# Patient Record
Sex: MALE | Race: OTHER | NOT HISPANIC OR LATINO | URBAN - METROPOLITAN AREA
[De-identification: names, ages, dates, MRNs, and addresses within clinical notes are randomized per-mention and may not be internally consistent; named-entity substitution may affect disease eponyms.]

---

## 2017-03-02 ENCOUNTER — INPATIENT (INPATIENT)
Facility: HOSPITAL | Age: 72
LOS: 3 days | Discharge: HOME | DRG: 191 | End: 2017-03-06
Attending: FAMILY MEDICINE | Admitting: FAMILY MEDICINE
Payer: MEDICARE

## 2017-03-02 VITALS
RESPIRATION RATE: 30 BRPM | SYSTOLIC BLOOD PRESSURE: 177 MMHG | WEIGHT: 160.06 LBS | HEART RATE: 130 BPM | HEIGHT: 67 IN | OXYGEN SATURATION: 89 % | DIASTOLIC BLOOD PRESSURE: 77 MMHG

## 2017-03-02 DIAGNOSIS — Z98.89 OTHER SPECIFIED POSTPROCEDURAL STATES: Chronic | ICD-10-CM

## 2017-03-02 DIAGNOSIS — Z12.11 ENCOUNTER FOR SCREENING FOR MALIGNANT NEOPLASM OF COLON: Chronic | ICD-10-CM

## 2017-03-02 LAB
ALBUMIN SERPL ELPH-MCNC: 3.1 G/DL — LOW (ref 3.5–5)
ALP SERPL-CCNC: 91 U/L — SIGNIFICANT CHANGE UP (ref 40–120)
ALT FLD-CCNC: 27 U/L DA — SIGNIFICANT CHANGE UP (ref 10–60)
ANION GAP SERPL CALC-SCNC: 5 MMOL/L — SIGNIFICANT CHANGE UP (ref 5–17)
APTT BLD: 29.5 SEC — SIGNIFICANT CHANGE UP (ref 27.5–37.4)
AST SERPL-CCNC: 65 U/L — HIGH (ref 10–40)
BASOPHILS # BLD AUTO: 0.2 K/UL — SIGNIFICANT CHANGE UP (ref 0–0.2)
BASOPHILS NFR BLD AUTO: 1.2 % — SIGNIFICANT CHANGE UP (ref 0–2)
BILIRUB SERPL-MCNC: 0.5 MG/DL — SIGNIFICANT CHANGE UP (ref 0.2–1.2)
BUN SERPL-MCNC: 20 MG/DL — HIGH (ref 7–18)
CALCIUM SERPL-MCNC: 8.6 MG/DL — SIGNIFICANT CHANGE UP (ref 8.4–10.5)
CHLORIDE SERPL-SCNC: 99 MMOL/L — SIGNIFICANT CHANGE UP (ref 96–108)
CO2 SERPL-SCNC: 32 MMOL/L — HIGH (ref 22–31)
CREAT SERPL-MCNC: 1.06 MG/DL — SIGNIFICANT CHANGE UP (ref 0.5–1.3)
EOSINOPHIL # BLD AUTO: 0.1 K/UL — SIGNIFICANT CHANGE UP (ref 0–0.5)
EOSINOPHIL NFR BLD AUTO: 0.7 % — SIGNIFICANT CHANGE UP (ref 0–6)
GLUCOSE SERPL-MCNC: 165 MG/DL — HIGH (ref 70–99)
HCT VFR BLD CALC: 46.6 % — SIGNIFICANT CHANGE UP (ref 39–50)
HGB BLD-MCNC: 14.7 G/DL — SIGNIFICANT CHANGE UP (ref 13–17)
INR BLD: 1.08 RATIO — SIGNIFICANT CHANGE UP (ref 0.88–1.16)
LYMPHOCYTES # BLD AUTO: 1.9 K/UL — SIGNIFICANT CHANGE UP (ref 1–3.3)
LYMPHOCYTES # BLD AUTO: 12.7 % — LOW (ref 13–44)
MCHC RBC-ENTMCNC: 29.2 PG — SIGNIFICANT CHANGE UP (ref 27–34)
MCHC RBC-ENTMCNC: 31.5 GM/DL — LOW (ref 32–36)
MCV RBC AUTO: 92.6 FL — SIGNIFICANT CHANGE UP (ref 80–100)
MONOCYTES # BLD AUTO: 1 K/UL — HIGH (ref 0–0.9)
MONOCYTES NFR BLD AUTO: 7.2 % — SIGNIFICANT CHANGE UP (ref 2–14)
NEUTROPHILS # BLD AUTO: 11.4 K/UL — HIGH (ref 1.8–7.4)
NEUTROPHILS NFR BLD AUTO: 78.3 % — HIGH (ref 43–77)
PLATELET # BLD AUTO: 279 K/UL — SIGNIFICANT CHANGE UP (ref 150–400)
POTASSIUM SERPL-MCNC: 5.8 MMOL/L — HIGH (ref 3.5–5.3)
POTASSIUM SERPL-SCNC: 5.8 MMOL/L — HIGH (ref 3.5–5.3)
PROT SERPL-MCNC: 9.1 G/DL — HIGH (ref 6–8.3)
PROTHROM AB SERPL-ACNC: 12.1 SEC — SIGNIFICANT CHANGE UP (ref 10–13.1)
RBC # BLD: 5.03 M/UL — SIGNIFICANT CHANGE UP (ref 4.2–5.8)
RBC # FLD: 14.4 % — SIGNIFICANT CHANGE UP (ref 10.3–14.5)
SODIUM SERPL-SCNC: 136 MMOL/L — SIGNIFICANT CHANGE UP (ref 135–145)
WBC # BLD: 14.6 K/UL — HIGH (ref 3.8–10.5)
WBC # FLD AUTO: 14.6 K/UL — HIGH (ref 3.8–10.5)

## 2017-03-02 PROCEDURE — 71020: CPT | Mod: 26

## 2017-03-02 RX ORDER — IPRATROPIUM/ALBUTEROL SULFATE 18-103MCG
3 AEROSOL WITH ADAPTER (GRAM) INHALATION ONCE
Qty: 0 | Refills: 0 | Status: COMPLETED | OUTPATIENT
Start: 2017-03-02 | End: 2017-03-02

## 2017-03-02 RX ADMIN — Medication 3 MILLILITER(S): at 23:29

## 2017-03-02 RX ADMIN — Medication 3 MILLILITER(S): at 23:20

## 2017-03-02 RX ADMIN — Medication 125 MILLIGRAM(S): at 23:20

## 2017-03-02 NOTE — ED ADULT NURSE NOTE - PMH
Asthma    COPD (chronic obstructive pulmonary disease)    DM (diabetes mellitus)    HTN (hypertension)    Hyperlipemia

## 2017-03-03 DIAGNOSIS — I10 ESSENTIAL (PRIMARY) HYPERTENSION: ICD-10-CM

## 2017-03-03 DIAGNOSIS — I21.4 NON-ST ELEVATION (NSTEMI) MYOCARDIAL INFARCTION: ICD-10-CM

## 2017-03-03 DIAGNOSIS — E11.9 TYPE 2 DIABETES MELLITUS WITHOUT COMPLICATIONS: ICD-10-CM

## 2017-03-03 DIAGNOSIS — G47.33 OBSTRUCTIVE SLEEP APNEA (ADULT) (PEDIATRIC): ICD-10-CM

## 2017-03-03 DIAGNOSIS — E78.5 HYPERLIPIDEMIA, UNSPECIFIED: ICD-10-CM

## 2017-03-03 DIAGNOSIS — R79.89 OTHER SPECIFIED ABNORMAL FINDINGS OF BLOOD CHEMISTRY: ICD-10-CM

## 2017-03-03 DIAGNOSIS — Z41.8 ENCOUNTER FOR OTHER PROCEDURES FOR PURPOSES OTHER THAN REMEDYING HEALTH STATE: ICD-10-CM

## 2017-03-03 DIAGNOSIS — J44.1 CHRONIC OBSTRUCTIVE PULMONARY DISEASE WITH (ACUTE) EXACERBATION: ICD-10-CM

## 2017-03-03 LAB
24R-OH-CALCIDIOL SERPL-MCNC: 16.5 NG/ML — LOW (ref 30–100)
ALBUMIN SERPL ELPH-MCNC: 3.1 G/DL — LOW (ref 3.5–5)
ALP SERPL-CCNC: 77 U/L — SIGNIFICANT CHANGE UP (ref 40–120)
ALT FLD-CCNC: 22 U/L DA — SIGNIFICANT CHANGE UP (ref 10–60)
ANION GAP SERPL CALC-SCNC: 8 MMOL/L — SIGNIFICANT CHANGE UP (ref 5–17)
APTT BLD: 21.8 SEC — LOW (ref 27.5–37.4)
AST SERPL-CCNC: 24 U/L — SIGNIFICANT CHANGE UP (ref 10–40)
BASE EXCESS BLDA CALC-SCNC: 7.3 MMOL/L — HIGH (ref -2–2)
BASOPHILS # BLD AUTO: 0.1 K/UL — SIGNIFICANT CHANGE UP (ref 0–0.2)
BASOPHILS NFR BLD AUTO: 0.5 % — SIGNIFICANT CHANGE UP (ref 0–2)
BILIRUB SERPL-MCNC: 0.3 MG/DL — SIGNIFICANT CHANGE UP (ref 0.2–1.2)
BLOOD GAS COMMENTS ARTERIAL: SIGNIFICANT CHANGE UP
BUN SERPL-MCNC: 19 MG/DL — HIGH (ref 7–18)
CALCIUM SERPL-MCNC: 8.5 MG/DL — SIGNIFICANT CHANGE UP (ref 8.4–10.5)
CHLORIDE SERPL-SCNC: 97 MMOL/L — SIGNIFICANT CHANGE UP (ref 96–108)
CHOLEST SERPL-MCNC: 147 MG/DL — SIGNIFICANT CHANGE UP (ref 10–199)
CK MB BLD-MCNC: 1.1 % — SIGNIFICANT CHANGE UP (ref 0–3.5)
CK MB BLD-MCNC: 1.1 % — SIGNIFICANT CHANGE UP (ref 0–3.5)
CK MB CFR SERPL CALC: 2.7 NG/ML — SIGNIFICANT CHANGE UP (ref 0–3.6)
CK MB CFR SERPL CALC: 2.7 NG/ML — SIGNIFICANT CHANGE UP (ref 0–3.6)
CK SERPL-CCNC: 236 U/L — HIGH (ref 35–232)
CK SERPL-CCNC: 250 U/L — HIGH (ref 35–232)
CK SERPL-CCNC: 382 U/L — HIGH (ref 35–232)
CO2 SERPL-SCNC: 35 MMOL/L — HIGH (ref 22–31)
CREAT SERPL-MCNC: 1.12 MG/DL — SIGNIFICANT CHANGE UP (ref 0.5–1.3)
EOSINOPHIL # BLD AUTO: 0 K/UL — SIGNIFICANT CHANGE UP (ref 0–0.5)
EOSINOPHIL NFR BLD AUTO: 0 % — SIGNIFICANT CHANGE UP (ref 0–6)
FOLATE SERPL-MCNC: 14.6 NG/ML — SIGNIFICANT CHANGE UP (ref 4.8–24.2)
GLUCOSE SERPL-MCNC: 210 MG/DL — HIGH (ref 70–99)
HBA1C BLD-MCNC: 7.4 % — HIGH (ref 4–5.6)
HCO3 BLDA-SCNC: 32 MMOL/L — HIGH (ref 23–27)
HCT VFR BLD CALC: 40.4 % — SIGNIFICANT CHANGE UP (ref 39–50)
HDLC SERPL-MCNC: 48 MG/DL — SIGNIFICANT CHANGE UP (ref 40–125)
HGB BLD-MCNC: 13.1 G/DL — SIGNIFICANT CHANGE UP (ref 13–17)
HOROWITZ INDEX BLDA+IHG-RTO: 28 — SIGNIFICANT CHANGE UP
INR BLD: 1.11 RATIO — SIGNIFICANT CHANGE UP (ref 0.88–1.16)
LACTATE SERPL-SCNC: 2.1 MMOL/L — HIGH (ref 0.7–2)
LIPID PNL WITH DIRECT LDL SERPL: 82 MG/DL — SIGNIFICANT CHANGE UP
LYMPHOCYTES # BLD AUTO: 0.5 K/UL — LOW (ref 1–3.3)
LYMPHOCYTES # BLD AUTO: 3.9 % — LOW (ref 13–44)
MAGNESIUM SERPL-MCNC: 1.7 MG/DL — LOW (ref 1.8–2.4)
MCHC RBC-ENTMCNC: 29.6 PG — SIGNIFICANT CHANGE UP (ref 27–34)
MCHC RBC-ENTMCNC: 32.4 GM/DL — SIGNIFICANT CHANGE UP (ref 32–36)
MCV RBC AUTO: 91.2 FL — SIGNIFICANT CHANGE UP (ref 80–100)
MONOCYTES # BLD AUTO: 0.3 K/UL — SIGNIFICANT CHANGE UP (ref 0–0.9)
MONOCYTES NFR BLD AUTO: 2.1 % — SIGNIFICANT CHANGE UP (ref 2–14)
NEUTROPHILS # BLD AUTO: 11.8 K/UL — HIGH (ref 1.8–7.4)
NEUTROPHILS NFR BLD AUTO: 93.6 % — HIGH (ref 43–77)
NT-PROBNP SERPL-SCNC: 1464 PG/ML — HIGH (ref 0–125)
NT-PROBNP SERPL-SCNC: 1735 PG/ML — HIGH (ref 0–125)
PCO2 BLDA: 49 MMHG — HIGH (ref 32–46)
PH BLDA: 7.44 — SIGNIFICANT CHANGE UP (ref 7.35–7.45)
PLATELET # BLD AUTO: 237 K/UL — SIGNIFICANT CHANGE UP (ref 150–400)
PO2 BLDA: 68 MMHG — LOW (ref 74–108)
POTASSIUM SERPL-MCNC: 3.4 MMOL/L — LOW (ref 3.5–5.3)
POTASSIUM SERPL-SCNC: 3.4 MMOL/L — LOW (ref 3.5–5.3)
PROT SERPL-MCNC: 8.2 G/DL — SIGNIFICANT CHANGE UP (ref 6–8.3)
PROTHROM AB SERPL-ACNC: 12.5 SEC — SIGNIFICANT CHANGE UP (ref 10–13.1)
RAPID RVP RESULT: SIGNIFICANT CHANGE UP
RBC # BLD: 4.43 M/UL — SIGNIFICANT CHANGE UP (ref 4.2–5.8)
RBC # FLD: 14 % — SIGNIFICANT CHANGE UP (ref 10.3–14.5)
SAO2 % BLDA: 94 % — SIGNIFICANT CHANGE UP (ref 92–96)
SODIUM SERPL-SCNC: 140 MMOL/L — SIGNIFICANT CHANGE UP (ref 135–145)
TOTAL CHOLESTEROL/HDL RATIO MEASUREMENT: 3.1 RATIO — LOW (ref 3.4–9.6)
TRIGL SERPL-MCNC: 86 MG/DL — SIGNIFICANT CHANGE UP (ref 10–149)
TROPONIN I SERPL-MCNC: 0.36 NG/ML — HIGH (ref 0–0.04)
TROPONIN I SERPL-MCNC: 0.51 NG/ML — HIGH (ref 0–0.04)
TROPONIN I SERPL-MCNC: 0.74 NG/ML — HIGH (ref 0–0.04)
TSH SERPL-MCNC: 0.42 UU/ML — SIGNIFICANT CHANGE UP (ref 0.34–4.82)
VIT B12 SERPL-MCNC: 649 PG/ML — SIGNIFICANT CHANGE UP (ref 243–894)
WBC # BLD: 12.6 K/UL — HIGH (ref 3.8–10.5)
WBC # FLD AUTO: 12.6 K/UL — HIGH (ref 3.8–10.5)

## 2017-03-03 PROCEDURE — 99285 EMERGENCY DEPT VISIT HI MDM: CPT | Mod: 25

## 2017-03-03 RX ORDER — INSULIN HUMAN 100 [IU]/ML
10 INJECTION, SOLUTION SUBCUTANEOUS ONCE
Qty: 0 | Refills: 0 | Status: COMPLETED | OUTPATIENT
Start: 2017-03-03 | End: 2017-03-03

## 2017-03-03 RX ORDER — CALCIUM GLUCONATE 100 MG/ML
1 VIAL (ML) INTRAVENOUS ONCE
Qty: 0 | Refills: 0 | Status: COMPLETED | OUTPATIENT
Start: 2017-03-03 | End: 2017-03-03

## 2017-03-03 RX ORDER — SODIUM BICARBONATE 1 MEQ/ML
50 SYRINGE (ML) INTRAVENOUS ONCE
Qty: 0 | Refills: 0 | Status: COMPLETED | OUTPATIENT
Start: 2017-03-03 | End: 2017-03-03

## 2017-03-03 RX ORDER — ACETAMINOPHEN 500 MG
650 TABLET ORAL EVERY 6 HOURS
Qty: 0 | Refills: 0 | Status: DISCONTINUED | OUTPATIENT
Start: 2017-03-03 | End: 2017-03-06

## 2017-03-03 RX ORDER — SIMVASTATIN 20 MG/1
20 TABLET, FILM COATED ORAL AT BEDTIME
Qty: 0 | Refills: 0 | Status: DISCONTINUED | OUTPATIENT
Start: 2017-03-03 | End: 2017-03-06

## 2017-03-03 RX ORDER — INSULIN LISPRO 100/ML
VIAL (ML) SUBCUTANEOUS
Qty: 0 | Refills: 0 | Status: DISCONTINUED | OUTPATIENT
Start: 2017-03-03 | End: 2017-03-04

## 2017-03-03 RX ORDER — NITROGLYCERIN 6.5 MG
0.4 CAPSULE, EXTENDED RELEASE ORAL ONCE
Qty: 0 | Refills: 0 | Status: COMPLETED | OUTPATIENT
Start: 2017-03-03 | End: 2017-03-03

## 2017-03-03 RX ORDER — GABAPENTIN 400 MG/1
400 CAPSULE ORAL
Qty: 0 | Refills: 0 | Status: DISCONTINUED | OUTPATIENT
Start: 2017-03-03 | End: 2017-03-06

## 2017-03-03 RX ORDER — IPRATROPIUM/ALBUTEROL SULFATE 18-103MCG
3 AEROSOL WITH ADAPTER (GRAM) INHALATION EVERY 6 HOURS
Qty: 0 | Refills: 0 | Status: DISCONTINUED | OUTPATIENT
Start: 2017-03-03 | End: 2017-03-04

## 2017-03-03 RX ORDER — ENOXAPARIN SODIUM 100 MG/ML
40 INJECTION SUBCUTANEOUS DAILY
Qty: 0 | Refills: 0 | Status: DISCONTINUED | OUTPATIENT
Start: 2017-03-03 | End: 2017-03-06

## 2017-03-03 RX ORDER — MAGNESIUM SULFATE 500 MG/ML
1 VIAL (ML) INJECTION ONCE
Qty: 0 | Refills: 0 | Status: COMPLETED | OUTPATIENT
Start: 2017-03-03 | End: 2017-03-03

## 2017-03-03 RX ORDER — DEXTROSE 50 % IN WATER 50 %
50 SYRINGE (ML) INTRAVENOUS ONCE
Qty: 0 | Refills: 0 | Status: COMPLETED | OUTPATIENT
Start: 2017-03-03 | End: 2017-03-03

## 2017-03-03 RX ORDER — FUROSEMIDE 40 MG
40 TABLET ORAL ONCE
Qty: 0 | Refills: 0 | Status: COMPLETED | OUTPATIENT
Start: 2017-03-03 | End: 2017-03-03

## 2017-03-03 RX ORDER — ASPIRIN/CALCIUM CARB/MAGNESIUM 324 MG
162 TABLET ORAL DAILY
Qty: 0 | Refills: 0 | Status: DISCONTINUED | OUTPATIENT
Start: 2017-03-03 | End: 2017-03-06

## 2017-03-03 RX ORDER — POTASSIUM CHLORIDE 20 MEQ
40 PACKET (EA) ORAL ONCE
Qty: 0 | Refills: 0 | Status: COMPLETED | OUTPATIENT
Start: 2017-03-03 | End: 2017-03-03

## 2017-03-03 RX ORDER — BUDESONIDE AND FORMOTEROL FUMARATE DIHYDRATE 160; 4.5 UG/1; UG/1
2 AEROSOL RESPIRATORY (INHALATION)
Qty: 0 | Refills: 0 | Status: DISCONTINUED | OUTPATIENT
Start: 2017-03-03 | End: 2017-03-06

## 2017-03-03 RX ORDER — NICOTINE POLACRILEX 2 MG
1 GUM BUCCAL DAILY
Qty: 0 | Refills: 0 | Status: DISCONTINUED | OUTPATIENT
Start: 2017-03-03 | End: 2017-03-06

## 2017-03-03 RX ORDER — LOSARTAN POTASSIUM 100 MG/1
50 TABLET, FILM COATED ORAL DAILY
Qty: 0 | Refills: 0 | Status: DISCONTINUED | OUTPATIENT
Start: 2017-03-03 | End: 2017-03-06

## 2017-03-03 RX ADMIN — GABAPENTIN 400 MILLIGRAM(S): 400 CAPSULE ORAL at 11:44

## 2017-03-03 RX ADMIN — GABAPENTIN 400 MILLIGRAM(S): 400 CAPSULE ORAL at 05:25

## 2017-03-03 RX ADMIN — Medication 3 MILLILITER(S): at 15:40

## 2017-03-03 RX ADMIN — Medication 5: at 21:43

## 2017-03-03 RX ADMIN — Medication 2: at 16:44

## 2017-03-03 RX ADMIN — Medication 40 MILLIGRAM(S): at 05:25

## 2017-03-03 RX ADMIN — Medication 1 PATCH: at 05:05

## 2017-03-03 RX ADMIN — INSULIN HUMAN 10 UNIT(S): 100 INJECTION, SOLUTION SUBCUTANEOUS at 00:33

## 2017-03-03 RX ADMIN — Medication 50 MILLILITER(S): at 00:32

## 2017-03-03 RX ADMIN — GABAPENTIN 400 MILLIGRAM(S): 400 CAPSULE ORAL at 23:31

## 2017-03-03 RX ADMIN — Medication 3 MILLILITER(S): at 20:55

## 2017-03-03 RX ADMIN — LOSARTAN POTASSIUM 50 MILLIGRAM(S): 100 TABLET, FILM COATED ORAL at 05:36

## 2017-03-03 RX ADMIN — Medication 3 MILLILITER(S): at 10:01

## 2017-03-03 RX ADMIN — Medication 4: at 11:44

## 2017-03-03 RX ADMIN — Medication 200 GRAM(S): at 00:33

## 2017-03-03 RX ADMIN — Medication 1: at 08:32

## 2017-03-03 RX ADMIN — Medication 100 GRAM(S): at 11:27

## 2017-03-03 RX ADMIN — Medication 40 MILLIGRAM(S): at 17:34

## 2017-03-03 RX ADMIN — Medication 50 MILLIEQUIVALENT(S): at 00:32

## 2017-03-03 RX ADMIN — Medication 0.4 MILLIGRAM(S): at 00:33

## 2017-03-03 RX ADMIN — ENOXAPARIN SODIUM 40 MILLIGRAM(S): 100 INJECTION SUBCUTANEOUS at 11:36

## 2017-03-03 RX ADMIN — Medication 40 MILLIGRAM(S): at 00:33

## 2017-03-03 RX ADMIN — SIMVASTATIN 20 MILLIGRAM(S): 20 TABLET, FILM COATED ORAL at 21:44

## 2017-03-03 RX ADMIN — GABAPENTIN 400 MILLIGRAM(S): 400 CAPSULE ORAL at 17:34

## 2017-03-03 RX ADMIN — Medication 40 MILLIGRAM(S): at 11:27

## 2017-03-03 RX ADMIN — Medication 40 MILLIEQUIVALENT(S): at 08:32

## 2017-03-03 RX ADMIN — Medication 162 MILLIGRAM(S): at 02:37

## 2017-03-03 RX ADMIN — Medication 3 MILLILITER(S): at 02:37

## 2017-03-03 NOTE — ED ADULT NURSE REASSESSMENT NOTE - NS ED NURSE REASSESS COMMENT FT1
Patient was endorsed to RN Daniella hemodynamically stable and with no complaints. Patient refused bipap. ON 2L O2.

## 2017-03-03 NOTE — ED PROVIDER NOTE - CARE PLAN
Principal Discharge DX:	NSTEMI (non-ST elevated myocardial infarction)  Secondary Diagnosis:	COPD (chronic obstructive pulmonary disease)  Secondary Diagnosis:	CHF (congestive heart failure)

## 2017-03-03 NOTE — ED PROVIDER NOTE - OBJECTIVE STATEMENT
72 y/o M with PMHx of COPD, DM, asthma, HTN, HLD, presents to ED c/o shortness of breath x yesterday with associated coughing productive of yellow sputum and wheezing. Pt uses albuterol pump at home, with no relief in this episode. Pt is not on home O2. Denies any sick contacts or recent travel. Pt endorses some chest tightness, and notes similarity of episodes. Denies leg swelling or any other complaints. NKDA.

## 2017-03-03 NOTE — ED PROVIDER NOTE - NS ED MD SCRIBE ATTENDING SCRIBE SECTIONS
VITAL SIGNS( Pullset)/PAST MEDICAL/SURGICAL/SOCIAL HISTORY/PHYSICAL EXAM/DISPOSITION/HISTORY OF PRESENT ILLNESS/REVIEW OF SYSTEMS

## 2017-03-03 NOTE — H&P ADULT. - ASSESSMENT
70 y/o Male from home, lives with family , active smoker w/ PMHx COPD (never intubated, not on home oxygen), YUE on cpap at [facility], DM, asthma, HTN, HLD, presented to ED c/o worsening productive cough, wheezing and  shortness of breath x 2 months admitted for COPD exacerbation 2/2 to acute bronchitis

## 2017-03-03 NOTE — H&P ADULT. - PROBLEM SELECTOR PLAN 2
elevated troponin, Patient denies any chest pain, but is diabetic  underlying ?CHF  f/up echo  admit to tele, r/o ACs  trend cardiac enzymes  f/up Cardio: Dr. beaver

## 2017-03-03 NOTE — DIETITIAN INITIAL EVALUATION ADULT. - OTHER INFO
Patient visited for blood glucose >200. Patient from home lives with family. Pt. visited, reports appetite fair [id number] eating 2meals with a snack in between meals due to his medical condition PTA, stated  Lbs & stable for 2yrs, dx. as diabetic 5yrs ago & control blood glucose with med/metformin at home, accepted diet/diabetic education & copy of My Plate Planner & receptive, in house intake 50% & tolerating, skin intact.

## 2017-03-03 NOTE — ED PROVIDER NOTE - CARDIAC, MLM
Normal rate, regular rhythm.  Heart sounds S1, S2.  No murmurs, rubs or gallops. No peripheral edema

## 2017-03-03 NOTE — ED PROVIDER NOTE - MEDICAL DECISION MAKING DETAILS
70 y/o M with likely COPD exacerbation. Will give duonebs, steroids and check chest x-ray, labs and EKG. Consider admission if little to no improvement after treatment.

## 2017-03-03 NOTE — H&P ADULT. - NEUROLOGICAL DETAILS
alert and oriented x 3/no spontaneous movement/responds to pain/deep reflexes intact/sensation intact/normal strength/cranial nerves intact/superficial reflexes intact/responds to verbal commands

## 2017-03-03 NOTE — H&P ADULT. - HISTORY OF PRESENT ILLNESS
70 y/o M with PMHx of COPD, DM, asthma, HTN, HLD, presents to ED c/o shortness of breath x yesterday with associated coughing productive of yellow sputum and wheezing. Pt uses albuterol pump at home, with no relief in this episode. Pt is not on home O2. Denies any sick contacts or recent travel. Pt endorses some chest tightness, and notes similarity of episodes. Denies leg swelling or any other complaints. NKDA. 70 y/o Male from home, lives with family , active smoker w/ PMHx COPD (never intubated, not on home oxygen), YUE on cpap at [facility], DM, asthma, HTN, HLD, presented to ED c/o worsening productive cough, wheezing and  shortness of breath x 2 months. Patient states that he was asymptomatic 2 months ago and started having cough productive of yellowish sputum, and wheezing on and off. Patient states that he has been using albuterol inhaler at home which helps to some extent, but recently has not been helping much. Patient complains of SOB, orthopnea, no PND. Denies any fever/chills, abdominal pain, diaphoresis, Chest pain or any other complaints. Denies any sick contacts or recent travel. Pt endorses some chest tightness, and notes similarity of episodes. Denies leg swelling or any other complaints.     Ed course:  Patient was found to be sinus tachy to about 123bpm, was wheezing one examination, labs concerning for elevated troponin, elevated WBC count

## 2017-03-03 NOTE — H&P ADULT. - PROBLEM SELECTOR PLAN 1
COPD exacerbation 2/2 to Acute bronchitis, RVP panel: Neg  c/w duoneb   c/w steroids and taper as needed  c/w levaquin   Monitor for any fever  f/up blood cultures  Smoking cessation, Patient is active smoker with hx of 1pack per week x 60 years  nicotine patch: willing to quit

## 2017-03-03 NOTE — H&P ADULT. - NEUROLOGICAL
negative Alert & oriented; no sensory, motor or coordination deficits, normal reflexes detailed exam

## 2017-03-04 LAB
ALBUMIN SERPL ELPH-MCNC: 2.6 G/DL — LOW (ref 3.5–5)
ALP SERPL-CCNC: 79 U/L — SIGNIFICANT CHANGE UP (ref 40–120)
ALT FLD-CCNC: 32 U/L DA — SIGNIFICANT CHANGE UP (ref 10–60)
ANION GAP SERPL CALC-SCNC: 6 MMOL/L — SIGNIFICANT CHANGE UP (ref 5–17)
AST SERPL-CCNC: 25 U/L — SIGNIFICANT CHANGE UP (ref 10–40)
BILIRUB SERPL-MCNC: 0.2 MG/DL — SIGNIFICANT CHANGE UP (ref 0.2–1.2)
BUN SERPL-MCNC: 31 MG/DL — HIGH (ref 7–18)
CALCIUM SERPL-MCNC: 8.4 MG/DL — SIGNIFICANT CHANGE UP (ref 8.4–10.5)
CHLORIDE SERPL-SCNC: 98 MMOL/L — SIGNIFICANT CHANGE UP (ref 96–108)
CO2 SERPL-SCNC: 33 MMOL/L — HIGH (ref 22–31)
CREAT SERPL-MCNC: 0.98 MG/DL — SIGNIFICANT CHANGE UP (ref 0.5–1.3)
GLUCOSE SERPL-MCNC: 305 MG/DL — HIGH (ref 70–99)
HCT VFR BLD CALC: 41.4 % — SIGNIFICANT CHANGE UP (ref 39–50)
HGB BLD-MCNC: 13 G/DL — SIGNIFICANT CHANGE UP (ref 13–17)
LACTATE SERPL-SCNC: 1.1 MMOL/L — SIGNIFICANT CHANGE UP (ref 0.7–2)
MAGNESIUM SERPL-MCNC: 2.2 MG/DL — SIGNIFICANT CHANGE UP (ref 1.8–2.4)
MCHC RBC-ENTMCNC: 29.3 PG — SIGNIFICANT CHANGE UP (ref 27–34)
MCHC RBC-ENTMCNC: 31.5 GM/DL — LOW (ref 32–36)
MCV RBC AUTO: 93.2 FL — SIGNIFICANT CHANGE UP (ref 80–100)
PHOSPHATE SERPL-MCNC: 2.6 MG/DL — SIGNIFICANT CHANGE UP (ref 2.5–4.5)
PLATELET # BLD AUTO: 258 K/UL — SIGNIFICANT CHANGE UP (ref 150–400)
POTASSIUM SERPL-MCNC: 4.2 MMOL/L — SIGNIFICANT CHANGE UP (ref 3.5–5.3)
POTASSIUM SERPL-SCNC: 4.2 MMOL/L — SIGNIFICANT CHANGE UP (ref 3.5–5.3)
PROT SERPL-MCNC: 7.7 G/DL — SIGNIFICANT CHANGE UP (ref 6–8.3)
RBC # BLD: 4.44 M/UL — SIGNIFICANT CHANGE UP (ref 4.2–5.8)
RBC # FLD: 14.5 % — SIGNIFICANT CHANGE UP (ref 10.3–14.5)
SODIUM SERPL-SCNC: 137 MMOL/L — SIGNIFICANT CHANGE UP (ref 135–145)
TROPONIN I SERPL-MCNC: 0.25 NG/ML — HIGH (ref 0–0.04)
WBC # BLD: 12.2 K/UL — HIGH (ref 3.8–10.5)
WBC # FLD AUTO: 12.2 K/UL — HIGH (ref 3.8–10.5)

## 2017-03-04 RX ORDER — SODIUM CHLORIDE 9 MG/ML
1000 INJECTION, SOLUTION INTRAVENOUS
Qty: 0 | Refills: 0 | Status: DISCONTINUED | OUTPATIENT
Start: 2017-03-04 | End: 2017-03-06

## 2017-03-04 RX ORDER — DEXTROSE 50 % IN WATER 50 %
1 SYRINGE (ML) INTRAVENOUS ONCE
Qty: 0 | Refills: 0 | Status: DISCONTINUED | OUTPATIENT
Start: 2017-03-04 | End: 2017-03-06

## 2017-03-04 RX ORDER — INSULIN LISPRO 100/ML
VIAL (ML) SUBCUTANEOUS
Qty: 0 | Refills: 0 | Status: DISCONTINUED | OUTPATIENT
Start: 2017-03-04 | End: 2017-03-06

## 2017-03-04 RX ORDER — ALBUTEROL 90 UG/1
2 AEROSOL, METERED ORAL EVERY 6 HOURS
Qty: 0 | Refills: 0 | Status: DISCONTINUED | OUTPATIENT
Start: 2017-03-04 | End: 2017-03-06

## 2017-03-04 RX ORDER — DEXTROSE 50 % IN WATER 50 %
12.5 SYRINGE (ML) INTRAVENOUS ONCE
Qty: 0 | Refills: 0 | Status: DISCONTINUED | OUTPATIENT
Start: 2017-03-04 | End: 2017-03-06

## 2017-03-04 RX ORDER — DEXTROSE 50 % IN WATER 50 %
25 SYRINGE (ML) INTRAVENOUS ONCE
Qty: 0 | Refills: 0 | Status: DISCONTINUED | OUTPATIENT
Start: 2017-03-04 | End: 2017-03-06

## 2017-03-04 RX ORDER — GLUCAGON INJECTION, SOLUTION 0.5 MG/.1ML
1 INJECTION, SOLUTION SUBCUTANEOUS ONCE
Qty: 0 | Refills: 0 | Status: DISCONTINUED | OUTPATIENT
Start: 2017-03-04 | End: 2017-03-06

## 2017-03-04 RX ADMIN — LOSARTAN POTASSIUM 50 MILLIGRAM(S): 100 TABLET, FILM COATED ORAL at 05:38

## 2017-03-04 RX ADMIN — Medication 8: at 17:00

## 2017-03-04 RX ADMIN — Medication 1 PATCH: at 11:37

## 2017-03-04 RX ADMIN — Medication 3 MILLILITER(S): at 08:47

## 2017-03-04 RX ADMIN — Medication 162 MILLIGRAM(S): at 11:36

## 2017-03-04 RX ADMIN — BUDESONIDE AND FORMOTEROL FUMARATE DIHYDRATE 2 PUFF(S): 160; 4.5 AEROSOL RESPIRATORY (INHALATION) at 22:13

## 2017-03-04 RX ADMIN — BUDESONIDE AND FORMOTEROL FUMARATE DIHYDRATE 2 PUFF(S): 160; 4.5 AEROSOL RESPIRATORY (INHALATION) at 00:12

## 2017-03-04 RX ADMIN — Medication 40 MILLIGRAM(S): at 05:37

## 2017-03-04 RX ADMIN — SIMVASTATIN 20 MILLIGRAM(S): 20 TABLET, FILM COATED ORAL at 22:13

## 2017-03-04 RX ADMIN — Medication 1 PATCH: at 05:36

## 2017-03-04 RX ADMIN — Medication 3 MILLILITER(S): at 14:44

## 2017-03-04 RX ADMIN — Medication 6: at 11:36

## 2017-03-04 RX ADMIN — BUDESONIDE AND FORMOTEROL FUMARATE DIHYDRATE 2 PUFF(S): 160; 4.5 AEROSOL RESPIRATORY (INHALATION) at 08:37

## 2017-03-04 RX ADMIN — Medication 3: at 08:35

## 2017-03-04 RX ADMIN — GABAPENTIN 400 MILLIGRAM(S): 400 CAPSULE ORAL at 17:54

## 2017-03-04 RX ADMIN — ENOXAPARIN SODIUM 40 MILLIGRAM(S): 100 INJECTION SUBCUTANEOUS at 11:36

## 2017-03-04 RX ADMIN — Medication 40 MILLIGRAM(S): at 15:47

## 2017-03-04 RX ADMIN — GABAPENTIN 400 MILLIGRAM(S): 400 CAPSULE ORAL at 23:26

## 2017-03-04 RX ADMIN — GABAPENTIN 400 MILLIGRAM(S): 400 CAPSULE ORAL at 11:37

## 2017-03-04 RX ADMIN — GABAPENTIN 400 MILLIGRAM(S): 400 CAPSULE ORAL at 05:38

## 2017-03-05 LAB
ALBUMIN SERPL ELPH-MCNC: 2.9 G/DL — LOW (ref 3.5–5)
ALP SERPL-CCNC: 79 U/L — SIGNIFICANT CHANGE UP (ref 40–120)
ALT FLD-CCNC: 45 U/L DA — SIGNIFICANT CHANGE UP (ref 10–60)
ANION GAP SERPL CALC-SCNC: 5 MMOL/L — SIGNIFICANT CHANGE UP (ref 5–17)
AST SERPL-CCNC: 25 U/L — SIGNIFICANT CHANGE UP (ref 10–40)
BILIRUB SERPL-MCNC: 0.2 MG/DL — SIGNIFICANT CHANGE UP (ref 0.2–1.2)
BUN SERPL-MCNC: 30 MG/DL — HIGH (ref 7–18)
CALCIUM SERPL-MCNC: 8.7 MG/DL — SIGNIFICANT CHANGE UP (ref 8.4–10.5)
CHLORIDE SERPL-SCNC: 99 MMOL/L — SIGNIFICANT CHANGE UP (ref 96–108)
CO2 SERPL-SCNC: 35 MMOL/L — HIGH (ref 22–31)
CREAT SERPL-MCNC: 1.03 MG/DL — SIGNIFICANT CHANGE UP (ref 0.5–1.3)
GLUCOSE SERPL-MCNC: 249 MG/DL — HIGH (ref 70–99)
HCT VFR BLD CALC: 43.8 % — SIGNIFICANT CHANGE UP (ref 39–50)
HGB BLD-MCNC: 13.4 G/DL — SIGNIFICANT CHANGE UP (ref 13–17)
MAGNESIUM SERPL-MCNC: 2.3 MG/DL — SIGNIFICANT CHANGE UP (ref 1.8–2.4)
MCHC RBC-ENTMCNC: 28.7 PG — SIGNIFICANT CHANGE UP (ref 27–34)
MCHC RBC-ENTMCNC: 30.5 GM/DL — LOW (ref 32–36)
MCV RBC AUTO: 93.9 FL — SIGNIFICANT CHANGE UP (ref 80–100)
PHOSPHATE SERPL-MCNC: 2.6 MG/DL — SIGNIFICANT CHANGE UP (ref 2.5–4.5)
PLATELET # BLD AUTO: 286 K/UL — SIGNIFICANT CHANGE UP (ref 150–400)
POTASSIUM SERPL-MCNC: 4.5 MMOL/L — SIGNIFICANT CHANGE UP (ref 3.5–5.3)
POTASSIUM SERPL-SCNC: 4.5 MMOL/L — SIGNIFICANT CHANGE UP (ref 3.5–5.3)
PROT SERPL-MCNC: 7.9 G/DL — SIGNIFICANT CHANGE UP (ref 6–8.3)
RBC # BLD: 4.67 M/UL — SIGNIFICANT CHANGE UP (ref 4.2–5.8)
RBC # FLD: 14.3 % — SIGNIFICANT CHANGE UP (ref 10.3–14.5)
SODIUM SERPL-SCNC: 139 MMOL/L — SIGNIFICANT CHANGE UP (ref 135–145)
TROPONIN I SERPL-MCNC: 0.21 NG/ML — HIGH (ref 0–0.04)
WBC # BLD: 12 K/UL — HIGH (ref 3.8–10.5)
WBC # FLD AUTO: 12 K/UL — HIGH (ref 3.8–10.5)

## 2017-03-05 RX ADMIN — GABAPENTIN 400 MILLIGRAM(S): 400 CAPSULE ORAL at 11:16

## 2017-03-05 RX ADMIN — LOSARTAN POTASSIUM 50 MILLIGRAM(S): 100 TABLET, FILM COATED ORAL at 05:38

## 2017-03-05 RX ADMIN — GABAPENTIN 400 MILLIGRAM(S): 400 CAPSULE ORAL at 23:38

## 2017-03-05 RX ADMIN — Medication 6: at 08:30

## 2017-03-05 RX ADMIN — SIMVASTATIN 20 MILLIGRAM(S): 20 TABLET, FILM COATED ORAL at 21:31

## 2017-03-05 RX ADMIN — Medication 162 MILLIGRAM(S): at 11:16

## 2017-03-05 RX ADMIN — ALBUTEROL 2 PUFF(S): 90 AEROSOL, METERED ORAL at 14:28

## 2017-03-05 RX ADMIN — GABAPENTIN 400 MILLIGRAM(S): 400 CAPSULE ORAL at 17:34

## 2017-03-05 RX ADMIN — Medication 100 MILLIGRAM(S): at 23:38

## 2017-03-05 RX ADMIN — Medication 40 MILLIGRAM(S): at 05:38

## 2017-03-05 RX ADMIN — ENOXAPARIN SODIUM 40 MILLIGRAM(S): 100 INJECTION SUBCUTANEOUS at 11:17

## 2017-03-05 RX ADMIN — Medication 1 PATCH: at 11:16

## 2017-03-05 RX ADMIN — Medication 650 MILLIGRAM(S): at 03:06

## 2017-03-05 RX ADMIN — ALBUTEROL 2 PUFF(S): 90 AEROSOL, METERED ORAL at 04:03

## 2017-03-05 RX ADMIN — Medication 4: at 11:16

## 2017-03-05 RX ADMIN — BUDESONIDE AND FORMOTEROL FUMARATE DIHYDRATE 2 PUFF(S): 160; 4.5 AEROSOL RESPIRATORY (INHALATION) at 21:31

## 2017-03-05 RX ADMIN — GABAPENTIN 400 MILLIGRAM(S): 400 CAPSULE ORAL at 05:38

## 2017-03-05 RX ADMIN — BUDESONIDE AND FORMOTEROL FUMARATE DIHYDRATE 2 PUFF(S): 160; 4.5 AEROSOL RESPIRATORY (INHALATION) at 11:17

## 2017-03-05 RX ADMIN — ALBUTEROL 2 PUFF(S): 90 AEROSOL, METERED ORAL at 09:00

## 2017-03-05 RX ADMIN — Medication 1 PATCH: at 11:17

## 2017-03-05 RX ADMIN — Medication 650 MILLIGRAM(S): at 02:06

## 2017-03-06 ENCOUNTER — TRANSCRIPTION ENCOUNTER (OUTPATIENT)
Age: 72
End: 2017-03-06

## 2017-03-06 VITALS
DIASTOLIC BLOOD PRESSURE: 69 MMHG | OXYGEN SATURATION: 96 % | RESPIRATION RATE: 18 BRPM | TEMPERATURE: 97 F | SYSTOLIC BLOOD PRESSURE: 130 MMHG | HEART RATE: 104 BPM

## 2017-03-06 LAB
ALBUMIN SERPL ELPH-MCNC: 2.8 G/DL — LOW (ref 3.5–5)
ALP SERPL-CCNC: 78 U/L — SIGNIFICANT CHANGE UP (ref 40–120)
ALT FLD-CCNC: 51 U/L DA — SIGNIFICANT CHANGE UP (ref 10–60)
ANION GAP SERPL CALC-SCNC: 6 MMOL/L — SIGNIFICANT CHANGE UP (ref 5–17)
AST SERPL-CCNC: 27 U/L — SIGNIFICANT CHANGE UP (ref 10–40)
BILIRUB SERPL-MCNC: 0.3 MG/DL — SIGNIFICANT CHANGE UP (ref 0.2–1.2)
BUN SERPL-MCNC: 22 MG/DL — HIGH (ref 7–18)
CALCIUM SERPL-MCNC: 8.8 MG/DL — SIGNIFICANT CHANGE UP (ref 8.4–10.5)
CHLORIDE SERPL-SCNC: 102 MMOL/L — SIGNIFICANT CHANGE UP (ref 96–108)
CO2 SERPL-SCNC: 32 MMOL/L — HIGH (ref 22–31)
CREAT SERPL-MCNC: 0.78 MG/DL — SIGNIFICANT CHANGE UP (ref 0.5–1.3)
GLUCOSE SERPL-MCNC: 124 MG/DL — HIGH (ref 70–99)
HCT VFR BLD CALC: 44.1 % — SIGNIFICANT CHANGE UP (ref 39–50)
HGB BLD-MCNC: 14 G/DL — SIGNIFICANT CHANGE UP (ref 13–17)
MAGNESIUM SERPL-MCNC: 1.9 MG/DL — SIGNIFICANT CHANGE UP (ref 1.8–2.4)
MCHC RBC-ENTMCNC: 29.5 PG — SIGNIFICANT CHANGE UP (ref 27–34)
MCHC RBC-ENTMCNC: 31.9 GM/DL — LOW (ref 32–36)
MCV RBC AUTO: 92.6 FL — SIGNIFICANT CHANGE UP (ref 80–100)
PHOSPHATE SERPL-MCNC: 2.2 MG/DL — LOW (ref 2.5–4.5)
PLATELET # BLD AUTO: 298 K/UL — SIGNIFICANT CHANGE UP (ref 150–400)
POTASSIUM SERPL-MCNC: 4 MMOL/L — SIGNIFICANT CHANGE UP (ref 3.5–5.3)
POTASSIUM SERPL-SCNC: 4 MMOL/L — SIGNIFICANT CHANGE UP (ref 3.5–5.3)
PROT SERPL-MCNC: 7.5 G/DL — SIGNIFICANT CHANGE UP (ref 6–8.3)
RBC # BLD: 4.76 M/UL — SIGNIFICANT CHANGE UP (ref 4.2–5.8)
RBC # FLD: 14.2 % — SIGNIFICANT CHANGE UP (ref 10.3–14.5)
SODIUM SERPL-SCNC: 140 MMOL/L — SIGNIFICANT CHANGE UP (ref 135–145)
TROPONIN I SERPL-MCNC: 0.18 NG/ML — HIGH (ref 0–0.04)
WBC # BLD: 13 K/UL — HIGH (ref 3.8–10.5)
WBC # FLD AUTO: 13 K/UL — HIGH (ref 3.8–10.5)

## 2017-03-06 PROCEDURE — 83735 ASSAY OF MAGNESIUM: CPT

## 2017-03-06 PROCEDURE — 82553 CREATINE MB FRACTION: CPT

## 2017-03-06 PROCEDURE — 85027 COMPLETE CBC AUTOMATED: CPT

## 2017-03-06 PROCEDURE — 84443 ASSAY THYROID STIM HORMONE: CPT

## 2017-03-06 PROCEDURE — 78452 HT MUSCLE IMAGE SPECT MULT: CPT

## 2017-03-06 PROCEDURE — 85610 PROTHROMBIN TIME: CPT

## 2017-03-06 PROCEDURE — 87581 M.PNEUMON DNA AMP PROBE: CPT

## 2017-03-06 PROCEDURE — 99285 EMERGENCY DEPT VISIT HI MDM: CPT | Mod: 25

## 2017-03-06 PROCEDURE — 84100 ASSAY OF PHOSPHORUS: CPT

## 2017-03-06 PROCEDURE — 94660 CPAP INITIATION&MGMT: CPT

## 2017-03-06 PROCEDURE — 93306 TTE W/DOPPLER COMPLETE: CPT

## 2017-03-06 PROCEDURE — 83036 HEMOGLOBIN GLYCOSYLATED A1C: CPT

## 2017-03-06 PROCEDURE — 87633 RESP VIRUS 12-25 TARGETS: CPT

## 2017-03-06 PROCEDURE — 80061 LIPID PANEL: CPT

## 2017-03-06 PROCEDURE — 80053 COMPREHEN METABOLIC PANEL: CPT

## 2017-03-06 PROCEDURE — 87798 DETECT AGENT NOS DNA AMP: CPT

## 2017-03-06 PROCEDURE — 82607 VITAMIN B-12: CPT

## 2017-03-06 PROCEDURE — 82306 VITAMIN D 25 HYDROXY: CPT

## 2017-03-06 PROCEDURE — 82803 BLOOD GASES ANY COMBINATION: CPT

## 2017-03-06 PROCEDURE — 83880 ASSAY OF NATRIURETIC PEPTIDE: CPT

## 2017-03-06 PROCEDURE — A9502: CPT

## 2017-03-06 PROCEDURE — 94640 AIRWAY INHALATION TREATMENT: CPT

## 2017-03-06 PROCEDURE — 83605 ASSAY OF LACTIC ACID: CPT

## 2017-03-06 PROCEDURE — 87486 CHLMYD PNEUM DNA AMP PROBE: CPT

## 2017-03-06 PROCEDURE — 71046 X-RAY EXAM CHEST 2 VIEWS: CPT

## 2017-03-06 PROCEDURE — 84484 ASSAY OF TROPONIN QUANT: CPT

## 2017-03-06 PROCEDURE — 85730 THROMBOPLASTIN TIME PARTIAL: CPT

## 2017-03-06 PROCEDURE — 82746 ASSAY OF FOLIC ACID SERUM: CPT

## 2017-03-06 PROCEDURE — 87040 BLOOD CULTURE FOR BACTERIA: CPT

## 2017-03-06 PROCEDURE — 82550 ASSAY OF CK (CPK): CPT

## 2017-03-06 PROCEDURE — 93017 CV STRESS TEST TRACING ONLY: CPT

## 2017-03-06 PROCEDURE — 93005 ELECTROCARDIOGRAM TRACING: CPT

## 2017-03-06 RX ORDER — SIMVASTATIN 20 MG/1
1 TABLET, FILM COATED ORAL
Qty: 30 | Refills: 0
Start: 2017-03-06 | End: 2017-04-05

## 2017-03-06 RX ORDER — ASPIRIN/CALCIUM CARB/MAGNESIUM 324 MG
2 TABLET ORAL
Qty: 60 | Refills: 0
Start: 2017-03-06 | End: 2017-04-05

## 2017-03-06 RX ORDER — BUDESONIDE AND FORMOTEROL FUMARATE DIHYDRATE 160; 4.5 UG/1; UG/1
1 AEROSOL RESPIRATORY (INHALATION)
Qty: 1 | Refills: 0 | OUTPATIENT
Start: 2017-03-06 | End: 2017-04-05

## 2017-03-06 RX ORDER — LOSARTAN POTASSIUM 100 MG/1
1 TABLET, FILM COATED ORAL
Qty: 30 | Refills: 0
Start: 2017-03-06 | End: 2017-04-05

## 2017-03-06 RX ORDER — CIPROFLOXACIN LACTATE 400MG/40ML
1 VIAL (ML) INTRAVENOUS
Qty: 1 | Refills: 0
Start: 2017-03-06 | End: 2017-03-07

## 2017-03-06 RX ORDER — DILTIAZEM HCL 120 MG
1 CAPSULE, EXT RELEASE 24 HR ORAL
Qty: 90 | Refills: 0
Start: 2017-03-06 | End: 2017-04-05

## 2017-03-06 RX ORDER — ACETAMINOPHEN 500 MG
2 TABLET ORAL
Qty: 240 | Refills: 0
Start: 2017-03-06 | End: 2017-04-05

## 2017-03-06 RX ORDER — SIMVASTATIN 20 MG/1
1 TABLET, FILM COATED ORAL
Qty: 0 | Refills: 0 | COMMUNITY

## 2017-03-06 RX ORDER — POTASSIUM PHOSPHATE, MONOBASIC POTASSIUM PHOSPHATE, DIBASIC 236; 224 MG/ML; MG/ML
15 INJECTION, SOLUTION INTRAVENOUS ONCE
Qty: 0 | Refills: 0 | Status: COMPLETED | OUTPATIENT
Start: 2017-03-06 | End: 2017-03-06

## 2017-03-06 RX ORDER — NICOTINE POLACRILEX 2 MG
1 GUM BUCCAL
Qty: 30 | Refills: 0
Start: 2017-03-06 | End: 2017-04-05

## 2017-03-06 RX ADMIN — ENOXAPARIN SODIUM 40 MILLIGRAM(S): 100 INJECTION SUBCUTANEOUS at 11:30

## 2017-03-06 RX ADMIN — Medication 1 PATCH: at 11:33

## 2017-03-06 RX ADMIN — Medication 650 MILLIGRAM(S): at 01:56

## 2017-03-06 RX ADMIN — POTASSIUM PHOSPHATE, MONOBASIC POTASSIUM PHOSPHATE, DIBASIC 62.5 MILLIMOLE(S): 236; 224 INJECTION, SOLUTION INTRAVENOUS at 10:44

## 2017-03-06 RX ADMIN — GABAPENTIN 400 MILLIGRAM(S): 400 CAPSULE ORAL at 05:35

## 2017-03-06 RX ADMIN — GABAPENTIN 400 MILLIGRAM(S): 400 CAPSULE ORAL at 11:30

## 2017-03-06 RX ADMIN — Medication 6: at 12:31

## 2017-03-06 RX ADMIN — ALBUTEROL 2 PUFF(S): 90 AEROSOL, METERED ORAL at 10:44

## 2017-03-06 RX ADMIN — Medication 1 PATCH: at 11:30

## 2017-03-06 RX ADMIN — Medication 162 MILLIGRAM(S): at 11:30

## 2017-03-06 RX ADMIN — Medication 40 MILLIGRAM(S): at 05:35

## 2017-03-06 RX ADMIN — Medication 2: at 07:44

## 2017-03-06 RX ADMIN — BUDESONIDE AND FORMOTEROL FUMARATE DIHYDRATE 2 PUFF(S): 160; 4.5 AEROSOL RESPIRATORY (INHALATION) at 10:44

## 2017-03-06 RX ADMIN — Medication 650 MILLIGRAM(S): at 02:56

## 2017-03-06 NOTE — DISCHARGE NOTE ADULT - PATIENT PORTAL LINK FT
“You can access the [facility] Patient Portal, offered by [facility], by registering with the following website: [email or web address]”

## 2017-03-06 NOTE — DISCHARGE NOTE ADULT - MEDICATION SUMMARY - MEDICATIONS TO STOP TAKING
I will STOP taking the medications listed below when I get home from the hospital:    propranolol 20 mg oral tablet  --  by mouth once a day    Augmentin 875 mg-125 mg oral tablet  -- 875 milligram(s) by mouth every 12 hours  -- Finish all this medication unless otherwise directed by prescriber.  Take with food or milk.

## 2017-03-06 NOTE — DISCHARGE NOTE ADULT - MEDICATION SUMMARY - MEDICATIONS TO TAKE
I will START or STAY ON the medications listed below when I get home from the hospital:    prednisone 40mg x 1 day, then 30mg x 3 days, then 20mg x 3 days, then 10mg x 3 days, then stop  -- Indication: For COPD (chronic obstructive pulmonary disease)    acetaminophen 325 mg oral tablet  -- 2 tab(s) by mouth every 6 hours, As needed, Mild Pain (1 - 3)/temp >100.4  -- Indication: For pain    aspirin 81 mg oral tablet, chewable  -- 2 tab(s) by mouth once a day  -- Indication: For Need for prophylactic measure    losartan 50 mg oral tablet  -- 1 tab(s) by mouth once a day  -- Indication: For HTN (hypertension)    diltiaZEM 30 mg oral tablet  -- 1 tab(s) by mouth every 8 hours  -- Indication: For HTN (hypertension)    gabapentin 400 mg oral tablet  --  by mouth 4 times a day  -- Indication: For pain    metFORMIN 1000 mg oral tablet  -- 1 tab(s) by mouth 2 times a day  -- Indication: For DM (diabetes mellitus)    glimepiride 4 mg oral tablet  -- 1 tab(s) by mouth once a day  -- Indication: For DM (diabetes mellitus)    simvastatin 20 mg oral tablet  -- 1 tab(s) by mouth once a day (at bedtime)  -- Indication: For HLDq    budesonide-formoterol 80 mcg-4.5 mcg/inh inhalation aerosol  -- 1 application inhaled 2 times a day, As Needed  -- Indication: For COPD (chronic obstructive pulmonary disease)    albuterol 90 mcg/inh inhalation aerosol  --  inhaled once a day  -- Indication: For COPD (chronic obstructive pulmonary disease)    albuterol 90 mcg/inh inhalation aerosol  -- 2 puff(s) inhaled every 4 hours, As Needed  -- Indication: For COPD (chronic obstructive pulmonary disease)    guaiFENesin 100 mg/5 mL oral liquid  -- 5 milliliter(s) by mouth every 6 hours, As needed, Cough  -- Indication: For COugh    ocular lubricant ophthalmic solution  -- 1 drop(s) to each affected eye 4 times a day, As needed, Dry Eyes  -- Indication: For Eye drops    Levaquin 500 mg oral tablet  -- 1 tab(s) by mouth once a day  -- Avoid prolonged or excessive exposure to direct and/or artificial sunlight while taking this medication.  Do not take dairy products, antacids, or iron preparations within one hour of this medication.  Finish all this medication unless otherwise directed by prescriber.  May cause drowsiness or dizziness.  Medication should be taken with plenty of water.    -- Indication: For bronchitis    nicotine 14 mg/24 hr transdermal film, extended release  -- 1 application by transdermal patch once a day  -- Indication: For smoking cessation

## 2017-03-06 NOTE — DISCHARGE NOTE ADULT - CARE PLAN
Principal Discharge DX:	COPD (chronic obstructive pulmonary disease)  Goal:	to be free of bronchitis causing COPD exacerbation  Instructions for follow-up, activity and diet:	c/w antibiotics x 1 more day, c/w bronchodilators  Secondary Diagnosis:	CHF (congestive heart failure)  Instructions for follow-up, activity and diet:	grade 2 diastolic dysfunction on echo, restrict sodium intake  Secondary Diagnosis:	HTN (hypertension)  Instructions for follow-up, activity and diet:	take BP meds as directed  Secondary Diagnosis:	Hyperlipemia  Instructions for follow-up, activity and diet:	c/w statin  Secondary Diagnosis:	NSTEMI (non-ST elevated myocardial infarction)  Instructions for follow-up, activity and diet:	c/w meds as directed, likely 2/2 demand ischemia;  Secondary Diagnosis:	YUE on CPAP  Instructions for follow-up, activity and diet:	c/w CPAP at night  Secondary Diagnosis:	Asthma  Instructions for follow-up, activity and diet:	c/w bronchodilators

## 2017-03-06 NOTE — DISCHARGE NOTE ADULT - HOSPITAL COURSE
72 y/o Male from home, lives with family , active smoker w/ PMHx COPD (never intubated, not on home oxygen), YUE on cpap at [facility], DM, asthma, HTN, HLD, presented to ED c/o worsening productive cough, wheezing and  shortness of breath x 2 months. Patient states that he was asymptomatic 2 months ago and started having cough productive of yellowish sputum, and wheezing on and off. Patient states that he has been using albuterol inhaler at home which helps to some extent, but recently has not been helping much. Patient complains of SOB, orthopnea, no PND. Denies any fever/chills, abdominal pain, diaphoresis, Chest pain or any other complaints. Denies any sick contacts or recent travel. Pt endorses some chest tightness, and notes similarity of episodes. Denies leg swelling or any other complaints.     Ed course:  Patient was found to be sinus tachy to about 123bpm, was wheezing one examination, labs concerning for elevated troponin, elevated WBC count    Admitted for:    1) COPD exacerbation 2/2 Acute bronchitis  RVP panel: Neg  c/w levaquin x 1 more day  c/w steroid taper  c/w symbicort  f/up blood cultures  c/w nicotine for smoking cessation    2) Troponemia, r/o ACS  echo: grade 2 DD, normal Ef  stress test was done  Troponins trending down  Cardio: Dr. beaver.     3) DM (diabetes mellitus): c/w home DM meds    4) HTN: take meds as directed, losartan and cardizem    5) HLD- c/w statin    6) YUE on CPAP at night    7)·  DVT ppx 72 y/o Male from home, lives with family , active smoker w/ PMHx COPD (never intubated, not on home oxygen), YUE on cpap at [facility], DM, asthma, HTN, HLD, presented to ED c/o worsening productive cough, wheezing and  shortness of breath x 2 months. Patient states that he was asymptomatic 2 months ago and started having cough productive of yellowish sputum, and wheezing on and off. Patient states that he has been using albuterol inhaler at home which helps to some extent, but recently has not been helping much. Patient complains of SOB, orthopnea, no PND. Denies any fever/chills, abdominal pain, diaphoresis, Chest pain or any other complaints. Denies any sick contacts or recent travel. Pt endorses some chest tightness, and notes similarity of episodes. Denies leg swelling or any other complaints.     Ed course:  Patient was found to be sinus tachy to about 123bpm, was wheezing one examination, labs concerning for elevated troponin, elevated WBC count    Admitted for:    1) COPD exacerbation 2/2 Acute bronchitis  RVP panel: Neg  c/w levaquin x 1 more day  c/w steroid taper  c/w symbicort  f/up blood cultures  c/w nicotine for smoking cessation    2) Troponemia, r/o ACS  echo: grade 2 DD, normal Ef, Troponins trending down  stress test was done with Negative ECG evidence of ischemia after IV of Lexiscan.  Cardio: Dr. beaver.     3) DM (diabetes mellitus): c/w home DM meds    4) HTN: take meds as directed, losartan and cardizem    5) HLD- c/w statin    6) YUE on CPAP at night    7)·  DVT ppx

## 2017-03-06 NOTE — DISCHARGE NOTE ADULT - PLAN OF CARE
to be free of bronchitis causing COPD exacerbation c/w antibiotics x 1 more day, c/w bronchodilators grade 2 diastolic dysfunction on echo, restrict sodium intake take BP meds as directed c/w statin c/w meds as directed, likely 2/2 demand ischemia; c/w CPAP at night c/w bronchodilators

## 2017-03-06 NOTE — DISCHARGE NOTE ADULT - SECONDARY DIAGNOSIS.
CHF (congestive heart failure) HTN (hypertension) Hyperlipemia NSTEMI (non-ST elevated myocardial infarction) YUE on CPAP Asthma

## 2017-03-08 LAB
CULTURE RESULTS: SIGNIFICANT CHANGE UP
CULTURE RESULTS: SIGNIFICANT CHANGE UP
SPECIMEN SOURCE: SIGNIFICANT CHANGE UP
SPECIMEN SOURCE: SIGNIFICANT CHANGE UP

## 2017-03-10 DIAGNOSIS — I50.9 HEART FAILURE, UNSPECIFIED: ICD-10-CM

## 2017-03-10 DIAGNOSIS — E11.9 TYPE 2 DIABETES MELLITUS WITHOUT COMPLICATIONS: ICD-10-CM

## 2017-03-10 DIAGNOSIS — R07.89 OTHER CHEST PAIN: ICD-10-CM

## 2017-03-10 DIAGNOSIS — Z79.82 LONG TERM (CURRENT) USE OF ASPIRIN: ICD-10-CM

## 2017-03-10 DIAGNOSIS — Z79.84 LONG TERM (CURRENT) USE OF ORAL HYPOGLYCEMIC DRUGS: ICD-10-CM

## 2017-03-10 DIAGNOSIS — J20.9 ACUTE BRONCHITIS, UNSPECIFIED: ICD-10-CM

## 2017-03-10 DIAGNOSIS — E78.5 HYPERLIPIDEMIA, UNSPECIFIED: ICD-10-CM

## 2017-03-10 DIAGNOSIS — F17.210 NICOTINE DEPENDENCE, CIGARETTES, UNCOMPLICATED: ICD-10-CM

## 2017-03-10 DIAGNOSIS — J45.909 UNSPECIFIED ASTHMA, UNCOMPLICATED: ICD-10-CM

## 2017-03-10 DIAGNOSIS — J44.1 CHRONIC OBSTRUCTIVE PULMONARY DISEASE WITH (ACUTE) EXACERBATION: ICD-10-CM

## 2017-03-10 DIAGNOSIS — D72.829 ELEVATED WHITE BLOOD CELL COUNT, UNSPECIFIED: ICD-10-CM

## 2017-03-10 DIAGNOSIS — G47.33 OBSTRUCTIVE SLEEP APNEA (ADULT) (PEDIATRIC): ICD-10-CM

## 2017-03-10 DIAGNOSIS — Z83.3 FAMILY HISTORY OF DIABETES MELLITUS: ICD-10-CM

## 2017-03-10 DIAGNOSIS — Z79.51 LONG TERM (CURRENT) USE OF INHALED STEROIDS: ICD-10-CM

## 2017-03-10 DIAGNOSIS — I21.4 NON-ST ELEVATION (NSTEMI) MYOCARDIAL INFARCTION: ICD-10-CM

## 2017-03-10 DIAGNOSIS — I11.0 HYPERTENSIVE HEART DISEASE WITH HEART FAILURE: ICD-10-CM

## 2017-03-14 DIAGNOSIS — I24.8 OTHER FORMS OF ACUTE ISCHEMIC HEART DISEASE: ICD-10-CM

## 2017-03-14 DIAGNOSIS — J44.0 CHRONIC OBSTRUCTIVE PULMONARY DISEASE WITH ACUTE LOWER RESPIRATORY INFECTION: ICD-10-CM

## 2017-05-14 ENCOUNTER — EMERGENCY (EMERGENCY)
Facility: HOSPITAL | Age: 72
LOS: 1 days | Discharge: HOME | End: 2017-05-14
Attending: EMERGENCY MEDICINE
Payer: MEDICARE

## 2017-05-14 VITALS
WEIGHT: 179.9 LBS | OXYGEN SATURATION: 99 % | HEART RATE: 83 BPM | DIASTOLIC BLOOD PRESSURE: 67 MMHG | SYSTOLIC BLOOD PRESSURE: 130 MMHG | RESPIRATION RATE: 18 BRPM | TEMPERATURE: 97 F

## 2017-05-14 VITALS
SYSTOLIC BLOOD PRESSURE: 151 MMHG | OXYGEN SATURATION: 98 % | TEMPERATURE: 98 F | DIASTOLIC BLOOD PRESSURE: 63 MMHG | RESPIRATION RATE: 17 BRPM | HEART RATE: 117 BPM

## 2017-05-14 DIAGNOSIS — Z98.89 OTHER SPECIFIED POSTPROCEDURAL STATES: Chronic | ICD-10-CM

## 2017-05-14 DIAGNOSIS — Z12.11 ENCOUNTER FOR SCREENING FOR MALIGNANT NEOPLASM OF COLON: Chronic | ICD-10-CM

## 2017-05-14 LAB
ALBUMIN SERPL ELPH-MCNC: 3.8 G/DL — SIGNIFICANT CHANGE UP (ref 3.5–5)
ALP SERPL-CCNC: 73 U/L — SIGNIFICANT CHANGE UP (ref 40–120)
ALT FLD-CCNC: 23 U/L DA — SIGNIFICANT CHANGE UP (ref 10–60)
ANION GAP SERPL CALC-SCNC: 7 MMOL/L — SIGNIFICANT CHANGE UP (ref 5–17)
AST SERPL-CCNC: 22 U/L — SIGNIFICANT CHANGE UP (ref 10–40)
BASOPHILS # BLD AUTO: 0.2 K/UL — SIGNIFICANT CHANGE UP (ref 0–0.2)
BASOPHILS NFR BLD AUTO: 2.5 % — HIGH (ref 0–2)
BILIRUB SERPL-MCNC: 0.3 MG/DL — SIGNIFICANT CHANGE UP (ref 0.2–1.2)
BUN SERPL-MCNC: 14 MG/DL — SIGNIFICANT CHANGE UP (ref 7–18)
CALCIUM SERPL-MCNC: 9.5 MG/DL — SIGNIFICANT CHANGE UP (ref 8.4–10.5)
CHLORIDE SERPL-SCNC: 98 MMOL/L — SIGNIFICANT CHANGE UP (ref 96–108)
CO2 SERPL-SCNC: 32 MMOL/L — HIGH (ref 22–31)
CREAT SERPL-MCNC: 0.96 MG/DL — SIGNIFICANT CHANGE UP (ref 0.5–1.3)
EOSINOPHIL # BLD AUTO: 0.4 K/UL — SIGNIFICANT CHANGE UP (ref 0–0.5)
EOSINOPHIL NFR BLD AUTO: 3.7 % — SIGNIFICANT CHANGE UP (ref 0–6)
GLUCOSE SERPL-MCNC: 105 MG/DL — HIGH (ref 70–99)
HCT VFR BLD CALC: 45.5 % — SIGNIFICANT CHANGE UP (ref 39–50)
HGB BLD-MCNC: 14.7 G/DL — SIGNIFICANT CHANGE UP (ref 13–17)
LYMPHOCYTES # BLD AUTO: 3.2 K/UL — SIGNIFICANT CHANGE UP (ref 1–3.3)
LYMPHOCYTES # BLD AUTO: 33.7 % — SIGNIFICANT CHANGE UP (ref 13–44)
MCHC RBC-ENTMCNC: 30 PG — SIGNIFICANT CHANGE UP (ref 27–34)
MCHC RBC-ENTMCNC: 32.3 GM/DL — SIGNIFICANT CHANGE UP (ref 32–36)
MCV RBC AUTO: 92.9 FL — SIGNIFICANT CHANGE UP (ref 80–100)
MONOCYTES # BLD AUTO: 0.7 K/UL — SIGNIFICANT CHANGE UP (ref 0–0.9)
MONOCYTES NFR BLD AUTO: 7.2 % — SIGNIFICANT CHANGE UP (ref 2–14)
NEUTROPHILS # BLD AUTO: 5 K/UL — SIGNIFICANT CHANGE UP (ref 1.8–7.4)
NEUTROPHILS NFR BLD AUTO: 52.9 % — SIGNIFICANT CHANGE UP (ref 43–77)
NT-PROBNP SERPL-SCNC: 221 PG/ML — HIGH (ref 0–125)
PLATELET # BLD AUTO: 293 K/UL — SIGNIFICANT CHANGE UP (ref 150–400)
POTASSIUM SERPL-MCNC: 4.3 MMOL/L — SIGNIFICANT CHANGE UP (ref 3.5–5.3)
POTASSIUM SERPL-SCNC: 4.3 MMOL/L — SIGNIFICANT CHANGE UP (ref 3.5–5.3)
PROT SERPL-MCNC: 8.8 G/DL — HIGH (ref 6–8.3)
RBC # BLD: 4.89 M/UL — SIGNIFICANT CHANGE UP (ref 4.2–5.8)
RBC # FLD: 14.2 % — SIGNIFICANT CHANGE UP (ref 10.3–14.5)
SODIUM SERPL-SCNC: 137 MMOL/L — SIGNIFICANT CHANGE UP (ref 135–145)
TROPONIN I SERPL-MCNC: <0.015 NG/ML — SIGNIFICANT CHANGE UP (ref 0–0.04)
WBC # BLD: 9.4 K/UL — SIGNIFICANT CHANGE UP (ref 3.8–10.5)
WBC # FLD AUTO: 9.4 K/UL — SIGNIFICANT CHANGE UP (ref 3.8–10.5)

## 2017-05-14 PROCEDURE — 94640 AIRWAY INHALATION TREATMENT: CPT

## 2017-05-14 PROCEDURE — 93005 ELECTROCARDIOGRAM TRACING: CPT

## 2017-05-14 PROCEDURE — 99285 EMERGENCY DEPT VISIT HI MDM: CPT | Mod: 25

## 2017-05-14 PROCEDURE — 71020: CPT | Mod: 26

## 2017-05-14 PROCEDURE — 71046 X-RAY EXAM CHEST 2 VIEWS: CPT

## 2017-05-14 PROCEDURE — 96374 THER/PROPH/DIAG INJ IV PUSH: CPT

## 2017-05-14 PROCEDURE — 80053 COMPREHEN METABOLIC PANEL: CPT

## 2017-05-14 PROCEDURE — 84484 ASSAY OF TROPONIN QUANT: CPT

## 2017-05-14 PROCEDURE — 85027 COMPLETE CBC AUTOMATED: CPT

## 2017-05-14 PROCEDURE — 83880 ASSAY OF NATRIURETIC PEPTIDE: CPT

## 2017-05-14 PROCEDURE — 96375 TX/PRO/DX INJ NEW DRUG ADDON: CPT

## 2017-05-14 RX ORDER — ACETAMINOPHEN 500 MG
975 TABLET ORAL ONCE
Qty: 0 | Refills: 0 | Status: COMPLETED | OUTPATIENT
Start: 2017-05-14 | End: 2017-05-14

## 2017-05-14 RX ORDER — IPRATROPIUM/ALBUTEROL SULFATE 18-103MCG
3 AEROSOL WITH ADAPTER (GRAM) INHALATION ONCE
Qty: 0 | Refills: 0 | Status: COMPLETED | OUTPATIENT
Start: 2017-05-14 | End: 2017-05-14

## 2017-05-14 RX ORDER — ALBUTEROL 90 UG/1
2.5 AEROSOL, METERED ORAL ONCE
Qty: 0 | Refills: 0 | Status: COMPLETED | OUTPATIENT
Start: 2017-05-14 | End: 2017-05-14

## 2017-05-14 RX ORDER — ALBUTEROL 90 UG/1
3 AEROSOL, METERED ORAL
Qty: 60 | Refills: 0
Start: 2017-05-14 | End: 2017-05-19

## 2017-05-14 RX ADMIN — Medication 975 MILLIGRAM(S): at 06:29

## 2017-05-14 RX ADMIN — Medication 3 MILLILITER(S): at 06:29

## 2017-05-14 RX ADMIN — Medication 125 MILLIGRAM(S): at 04:38

## 2017-05-14 RX ADMIN — Medication 3 MILLILITER(S): at 04:40

## 2017-05-14 RX ADMIN — ALBUTEROL 2.5 MILLIGRAM(S): 90 AEROSOL, METERED ORAL at 05:00

## 2017-05-14 NOTE — ED ADULT NURSE NOTE - OBJECTIVE STATEMENT
71 y/o M pt with PMHx of Asthma (uses Albuterol pump), COPD, DM, HTN, and HLD and no significant PSHx presents to ED c/o difficulty breathing, SOB, and difficulty completing sentences for several months that worsened 3 days ago, as well as loss of appetite (although pt has been gaining weight) x3.5 months. Pt also states SOB is worse at night. Pt denies CP, diarrhea, nausea, vomiting, abd pain, or any other complaints. Pt reports being admitted to the hospital x1 year ago for difficulty breathing; pt recovered but subsequently quit smoking (prior to this, pt smoked extensively).

## 2017-05-14 NOTE — ED PROVIDER NOTE - OBJECTIVE STATEMENT
71 y/o M pt with PMHx of Asthma (uses Albuterol pump), COPD, DM, HTN, and HLD and no significant PSHx presents to ED c/o difficulty breathing, SOB, and difficulty completing sentences for several months that worsened 3 days ago, as well as loss of appetite (although pt has been gaining weight) x3.5 months. Pt also states SOB is worse at night. Pt denies CP, diarrhea, nausea, vomiting, abd pain, or any other complaints. Pt reports being admitted to the hospital x1 year ago for difficulty breathing; pt recovered but subsequently quit smoking (prior to this, pt smoked extensively). Pt also denies Hx of EtOH abuse. As per pt, he does not recall if he was ever given a steroid to treat his difficulty breathing. NKDA.

## 2017-05-14 NOTE — ED PROVIDER NOTE - MEDICAL DECISION MAKING DETAILS
73 y/o M pt presents with difficulty breathing and shortness of breath for several months that worsened 3 days ago, as well as loss of appetite x3.5 months. Plan for CXR, labs, nebulizer treatment, and reassess.

## 2017-05-14 NOTE — ED PROVIDER NOTE - PROGRESS NOTE DETAILS
Patient is feeling much better and is asking to leave. Will dc with albuterol nebs and prednisone to follow up with pmd and return if worse. He ias ambulatory without getting sob. Precautions reviewed.

## 2017-05-14 NOTE — ED ADULT NURSE REASSESSMENT NOTE - NS ED NURSE REASSESS COMMENT FT1
[facility]- [phone] (CELL) HOME PHONE [phone]
patient states feeling much better, denies nay difficulty breathing or pain.

## 2017-05-14 NOTE — ED PROVIDER NOTE - NS ED MD SCRIBE ATTENDING SCRIBE SECTIONS
HISTORY OF PRESENT ILLNESS/PHYSICAL EXAM/PAST MEDICAL/SURGICAL/SOCIAL HISTORY/RESULTS/REVIEW OF SYSTEMS/VITAL SIGNS( Pullset)/DISPOSITION

## 2017-05-17 DIAGNOSIS — J44.1 CHRONIC OBSTRUCTIVE PULMONARY DISEASE WITH (ACUTE) EXACERBATION: ICD-10-CM

## 2017-05-17 DIAGNOSIS — I10 ESSENTIAL (PRIMARY) HYPERTENSION: ICD-10-CM

## 2017-05-17 DIAGNOSIS — J45.909 UNSPECIFIED ASTHMA, UNCOMPLICATED: ICD-10-CM

## 2017-05-17 DIAGNOSIS — Z79.51 LONG TERM (CURRENT) USE OF INHALED STEROIDS: ICD-10-CM

## 2017-05-17 DIAGNOSIS — E11.9 TYPE 2 DIABETES MELLITUS WITHOUT COMPLICATIONS: ICD-10-CM

## 2017-05-17 DIAGNOSIS — E78.5 HYPERLIPIDEMIA, UNSPECIFIED: ICD-10-CM

## 2017-05-17 DIAGNOSIS — Z79.84 LONG TERM (CURRENT) USE OF ORAL HYPOGLYCEMIC DRUGS: ICD-10-CM

## 2017-05-29 ENCOUNTER — EMERGENCY (EMERGENCY)
Facility: HOSPITAL | Age: 72
LOS: 1 days | Discharge: HOME | End: 2017-05-29
Attending: EMERGENCY MEDICINE
Payer: MEDICARE

## 2017-05-29 VITALS
RESPIRATION RATE: 16 BRPM | HEIGHT: 66 IN | SYSTOLIC BLOOD PRESSURE: 153 MMHG | TEMPERATURE: 97 F | OXYGEN SATURATION: 96 % | WEIGHT: 169.98 LBS | DIASTOLIC BLOOD PRESSURE: 72 MMHG | HEART RATE: 85 BPM

## 2017-05-29 DIAGNOSIS — Z98.89 OTHER SPECIFIED POSTPROCEDURAL STATES: Chronic | ICD-10-CM

## 2017-05-29 DIAGNOSIS — B34.9 VIRAL INFECTION, UNSPECIFIED: ICD-10-CM

## 2017-05-29 DIAGNOSIS — R06.02 SHORTNESS OF BREATH: ICD-10-CM

## 2017-05-29 DIAGNOSIS — Z12.11 ENCOUNTER FOR SCREENING FOR MALIGNANT NEOPLASM OF COLON: Chronic | ICD-10-CM

## 2017-05-29 DIAGNOSIS — J44.9 CHRONIC OBSTRUCTIVE PULMONARY DISEASE, UNSPECIFIED: ICD-10-CM

## 2017-05-29 DIAGNOSIS — E11.9 TYPE 2 DIABETES MELLITUS WITHOUT COMPLICATIONS: ICD-10-CM

## 2017-05-29 DIAGNOSIS — E78.5 HYPERLIPIDEMIA, UNSPECIFIED: ICD-10-CM

## 2017-05-29 DIAGNOSIS — I10 ESSENTIAL (PRIMARY) HYPERTENSION: ICD-10-CM

## 2017-05-29 LAB
ALBUMIN SERPL ELPH-MCNC: 3.6 G/DL — SIGNIFICANT CHANGE UP (ref 3.5–5)
ALP SERPL-CCNC: 107 U/L — SIGNIFICANT CHANGE UP (ref 40–120)
ALT FLD-CCNC: 70 U/L DA — HIGH (ref 10–60)
ANION GAP SERPL CALC-SCNC: 5 MMOL/L — SIGNIFICANT CHANGE UP (ref 5–17)
APPEARANCE UR: CLEAR — SIGNIFICANT CHANGE UP
AST SERPL-CCNC: 12 U/L — SIGNIFICANT CHANGE UP (ref 10–40)
BASE EXCESS BLDV CALC-SCNC: 7.4 MMOL/L — HIGH (ref -2–2)
BILIRUB SERPL-MCNC: 0.2 MG/DL — SIGNIFICANT CHANGE UP (ref 0.2–1.2)
BILIRUB UR-MCNC: NEGATIVE — SIGNIFICANT CHANGE UP
BUN SERPL-MCNC: 14 MG/DL — SIGNIFICANT CHANGE UP (ref 7–18)
CALCIUM SERPL-MCNC: 9.6 MG/DL — SIGNIFICANT CHANGE UP (ref 8.4–10.5)
CHLORIDE SERPL-SCNC: 102 MMOL/L — SIGNIFICANT CHANGE UP (ref 96–108)
CO2 SERPL-SCNC: 35 MMOL/L — HIGH (ref 22–31)
COLOR SPEC: YELLOW — SIGNIFICANT CHANGE UP
CREAT SERPL-MCNC: 0.92 MG/DL — SIGNIFICANT CHANGE UP (ref 0.5–1.3)
DIFF PNL FLD: ABNORMAL
GLUCOSE SERPL-MCNC: 107 MG/DL — HIGH (ref 70–99)
GLUCOSE UR QL: NEGATIVE — SIGNIFICANT CHANGE UP
HCO3 BLDV-SCNC: 35 MMOL/L — HIGH (ref 21–29)
HCT VFR BLD CALC: 47.1 % — SIGNIFICANT CHANGE UP (ref 39–50)
HGB BLD-MCNC: 14.4 G/DL — SIGNIFICANT CHANGE UP (ref 13–17)
HOROWITZ INDEX BLDV+IHG-RTO: 21 — SIGNIFICANT CHANGE UP
KETONES UR-MCNC: NEGATIVE — SIGNIFICANT CHANGE UP
LEUKOCYTE ESTERASE UR-ACNC: NEGATIVE — SIGNIFICANT CHANGE UP
MCHC RBC-ENTMCNC: 28.8 PG — SIGNIFICANT CHANGE UP (ref 27–34)
MCHC RBC-ENTMCNC: 30.6 GM/DL — LOW (ref 32–36)
MCV RBC AUTO: 94.2 FL — SIGNIFICANT CHANGE UP (ref 80–100)
NITRITE UR-MCNC: NEGATIVE — SIGNIFICANT CHANGE UP
PCO2 BLDV: 64 MMHG — HIGH (ref 35–50)
PH BLDV: 7.36 — SIGNIFICANT CHANGE UP (ref 7.35–7.45)
PH UR: 8 — SIGNIFICANT CHANGE UP (ref 5–8)
PLATELET # BLD AUTO: 338 K/UL — SIGNIFICANT CHANGE UP (ref 150–400)
PO2 BLDV: 19 MMHG — LOW (ref 25–45)
POTASSIUM SERPL-MCNC: 4.8 MMOL/L — SIGNIFICANT CHANGE UP (ref 3.5–5.3)
POTASSIUM SERPL-SCNC: 4.8 MMOL/L — SIGNIFICANT CHANGE UP (ref 3.5–5.3)
PROT SERPL-MCNC: 8.5 G/DL — HIGH (ref 6–8.3)
PROT UR-MCNC: NEGATIVE — SIGNIFICANT CHANGE UP
RBC # BLD: 5 M/UL — SIGNIFICANT CHANGE UP (ref 4.2–5.8)
RBC # FLD: 14.2 % — SIGNIFICANT CHANGE UP (ref 10.3–14.5)
SAO2 % BLDV: 22 % — LOW (ref 67–88)
SODIUM SERPL-SCNC: 142 MMOL/L — SIGNIFICANT CHANGE UP (ref 135–145)
SP GR SPEC: 1.01 — SIGNIFICANT CHANGE UP (ref 1.01–1.02)
UROBILINOGEN FLD QL: NEGATIVE — SIGNIFICANT CHANGE UP
WBC # BLD: 10.1 K/UL — SIGNIFICANT CHANGE UP (ref 3.8–10.5)
WBC # FLD AUTO: 10.1 K/UL — SIGNIFICANT CHANGE UP (ref 3.8–10.5)

## 2017-05-29 PROCEDURE — 85027 COMPLETE CBC AUTOMATED: CPT

## 2017-05-29 PROCEDURE — 82803 BLOOD GASES ANY COMBINATION: CPT

## 2017-05-29 PROCEDURE — 99284 EMERGENCY DEPT VISIT MOD MDM: CPT | Mod: 25

## 2017-05-29 PROCEDURE — 80053 COMPREHEN METABOLIC PANEL: CPT

## 2017-05-29 PROCEDURE — 99285 EMERGENCY DEPT VISIT HI MDM: CPT | Mod: 25

## 2017-05-29 PROCEDURE — 71020: CPT | Mod: 26

## 2017-05-29 PROCEDURE — 81001 URINALYSIS AUTO W/SCOPE: CPT

## 2017-05-29 PROCEDURE — 96374 THER/PROPH/DIAG INJ IV PUSH: CPT

## 2017-05-29 PROCEDURE — 71046 X-RAY EXAM CHEST 2 VIEWS: CPT

## 2017-05-29 RX ORDER — BUDESONIDE AND FORMOTEROL FUMARATE DIHYDRATE 160; 4.5 UG/1; UG/1
1 AEROSOL RESPIRATORY (INHALATION)
Qty: 1 | Refills: 0
Start: 2017-05-29 | End: 2017-06-28

## 2017-05-29 RX ORDER — SODIUM CHLORIDE 9 MG/ML
1000 INJECTION INTRAMUSCULAR; INTRAVENOUS; SUBCUTANEOUS ONCE
Qty: 0 | Refills: 0 | Status: COMPLETED | OUTPATIENT
Start: 2017-05-29 | End: 2017-05-29

## 2017-05-29 RX ORDER — ALBUTEROL 90 UG/1
2 AEROSOL, METERED ORAL
Qty: 1 | Refills: 0
Start: 2017-05-29

## 2017-05-29 RX ORDER — ALBUTEROL 90 UG/1
3 AEROSOL, METERED ORAL
Qty: 60 | Refills: 0
Start: 2017-05-29 | End: 2017-06-28

## 2017-05-29 RX ORDER — ALBUTEROL 90 UG/1
2 AEROSOL, METERED ORAL
Qty: 0 | Refills: 0 | COMMUNITY

## 2017-05-29 RX ORDER — KETOROLAC TROMETHAMINE 30 MG/ML
30 SYRINGE (ML) INJECTION ONCE
Qty: 0 | Refills: 0 | Status: DISCONTINUED | OUTPATIENT
Start: 2017-05-29 | End: 2017-05-29

## 2017-05-29 RX ADMIN — Medication 30 MILLIGRAM(S): at 05:08

## 2017-05-29 RX ADMIN — SODIUM CHLORIDE 4000 MILLILITER(S): 9 INJECTION INTRAMUSCULAR; INTRAVENOUS; SUBCUTANEOUS at 02:13

## 2017-05-29 RX ADMIN — Medication 30 MILLIGRAM(S): at 02:33

## 2017-05-29 NOTE — ED PROVIDER NOTE - OBJECTIVE STATEMENT
71 y/o M pt w/ PMHx of COPD, asthma, DM and HTN presents to the ED c/o shortness of breath. Pt states that lately he has been feeling confused and weak. Denies fever, chills, cough or any other complaints. NKDA. 71 y/o M pt w/ PMHx of COPD, asthma, DM and HTN presents to the ED c/o shortness of breath. Pt states that lately he has been feeling low energy and sob bc he ran out of his nebs. Denies fever, chills, cough or any other complaints. NKDA.

## 2017-05-29 NOTE — ED PROVIDER NOTE - MEDICAL DECISION MAKING DETAILS
copd flare in setting of viral illness  feels much better with fluids and meds. not having productive cough so no azithro but refilled nebs  discussed anticipatory guidance. pmd follow up this week. well appearing. feels better.

## 2017-05-29 NOTE — ED ADULT NURSE NOTE - OBJECTIVE STATEMENT
Pt states that he is having sob especially on exertion, co having headache. Pt also feeling confused. pt is aware of the time, place. denies fever, chills.

## 2017-06-06 ENCOUNTER — EMERGENCY (EMERGENCY)
Facility: HOSPITAL | Age: 72
LOS: 1 days | Discharge: HOME | End: 2017-06-06
Attending: EMERGENCY MEDICINE
Payer: MEDICARE

## 2017-06-06 VITALS
TEMPERATURE: 98 F | HEART RATE: 95 BPM | RESPIRATION RATE: 18 BRPM | SYSTOLIC BLOOD PRESSURE: 148 MMHG | OXYGEN SATURATION: 97 % | DIASTOLIC BLOOD PRESSURE: 75 MMHG

## 2017-06-06 VITALS
HEIGHT: 67 IN | HEART RATE: 101 BPM | WEIGHT: 164.91 LBS | RESPIRATION RATE: 18 BRPM | OXYGEN SATURATION: 95 % | SYSTOLIC BLOOD PRESSURE: 156 MMHG | DIASTOLIC BLOOD PRESSURE: 82 MMHG | TEMPERATURE: 98 F

## 2017-06-06 DIAGNOSIS — Z12.11 ENCOUNTER FOR SCREENING FOR MALIGNANT NEOPLASM OF COLON: Chronic | ICD-10-CM

## 2017-06-06 DIAGNOSIS — E78.5 HYPERLIPIDEMIA, UNSPECIFIED: ICD-10-CM

## 2017-06-06 DIAGNOSIS — Z98.890 OTHER SPECIFIED POSTPROCEDURAL STATES: ICD-10-CM

## 2017-06-06 DIAGNOSIS — R10.13 EPIGASTRIC PAIN: ICD-10-CM

## 2017-06-06 DIAGNOSIS — J45.909 UNSPECIFIED ASTHMA, UNCOMPLICATED: ICD-10-CM

## 2017-06-06 DIAGNOSIS — E11.9 TYPE 2 DIABETES MELLITUS WITHOUT COMPLICATIONS: ICD-10-CM

## 2017-06-06 DIAGNOSIS — Z98.89 OTHER SPECIFIED POSTPROCEDURAL STATES: Chronic | ICD-10-CM

## 2017-06-06 DIAGNOSIS — I10 ESSENTIAL (PRIMARY) HYPERTENSION: ICD-10-CM

## 2017-06-06 DIAGNOSIS — R51 HEADACHE: ICD-10-CM

## 2017-06-06 DIAGNOSIS — R11.0 NAUSEA: ICD-10-CM

## 2017-06-06 LAB
ALBUMIN SERPL ELPH-MCNC: 3.4 G/DL — LOW (ref 3.5–5)
ALP SERPL-CCNC: 89 U/L — SIGNIFICANT CHANGE UP (ref 40–120)
ALT FLD-CCNC: 33 U/L DA — SIGNIFICANT CHANGE UP (ref 10–60)
ANION GAP SERPL CALC-SCNC: 5 MMOL/L — SIGNIFICANT CHANGE UP (ref 5–17)
APTT BLD: 34.4 SEC — SIGNIFICANT CHANGE UP (ref 27.5–37.4)
AST SERPL-CCNC: 46 U/L — HIGH (ref 10–40)
BASOPHILS # BLD AUTO: 0.2 K/UL — SIGNIFICANT CHANGE UP (ref 0–0.2)
BASOPHILS NFR BLD AUTO: 2.6 % — HIGH (ref 0–2)
BILIRUB SERPL-MCNC: 0.3 MG/DL — SIGNIFICANT CHANGE UP (ref 0.2–1.2)
BUN SERPL-MCNC: 13 MG/DL — SIGNIFICANT CHANGE UP (ref 7–18)
CALCIUM SERPL-MCNC: 9 MG/DL — SIGNIFICANT CHANGE UP (ref 8.4–10.5)
CHLORIDE SERPL-SCNC: 97 MMOL/L — SIGNIFICANT CHANGE UP (ref 96–108)
CK MB BLD-MCNC: 1.1 % — SIGNIFICANT CHANGE UP (ref 0–3.5)
CK MB CFR SERPL CALC: 2.4 NG/ML — SIGNIFICANT CHANGE UP (ref 0–3.6)
CK SERPL-CCNC: 212 U/L — SIGNIFICANT CHANGE UP (ref 35–232)
CO2 SERPL-SCNC: 34 MMOL/L — HIGH (ref 22–31)
CREAT SERPL-MCNC: 0.9 MG/DL — SIGNIFICANT CHANGE UP (ref 0.5–1.3)
D DIMER BLD IA.RAPID-MCNC: <150 NG/ML DDU — SIGNIFICANT CHANGE UP
EOSINOPHIL # BLD AUTO: 0.6 K/UL — HIGH (ref 0–0.5)
EOSINOPHIL NFR BLD AUTO: 7.2 % — HIGH (ref 0–6)
GLUCOSE SERPL-MCNC: 148 MG/DL — HIGH (ref 70–99)
HCT VFR BLD CALC: 42.8 % — SIGNIFICANT CHANGE UP (ref 39–50)
HGB BLD-MCNC: 14.4 G/DL — SIGNIFICANT CHANGE UP (ref 13–17)
INR BLD: 0.97 RATIO — SIGNIFICANT CHANGE UP (ref 0.88–1.16)
LIDOCAIN IGE QN: 116 U/L — SIGNIFICANT CHANGE UP (ref 73–393)
LYMPHOCYTES # BLD AUTO: 2.3 K/UL — SIGNIFICANT CHANGE UP (ref 1–3.3)
LYMPHOCYTES # BLD AUTO: 29.6 % — SIGNIFICANT CHANGE UP (ref 13–44)
MCHC RBC-ENTMCNC: 31 PG — SIGNIFICANT CHANGE UP (ref 27–34)
MCHC RBC-ENTMCNC: 33.5 GM/DL — SIGNIFICANT CHANGE UP (ref 32–36)
MCV RBC AUTO: 92.4 FL — SIGNIFICANT CHANGE UP (ref 80–100)
MONOCYTES # BLD AUTO: 0.5 K/UL — SIGNIFICANT CHANGE UP (ref 0–0.9)
MONOCYTES NFR BLD AUTO: 6.1 % — SIGNIFICANT CHANGE UP (ref 2–14)
NEUTROPHILS # BLD AUTO: 4.3 K/UL — SIGNIFICANT CHANGE UP (ref 1.8–7.4)
NEUTROPHILS NFR BLD AUTO: 54.4 % — SIGNIFICANT CHANGE UP (ref 43–77)
PLATELET # BLD AUTO: 301 K/UL — SIGNIFICANT CHANGE UP (ref 150–400)
POTASSIUM SERPL-MCNC: 5.5 MMOL/L — HIGH (ref 3.5–5.3)
POTASSIUM SERPL-SCNC: 5.5 MMOL/L — HIGH (ref 3.5–5.3)
PROT SERPL-MCNC: 8.8 G/DL — HIGH (ref 6–8.3)
PROTHROM AB SERPL-ACNC: 10.6 SEC — SIGNIFICANT CHANGE UP (ref 9.8–12.7)
RBC # BLD: 4.64 M/UL — SIGNIFICANT CHANGE UP (ref 4.2–5.8)
RBC # FLD: 14.4 % — SIGNIFICANT CHANGE UP (ref 10.3–14.5)
SODIUM SERPL-SCNC: 136 MMOL/L — SIGNIFICANT CHANGE UP (ref 135–145)
TROPONIN I SERPL-MCNC: <0.015 NG/ML — SIGNIFICANT CHANGE UP (ref 0–0.04)
WBC # BLD: 7.9 K/UL — SIGNIFICANT CHANGE UP (ref 3.8–10.5)
WBC # FLD AUTO: 7.9 K/UL — SIGNIFICANT CHANGE UP (ref 3.8–10.5)

## 2017-06-06 PROCEDURE — 71020: CPT | Mod: 26

## 2017-06-06 PROCEDURE — 82553 CREATINE MB FRACTION: CPT

## 2017-06-06 PROCEDURE — 85027 COMPLETE CBC AUTOMATED: CPT

## 2017-06-06 PROCEDURE — 36000 PLACE NEEDLE IN VEIN: CPT

## 2017-06-06 PROCEDURE — 70450 CT HEAD/BRAIN W/O DYE: CPT

## 2017-06-06 PROCEDURE — 70450 CT HEAD/BRAIN W/O DYE: CPT | Mod: 26

## 2017-06-06 PROCEDURE — 85730 THROMBOPLASTIN TIME PARTIAL: CPT

## 2017-06-06 PROCEDURE — 83690 ASSAY OF LIPASE: CPT

## 2017-06-06 PROCEDURE — 99284 EMERGENCY DEPT VISIT MOD MDM: CPT

## 2017-06-06 PROCEDURE — 71046 X-RAY EXAM CHEST 2 VIEWS: CPT

## 2017-06-06 PROCEDURE — 80053 COMPREHEN METABOLIC PANEL: CPT

## 2017-06-06 PROCEDURE — 93005 ELECTROCARDIOGRAM TRACING: CPT

## 2017-06-06 PROCEDURE — 99285 EMERGENCY DEPT VISIT HI MDM: CPT | Mod: 25

## 2017-06-06 PROCEDURE — 85610 PROTHROMBIN TIME: CPT

## 2017-06-06 PROCEDURE — 82550 ASSAY OF CK (CPK): CPT

## 2017-06-06 PROCEDURE — 84484 ASSAY OF TROPONIN QUANT: CPT

## 2017-06-06 PROCEDURE — 85379 FIBRIN DEGRADATION QUANT: CPT

## 2017-06-06 PROCEDURE — 36416 COLLJ CAPILLARY BLOOD SPEC: CPT

## 2017-06-06 RX ORDER — FAMOTIDINE 10 MG/ML
20 INJECTION INTRAVENOUS ONCE
Qty: 0 | Refills: 0 | Status: COMPLETED | OUTPATIENT
Start: 2017-06-06 | End: 2017-06-06

## 2017-06-06 RX ORDER — SODIUM CHLORIDE 9 MG/ML
3 INJECTION INTRAMUSCULAR; INTRAVENOUS; SUBCUTANEOUS ONCE
Qty: 0 | Refills: 0 | Status: COMPLETED | OUTPATIENT
Start: 2017-06-06 | End: 2017-06-06

## 2017-06-06 RX ORDER — ACETAMINOPHEN 500 MG
650 TABLET ORAL ONCE
Qty: 0 | Refills: 0 | Status: COMPLETED | OUTPATIENT
Start: 2017-06-06 | End: 2017-06-06

## 2017-06-06 RX ORDER — FAMOTIDINE 10 MG/ML
1 INJECTION INTRAVENOUS
Qty: 60 | Refills: 0
Start: 2017-06-06 | End: 2017-07-06

## 2017-06-06 RX ADMIN — Medication 650 MILLIGRAM(S): at 04:21

## 2017-06-06 RX ADMIN — FAMOTIDINE 20 MILLIGRAM(S): 10 INJECTION INTRAVENOUS at 05:26

## 2017-06-06 RX ADMIN — SODIUM CHLORIDE 3 MILLILITER(S): 9 INJECTION INTRAMUSCULAR; INTRAVENOUS; SUBCUTANEOUS at 03:29

## 2017-06-06 NOTE — ED PROVIDER NOTE - NS ED MD SCRIBE ATTENDING SCRIBE SECTIONS
PHYSICAL EXAM/REVIEW OF SYSTEMS/PAST MEDICAL/SURGICAL/SOCIAL HISTORY/DISPOSITION/HISTORY OF PRESENT ILLNESS/VITAL SIGNS( Pullset)

## 2017-06-06 NOTE — ED PROVIDER NOTE - MEDICAL DECISION MAKING DETAILS
73 y/o M pt with DM, HTN, HLD, headaches presents to ED c/o nausea and HA. Will order Pepsin, Zofran, CT head for headaches and reassess;  f/u with GI doctor. 71 y/o M pt with DM, HTN, HLD, headaches presents to ED c/o nausea and HA. Will order Pepcid, Zofran, CT head for headaches and reassess;  f/u with GI doctor.

## 2017-06-06 NOTE — ED PROVIDER NOTE - OBJECTIVE STATEMENT
71 y/o M pt with a significant PMHx of DM, HTN, HLD, headaches (takes tylenols to relief) and a significant PSHx of spine surgery presents to ED c/o nausea x 2 days. Pt reports that he could not sleep at night, so he came to the ED. Pt reports to have HA secondary to nausea x today. Pt states that he has visual changes occasionally; things go dark when he watches TV sometimes. Pt states that when this happens, he realizes his blood sugar is low. Pt denies sick contacts, or recent travel. Pt denies abd pain, chest pain, fevers, chills, diarrhea, cough, or any other complaints.  (Pt quit smoking 2 years ago). NKDA. 73 y/o M pt with a significant PMHx of DM, HTN, HLD, headaches (takes tylenols to relief) and a significant PSHx of spine surgery presents to ED c/o nausea x 2 days. Pt reports that he could not sleep at night, so he came to the ED. Pt reports to have HA secondary to nausea x today. Pt states that he has visual changes occasionally; things go dark when he watches TV sometimes. Pt states that when this happens, he realizes his blood sugar is low. Pt denies sick contacts, or recent travel. Pt denies abd pain, no back pain, chest pain, no diaphoresis, no fevers, chills, diarrhea, cough, or any other complaints.  (Pt quit smoking 2 years ago). NKDA. Questioned CP/SOB several times as triage report notes this as his chief complaint. He repeatedly states he did not have pain/sob.  He does feel bloating- relieved by gas-x

## 2017-06-22 ENCOUNTER — EMERGENCY (EMERGENCY)
Facility: HOSPITAL | Age: 72
LOS: 1 days | Discharge: HOME | End: 2017-06-22
Attending: EMERGENCY MEDICINE
Payer: MEDICARE

## 2017-06-22 VITALS
HEIGHT: 67 IN | TEMPERATURE: 98 F | DIASTOLIC BLOOD PRESSURE: 66 MMHG | RESPIRATION RATE: 18 BRPM | SYSTOLIC BLOOD PRESSURE: 128 MMHG | HEART RATE: 72 BPM | OXYGEN SATURATION: 97 % | WEIGHT: 175.05 LBS

## 2017-06-22 DIAGNOSIS — E11.9 TYPE 2 DIABETES MELLITUS WITHOUT COMPLICATIONS: ICD-10-CM

## 2017-06-22 DIAGNOSIS — Z98.89 OTHER SPECIFIED POSTPROCEDURAL STATES: Chronic | ICD-10-CM

## 2017-06-22 DIAGNOSIS — E78.5 HYPERLIPIDEMIA, UNSPECIFIED: ICD-10-CM

## 2017-06-22 DIAGNOSIS — Z12.11 ENCOUNTER FOR SCREENING FOR MALIGNANT NEOPLASM OF COLON: Chronic | ICD-10-CM

## 2017-06-22 DIAGNOSIS — Z87.891 PERSONAL HISTORY OF NICOTINE DEPENDENCE: ICD-10-CM

## 2017-06-22 DIAGNOSIS — I10 ESSENTIAL (PRIMARY) HYPERTENSION: ICD-10-CM

## 2017-06-22 DIAGNOSIS — J44.1 CHRONIC OBSTRUCTIVE PULMONARY DISEASE WITH (ACUTE) EXACERBATION: ICD-10-CM

## 2017-06-22 PROCEDURE — 84484 ASSAY OF TROPONIN QUANT: CPT

## 2017-06-22 PROCEDURE — 85027 COMPLETE CBC AUTOMATED: CPT

## 2017-06-22 PROCEDURE — 36415 COLL VENOUS BLD VENIPUNCTURE: CPT

## 2017-06-22 PROCEDURE — 99284 EMERGENCY DEPT VISIT MOD MDM: CPT | Mod: 25

## 2017-06-22 PROCEDURE — 94640 AIRWAY INHALATION TREATMENT: CPT

## 2017-06-22 PROCEDURE — 99283 EMERGENCY DEPT VISIT LOW MDM: CPT | Mod: 25

## 2017-06-22 PROCEDURE — 83880 ASSAY OF NATRIURETIC PEPTIDE: CPT

## 2017-06-22 PROCEDURE — 80053 COMPREHEN METABOLIC PANEL: CPT

## 2017-06-22 RX ORDER — IPRATROPIUM/ALBUTEROL SULFATE 18-103MCG
3 AEROSOL WITH ADAPTER (GRAM) INHALATION ONCE
Qty: 0 | Refills: 0 | Status: COMPLETED | OUTPATIENT
Start: 2017-06-22 | End: 2017-06-22

## 2017-06-22 RX ORDER — IPRATROPIUM BROMIDE 0.2 MG/ML
500 SOLUTION, NON-ORAL INHALATION ONCE
Qty: 0 | Refills: 0 | Status: DISCONTINUED | OUTPATIENT
Start: 2017-06-22 | End: 2017-06-22

## 2017-06-22 RX ADMIN — Medication 50 MILLIGRAM(S): at 23:59

## 2017-06-22 RX ADMIN — Medication 3 MILLILITER(S): at 23:59

## 2017-06-22 NOTE — ED ADULT TRIAGE NOTE - CHIEF COMPLAINT QUOTE
pt c/o feeling short of breath when laying down,  presently states feeling fine since he is sitting up

## 2017-06-22 NOTE — ED PROVIDER NOTE - OBJECTIVE STATEMENT
71 y/o male with PMHx of [place], DM, COPD, HTN, and HLD and PSHx of Endoscopy and Spinal Surgery presents to the ED c/o SOB when lying down x2 days. Pt states he has nebs which help relieve symptoms, but he is out of the solution. Pt denies fever, chills, CP, cough, leg swelling, or any other complaints. NKDA.

## 2017-06-22 NOTE — ED PROVIDER NOTE - MEDICAL DECISION MAKING DETAILS
copd exacerbation  feels much better after nebs and prednisone. discussed anticipatory guidance. refilled meds.

## 2017-06-23 LAB
ALBUMIN SERPL ELPH-MCNC: 3.3 G/DL — LOW (ref 3.5–5)
ALP SERPL-CCNC: 71 U/L — SIGNIFICANT CHANGE UP (ref 40–120)
ALT FLD-CCNC: 25 U/L DA — SIGNIFICANT CHANGE UP (ref 10–60)
ANION GAP SERPL CALC-SCNC: 7 MMOL/L — SIGNIFICANT CHANGE UP (ref 5–17)
AST SERPL-CCNC: 25 U/L — SIGNIFICANT CHANGE UP (ref 10–40)
BILIRUB SERPL-MCNC: 0.2 MG/DL — SIGNIFICANT CHANGE UP (ref 0.2–1.2)
BUN SERPL-MCNC: 13 MG/DL — SIGNIFICANT CHANGE UP (ref 7–18)
CALCIUM SERPL-MCNC: 8.4 MG/DL — SIGNIFICANT CHANGE UP (ref 8.4–10.5)
CHLORIDE SERPL-SCNC: 101 MMOL/L — SIGNIFICANT CHANGE UP (ref 96–108)
CO2 SERPL-SCNC: 30 MMOL/L — SIGNIFICANT CHANGE UP (ref 22–31)
CREAT SERPL-MCNC: 0.91 MG/DL — SIGNIFICANT CHANGE UP (ref 0.5–1.3)
GLUCOSE SERPL-MCNC: 118 MG/DL — HIGH (ref 70–99)
HCT VFR BLD CALC: 39.9 % — SIGNIFICANT CHANGE UP (ref 39–50)
HGB BLD-MCNC: 13 G/DL — SIGNIFICANT CHANGE UP (ref 13–17)
MCHC RBC-ENTMCNC: 30.9 PG — SIGNIFICANT CHANGE UP (ref 27–34)
MCHC RBC-ENTMCNC: 32.5 GM/DL — SIGNIFICANT CHANGE UP (ref 32–36)
MCV RBC AUTO: 95.2 FL — SIGNIFICANT CHANGE UP (ref 80–100)
NT-PROBNP SERPL-SCNC: 565 PG/ML — HIGH (ref 0–125)
PLATELET # BLD AUTO: 244 K/UL — SIGNIFICANT CHANGE UP (ref 150–400)
POTASSIUM SERPL-MCNC: 4.2 MMOL/L — SIGNIFICANT CHANGE UP (ref 3.5–5.3)
POTASSIUM SERPL-SCNC: 4.2 MMOL/L — SIGNIFICANT CHANGE UP (ref 3.5–5.3)
PROT SERPL-MCNC: 7.7 G/DL — SIGNIFICANT CHANGE UP (ref 6–8.3)
RBC # BLD: 4.19 M/UL — LOW (ref 4.2–5.8)
RBC # FLD: 13.2 % — SIGNIFICANT CHANGE UP (ref 10.3–14.5)
SODIUM SERPL-SCNC: 138 MMOL/L — SIGNIFICANT CHANGE UP (ref 135–145)
TROPONIN I SERPL-MCNC: <0.015 NG/ML — SIGNIFICANT CHANGE UP (ref 0–0.04)
WBC # BLD: 8.7 K/UL — SIGNIFICANT CHANGE UP (ref 3.8–10.5)
WBC # FLD AUTO: 8.7 K/UL — SIGNIFICANT CHANGE UP (ref 3.8–10.5)

## 2017-06-23 RX ORDER — ALBUTEROL 90 UG/1
3 AEROSOL, METERED ORAL
Qty: 30 | Refills: 0
Start: 2017-06-23 | End: 2017-07-23

## 2017-06-23 NOTE — ED ADULT NURSE NOTE - PAIN RATING/NUMBER SCALE (0-10): REST
Subjective:    HPI                    Objective:    System    Physical Exam    General     ROS    Assessment/Plan:      SUBJECTIVE  Subjective: Stood for almost 4 hours yesterday so he is stiff and sore.  But still doing well otherwise.  Using his cane without issues right now   Current Pain level: 1/10   Changes in function:  Yes (See Goal flowsheet attached for changes in current functional level)     Adverse reaction to treatment or activity:  None    OBJECTIVE  Objective: Gait: cues to bend knee more on swing and avoid hip hiking.       ASSESSMENT  Chidi continues to require intervention to meet STG and LTG's: PT  Patient is progressing as expected.  Response to therapy has shown an improvement in  pain level, strength and gait  Progress made towards STG/LTG?  Yes (See Goal flowsheet attached for updates on achievement of STG and LTG)    PLAN  Current treatment program is being advanced to more complex exercises.    PTA/ATC plan:  N/A    Please refer to the daily flowsheet for treatment today, total treatment time and time spent performing 1:1 timed codes.               0

## 2019-06-05 NOTE — ED PROVIDER NOTE - CPE EDP MUSC NORM
Normal rate, regular rhythm.  Heart sounds S1, S2.  No murmurs, rubs or gallops.
normal...
denies pain/discomfort

## 2019-11-04 ENCOUNTER — INPATIENT (INPATIENT)
Facility: HOSPITAL | Age: 74
LOS: 1 days | Discharge: HOME | DRG: 300 | End: 2019-11-06
Attending: INTERNAL MEDICINE | Admitting: INTERNAL MEDICINE
Payer: MEDICARE

## 2019-11-04 VITALS — TEMPERATURE: 100 F

## 2019-11-04 DIAGNOSIS — J44.9 CHRONIC OBSTRUCTIVE PULMONARY DISEASE, UNSPECIFIED: ICD-10-CM

## 2019-11-04 DIAGNOSIS — Z98.89 OTHER SPECIFIED POSTPROCEDURAL STATES: Chronic | ICD-10-CM

## 2019-11-04 DIAGNOSIS — R06.02 SHORTNESS OF BREATH: ICD-10-CM

## 2019-11-04 DIAGNOSIS — I10 ESSENTIAL (PRIMARY) HYPERTENSION: ICD-10-CM

## 2019-11-04 DIAGNOSIS — E11.9 TYPE 2 DIABETES MELLITUS WITHOUT COMPLICATIONS: ICD-10-CM

## 2019-11-04 DIAGNOSIS — N17.9 ACUTE KIDNEY FAILURE, UNSPECIFIED: ICD-10-CM

## 2019-11-04 DIAGNOSIS — E78.5 HYPERLIPIDEMIA, UNSPECIFIED: ICD-10-CM

## 2019-11-04 DIAGNOSIS — F17.200 NICOTINE DEPENDENCE, UNSPECIFIED, UNCOMPLICATED: ICD-10-CM

## 2019-11-04 DIAGNOSIS — Z29.9 ENCOUNTER FOR PROPHYLACTIC MEASURES, UNSPECIFIED: ICD-10-CM

## 2019-11-04 DIAGNOSIS — I73.9 PERIPHERAL VASCULAR DISEASE, UNSPECIFIED: ICD-10-CM

## 2019-11-04 DIAGNOSIS — Z12.11 ENCOUNTER FOR SCREENING FOR MALIGNANT NEOPLASM OF COLON: Chronic | ICD-10-CM

## 2019-11-04 LAB
ALBUMIN SERPL ELPH-MCNC: 3.6 G/DL — SIGNIFICANT CHANGE UP (ref 3.5–5)
ALP SERPL-CCNC: 75 U/L — SIGNIFICANT CHANGE UP (ref 40–120)
ALT FLD-CCNC: 27 U/L DA — SIGNIFICANT CHANGE UP (ref 10–60)
ANION GAP SERPL CALC-SCNC: 4 MMOL/L — LOW (ref 5–17)
APPEARANCE UR: CLEAR — SIGNIFICANT CHANGE UP
APTT BLD: 30.2 SEC — SIGNIFICANT CHANGE UP (ref 27.5–36.3)
AST SERPL-CCNC: 14 U/L — SIGNIFICANT CHANGE UP (ref 10–40)
BASOPHILS # BLD AUTO: 0.09 K/UL — SIGNIFICANT CHANGE UP (ref 0–0.2)
BASOPHILS NFR BLD AUTO: 0.7 % — SIGNIFICANT CHANGE UP (ref 0–2)
BILIRUB SERPL-MCNC: 0.6 MG/DL — SIGNIFICANT CHANGE UP (ref 0.2–1.2)
BILIRUB UR-MCNC: NEGATIVE — SIGNIFICANT CHANGE UP
BUN SERPL-MCNC: 29 MG/DL — HIGH (ref 7–18)
CALCIUM SERPL-MCNC: 9.5 MG/DL — SIGNIFICANT CHANGE UP (ref 8.4–10.5)
CHLORIDE SERPL-SCNC: 100 MMOL/L — SIGNIFICANT CHANGE UP (ref 96–108)
CO2 SERPL-SCNC: 31 MMOL/L — SIGNIFICANT CHANGE UP (ref 22–31)
COLOR SPEC: YELLOW — SIGNIFICANT CHANGE UP
CREAT SERPL-MCNC: 1.47 MG/DL — HIGH (ref 0.5–1.3)
DIFF PNL FLD: ABNORMAL
EOSINOPHIL # BLD AUTO: 0.04 K/UL — SIGNIFICANT CHANGE UP (ref 0–0.5)
EOSINOPHIL NFR BLD AUTO: 0.3 % — SIGNIFICANT CHANGE UP (ref 0–6)
GLUCOSE BLDC GLUCOMTR-MCNC: 286 MG/DL — HIGH (ref 70–99)
GLUCOSE SERPL-MCNC: 210 MG/DL — HIGH (ref 70–99)
GLUCOSE UR QL: 100 MG/DL
HCT VFR BLD CALC: 48.5 % — SIGNIFICANT CHANGE UP (ref 39–50)
HGB BLD-MCNC: 15.6 G/DL — SIGNIFICANT CHANGE UP (ref 13–17)
IMM GRANULOCYTES NFR BLD AUTO: 0.3 % — SIGNIFICANT CHANGE UP (ref 0–1.5)
INR BLD: 1.04 RATIO — SIGNIFICANT CHANGE UP (ref 0.88–1.16)
KETONES UR-MCNC: ABNORMAL
LACTATE SERPL-SCNC: 1.7 MMOL/L — SIGNIFICANT CHANGE UP (ref 0.7–2)
LEUKOCYTE ESTERASE UR-ACNC: NEGATIVE — SIGNIFICANT CHANGE UP
LYMPHOCYTES # BLD AUTO: 18.6 % — SIGNIFICANT CHANGE UP (ref 13–44)
LYMPHOCYTES # BLD AUTO: 2.34 K/UL — SIGNIFICANT CHANGE UP (ref 1–3.3)
MCHC RBC-ENTMCNC: 30.5 PG — SIGNIFICANT CHANGE UP (ref 27–34)
MCHC RBC-ENTMCNC: 32.2 GM/DL — SIGNIFICANT CHANGE UP (ref 32–36)
MCV RBC AUTO: 94.7 FL — SIGNIFICANT CHANGE UP (ref 80–100)
MONOCYTES # BLD AUTO: 1.09 K/UL — HIGH (ref 0–0.9)
MONOCYTES NFR BLD AUTO: 8.7 % — SIGNIFICANT CHANGE UP (ref 2–14)
NEUTROPHILS # BLD AUTO: 8.97 K/UL — HIGH (ref 1.8–7.4)
NEUTROPHILS NFR BLD AUTO: 71.4 % — SIGNIFICANT CHANGE UP (ref 43–77)
NITRITE UR-MCNC: NEGATIVE — SIGNIFICANT CHANGE UP
NRBC # BLD: 0 /100 WBCS — SIGNIFICANT CHANGE UP (ref 0–0)
PH UR: 6 — SIGNIFICANT CHANGE UP (ref 5–8)
PLATELET # BLD AUTO: 254 K/UL — SIGNIFICANT CHANGE UP (ref 150–400)
POTASSIUM SERPL-MCNC: 4.3 MMOL/L — SIGNIFICANT CHANGE UP (ref 3.5–5.3)
POTASSIUM SERPL-SCNC: 4.3 MMOL/L — SIGNIFICANT CHANGE UP (ref 3.5–5.3)
PROT SERPL-MCNC: 9.1 G/DL — HIGH (ref 6–8.3)
PROT UR-MCNC: 30 MG/DL
PROTHROM AB SERPL-ACNC: 11.6 SEC — SIGNIFICANT CHANGE UP (ref 10–12.9)
RBC # BLD: 5.12 M/UL — SIGNIFICANT CHANGE UP (ref 4.2–5.8)
RBC # FLD: 13.6 % — SIGNIFICANT CHANGE UP (ref 10.3–14.5)
SODIUM SERPL-SCNC: 135 MMOL/L — SIGNIFICANT CHANGE UP (ref 135–145)
SP GR SPEC: 1.01 — SIGNIFICANT CHANGE UP (ref 1.01–1.02)
TROPONIN I SERPL-MCNC: <0.015 NG/ML — SIGNIFICANT CHANGE UP (ref 0–0.04)
UROBILINOGEN FLD QL: NEGATIVE — SIGNIFICANT CHANGE UP
WBC # BLD: 12.57 K/UL — HIGH (ref 3.8–10.5)
WBC # FLD AUTO: 12.57 K/UL — HIGH (ref 3.8–10.5)

## 2019-11-04 PROCEDURE — 99231 SBSQ HOSP IP/OBS SF/LOW 25: CPT

## 2019-11-04 PROCEDURE — 99223 1ST HOSP IP/OBS HIGH 75: CPT | Mod: AI,GC

## 2019-11-04 PROCEDURE — 93971 EXTREMITY STUDY: CPT | Mod: 26,LT

## 2019-11-04 PROCEDURE — 99222 1ST HOSP IP/OBS MODERATE 55: CPT

## 2019-11-04 PROCEDURE — 71275 CT ANGIOGRAPHY CHEST: CPT | Mod: 26

## 2019-11-04 PROCEDURE — 99285 EMERGENCY DEPT VISIT HI MDM: CPT

## 2019-11-04 PROCEDURE — 71045 X-RAY EXAM CHEST 1 VIEW: CPT | Mod: 26

## 2019-11-04 RX ORDER — INSULIN LISPRO 100/ML
VIAL (ML) SUBCUTANEOUS
Refills: 0 | Status: DISCONTINUED | OUTPATIENT
Start: 2019-11-04 | End: 2019-11-06

## 2019-11-04 RX ORDER — SODIUM CHLORIDE 9 MG/ML
1000 INJECTION, SOLUTION INTRAVENOUS
Refills: 0 | Status: DISCONTINUED | OUTPATIENT
Start: 2019-11-04 | End: 2019-11-06

## 2019-11-04 RX ORDER — GLIMEPIRIDE 1 MG
1 TABLET ORAL
Qty: 0 | Refills: 0 | DISCHARGE

## 2019-11-04 RX ORDER — CEFTRIAXONE 500 MG/1
1000 INJECTION, POWDER, FOR SOLUTION INTRAMUSCULAR; INTRAVENOUS ONCE
Refills: 0 | Status: COMPLETED | OUTPATIENT
Start: 2019-11-04 | End: 2019-11-04

## 2019-11-04 RX ORDER — AZITHROMYCIN 500 MG/1
500 TABLET, FILM COATED ORAL ONCE
Refills: 0 | Status: COMPLETED | OUTPATIENT
Start: 2019-11-04 | End: 2019-11-04

## 2019-11-04 RX ORDER — MORPHINE SULFATE 50 MG/1
2 CAPSULE, EXTENDED RELEASE ORAL ONCE
Refills: 0 | Status: DISCONTINUED | OUTPATIENT
Start: 2019-11-04 | End: 2019-11-04

## 2019-11-04 RX ORDER — GLUCAGON INJECTION, SOLUTION 0.5 MG/.1ML
1 INJECTION, SOLUTION SUBCUTANEOUS ONCE
Refills: 0 | Status: DISCONTINUED | OUTPATIENT
Start: 2019-11-04 | End: 2019-11-06

## 2019-11-04 RX ORDER — ASPIRIN/CALCIUM CARB/MAGNESIUM 324 MG
81 TABLET ORAL DAILY
Refills: 0 | Status: DISCONTINUED | OUTPATIENT
Start: 2019-11-04 | End: 2019-11-06

## 2019-11-04 RX ORDER — DEXTROSE 50 % IN WATER 50 %
15 SYRINGE (ML) INTRAVENOUS ONCE
Refills: 0 | Status: DISCONTINUED | OUTPATIENT
Start: 2019-11-04 | End: 2019-11-06

## 2019-11-04 RX ORDER — IPRATROPIUM/ALBUTEROL SULFATE 18-103MCG
3 AEROSOL WITH ADAPTER (GRAM) INHALATION EVERY 6 HOURS
Refills: 0 | Status: DISCONTINUED | OUTPATIENT
Start: 2019-11-04 | End: 2019-11-06

## 2019-11-04 RX ORDER — ACETAMINOPHEN 500 MG
1000 TABLET ORAL ONCE
Refills: 0 | Status: COMPLETED | OUTPATIENT
Start: 2019-11-04 | End: 2019-11-04

## 2019-11-04 RX ORDER — IPRATROPIUM/ALBUTEROL SULFATE 18-103MCG
3 AEROSOL WITH ADAPTER (GRAM) INHALATION ONCE
Refills: 0 | Status: COMPLETED | OUTPATIENT
Start: 2019-11-04 | End: 2019-11-04

## 2019-11-04 RX ORDER — INSULIN LISPRO 100/ML
VIAL (ML) SUBCUTANEOUS AT BEDTIME
Refills: 0 | Status: DISCONTINUED | OUTPATIENT
Start: 2019-11-04 | End: 2019-11-06

## 2019-11-04 RX ORDER — LOSARTAN POTASSIUM 100 MG/1
50 TABLET, FILM COATED ORAL DAILY
Refills: 0 | Status: DISCONTINUED | OUTPATIENT
Start: 2019-11-04 | End: 2019-11-04

## 2019-11-04 RX ORDER — GABAPENTIN 400 MG/1
600 CAPSULE ORAL THREE TIMES A DAY
Refills: 0 | Status: DISCONTINUED | OUTPATIENT
Start: 2019-11-04 | End: 2019-11-06

## 2019-11-04 RX ORDER — DEXTROSE 10 % IN WATER 10 %
250 INTRAVENOUS SOLUTION INTRAVENOUS ONCE
Refills: 0 | Status: DISCONTINUED | OUTPATIENT
Start: 2019-11-04 | End: 2019-11-06

## 2019-11-04 RX ORDER — GABAPENTIN 400 MG/1
400 CAPSULE ORAL
Refills: 0 | Status: DISCONTINUED | OUTPATIENT
Start: 2019-11-04 | End: 2019-11-04

## 2019-11-04 RX ORDER — HEPARIN SODIUM 5000 [USP'U]/ML
5000 INJECTION INTRAVENOUS; SUBCUTANEOUS EVERY 12 HOURS
Refills: 0 | Status: DISCONTINUED | OUTPATIENT
Start: 2019-11-04 | End: 2019-11-06

## 2019-11-04 RX ORDER — DEXTROSE 10 % IN WATER 10 %
125 INTRAVENOUS SOLUTION INTRAVENOUS ONCE
Refills: 0 | Status: DISCONTINUED | OUTPATIENT
Start: 2019-11-04 | End: 2019-11-06

## 2019-11-04 RX ORDER — ACETAMINOPHEN 500 MG
650 TABLET ORAL EVERY 6 HOURS
Refills: 0 | Status: DISCONTINUED | OUTPATIENT
Start: 2019-11-04 | End: 2019-11-06

## 2019-11-04 RX ORDER — NICOTINE POLACRILEX 2 MG
1 GUM BUCCAL DAILY
Refills: 0 | Status: DISCONTINUED | OUTPATIENT
Start: 2019-11-04 | End: 2019-11-06

## 2019-11-04 RX ORDER — SIMVASTATIN 20 MG/1
20 TABLET, FILM COATED ORAL AT BEDTIME
Refills: 0 | Status: DISCONTINUED | OUTPATIENT
Start: 2019-11-04 | End: 2019-11-06

## 2019-11-04 RX ORDER — SODIUM CHLORIDE 9 MG/ML
2300 INJECTION INTRAMUSCULAR; INTRAVENOUS; SUBCUTANEOUS ONCE
Refills: 0 | Status: COMPLETED | OUTPATIENT
Start: 2019-11-04 | End: 2019-11-04

## 2019-11-04 RX ADMIN — SODIUM CHLORIDE 2300 MILLILITER(S): 9 INJECTION INTRAMUSCULAR; INTRAVENOUS; SUBCUTANEOUS at 09:54

## 2019-11-04 RX ADMIN — Medication 1000 MILLIGRAM(S): at 10:10

## 2019-11-04 RX ADMIN — SODIUM CHLORIDE 2300 MILLILITER(S): 9 INJECTION INTRAMUSCULAR; INTRAVENOUS; SUBCUTANEOUS at 10:54

## 2019-11-04 RX ADMIN — AZITHROMYCIN 255 MILLIGRAM(S): 500 TABLET, FILM COATED ORAL at 11:20

## 2019-11-04 RX ADMIN — Medication 3 MILLILITER(S): at 21:22

## 2019-11-04 RX ADMIN — Medication 125 MILLIGRAM(S): at 10:00

## 2019-11-04 RX ADMIN — Medication 1: at 21:41

## 2019-11-04 RX ADMIN — CEFTRIAXONE 1000 MILLIGRAM(S): 500 INJECTION, POWDER, FOR SOLUTION INTRAMUSCULAR; INTRAVENOUS at 09:54

## 2019-11-04 RX ADMIN — Medication 400 MILLIGRAM(S): at 09:54

## 2019-11-04 RX ADMIN — AZITHROMYCIN 500 MILLIGRAM(S): 500 TABLET, FILM COATED ORAL at 12:20

## 2019-11-04 RX ADMIN — Medication 3 MILLILITER(S): at 09:59

## 2019-11-04 RX ADMIN — Medication 3 MILLILITER(S): at 11:21

## 2019-11-04 RX ADMIN — MORPHINE SULFATE 2 MILLIGRAM(S): 50 CAPSULE, EXTENDED RELEASE ORAL at 13:37

## 2019-11-04 RX ADMIN — GABAPENTIN 600 MILLIGRAM(S): 400 CAPSULE ORAL at 21:38

## 2019-11-04 RX ADMIN — MORPHINE SULFATE 2 MILLIGRAM(S): 50 CAPSULE, EXTENDED RELEASE ORAL at 14:07

## 2019-11-04 RX ADMIN — Medication 1000 MILLIGRAM(S): at 10:24

## 2019-11-04 RX ADMIN — CEFTRIAXONE 100 MILLIGRAM(S): 500 INJECTION, POWDER, FOR SOLUTION INTRAMUSCULAR; INTRAVENOUS at 09:54

## 2019-11-04 NOTE — CONSULT NOTE ADULT - SUBJECTIVE AND OBJECTIVE BOX
Pt c/o new acute onset L LE pain x 9 days.  No obvious inciting etiology, but had an episode of weakness in a bathroom and fell on his knees at that time. (no other trauma)  From knee to foot.  No swelling/discoloration.  Constant.  Worse w WB'ing. Unsure re elevation/lowering effect.  No CP/Palpitations  No previous vasc probs- no GI/LE claudication, rest pain, ulcers, swelling, discoloration etc    PMH: DM  PSH: CCY, spine surg    Smoker  Ex ETOH    No Gi c/o    FH: No reported aneurysms/clots    AAO, NAD, Jaime  Uncomfortable when asked to flex L LE  No JVD/icterus  Abd: S/NT, no pulsatilty  LEs: equally warm/norm color/girth  L Knee tenderness  No edema/ulcers/cellulitis  +rad, fem, pop, DP/PT b/l    vUS: No DVT    A/P:   L LE pain  No obvious vasc path    Rec:   Med mgmt/cv risk factors optimization  Further neuroskeletomuscular moyer and mgmt as indicated  Stop smoking  FU outpt for vasc surveillace

## 2019-11-04 NOTE — H&P ADULT - HISTORY OF PRESENT ILLNESS
___ PMHx presented to ED c/o 74M from home, active smoker, PMHx of HTN, DM, asthma, COPD, HLD presented to ED c/o severe pain and numbness of lt. leg. He complaints of worsening 10/10 left calf pain with numbness, not relieved with pain meds and massage. He was seen by his PCP and advised to see vascular specialist but pt. did not make appointment. He now has severe pain that he cannot even stand on it. He also endorses worsening shortness of breath at rest with productive cough X 7-8 mths. Denies any weakness, numbness of other body parts, headache dizziness, chest pain, urinary or bowel complaint.

## 2019-11-04 NOTE — ED ADULT NURSE NOTE - CHPI ED NUR SYMPTOMS NEG
no loss of consciousness/no numbness/no abrasion/no tingling/no bleeding/no vomiting/no confusion/no deformity

## 2019-11-04 NOTE — H&P ADULT - NSICDXPASTMEDICALHX_GEN_ALL_CORE_FT
PAST MEDICAL HISTORY:  Asthma     COPD (chronic obstructive pulmonary disease)     DM (diabetes mellitus)     HTN (hypertension)     Hyperlipemia

## 2019-11-04 NOTE — H&P ADULT - PROBLEM SELECTOR PLAN 8
IMPROVE VTE Individual Risk Assessment  RISK                                                                Points  [  ] Previous VTE                                                  3  [  ] Thrombophilia                                               2  [  ] Lower limb paralysis                                      2        (unable to hold up >15 seconds)    [  ] Current Cancer                                              2         (within 6 months)  [x  ] Immobilization > 24 hrs                                1  [  ] ICU/CCU stay > 24 hours                              1  [x  ] Age > 60                                                      1  IMPROVE VTE Score _________2,   heparin for DVT proph

## 2019-11-04 NOTE — H&P ADULT - NSCORESITESY/N_GEN_A_CORE_RD
IDR Summary      Patient: Juliana Richardson MRN: [id number]    Age: 65 y.o.  : 1941     Admit Diagnosis: I73.9  Atherosclerosis of arteries of extremities ([address]      POD #: 4    DIET status: Cardiac     Lines/Tubes:   IV: YES   Needed: YES  Murray: NO  Needed:NO  Central Line: NO Needed: NO      VTE Prophylaxis: Chemical    Mobility needs: Yes     PT ordered:  YES PT eval on chart: YES    OT ordered:  YES OT eval on chart: YES      ST ordered:  NO ST eval on chart:  NO     Disposition/Care Management:  Discharge plan: Home with [address] ordered? NO     Recommended DME from PT/OT:      DME ordered? NO     SNF- has patient been matched? NO    Accepting bed? NO   Does patient require insurance auth?   NO      Barriers to discharge:   Financial concerns:No   PCP: Shanice Tim NP    : NO  Interventions:       LOS: 4 days     Expected days until discharge: today           Signed:     KANNAN Fink-BC  [address]  Hospitalist Division  Pager:  [phone]  Office:  [phone]
Yes

## 2019-11-04 NOTE — H&P ADULT - ASSESSMENT
74M from home, active smoker, PMHx of HTN, DM, asthma, COPD, HLD presented to ED c/o severe pain and numbness of lt. leg.

## 2019-11-04 NOTE — H&P ADULT - ATTENDING COMMENTS
Patient seen and examined with PGY2 ; Agree with PGY2 MAR A/P above with editing as needed. My independent assessment, findings on exam, diagnosis and plan of care as listed below. Discussed with Dr. Davis    Vital Signs Last 24 Hrs  T(C): 36.7 (04 Nov 2019 16:05), Max: 37.8 (04 Nov 2019 09:00)  T(F): 98 (04 Nov 2019 16:05), Max: 100.1 (04 Nov 2019 09:00)  HR: 96 (04 Nov 2019 16:05) (83 - 250)  BP: 136/62 (04 Nov 2019 16:05) (108/63 - 136/62)  RR: 16 (04 Nov 2019 16:05) (16 - 26)  SpO2: 94% (04 Nov 2019 16:05) (82% - 100%)    P/E;  Neuro: AAO x3; No focal deficits  CVS: S1S2 present, regular  Resp: BLAE+, No wheeze or Rhonchi  GI: Soft, BS+, NT  Extr: No edema or calf tenderness  severe Claudication of right LE with elevation and flexion    Labs:                        15.6   12.57 )-----------( 254      ( 04 Nov 2019 09:43 )             48.5   11-04    135  |  100  |  29<H>  ----------------------------<  210<H>  4.3   |  31  |  1.47<H>    Ca    9.5      04 Nov 2019 09:43    TPro  9.1<H>  /  Alb  3.6  /  TBili  0.6  /  DBili  x   /  AST  14  /  ALT  27  /  AlkPhos  75  11-04    US Duplex Venous Lower Ext Ltd, Left (11.04.19 @ 10:26)    IMPRESSION: No evidence of left lower extremity deep venous thrombosis.    D/D:  Inability to ambulate due to severe claudication of the RLE possibly severe PVD due to prolonged smoking  Type 2 DM with Neuropathy also possible  Chronic tobacco use        Plan:  Medicine;   Increase Gabapentin to 600 mg thrice daily  Vascular consult Dr. De Leon; Arterial doppler for ABIs/ PVRs  PT eval  Smoking cessation counseled  Hold metformin as patient may need further contrast studies;   Humalog scale for now and add  Lantus if sugars are persistently elevated  A1c, TSH  DVT ppx    Discussed with patient findings and plan of care  Discussed with TJ Davis and RN    FULL NOTE TO FOLLOW  Discussed with patient findings and plan of care Patient seen and examined with PGY2 ; Agree with PGY2 MAR A/P above with editing as needed. My independent assessment, findings on exam, diagnosis and plan of care as listed below. Discussed with Dr. Davis    Patient with Hx DM and HTN, chronic smoker x 60 yrs presenting with inability to ambulate due to pain in left calf especially on flexion.     ROS/FH/SH: As above    Vital Signs Last 24 Hrs  T(C): 36.7 (04 Nov 2019 16:05), Max: 37.8 (04 Nov 2019 09:00)  T(F): 98 (04 Nov 2019 16:05), Max: 100.1 (04 Nov 2019 09:00)  HR: 96 (04 Nov 2019 16:05) (83 - 250)  BP: 136/62 (04 Nov 2019 16:05) (108/63 - 136/62)  RR: 16 (04 Nov 2019 16:05) (16 - 26)  SpO2: 94% (04 Nov 2019 16:05) (82% - 100%)    P/E;  Neuro: AAO x3; No focal deficits  CVS: S1S2 present, regular  Resp: BLAE+, No wheeze or Rhonchi  GI: Soft, BS+, NT  Extr: No edema or calf tenderness  severe Claudication of right LE with elevation and flexion    Labs:                        15.6   12.57 )-----------( 254      ( 04 Nov 2019 09:43 )             48.5   11-04    135  |  100  |  29<H>  ----------------------------<  210<H>  4.3   |  31  |  1.47<H>    Ca    9.5      04 Nov 2019 09:43    TPro  9.1<H>  /  Alb  3.6  /  TBili  0.6  /  DBili  x   /  AST  14  /  ALT  27  /  AlkPhos  75  11-04    US Duplex Venous Lower Ext Ltd, Left (11.04.19 @ 10:26)    IMPRESSION: No evidence of left lower extremity deep venous thrombosis.    D/D:  Inability to ambulate due to severe claudication of the RLE possibly severe PVD due to prolonged smoking  Type 2 DM with Neuropathy also possible  Chronic tobacco use    Plan:  Medicine;   Increase Gabapentin to 600 mg thrice daily  Vascular consult d/w Dr. De Leon; Arterial doppler for ABIs/ PVRs  If leg weakness persist, will consider Neurology evaluation  PT eval  Smoking cessation counseled  Hold metformin as patient may need further contrast studies;   Humalog scale for now and add  Lantus if sugars are persistently elevated  A1c, TSH  DVT ppx    Discussed with patient findings and plan of care  Discussed with TJ Davis and RN

## 2019-11-04 NOTE — ED PROVIDER NOTE - CLINICAL SUMMARY MEDICAL DECISION MAKING FREE TEXT BOX
73 yo male presents with shortness of breath and leg pain. Patient was found to be febrile on core temp and is hypoxic with SpO2 at 92% on room air. Will give patient oxygen, obtain sepsis labs, give treatment for Asthma/COPD exacerbation.  Will obtain US doppler to r/o DVT and CTA chest to r/o PE. Will reassess.

## 2019-11-04 NOTE — ED PROVIDER NOTE - MUSCULOSKELETAL, MLM
Left popliteal fossa with TTP. Mild tenderness of left calf. Compartments are soft with no tenderness or deformity. Good sensation and peripheral pulses.

## 2019-11-04 NOTE — H&P ADULT - PROBLEM SELECTOR PLAN 4
home meds: metformin and glimepiride, (non compliant)  Hold oral hypoglycemics  c/w HSS   FU A1C  Monitor fingerstick and adjust meds

## 2019-11-04 NOTE — ED ADULT TRIAGE NOTE - CHIEF COMPLAINT QUOTE
C/o left knee and back pain since yesterday.  As per son patients knees gave out on him and he fell last night.  Dyspnea on exertion, positive tobacco use, chronic non productive moist cough noted

## 2019-11-04 NOTE — H&P ADULT - NSICDXPASTSURGICALHX_GEN_ALL_CORE_FT
PAST SURGICAL HISTORY:  Encounter for screening colonoscopy     H/O endoscopy     Status post spinal surgery lumbar

## 2019-11-04 NOTE — H&P ADULT - PROBLEM SELECTOR PLAN 1
p/w severe pain and numbness of lt. leg  no neurological deficit  doppler negative for DVT  counselled to stop smoking  vascular consult

## 2019-11-04 NOTE — ED ADULT NURSE NOTE - OBJECTIVE STATEMENT
Pt c/o LLE pain , denies any trauma/falls but son stated pt has been weak since yesterday, his knees gave uo and fell, no LOC   Pt states he has some shortness of breath, smoker's cough, active cigarette smoker

## 2019-11-04 NOTE — ED PROVIDER NOTE - OBJECTIVE STATEMENT
73 yo male with Pmhx of HTN, DM, asthma, COPD, HLD, presents with left leg pain and weakness for 2 days, and severe chills, wheezing and shortness of breath for multiple days. Patient states that yesterday he was having spontaneous pain in LLE that he has never had before and due to his pain, his left leg was giving out on him. Patient reports he has been unable to ambulate secondary to his left leg discomfort. Patient is compliant with all of his medication. Patient denies taking any anticoagulants, trauma, falls, chest pain, nausea, vomiting, diaphoresis, and any other acute complaints.

## 2019-11-05 LAB
ALBUMIN SERPL ELPH-MCNC: 2.8 G/DL — LOW (ref 3.5–5)
ALP SERPL-CCNC: 58 U/L — SIGNIFICANT CHANGE UP (ref 40–120)
ALT FLD-CCNC: 21 U/L DA — SIGNIFICANT CHANGE UP (ref 10–60)
ANION GAP SERPL CALC-SCNC: 4 MMOL/L — LOW (ref 5–17)
AST SERPL-CCNC: 15 U/L — SIGNIFICANT CHANGE UP (ref 10–40)
BASOPHILS # BLD AUTO: 0.01 K/UL — SIGNIFICANT CHANGE UP (ref 0–0.2)
BASOPHILS NFR BLD AUTO: 0.1 % — SIGNIFICANT CHANGE UP (ref 0–2)
BILIRUB SERPL-MCNC: 0.3 MG/DL — SIGNIFICANT CHANGE UP (ref 0.2–1.2)
BUN SERPL-MCNC: 40 MG/DL — HIGH (ref 7–18)
CALCIUM SERPL-MCNC: 9 MG/DL — SIGNIFICANT CHANGE UP (ref 8.4–10.5)
CHLORIDE SERPL-SCNC: 106 MMOL/L — SIGNIFICANT CHANGE UP (ref 96–108)
CHOLEST SERPL-MCNC: 151 MG/DL — SIGNIFICANT CHANGE UP (ref 10–199)
CK SERPL-CCNC: 255 U/L — HIGH (ref 35–232)
CO2 SERPL-SCNC: 29 MMOL/L — SIGNIFICANT CHANGE UP (ref 22–31)
CREAT SERPL-MCNC: 1.19 MG/DL — SIGNIFICANT CHANGE UP (ref 0.5–1.3)
CULTURE RESULTS: SIGNIFICANT CHANGE UP
EOSINOPHIL # BLD AUTO: 0 K/UL — SIGNIFICANT CHANGE UP (ref 0–0.5)
EOSINOPHIL NFR BLD AUTO: 0 % — SIGNIFICANT CHANGE UP (ref 0–6)
GLUCOSE BLDC GLUCOMTR-MCNC: 144 MG/DL — HIGH (ref 70–99)
GLUCOSE BLDC GLUCOMTR-MCNC: 167 MG/DL — HIGH (ref 70–99)
GLUCOSE BLDC GLUCOMTR-MCNC: 198 MG/DL — HIGH (ref 70–99)
GLUCOSE BLDC GLUCOMTR-MCNC: 214 MG/DL — HIGH (ref 70–99)
GLUCOSE SERPL-MCNC: 270 MG/DL — HIGH (ref 70–99)
HBA1C BLD-MCNC: 8.1 % — HIGH (ref 4–5.6)
HBA1C BLD-MCNC: 8.1 % — HIGH (ref 4–5.6)
HCT VFR BLD CALC: 41.3 % — SIGNIFICANT CHANGE UP (ref 39–50)
HDLC SERPL-MCNC: 64 MG/DL — SIGNIFICANT CHANGE UP
HGB BLD-MCNC: 13.3 G/DL — SIGNIFICANT CHANGE UP (ref 13–17)
IMM GRANULOCYTES NFR BLD AUTO: 0.6 % — SIGNIFICANT CHANGE UP (ref 0–1.5)
LIPID PNL WITH DIRECT LDL SERPL: 75 MG/DL — SIGNIFICANT CHANGE UP
LYMPHOCYTES # BLD AUTO: 1.66 K/UL — SIGNIFICANT CHANGE UP (ref 1–3.3)
LYMPHOCYTES # BLD AUTO: 13 % — SIGNIFICANT CHANGE UP (ref 13–44)
MAGNESIUM SERPL-MCNC: 2.4 MG/DL — SIGNIFICANT CHANGE UP (ref 1.6–2.6)
MCHC RBC-ENTMCNC: 30.5 PG — SIGNIFICANT CHANGE UP (ref 27–34)
MCHC RBC-ENTMCNC: 32.2 GM/DL — SIGNIFICANT CHANGE UP (ref 32–36)
MCV RBC AUTO: 94.7 FL — SIGNIFICANT CHANGE UP (ref 80–100)
MONOCYTES # BLD AUTO: 1.15 K/UL — HIGH (ref 0–0.9)
MONOCYTES NFR BLD AUTO: 9 % — SIGNIFICANT CHANGE UP (ref 2–14)
NEUTROPHILS # BLD AUTO: 9.9 K/UL — HIGH (ref 1.8–7.4)
NEUTROPHILS NFR BLD AUTO: 77.3 % — HIGH (ref 43–77)
NRBC # BLD: 0 /100 WBCS — SIGNIFICANT CHANGE UP (ref 0–0)
PHOSPHATE SERPL-MCNC: 2.7 MG/DL — SIGNIFICANT CHANGE UP (ref 2.5–4.5)
PLATELET # BLD AUTO: 233 K/UL — SIGNIFICANT CHANGE UP (ref 150–400)
POTASSIUM SERPL-MCNC: 4.5 MMOL/L — SIGNIFICANT CHANGE UP (ref 3.5–5.3)
POTASSIUM SERPL-SCNC: 4.5 MMOL/L — SIGNIFICANT CHANGE UP (ref 3.5–5.3)
PROT SERPL-MCNC: 7.5 G/DL — SIGNIFICANT CHANGE UP (ref 6–8.3)
RBC # BLD: 4.36 M/UL — SIGNIFICANT CHANGE UP (ref 4.2–5.8)
RBC # FLD: 13.4 % — SIGNIFICANT CHANGE UP (ref 10.3–14.5)
SODIUM SERPL-SCNC: 139 MMOL/L — SIGNIFICANT CHANGE UP (ref 135–145)
SPECIMEN SOURCE: SIGNIFICANT CHANGE UP
TOTAL CHOLESTEROL/HDL RATIO MEASUREMENT: 2.4 RATIO — LOW (ref 3.4–9.6)
TRIGL SERPL-MCNC: 61 MG/DL — SIGNIFICANT CHANGE UP (ref 10–149)
TSH SERPL-MCNC: 0.64 UU/ML — SIGNIFICANT CHANGE UP (ref 0.34–4.82)
VIT B12 SERPL-MCNC: 856 PG/ML — SIGNIFICANT CHANGE UP (ref 232–1245)
WBC # BLD: 12.8 K/UL — HIGH (ref 3.8–10.5)
WBC # FLD AUTO: 12.8 K/UL — HIGH (ref 3.8–10.5)

## 2019-11-05 PROCEDURE — 93923 UPR/LXTR ART STDY 3+ LVLS: CPT | Mod: 26

## 2019-11-05 PROCEDURE — 99233 SBSQ HOSP IP/OBS HIGH 50: CPT | Mod: GC

## 2019-11-05 RX ORDER — METFORMIN HYDROCHLORIDE 850 MG/1
1000 TABLET ORAL
Refills: 0 | Status: DISCONTINUED | OUTPATIENT
Start: 2019-11-05 | End: 2019-11-06

## 2019-11-05 RX ORDER — MAGNESIUM HYDROXIDE 400 MG/1
30 TABLET, CHEWABLE ORAL DAILY
Refills: 0 | Status: DISCONTINUED | OUTPATIENT
Start: 2019-11-05 | End: 2019-11-06

## 2019-11-05 RX ADMIN — Medication 1 PATCH: at 12:06

## 2019-11-05 RX ADMIN — GABAPENTIN 600 MILLIGRAM(S): 400 CAPSULE ORAL at 22:10

## 2019-11-05 RX ADMIN — Medication 81 MILLIGRAM(S): at 12:07

## 2019-11-05 RX ADMIN — HEPARIN SODIUM 5000 UNIT(S): 5000 INJECTION INTRAVENOUS; SUBCUTANEOUS at 17:29

## 2019-11-05 RX ADMIN — Medication 1: at 17:29

## 2019-11-05 RX ADMIN — SIMVASTATIN 20 MILLIGRAM(S): 20 TABLET, FILM COATED ORAL at 22:10

## 2019-11-05 RX ADMIN — Medication 2: at 07:57

## 2019-11-05 RX ADMIN — Medication 3 MILLILITER(S): at 14:27

## 2019-11-05 RX ADMIN — Medication 3 MILLILITER(S): at 09:25

## 2019-11-05 RX ADMIN — METFORMIN HYDROCHLORIDE 1000 MILLIGRAM(S): 850 TABLET ORAL at 17:29

## 2019-11-05 RX ADMIN — Medication 650 MILLIGRAM(S): at 22:10

## 2019-11-05 RX ADMIN — Medication 1 PATCH: at 21:25

## 2019-11-05 RX ADMIN — Medication 3 MILLILITER(S): at 21:05

## 2019-11-05 RX ADMIN — GABAPENTIN 600 MILLIGRAM(S): 400 CAPSULE ORAL at 13:53

## 2019-11-05 RX ADMIN — Medication 3 MILLILITER(S): at 02:25

## 2019-11-05 RX ADMIN — Medication 650 MILLIGRAM(S): at 23:45

## 2019-11-05 RX ADMIN — MAGNESIUM HYDROXIDE 30 MILLILITER(S): 400 TABLET, CHEWABLE ORAL at 17:32

## 2019-11-05 RX ADMIN — HEPARIN SODIUM 5000 UNIT(S): 5000 INJECTION INTRAVENOUS; SUBCUTANEOUS at 05:53

## 2019-11-05 RX ADMIN — GABAPENTIN 600 MILLIGRAM(S): 400 CAPSULE ORAL at 05:53

## 2019-11-05 NOTE — PROGRESS NOTE ADULT - SUBJECTIVE AND OBJECTIVE BOX
PGY 1 Note discussed with supervising resident and primary attending    Patient is a 74y old  Male who presents with a chief complaint of Inability to ambulate due to severe pain in left lower extremity (2019 16:16)      INTERVAL HPI/OVERNIGHT EVENTS: Patient's leg pain and weakness resolved this AM. Patient continues to have bilateral leg numbness and tingling, as well as numbness in left hand due to carpal tunnel syndrome(patient wears a wrist splint at night). Denies all other review of systems.      MEDICATIONS  (STANDING):  albuterol/ipratropium for Nebulization 3 milliLiter(s) Nebulizer every 6 hours  aspirin  chewable 81 milliGRAM(s) Oral daily  dextrose 10% Bolus 125 milliLiter(s) IV Bolus once  dextrose 10% Bolus 250 milliLiter(s) IV Bolus once  dextrose 5%. 1000 milliLiter(s) (50 mL/Hr) IV Continuous <Continuous>  gabapentin 600 milliGRAM(s) Oral three times a day  heparin  Injectable 5000 Unit(s) SubCutaneous every 12 hours  insulin lispro (HumaLOG) corrective regimen sliding scale   SubCutaneous three times a day before meals  insulin lispro (HumaLOG) corrective regimen sliding scale   SubCutaneous at bedtime  nicotine -  14 mG/24Hr(s) Patch 1 patch Transdermal daily  simvastatin 20 milliGRAM(s) Oral at bedtime    MEDICATIONS  (PRN):  acetaminophen   Tablet .. 650 milliGRAM(s) Oral every 6 hours PRN Mild Pain (1 - 3), Moderate Pain (4 - 6)  dextrose 40% Gel 15 Gram(s) Oral once PRN Blood Glucose LESS THAN 70 milliGRAM(s)/deciLiter  glucagon  Injectable 1 milliGRAM(s) IntraMuscular once PRN Glucose <70 milliGRAM(s)/deciLiter      __________________________________________________  REVIEW OF SYSTEMS:    Negative except as per HPI      Vital Signs Last 24 Hrs  T(C): 36.6 (2019 07:35), Max: 36.7 (2019 16:05)  T(F): 97.8 (2019 07:35), Max: 98 (2019 16:05)  HR: 90 (2019 09:43) (75 - 102)  BP: 100/43 (2019 07:35) (93/33 - 136/62)  BP(mean): --  RR: 18 (2019 07:35) (16 - 20)  SpO2: 99% (2019 09:43) (94% - 100%)    ________________________________________________  PHYSICAL EXAM:  GENERAL: NAD, well nourished, pleasant.   HEENT: Normocephalic;  conjunctivae and sclerae clear; moist mucous membranes;   NECK : supple  CHEST/LUNG: Clear to auscultation bilaterally with good air entry   HEART: S1 S2  regular; no murmurs, gallops or rubs  ABDOMEN: Soft, Nontender, Nondistended; Bowel sounds present  EXTREMITIES: no cyanosis; no edema; no calf tenderness, no discoloration  SKIN: warm and dry; no rash  NERVOUS SYSTEM:  Awake and alert; Oriented  to place, person and time ; no new deficits    _________________________________________________  LABS:                        13.3   12.80 )-----------( 233      ( 2019 06:24 )             41.3     11-    139  |  106  |  40<H>  ----------------------------<  270<H>  4.5   |  29  |  1.19    Ca    9.0      2019 06:24  Phos  2.7     11-  Mg     2.4     11-05    TPro  7.5  /  Alb  2.8<L>  /  TBili  0.3  /  DBili  x   /  AST  15  /  ALT  21  /  AlkPhos  58  11-05    PT/INR - ( 2019 09:43 )   PT: 11.6 sec;   INR: 1.04 ratio         PTT - ( 2019 09:43 )  PTT:30.2 sec  Urinalysis Basic - ( 2019 17:00 )    Color: Yellow / Appearance: Clear / S.010 / pH: x  Gluc: x / Ketone: Small  / Bili: Negative / Urobili: Negative   Blood: x / Protein: 30 mg/dL / Nitrite: Negative   Leuk Esterase: Negative / RBC: 0-2 /HPF / WBC 0-2 /HPF   Sq Epi: x / Non Sq Epi: Few /HPF / Bacteria: Few /HPF      CAPILLARY BLOOD GLUCOSE      POCT Blood Glucose.: 214 mg/dL (2019 07:51)  POCT Blood Glucose.: 286 mg/dL (2019 21:07)        RADIOLOGY & ADDITIONAL TESTS:    Imaging Personally Reviewed:  YES/NO    Consultant(s) Notes Reviewed:   YES/ No    Care Discussed with Consultants :     Plan of care was discussed with patient and /or primary care giver; all questions and concerns were addressed and care was aligned with patient's wishes.

## 2019-11-05 NOTE — PROGRESS NOTE ADULT - PROBLEM SELECTOR PLAN 2
Present with elevated from baseline creatinine.  Losartan, home medication held  Creatinine trending down

## 2019-11-05 NOTE — PROGRESS NOTE ADULT - PROBLEM SELECTOR PLAN 4
home meds: metformin and glimepiride, (non compliant?)  Hold oral hypoglycemics  c/w HSS   FU A1C- 8.1  -b/l lower extremity neuropathy, increased home dose of gabapentin  -Patient counseled on the importance of medication compliance

## 2019-11-05 NOTE — PROGRESS NOTE ADULT - PROBLEM SELECTOR PLAN 1
p/w severe pain and numbness of lt. leg  no neurological deficit  doppler negative for DVT  counselled to stop smoking  vascular consult, Dr De Leon  -No obvious vascular pathology, recommends med management, cardiovascular optimization, FU Op for vasc surveillance   -ABIs pending

## 2019-11-05 NOTE — PROGRESS NOTE ADULT - ATTENDING COMMENTS
Patient seen and examined this morning around 11 AM with PGY1; Agree with PGY1 A/P above with editing as needed. My independent assessment, findings on exam, diagnosis and plan of care as listed below. Discussed with Dr. Fry.    Patient admitted with inability to ambulate due to sever pain behind left knee and calf; Pain is significantly improved able to stand and take few steps today. He feels a lot better. No new complaints.    Patient seen by Vascular and recommended outpatient follow up and surveillance.     Vital Signs Last 24 Hrs  T(C): 36.6 (05 Nov 2019 07:35), Max: 36.7 (04 Nov 2019 16:05)  T(F): 97.8 (05 Nov 2019 07:35), Max: 98 (04 Nov 2019 16:05)  HR: 90 (05 Nov 2019 09:43) (75 - 102)  BP: 100/43 (05 Nov 2019 07:35) (93/33 - 136/62)  RR: 18 (05 Nov 2019 07:35) (16 - 20)  SpO2: 99% (05 Nov 2019 09:43) (94% - 100%)    P/E:  Neuro: AAO x3; No gross focal deficits  CVS: S1S2 present, regular  Resp: BLAE+, No wheeze or Rhonchi  GI: Soft, BS+, NT, ND  Extr: No edema or calf tenderness B/L LE  No palpable masses or focal tenderness noted    Labs:                        13.3   12.80 )-----------( 233      ( 05 Nov 2019 06:24 )             41.3   11-05    139  |  106  |  40<H>  ----------------------------<  270<H>  4.5   |  29  |  1.19    Ca    9.0      05 Nov 2019 06:24  Phos  2.7     11-05  Mg     2.4     11-05    TPro  7.5  /  Alb  2.8<L>  /  TBili  0.3  /  DBili  x   /  AST  15  /  ALT  21  /  AlkPhos  58  11-05    Creatine Kinase, Serum (11.05.19 @ 09:57)    Creatine Kinase, Serum: 255 U/L      D/D: Patient seen and examined this morning around 11 AM with PGY1; Agree with PGY1 A/P above with editing as needed. My independent assessment, findings on exam, diagnosis and plan of care as listed below. Discussed with Dr. Fry.    Patient admitted with inability to ambulate due to sever pain behind left knee and calf; Pain is significantly improved able to stand and take few steps today. He feels a lot better. No new complaints.    Patient seen by Vascular and recommended outpatient follow up and surveillance.     Vital Signs Last 24 Hrs  T(C): 36.6 (05 Nov 2019 07:35), Max: 36.7 (04 Nov 2019 16:05)  T(F): 97.8 (05 Nov 2019 07:35), Max: 98 (04 Nov 2019 16:05)  HR: 90 (05 Nov 2019 09:43) (75 - 102)  BP: 100/43 (05 Nov 2019 07:35) (93/33 - 136/62)  RR: 18 (05 Nov 2019 07:35) (16 - 20)  SpO2: 99% (05 Nov 2019 09:43) (94% - 100%)    P/E:  Neuro: AAO x3; No gross focal deficits  CVS: S1S2 present, regular  Resp: BLAE+, No wheeze or Rhonchi  GI: Soft, BS+, NT, ND  Extr: No edema or calf tenderness B/L LE  No palpable masses or focal tenderness noted    Labs:                        13.3   12.80 )-----------( 233      ( 05 Nov 2019 06:24 )             41.3   11-05    139  |  106  |  40<H>  ----------------------------<  270<H>  4.5   |  29  |  1.19    Ca    9.0      05 Nov 2019 06:24  Phos  2.7     11-05  Mg     2.4     11-05    TPro  7.5  /  Alb  2.8<L>  /  TBili  0.3  /  DBili  x   /  AST  15  /  ALT  21  /  AlkPhos  58  11-05    Creatine Kinase, Serum (11.05.19 @ 09:57)    Creatine Kinase, Serum: 255 U/L  Thyroid Stimulating Hormone, Serum (11.05.19 @ 06:24)    Thyroid Stimulating Hormone, Serum: 0.64 uU/mL  Lipid Profile (11.05.19 @ 06:24)    Total Cholesterol/HDL Ratio Measurement: 2.4 RATIO    Cholesterol, Serum: 151 mg/dL    Triglycerides, Serum: 61 mg/dL    HDL Cholesterol, Serum: 64:     Direct LDL: 75:     D/D:  Inability to ambulate due to severe claudication of the RLE etiology unclear Neuropathic pain vs PVD  Type 2 DM with Neuropathy with Hyperglycemia  Chronic tobacco use    Plan:  Continue Gabapentin 600 mg thrice daily; Tolerated well with significant improvement in claudication   Vascular consult appreciated; Recommend outpatient surveillance; No further work up suggested by Vascular; Follow up ABIs.  If leg weakness recurs, will consider Neurology evaluation; much improved currently.   PT eval awaited; If continues to improve well will plan discharge in the next 24 hrs  Resume Metformin as no further work up planned; Humalog scale for now  Nutritional consult and Diabetic teaching;   DVT ppx  Continue Bronchodilators for COPD; Smoking cessation counseled    Discussed with patient findings and plan of care; D/C Home likely tomorrow if remain stable  Discussed with PGY1 Dr. Fry and RN Jesusita.

## 2019-11-06 ENCOUNTER — TRANSCRIPTION ENCOUNTER (OUTPATIENT)
Age: 74
End: 2019-11-06

## 2019-11-06 VITALS
RESPIRATION RATE: 20 BRPM | SYSTOLIC BLOOD PRESSURE: 106 MMHG | HEART RATE: 96 BPM | OXYGEN SATURATION: 99 % | DIASTOLIC BLOOD PRESSURE: 85 MMHG | TEMPERATURE: 99 F

## 2019-11-06 LAB
ANION GAP SERPL CALC-SCNC: 4 MMOL/L — LOW (ref 5–17)
BUN SERPL-MCNC: 42 MG/DL — HIGH (ref 7–18)
CALCIUM SERPL-MCNC: 9 MG/DL — SIGNIFICANT CHANGE UP (ref 8.4–10.5)
CHLORIDE SERPL-SCNC: 106 MMOL/L — SIGNIFICANT CHANGE UP (ref 96–108)
CO2 SERPL-SCNC: 31 MMOL/L — SIGNIFICANT CHANGE UP (ref 22–31)
CREAT SERPL-MCNC: 1.11 MG/DL — SIGNIFICANT CHANGE UP (ref 0.5–1.3)
GLUCOSE BLDC GLUCOMTR-MCNC: 145 MG/DL — HIGH (ref 70–99)
GLUCOSE BLDC GLUCOMTR-MCNC: 221 MG/DL — HIGH (ref 70–99)
GLUCOSE SERPL-MCNC: 172 MG/DL — HIGH (ref 70–99)
HCT VFR BLD CALC: 42.7 % — SIGNIFICANT CHANGE UP (ref 39–50)
HGB BLD-MCNC: 13.5 G/DL — SIGNIFICANT CHANGE UP (ref 13–17)
MCHC RBC-ENTMCNC: 30.1 PG — SIGNIFICANT CHANGE UP (ref 27–34)
MCHC RBC-ENTMCNC: 31.6 GM/DL — LOW (ref 32–36)
MCV RBC AUTO: 95.1 FL — SIGNIFICANT CHANGE UP (ref 80–100)
NRBC # BLD: 0 /100 WBCS — SIGNIFICANT CHANGE UP (ref 0–0)
PLATELET # BLD AUTO: 243 K/UL — SIGNIFICANT CHANGE UP (ref 150–400)
POTASSIUM SERPL-MCNC: 4.5 MMOL/L — SIGNIFICANT CHANGE UP (ref 3.5–5.3)
POTASSIUM SERPL-SCNC: 4.5 MMOL/L — SIGNIFICANT CHANGE UP (ref 3.5–5.3)
RBC # BLD: 4.49 M/UL — SIGNIFICANT CHANGE UP (ref 4.2–5.8)
RBC # FLD: 13.7 % — SIGNIFICANT CHANGE UP (ref 10.3–14.5)
SODIUM SERPL-SCNC: 141 MMOL/L — SIGNIFICANT CHANGE UP (ref 135–145)
WBC # BLD: 9.27 K/UL — SIGNIFICANT CHANGE UP (ref 3.8–10.5)
WBC # FLD AUTO: 9.27 K/UL — SIGNIFICANT CHANGE UP (ref 3.8–10.5)

## 2019-11-06 PROCEDURE — 99239 HOSP IP/OBS DSCHRG MGMT >30: CPT | Mod: GC

## 2019-11-06 RX ORDER — GABAPENTIN 400 MG/1
0 CAPSULE ORAL
Qty: 0 | Refills: 0 | DISCHARGE

## 2019-11-06 RX ORDER — BUDESONIDE AND FORMOTEROL FUMARATE DIHYDRATE 160; 4.5 UG/1; UG/1
1 AEROSOL RESPIRATORY (INHALATION)
Qty: 1 | Refills: 0
Start: 2019-11-06 | End: 2019-12-05

## 2019-11-06 RX ORDER — GABAPENTIN 400 MG/1
1 CAPSULE ORAL
Qty: 90 | Refills: 0
Start: 2019-11-06 | End: 2019-12-05

## 2019-11-06 RX ORDER — LOSARTAN POTASSIUM 100 MG/1
1 TABLET, FILM COATED ORAL
Qty: 30 | Refills: 0
Start: 2019-11-06 | End: 2019-12-05

## 2019-11-06 RX ADMIN — Medication 1 PATCH: at 06:34

## 2019-11-06 RX ADMIN — Medication 81 MILLIGRAM(S): at 12:09

## 2019-11-06 RX ADMIN — GABAPENTIN 600 MILLIGRAM(S): 400 CAPSULE ORAL at 13:44

## 2019-11-06 RX ADMIN — GABAPENTIN 600 MILLIGRAM(S): 400 CAPSULE ORAL at 06:32

## 2019-11-06 RX ADMIN — Medication 1 PATCH: at 12:07

## 2019-11-06 RX ADMIN — HEPARIN SODIUM 5000 UNIT(S): 5000 INJECTION INTRAVENOUS; SUBCUTANEOUS at 06:30

## 2019-11-06 RX ADMIN — Medication 3 MILLILITER(S): at 03:52

## 2019-11-06 RX ADMIN — Medication 650 MILLIGRAM(S): at 10:59

## 2019-11-06 RX ADMIN — Medication 1 PATCH: at 12:09

## 2019-11-06 RX ADMIN — Medication 3 MILLILITER(S): at 08:27

## 2019-11-06 RX ADMIN — Medication 2: at 12:02

## 2019-11-06 RX ADMIN — Medication 650 MILLIGRAM(S): at 11:35

## 2019-11-06 RX ADMIN — METFORMIN HYDROCHLORIDE 1000 MILLIGRAM(S): 850 TABLET ORAL at 06:31

## 2019-11-06 NOTE — DISCHARGE NOTE PROVIDER - NSDCMRMEDTOKEN_GEN_ALL_CORE_FT
albuterol 90 mcg/inh inhalation aerosol: 2 puff(s) inhaled every 4 hours, As Needed prn sob  aspirin 81 mg oral tablet, chewable: 2 tab(s) orally once a day  budesonide-formoterol 80 mcg-4.5 mcg/inh inhalation aerosol: 1 application inhaled 2 times a day, As Needed  gabapentin 600 mg oral tablet: 1 tab(s) orally 3 times a day MDD:3/day  glimepiride 4 mg oral tablet: 1 tab(s) orally 2 times a day  losartan 50 mg oral tablet: 1 tab(s) orally once a day  metFORMIN 1000 mg oral tablet: 1 tab(s) orally 2 times a day  nebulizer machine: 1 application 4 times a day 1 Nebulizer machine. Use as directed.  nicotine 14 mg/24 hr transdermal film, extended release: 1 application transdermal once a day  simvastatin 20 mg oral tablet: 1 tab(s) orally once a day (at bedtime) albuterol 90 mcg/inh inhalation aerosol: 2 puff(s) inhaled every 4 hours, As Needed prn sob  aspirin 81 mg oral tablet, chewable: 2 tab(s) orally once a day  budesonide-formoterol 80 mcg-4.5 mcg/inh inhalation aerosol: 1 application inhaled 2 times a day, As Needed  gabapentin 600 mg oral tablet: 1 tab(s) orally 3 times a day MDD:3/day  glimepiride 4 mg oral tablet: 1 tab(s) orally 2 times a day  metFORMIN 1000 mg oral tablet: 1 tab(s) orally 2 times a day  nebulizer machine: 1 application 4 times a day 1 Nebulizer machine. Use as directed.  nicotine 14 mg/24 hr transdermal film, extended release: 1 application transdermal once a day  simvastatin 20 mg oral tablet: 1 tab(s) orally once a day (at bedtime) albuterol 90 mcg/inh inhalation aerosol: 2 puff(s) inhaled every 4 hours, As Needed prn sob  aspirin 81 mg oral tablet, chewable: 2 tab(s) orally once a day  budesonide-formoterol 80 mcg-4.5 mcg/inh inhalation aerosol: 1 application inhaled 2 times a day,  gabapentin 600 mg oral tablet: 1 tab(s) orally 3 times a day MDD:3/day  glimepiride 4 mg oral tablet: 1 tab(s) orally 2 times a day  losartan 25 mg oral tablet: 1 tab(s) orally once a day   metFORMIN 1000 mg oral tablet: 1 tab(s) orally 2 times a day  nebulizer machine: 1 application 4 times a day 1 Nebulizer machine. Use as directed.  nicotine 14 mg/24 hr transdermal film, extended release: 1 application transdermal once a day  simvastatin 20 mg oral tablet: 1 tab(s) orally once a day (at bedtime)

## 2019-11-06 NOTE — DISCHARGE NOTE PROVIDER - CARE PROVIDER_API CALL
Vadim De Leon)  Vascular Surgery  [address] [facility], [address] [place] [address]  Phone: [phone]  Fax: [phone]  Follow Up Time:

## 2019-11-06 NOTE — DISCHARGE NOTE PROVIDER - NSDCCPCAREPLAN_GEN_ALL_CORE_FT
PRINCIPAL DISCHARGE DIAGNOSIS  Diagnosis: Left leg pain  Assessment and Plan of Treatment: You presented with leg pain in your left calf. Original concerns for arterial disease. We obtained SHREYA that was negative for arterial disease. You were seen by vascular and they recommend outpatient vascular monitoring. You may follow up with vascular if needed. Your leg pain resolved during hospitalization.   Additionally, you had numbness and burning sensation in your bilateral feet. This is most likely due to diabetic neuropathy. It is important for you to maintain strict glycemic control. Please be compliant with diabetes medication.  Please follow up with your primary care physician in one week to inform them of your recent hospitalization.      SECONDARY DISCHARGE DIAGNOSES  Diagnosis: DM (diabetes mellitus)  Assessment and Plan of Treatment: It is IMPORTANT that you continue with diabetic medications of metformin and glimepiride . HbAIC was found on admission to be 8.1.  You must maintain a healthy diet that consist of low sugar, low fat, and low sodium. Exercise frequently if possible. Consider repeating your Hemoglobin A1c within 3 months after discharge to monitor your average blood glucose control. Follow up with primary care physician in one week after discharge.    Diagnosis: Hyperlipemia  Assessment and Plan of Treatment: Continue with cholesterol medications. Maintain a healthy diet that consist of low sugar, low fat, low sodium diet. Exercise frequently if possible.  Follow up with primary care physician in one week after discharge.  Diet suggested: DASH Diet that Emphasizes vegetables, fruits, and fat-free or low-fat dairy products. Includes whole grains, fish, poultry, beans, seeds, nuts, and vegetable oils. Limits sodium, sweets, sugary beverages, and red meats.    Diagnosis: Smoker  Assessment and Plan of Treatment: You have a history of smoking. This may cause arterial damage, however the tests done while admitted did not show any arterial disease. You are highly recommended to stop smoking. You may use nicotine gum or patches over the counter if you wish.    Diagnosis: CODY (acute kidney injury)  Assessment and Plan of Treatment: You presented with elevated creatinine levels indicating kidney damage. After IV fluids, your creatinine levels returned to normal. Your medication of losartan was initially held due to CODY. You may continue losartan as this medication is protective of kidneys in diabetic patients. Please follow up with your primary care physician in one week to inform them of your recent hospitalization.    Diagnosis: HTN (hypertension)  Assessment and Plan of Treatment: Continue with blood pressure medication. Maintain a healthy diet that consist of low sugar, low fat, and low sodium. Exercise frequently if possible.  Follow up with primary care physician in one week after discharge. PRINCIPAL DISCHARGE DIAGNOSIS  Diagnosis: Left leg pain  Assessment and Plan of Treatment: You presented with leg pain in your left calf. Original concerns for arterial disease. We obtained SHREYA that was negative for arterial disease. You were seen by vascular and they recommend outpatient vascular monitoring. You may follow up with vascular if needed. Your leg pain resolved during hospitalization.   Additionally, you had numbness and burning sensation in your bilateral feet. This is most likely due to diabetic neuropathy. It is important for you to maintain strict glycemic control. Please be compliant with diabetes medication.  Please follow up with your primary care physician in one week to inform them of your recent hospitalization.      SECONDARY DISCHARGE DIAGNOSES  Diagnosis: Uncontrolled diabetes mellitus with diabetic neuropathy  Assessment and Plan of Treatment: You have burning and tingling in your feet. This is likely due to diabetic neuropathy from uncontrolled diabetes. We highly recommend that you maintain strict glycemic control. We also recommend that you follow up with a podiatrist and opthalmologist at least once a year. Please follow up with your primary care physician in one week to inform them of your recent hospitalization.    Diagnosis: DM (diabetes mellitus)  Assessment and Plan of Treatment: It is IMPORTANT that you continue with diabetic medications of metformin and glimepiride . HbAIC was found on admission to be 8.1.  You must maintain a healthy diet that consist of low sugar, low fat, and low sodium. Exercise frequently if possible. Consider repeating your Hemoglobin A1c within 3 months after discharge to monitor your average blood glucose control. Follow up with primary care physician in one week after discharge.    Diagnosis: Hyperlipemia  Assessment and Plan of Treatment: Continue with cholesterol medications. Maintain a healthy diet that consist of low sugar, low fat, low sodium diet. Exercise frequently if possible.  Follow up with primary care physician in one week after discharge.  Diet suggested: DASH Diet that Emphasizes vegetables, fruits, and fat-free or low-fat dairy products. Includes whole grains, fish, poultry, beans, seeds, nuts, and vegetable oils. Limits sodium, sweets, sugary beverages, and red meats.    Diagnosis: Smoker  Assessment and Plan of Treatment: You have a history of smoking. This may cause arterial damage, however the tests done while admitted did not show any arterial disease. You are highly recommended to stop smoking. You may use nicotine gum or patches over the counter if you wish.    Diagnosis: CODY (acute kidney injury)  Assessment and Plan of Treatment: You presented with elevated creatinine levels indicating kidney damage. After IV fluids, your creatinine levels returned to normal. Your medication of losartan was initially held due to CODY. You may continue losartan as this medication is protective of kidneys in diabetic patients. Please follow up with your primary care physician in one week to inform them of your recent hospitalization.    Diagnosis: HTN (hypertension)  Assessment and Plan of Treatment: Continue with blood pressure medication. Maintain a healthy diet that consist of low sugar, low fat, and low sodium. Exercise frequently if possible.  Follow up with primary care physician in one week after discharge. PRINCIPAL DISCHARGE DIAGNOSIS  Diagnosis: Left leg pain  Assessment and Plan of Treatment: You presented with severe leg pain in your left calf with inability to ambulate. Original concerns for arterial disease. We obtained SHREYA that was negative for arterial disease. You were seen by Vascular Surgeon and they recommend outpatient vascular monitoring. You may follow up with vascular surgery as outpatient if needed for recurrent pains. Your leg pain resolved during hospitalization. Your pain could be related   Additionally, you had numbness and burning sensation in your bilateral feet. This is most likely due to diabetic neuropathy. It is important for you to maintain strict glycemic control. Please be compliant with diabetes medication.  Please follow up with your primary care physician in one week to inform them of your recent hospitalization.      SECONDARY DISCHARGE DIAGNOSES  Diagnosis: Uncontrolled diabetes mellitus with diabetic neuropathy  Assessment and Plan of Treatment: You have burning and tingling in your feet. This is likely due to diabetic neuropathy from uncontrolled diabetes. We highly recommend that you maintain strict glycemic control. We also recommend that you follow up with a podiatrist and opthalmologist at least once a year. Please follow up with your primary care physician in one week to inform them of your recent hospitalization.    Diagnosis: DM (diabetes mellitus)  Assessment and Plan of Treatment: It is IMPORTANT that you continue with diabetic medications of metformin and glimepiride . HbAIC was found on admission to be 8.1.  You must maintain a healthy diet that consist of low sugar, low fat, and low sodium. Exercise frequently if possible. Consider repeating your Hemoglobin A1c within 3 months after discharge to monitor your average blood glucose control. Follow up with primary care physician in one week after discharge.    Diagnosis: CODY (acute kidney injury)  Assessment and Plan of Treatment: You presented with elevated creatinine levels indicating kidney damage. After IV fluids, your creatinine levels returned to normal. Your medication of losartan was initially held due to CODY. You may continue losartan as this medication is protective of kidneys in diabetic patients. Please follow up with your primary care physician in one week to inform them of your recent hospitalization.    Diagnosis: Smoker  Assessment and Plan of Treatment: You have a history of smoking. This may cause arterial damage, however the tests done while admitted did not show any arterial disease. You are highly recommended to stop smoking. You may use nicotine gum or patches over the counter if you wish.    Diagnosis: Hyperlipemia  Assessment and Plan of Treatment: Continue with cholesterol medications. Maintain a healthy diet that consist of low sugar, low fat, low sodium diet. Exercise frequently if possible.  Follow up with primary care physician in one week after discharge.  Diet suggested: DASH Diet that Emphasizes vegetables, fruits, and fat-free or low-fat dairy products. Includes whole grains, fish, poultry, beans, seeds, nuts, and vegetable oils. Limits sodium, sweets, sugary beverages, and red meats.    Diagnosis: HTN (hypertension)  Assessment and Plan of Treatment: Continue with blood pressure medication. Maintain a healthy diet that consist of low sugar, low fat, and low sodium. Exercise frequently if possible.  Follow up with primary care physician in one week after discharge. PRINCIPAL DISCHARGE DIAGNOSIS  Diagnosis: Left leg pain  Assessment and Plan of Treatment: You presented with severe leg pain in your left calf with inability to ambulate. Original concerns for arterial disease. We obtained SHREYA that was negative for arterial disease. You were seen by Vascular Surgeon and they recommend outpatient vascular monitoring. You may follow up with vascular surgery as outpatient if needed for recurrent pain. Your leg pain resolved during hospitalization.   Additionally, you had numbness and burning sensation in your bilateral feet. This is most likely due to diabetic neuropathy. It is important for you to maintain strict glycemic control. Please be compliant with diabetes medication.  Please follow up with your primary care physician in one week to inform them of your recent hospitalization.      SECONDARY DISCHARGE DIAGNOSES  Diagnosis: Uncontrolled diabetes mellitus with diabetic neuropathy  Assessment and Plan of Treatment: You have burning and tingling in your feet. This is likely due to diabetic neuropathy from uncontrolled diabetes. We highly recommend that you maintain strict glycemic control. We also recommend that you follow up with a podiatrist and opthalmologist at least once a year. Please follow up with your primary care physician in one week to inform them of your recent hospitalization.    Diagnosis: DM (diabetes mellitus)  Assessment and Plan of Treatment: It is IMPORTANT that you continue with diabetic medications of metformin and glimepiride . HbAIC was found on admission to be 8.1.  You must maintain a healthy diet that consist of low sugar, low fat, and low sodium. Exercise frequently if possible. Consider repeating your Hemoglobin A1c within 3 months after discharge to monitor your average blood glucose control. Follow up with primary care physician in one week after discharge.    Diagnosis: Hyperlipemia  Assessment and Plan of Treatment: Continue with cholesterol medications. Maintain a healthy diet that consist of low sugar, low fat, low sodium diet. Exercise frequently if possible.  Follow up with primary care physician in one week after discharge.  Diet suggested: DASH Diet that Emphasizes vegetables, fruits, and fat-free or low-fat dairy products. Includes whole grains, fish, poultry, beans, seeds, nuts, and vegetable oils. Limits sodium, sweets, sugary beverages, and red meats.    Diagnosis: Smoker  Assessment and Plan of Treatment: You have a history of smoking. This may cause arterial damage, however the tests done while admitted did not show any arterial disease. You are highly recommended to stop smoking. You may use nicotine gum or patches over the counter if you wish.    Diagnosis: CODY (acute kidney injury)  Assessment and Plan of Treatment: You presented with elevated creatinine levels indicating kidney damage. After IV fluids, your creatinine levels returned to normal. Your medication of losartan was initially held due to CODY. You may continue losartan as this medication is protective of kidneys in diabetic patients. We will start you on a lower dose due to your lower than normal blood pressures. Please follow up with your primary care physician in one week to inform them of your recent hospitalization.    Diagnosis: HTN (hypertension)  Assessment and Plan of Treatment: Continue with blood pressure medication. Maintain a healthy diet that consist of low sugar, low fat, and low sodium. Exercise frequently if possible.  Follow up with primary care physician in one week after discharge. PRINCIPAL DISCHARGE DIAGNOSIS  Diagnosis: Left leg claudication  Assessment and Plan of Treatment:       SECONDARY DISCHARGE DIAGNOSES  Diagnosis: Left leg claudication  Assessment and Plan of Treatment: You presented with severe leg pain in your left calf with inability to ambulate. Original concerns for arterial disease. We obtained SHREYA that was negative for arterial disease. You were seen by Vascular Surgeon and they recommend outpatient vascular monitoring. You may follow up with vascular surgery as outpatient if needed for recurrent pain. Your leg pain resolved during hospitalization.   Additionally, you had numbness and burning sensation in your bilateral feet. This is most likely due to diabetic neuropathy. It is important for you to maintain strict glycemic control. Please be compliant with diabetes medication.  Please follow up with your primary care physician in one week to inform them of your recent hospitalization.    Diagnosis: Uncontrolled diabetes mellitus with diabetic neuropathy  Assessment and Plan of Treatment: You have burning and tingling in your feet. This is likely due to diabetic neuropathy from uncontrolled diabetes. We highly recommend that you maintain strict glycemic control. We also recommend that you follow up with a podiatrist and opthalmologist at least once a year. Please follow up with your primary care physician in one week to inform them of your recent hospitalization.    Diagnosis: DM (diabetes mellitus)  Assessment and Plan of Treatment: It is IMPORTANT that you continue with diabetic medications of metformin and glimepiride . HbAIC was found on admission to be 8.1.  You must maintain a healthy diet that consist of low sugar, low fat, and low sodium. Exercise frequently if possible. Consider repeating your Hemoglobin A1c within 3 months after discharge to monitor your average blood glucose control. Follow up with primary care physician in one week after discharge.    Diagnosis: CODY (acute kidney injury)  Assessment and Plan of Treatment: You presented with elevated creatinine levels indicating kidney damage. After IV fluids, your creatinine levels returned to normal. Your medication of losartan was initially held due to CODY. You may continue losartan as this medication is protective of kidneys in diabetic patients. We will start you on a lower dose due to your lower than normal blood pressures. Please follow up with your primary care physician in one week to inform them of your recent hospitalization.    Diagnosis: Smoker  Assessment and Plan of Treatment: You have a history of smoking. This may cause arterial damage, however the tests done while admitted did not show any arterial disease. You are highly recommended to stop smoking. You may use nicotine gum or patches over the counter if you wish.    Diagnosis: Hyperlipemia  Assessment and Plan of Treatment: Continue with cholesterol medications. Maintain a healthy diet that consist of low sugar, low fat, low sodium diet. Exercise frequently if possible.  Follow up with primary care physician in one week after discharge.  Diet suggested: DASH Diet that Emphasizes vegetables, fruits, and fat-free or low-fat dairy products. Includes whole grains, fish, poultry, beans, seeds, nuts, and vegetable oils. Limits sodium, sweets, sugary beverages, and red meats.    Diagnosis: HTN (hypertension)  Assessment and Plan of Treatment: Continue with blood pressure medication. Maintain a healthy diet that consist of low sugar, low fat, and low sodium. Exercise frequently if possible.  Follow up with primary care physician in one week after discharge. PRINCIPAL DISCHARGE DIAGNOSIS  Diagnosis: Left leg claudication  Assessment and Plan of Treatment: You presented with severe leg pain in your left calf with inability to ambulate. Original concerns for arterial disease. We obtained SHREYA that was negative for arterial disease. You were seen by Vascular Surgeon and they recommend outpatient vascular monitoring. You may follow up with vascular surgery as outpatient if needed for recurrent pain. Your leg pain resolved during hospitalization.   Additionally, you had numbness and burning sensation in your bilateral feet. This is most likely due to diabetic neuropathy. It is important for you to maintain strict glycemic control. Please be compliant with diabetes medication.  Please follow up with your primary care physician in one week to inform them of your recent hospitalization.      SECONDARY DISCHARGE DIAGNOSES  Diagnosis: Uncontrolled diabetes mellitus with diabetic neuropathy  Assessment and Plan of Treatment: You have burning and tingling in your both feet. This is likely due to diabetic neuropathy from uncontrolled diabetes. We highly recommend that you maintain strict sugar control. We also recommend that you follow up with a podiatrist and opthalmologist at least once a year as elevated sugars can lead to complications with eyes and skin breakdwon and ulcers. Please follow up with your primary care physician in one week to inform them of your recent hospitalization. Continue to take Gabapentin as advised. Please note we increased dose to 600 mg three times a day with night dose at the time of sleep.    Diagnosis: DM (diabetes mellitus)  Assessment and Plan of Treatment: It is IMPORTANT that you continue with diabetic medications of metformin and glimepiride . HbAIC was found on admission to be 8.1.  You must maintain a healthy diet that consist of low sugar, low fat, and low sodium. Exercise frequently if possible. Consider repeating your Hemoglobin A1c within 3 months after discharge to monitor your average blood glucose control. Follow up with primary care physician in one week after discharge.    Diagnosis: CODY (acute kidney injury)  Assessment and Plan of Treatment: You presented with elevated creatinine levels indicating kidney damage. After IV fluids, your creatinine levels returned to normal. Your medication of losartan was initially held due to CODY. You may continue losartan as this medication is protective of kidneys in diabetic patients. We will start you on a lower dose due to your lower than normal blood pressures. Please follow up with your primary care physician in one week to inform them of your recent hospitalization.    Diagnosis: Smoker  Assessment and Plan of Treatment: You have a history of smoking. This may cause arterial damage, however the tests done while admitted did not show any arterial disease. You are highly recommended to stop smoking. You may use nicotine gum or patches over the counter if you wish.    Diagnosis: Hyperlipemia  Assessment and Plan of Treatment: Continue with cholesterol medications. Maintain a healthy diet that consist of low sugar, low fat, low sodium diet. Exercise frequently if possible.  Follow up with primary care physician in one week after discharge.  Diet suggested: DASH Diet that Emphasizes vegetables, fruits, and fat-free or low-fat dairy products. Includes whole grains, fish, poultry, beans, seeds, nuts, and vegetable oils. Limits sodium, sweets, sugary beverages, and red meats.    Diagnosis: HTN (hypertension)  Assessment and Plan of Treatment: Continue with blood pressure medication. Maintain a healthy diet that consist of low sugar, low fat, and low sodium. Exercise frequently if possible.  Follow up with primary care physician in one week after discharge.  PLEASE NOTE THAT WE REDUCED DOSE OF LOSARTAN TO 25 MG DAILY.

## 2019-11-06 NOTE — DISCHARGE NOTE PROVIDER - HOSPITAL COURSE
74M from home, active smoker, PMHx of HTN, DM, asthma, COPD, HLD presented to ED c/o severe pain and numbness of lt. leg. He complaints of worsening 10/10 left calf pain with numbness, not relieved with pain meds and massage. He was seen by his PCP and advised to see vascular specialist but pt. did not make appointment. He now has severe pain that he cannot even stand on it. He also endorses worsening shortness of breath at rest with productive cough X 7-8 mths. Denies any weakness, numbness of other body parts, headache dizziness, chest pain, urinary or bowel complaint.    In the ED, US Doppler LE was done and was negative. Vascular consulted for suspected PVD, however ABIs returned negative. Patient to follow up outpatient vascular for monitoring. While admitted, patient's leg pain resolved. Patient's neuropathy is chronic, likely secondary to uncontrolled diabetes with A1C of 8.1. Patient was counseled on the importance of strict glucose control. Patient expressed understanding.     Patient had episode of CODY on admission. Resolved with fluids. Patient's losartan was initially held. To be continued upon discharge.    Patient is stable for discharge per attending and is advised to follow up with PCP as outpatient    Please refer to patient's complete medical chart with documents for a full hospital course, for this is only a brief summary.

## 2019-11-06 NOTE — DISCHARGE NOTE NURSING/CASE MANAGEMENT/SOCIAL WORK - PATIENT PORTAL LINK FT
You can access the FollowMyHealth Patient Portal offered by [facility] by registering at the following website: [email or web address]. By joining [facility]’s FollowMyHealth portal, you will also be able to view your health information using other applications (apps) compatible with our system.

## 2019-11-06 NOTE — DISCHARGE NOTE PROVIDER - NSDCPNSUBOBJ_GEN_ALL_CORE
Patient seen and examined this morning around 11 AM with PGY1; Agree with PGY1 A/P above with editing as needed. My independent assessment, findings on exam, diagnosis and plan of care as listed below. Discussed with Dr. Fry.        Patient admitted with inability to ambulate due to sever pain behind left knee and calf; Pain is significantly improved able to stand and take few steps today. He feels a lot better. No new complaints.        Patient seen by Vascular and recommended outpatient follow up and surveillance.         Vital Signs Last 24 Hrs    T(C): 36.6 (05 Nov 2019 07:35), Max: 36.7 (04 Nov 2019 16:05)    T(F): 97.8 (05 Nov 2019 07:35), Max: 98 (04 Nov 2019 16:05)    HR: 90 (05 Nov 2019 09:43) (75 - 102)    BP: 100/43 (05 Nov 2019 07:35) (93/33 - 136/62)    RR: 18 (05 Nov 2019 07:35) (16 - 20)    SpO2: 99% (05 Nov 2019 09:43) (94% - 100%)        P/E:    Neuro: AAO x3; No gross focal deficits    CVS: S1S2 present, regular    Resp: BLAE+, No wheeze or Rhonchi    GI: Soft, BS+, NT, ND    Extr: No edema or calf tenderness B/L LE    No palpable masses or focal tenderness noted        Labs:                            13.5     9.27  )-----------( 243      ( 06 Nov 2019 05:54 )               42.7     11-06        141  |  106  |  42<H>    ----------------------------<  172<H>    4.5   |  31  |  1.11        Ca    9.0      06 Nov 2019 05:54    Phos  2.7     11-05    Mg     2.4     11-05        TPro  7.5  /  Alb  2.8<L>  /  TBili  0.3  /  DBili  x   /  AST  15  /  ALT  21  /  AlkPhos  58  11-05        D/D:    Inability to ambulate due to severe claudication of the RLE etiology unclear     likely Neuropathic pain, less likely PVD    Acute Kidney injury resolved    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    Continue Metformin and Glimepiride; Dietary restrictions: Consistent CBH diet, low salt    repeat A1c in 3 months. Exercise 30 mins daily    BP lower end; Resume Losartan but only 25 mg daily; CODY also resolved    Continue Bronchodilators for COPD; Smoking cessation counseled        Discussed with patient findings and plan of care; D/C Home    Discussed with PGY1 Dr. Fry and RN Ms. Staley

## 2019-11-12 PROCEDURE — 36415 COLL VENOUS BLD VENIPUNCTURE: CPT

## 2019-11-12 PROCEDURE — 85027 COMPLETE CBC AUTOMATED: CPT

## 2019-11-12 PROCEDURE — 93971 EXTREMITY STUDY: CPT

## 2019-11-12 PROCEDURE — 71045 X-RAY EXAM CHEST 1 VIEW: CPT

## 2019-11-12 PROCEDURE — 87086 URINE CULTURE/COLONY COUNT: CPT

## 2019-11-12 PROCEDURE — 85610 PROTHROMBIN TIME: CPT

## 2019-11-12 PROCEDURE — 97161 PT EVAL LOW COMPLEX 20 MIN: CPT

## 2019-11-12 PROCEDURE — 83605 ASSAY OF LACTIC ACID: CPT

## 2019-11-12 PROCEDURE — 81001 URINALYSIS AUTO W/SCOPE: CPT

## 2019-11-12 PROCEDURE — 83735 ASSAY OF MAGNESIUM: CPT

## 2019-11-12 PROCEDURE — 82962 GLUCOSE BLOOD TEST: CPT

## 2019-11-12 PROCEDURE — 80061 LIPID PANEL: CPT

## 2019-11-12 PROCEDURE — 82607 VITAMIN B-12: CPT

## 2019-11-12 PROCEDURE — 99285 EMERGENCY DEPT VISIT HI MDM: CPT | Mod: 25

## 2019-11-12 PROCEDURE — 94640 AIRWAY INHALATION TREATMENT: CPT

## 2019-11-12 PROCEDURE — 84100 ASSAY OF PHOSPHORUS: CPT

## 2019-11-12 PROCEDURE — 84443 ASSAY THYROID STIM HORMONE: CPT

## 2019-11-12 PROCEDURE — 83036 HEMOGLOBIN GLYCOSYLATED A1C: CPT

## 2019-11-12 PROCEDURE — 87040 BLOOD CULTURE FOR BACTERIA: CPT

## 2019-11-12 PROCEDURE — 82550 ASSAY OF CK (CPK): CPT

## 2019-11-12 PROCEDURE — 80053 COMPREHEN METABOLIC PANEL: CPT

## 2019-11-12 PROCEDURE — 94760 N-INVAS EAR/PLS OXIMETRY 1: CPT

## 2019-11-12 PROCEDURE — 93005 ELECTROCARDIOGRAM TRACING: CPT

## 2019-11-12 PROCEDURE — 80048 BASIC METABOLIC PNL TOTAL CA: CPT

## 2019-11-12 PROCEDURE — 71275 CT ANGIOGRAPHY CHEST: CPT

## 2019-11-12 PROCEDURE — 84484 ASSAY OF TROPONIN QUANT: CPT

## 2019-11-12 PROCEDURE — 93923 UPR/LXTR ART STDY 3+ LVLS: CPT

## 2019-11-12 PROCEDURE — G0378: CPT

## 2019-11-12 PROCEDURE — 85730 THROMBOPLASTIN TIME PARTIAL: CPT

## 2019-11-21 ENCOUNTER — APPOINTMENT (OUTPATIENT)
Dept: VASCULAR SURGERY | Facility: CLINIC | Age: 74
End: 2019-11-21
Payer: MEDICARE

## 2019-11-21 VITALS
BODY MASS INDEX: 25.9 KG/M2 | HEART RATE: 88 BPM | TEMPERATURE: 97.11 F | WEIGHT: 165 LBS | HEIGHT: 67 IN | SYSTOLIC BLOOD PRESSURE: 116 MMHG | DIASTOLIC BLOOD PRESSURE: 72 MMHG

## 2019-11-21 PROCEDURE — 99203 OFFICE O/P NEW LOW 30 MIN: CPT

## 2019-11-21 RX ORDER — GABAPENTIN 300 MG/1
300 CAPSULE ORAL 4 TIMES DAILY
Refills: 0 | Status: ACTIVE | COMMUNITY

## 2019-11-21 NOTE — HISTORY OF PRESENT ILLNESS
[FreeTextEntry1] : MARTHA PERKINS is a 74 year old man who presents for left leg pain. Patient states that he was recently hospitalized three weeks ago with acute onset left leg pain. The pain was sharp occurring in the left anterior calf. No trauma prior but he did fall on his knee secondary to the pain. The pain has since decreased in intensity. The pain occurs occasionally and intermittently. It is not associated with ambulation. No history of claudication. There are no particular alleviating or agravating factors. Denies fever or chills. Does report long standing history of diabetic neuropathy with "pins and needles" sensation on the bilateral feet.

## 2019-11-21 NOTE — PROCEDURE
[FreeTextEntry1] : 74 year old man with history of left leg pain. Palpable pulses on exam without history of claudication.\par - Unclear etiology of left leg pain but does seem to be improving with increased dose of gabapentin during hospitalization. \par - No evidence of arterial insufficiency at this time. Counseled patient on smoking cessation. Continue medical optimization. \par - If pain persists, consider rheumatology or neurology workup. \par - Follow up in six months.

## 2019-11-21 NOTE — PHYSICAL EXAM
[Normal Breath Sounds] : Normal breath sounds [Normal Heart Sounds] : normal heart sounds [Right Carotid Bruit] : no bruit heard over the right carotid [Left Carotid Bruit] : no bruit heard over the left carotid [2+] : left 2+ [Ankle Swelling (On Exam)] : not present [Varicose Veins Of Lower Extremities] : not present [] : not present [No Rash or Lesion] : No rash or lesion [Calm] : calm [de-identified] : nad [de-identified] : eomi [de-identified] : supple, no JVD, no carotid bruit  [de-identified] : soft, nt, nd  [de-identified] : No tenderness. full ROM.  [de-identified] : A&Ox4

## 2019-11-21 NOTE — CONSULT LETTER
[Dear  ___] : Dear  [unfilled], [Courtesy Letter:] : I had the pleasure of seeing your patient, [unfilled], in my office today. [Please see my note below.] : Please see my note below. [Consult Closing:] : Thank you very much for allowing me to participate in the care of this patient.  If you have any questions, please do not hesitate to contact me. [Sincerely,] : Sincerely, [FreeTextEntry1] : He was seen by my partner, Dr. Vadim De Leon, while he was hospitalized in [place]. On my physical exam, he has palpable lower extremity pulses. I do not believe his left lower extremity pain to be due to arterial insufficiency. As his pain is improving on gabapentin, I recommend continuing treatment with gabapentin with a follow up with me in six months. If the pain persists or he develops wounds, I have instructed the patient to call for a sooner appointment.  [FreeTextEntry3] : Melly Mcqueen MD, RPVI\par Division of Vascular & Endovascular Surgery\par [facility], Department of Surgery

## 2020-06-04 ENCOUNTER — APPOINTMENT (OUTPATIENT)
Dept: VASCULAR SURGERY | Facility: CLINIC | Age: 75
End: 2020-06-04

## 2020-09-01 NOTE — DIETITIAN INITIAL EVALUATION ADULT. - SOURCE
Duration Of Freeze Thaw-Cycle (Seconds): 5
Medical Necessity Information: It is in your best interest to select a reason for this procedure from the list below. All of these items fulfill various CMS LCD requirements except the new and changing color options.
Detail Level: Detailed
Include Z78.9 (Other Specified Conditions Influencing Health Status) As An Associated Diagnosis?: No
Consent: The patient's consent was obtained including but not limited to risks of crusting, scabbing, blistering, scarring, darker or lighter pigmentary change, recurrence, incomplete removal and infection.
Medical Necessity Clause: This procedure was medically necessary because the lesions that were treated were:
Number Of Freeze-Thaw Cycles: 1 freeze-thaw cycle
Post-Care Instructions: I reviewed with the patient in detail post-care instructions. Patient is to wear sunprotection, and avoid picking at any of the treated lesions. Pt may apply Vaseline to crusted or scabbing areas.
Chart/other (specify)/patient

## 2020-10-04 NOTE — H&P ADULT. - RESPIRATORY
PHYSICAL THERAPY EVALUATION - INPATIENT     Room Number: 334/334-A  Evaluation Date: 10/4/2020  Type of Evaluation: Initial   Physician Order: PT Eval and Treat    Presenting Problem: Admitted with thrombosis RLE stent for PAD  Reason for Therapy: [facility]) [facility]    Pre-op testing      Past Medical History  Past Medical History:   Diagnosis Date   • Anemia    • Back pain    • Chronic kidney disease (CKD)    • CKD (chronic kidney disease)    • COPD (chronic obstructive pulmonary disease) (HCC)     FEV does the patient currently have. ..  -   Turning over in bed (including adjusting bedclothes, sheets and blankets)?: None   -   Sitting down on and standing up from a chair with arms (e.g., wheelchair, bedside commode, etc.): A Little   -   Moving from lyin pain   Goal #4 Patient will negotiate 14 stairs/one curb w/ assistive device and supervision   Goal #4   Current Status Unable to tolerate.   IV still present   Goal #5 Patient to demonstrate independence with home activity/exercise instructions provided to Breath Sounds equal & clear to percussion & auscultation, no accessory muscle use detailed exam

## 2021-11-02 NOTE — ED ADULT NURSE NOTE - CADM POA URETHRAL CATHETER
supplemented with potassium chloride 40 mg PO X2 doses, recheck level in am. No Supplemented  Trend bmp Takes Metoprolol daily   Restarted 12.5mg qd as BP running on lower side

## 2022-01-01 ENCOUNTER — INPATIENT (INPATIENT)
Facility: HOSPITAL | Age: 77
LOS: 5 days | Discharge: NURSING, REHAB OR LONG-TERM CARE | DRG: 872 | End: 2022-05-06
Attending: INTERNAL MEDICINE | Admitting: INTERNAL MEDICINE
Payer: MEDICARE

## 2022-01-01 ENCOUNTER — TRANSCRIPTION ENCOUNTER (OUTPATIENT)
Age: 77
End: 2022-01-01

## 2022-01-01 ENCOUNTER — INPATIENT (INPATIENT)
Facility: HOSPITAL | Age: 77
LOS: 7 days | DRG: 190 | End: 2022-05-16
Attending: INTERNAL MEDICINE | Admitting: INTERNAL MEDICINE
Payer: MEDICARE

## 2022-01-01 ENCOUNTER — INPATIENT (INPATIENT)
Facility: HOSPITAL | Age: 77
LOS: 17 days | Discharge: NURSING, REHAB OR LONG-TERM CARE | DRG: 871 | End: 2022-04-21
Attending: INTERNAL MEDICINE | Admitting: INTERNAL MEDICINE
Payer: MEDICARE

## 2022-01-01 VITALS
HEIGHT: 66 IN | TEMPERATURE: 98 F | OXYGEN SATURATION: 100 % | DIASTOLIC BLOOD PRESSURE: 47 MMHG | HEART RATE: 106 BPM | RESPIRATION RATE: 18 BRPM | SYSTOLIC BLOOD PRESSURE: 85 MMHG

## 2022-01-01 VITALS
RESPIRATION RATE: 26 BRPM | HEART RATE: 137 BPM | DIASTOLIC BLOOD PRESSURE: 74 MMHG | WEIGHT: 145.06 LBS | OXYGEN SATURATION: 84 % | SYSTOLIC BLOOD PRESSURE: 162 MMHG | TEMPERATURE: 101 F | HEIGHT: 66 IN

## 2022-01-01 VITALS
SYSTOLIC BLOOD PRESSURE: 109 MMHG | DIASTOLIC BLOOD PRESSURE: 49 MMHG | OXYGEN SATURATION: 98 % | TEMPERATURE: 97 F | RESPIRATION RATE: 18 BRPM | HEART RATE: 79 BPM

## 2022-01-01 VITALS
TEMPERATURE: 98 F | RESPIRATION RATE: 18 BRPM | HEART RATE: 91 BPM | DIASTOLIC BLOOD PRESSURE: 59 MMHG | SYSTOLIC BLOOD PRESSURE: 106 MMHG | OXYGEN SATURATION: 97 %

## 2022-01-01 VITALS
DIASTOLIC BLOOD PRESSURE: 43 MMHG | HEIGHT: 66 IN | OXYGEN SATURATION: 100 % | WEIGHT: 143.08 LBS | TEMPERATURE: 98 F | RESPIRATION RATE: 20 BRPM | HEART RATE: 83 BPM | SYSTOLIC BLOOD PRESSURE: 77 MMHG

## 2022-01-01 VITALS
SYSTOLIC BLOOD PRESSURE: 68 MMHG | HEART RATE: 75 BPM | DIASTOLIC BLOOD PRESSURE: 41 MMHG | RESPIRATION RATE: 20 BRPM | OXYGEN SATURATION: 96 % | TEMPERATURE: 100 F

## 2022-01-01 DIAGNOSIS — J10.1 INFLUENZA DUE TO OTHER IDENTIFIED INFLUENZA VIRUS WITH OTHER RESPIRATORY MANIFESTATIONS: ICD-10-CM

## 2022-01-01 DIAGNOSIS — Z51.5 ENCOUNTER FOR PALLIATIVE CARE: ICD-10-CM

## 2022-01-01 DIAGNOSIS — I35.0 NONRHEUMATIC AORTIC (VALVE) STENOSIS: ICD-10-CM

## 2022-01-01 DIAGNOSIS — R31.9 HEMATURIA, UNSPECIFIED: ICD-10-CM

## 2022-01-01 DIAGNOSIS — E16.2 HYPOGLYCEMIA, UNSPECIFIED: ICD-10-CM

## 2022-01-01 DIAGNOSIS — I48.91 UNSPECIFIED ATRIAL FIBRILLATION: ICD-10-CM

## 2022-01-01 DIAGNOSIS — I48.0 PAROXYSMAL ATRIAL FIBRILLATION: ICD-10-CM

## 2022-01-01 DIAGNOSIS — Z12.11 ENCOUNTER FOR SCREENING FOR MALIGNANT NEOPLASM OF COLON: Chronic | ICD-10-CM

## 2022-01-01 DIAGNOSIS — E11.9 TYPE 2 DIABETES MELLITUS WITHOUT COMPLICATIONS: ICD-10-CM

## 2022-01-01 DIAGNOSIS — Z29.9 ENCOUNTER FOR PROPHYLACTIC MEASURES, UNSPECIFIED: ICD-10-CM

## 2022-01-01 DIAGNOSIS — F03.90 UNSPECIFIED DEMENTIA WITHOUT BEHAVIORAL DISTURBANCE: ICD-10-CM

## 2022-01-01 DIAGNOSIS — Z98.89 OTHER SPECIFIED POSTPROCEDURAL STATES: Chronic | ICD-10-CM

## 2022-01-01 DIAGNOSIS — E87.5 HYPERKALEMIA: ICD-10-CM

## 2022-01-01 DIAGNOSIS — R33.8 OTHER RETENTION OF URINE: ICD-10-CM

## 2022-01-01 DIAGNOSIS — J44.9 CHRONIC OBSTRUCTIVE PULMONARY DISEASE, UNSPECIFIED: ICD-10-CM

## 2022-01-01 DIAGNOSIS — K29.70 GASTRITIS, UNSPECIFIED, WITHOUT BLEEDING: ICD-10-CM

## 2022-01-01 DIAGNOSIS — Z02.9 ENCOUNTER FOR ADMINISTRATIVE EXAMINATIONS, UNSPECIFIED: ICD-10-CM

## 2022-01-01 DIAGNOSIS — N40.0 BENIGN PROSTATIC HYPERPLASIA WITHOUT LOWER URINARY TRACT SYMPTOMS: ICD-10-CM

## 2022-01-01 DIAGNOSIS — I95.9 HYPOTENSION, UNSPECIFIED: ICD-10-CM

## 2022-01-01 DIAGNOSIS — I50.23 ACUTE ON CHRONIC SYSTOLIC (CONGESTIVE) HEART FAILURE: ICD-10-CM

## 2022-01-01 DIAGNOSIS — F41.9 ANXIETY DISORDER, UNSPECIFIED: ICD-10-CM

## 2022-01-01 DIAGNOSIS — F10.239 ALCOHOL DEPENDENCE WITH WITHDRAWAL, UNSPECIFIED: ICD-10-CM

## 2022-01-01 DIAGNOSIS — G93.41 METABOLIC ENCEPHALOPATHY: ICD-10-CM

## 2022-01-01 DIAGNOSIS — Z87.898 PERSONAL HISTORY OF OTHER SPECIFIED CONDITIONS: ICD-10-CM

## 2022-01-01 DIAGNOSIS — I21.4 NON-ST ELEVATION (NSTEMI) MYOCARDIAL INFARCTION: ICD-10-CM

## 2022-01-01 DIAGNOSIS — J44.1 CHRONIC OBSTRUCTIVE PULMONARY DISEASE WITH (ACUTE) EXACERBATION: ICD-10-CM

## 2022-01-01 DIAGNOSIS — M54.9 DORSALGIA, UNSPECIFIED: ICD-10-CM

## 2022-01-01 DIAGNOSIS — J18.9 PNEUMONIA, UNSPECIFIED ORGANISM: ICD-10-CM

## 2022-01-01 DIAGNOSIS — K11.7 DISTURBANCES OF SALIVARY SECRETION: ICD-10-CM

## 2022-01-01 DIAGNOSIS — R91.8 OTHER NONSPECIFIC ABNORMAL FINDING OF LUNG FIELD: ICD-10-CM

## 2022-01-01 DIAGNOSIS — E78.5 HYPERLIPIDEMIA, UNSPECIFIED: ICD-10-CM

## 2022-01-01 DIAGNOSIS — D64.9 ANEMIA, UNSPECIFIED: ICD-10-CM

## 2022-01-01 DIAGNOSIS — E11.649 TYPE 2 DIABETES MELLITUS WITH HYPOGLYCEMIA WITHOUT COMA: ICD-10-CM

## 2022-01-01 DIAGNOSIS — I10 ESSENTIAL (PRIMARY) HYPERTENSION: ICD-10-CM

## 2022-01-01 DIAGNOSIS — A41.3 SEPSIS DUE TO HEMOPHILUS INFLUENZAE: ICD-10-CM

## 2022-01-01 DIAGNOSIS — Z71.89 OTHER SPECIFIED COUNSELING: ICD-10-CM

## 2022-01-01 DIAGNOSIS — N17.9 ACUTE KIDNEY FAILURE, UNSPECIFIED: ICD-10-CM

## 2022-01-01 DIAGNOSIS — R06.00 DYSPNEA, UNSPECIFIED: ICD-10-CM

## 2022-01-01 DIAGNOSIS — F17.200 NICOTINE DEPENDENCE, UNSPECIFIED, UNCOMPLICATED: ICD-10-CM

## 2022-01-01 DIAGNOSIS — M54.16 RADICULOPATHY, LUMBAR REGION: ICD-10-CM

## 2022-01-01 DIAGNOSIS — E27.40 UNSPECIFIED ADRENOCORTICAL INSUFFICIENCY: ICD-10-CM

## 2022-01-01 DIAGNOSIS — E43 UNSPECIFIED SEVERE PROTEIN-CALORIE MALNUTRITION: ICD-10-CM

## 2022-01-01 DIAGNOSIS — J96.01 ACUTE RESPIRATORY FAILURE WITH HYPOXIA: ICD-10-CM

## 2022-01-01 DIAGNOSIS — E44.0 MODERATE PROTEIN-CALORIE MALNUTRITION: ICD-10-CM

## 2022-01-01 DIAGNOSIS — I47.1 SUPRAVENTRICULAR TACHYCARDIA: ICD-10-CM

## 2022-01-01 DIAGNOSIS — J96.02 ACUTE RESPIRATORY FAILURE WITH HYPERCAPNIA: ICD-10-CM

## 2022-01-01 DIAGNOSIS — Z91.89 OTHER SPECIFIED PERSONAL RISK FACTORS, NOT ELSEWHERE CLASSIFIED: ICD-10-CM

## 2022-01-01 DIAGNOSIS — R78.81 BACTEREMIA: ICD-10-CM

## 2022-01-01 DIAGNOSIS — K59.00 CONSTIPATION, UNSPECIFIED: ICD-10-CM

## 2022-01-01 LAB
-  AMIKACIN: SIGNIFICANT CHANGE UP
-  AMIKACIN: SIGNIFICANT CHANGE UP
-  AMOXICILLIN/CLAVULANIC ACID: SIGNIFICANT CHANGE UP
-  AMPICILLIN/SULBACTAM: SIGNIFICANT CHANGE UP
-  AMPICILLIN/SULBACTAM: SIGNIFICANT CHANGE UP
-  AMPICILLIN: SIGNIFICANT CHANGE UP
-  AMPICILLIN: SIGNIFICANT CHANGE UP
-  AZTREONAM: SIGNIFICANT CHANGE UP
-  AZTREONAM: SIGNIFICANT CHANGE UP
-  CEFAZOLIN: SIGNIFICANT CHANGE UP
-  CEFAZOLIN: SIGNIFICANT CHANGE UP
-  CEFEPIME: SIGNIFICANT CHANGE UP
-  CEFEPIME: SIGNIFICANT CHANGE UP
-  CEFTRIAXONE: SIGNIFICANT CHANGE UP
-  CEFTRIAXONE: SIGNIFICANT CHANGE UP
-  CIPROFLOXACIN: SIGNIFICANT CHANGE UP
-  CIPROFLOXACIN: SIGNIFICANT CHANGE UP
-  CTX-M RESISTANCE MARKER: SIGNIFICANT CHANGE UP
-  ERTAPENEM: SIGNIFICANT CHANGE UP
-  ERTAPENEM: SIGNIFICANT CHANGE UP
-  ESBL: SIGNIFICANT CHANGE UP
-  GENTAMICIN: SIGNIFICANT CHANGE UP
-  GENTAMICIN: SIGNIFICANT CHANGE UP
-  IMIPENEM: SIGNIFICANT CHANGE UP
-  IMIPENEM: SIGNIFICANT CHANGE UP
-  K. PNEUMONIAE GROUP: SIGNIFICANT CHANGE UP
-  LEVOFLOXACIN: SIGNIFICANT CHANGE UP
-  LEVOFLOXACIN: SIGNIFICANT CHANGE UP
-  MEROPENEM: SIGNIFICANT CHANGE UP
-  MEROPENEM: SIGNIFICANT CHANGE UP
-  NITROFURANTOIN: SIGNIFICANT CHANGE UP
-  PIPERACILLIN/TAZOBACTAM: SIGNIFICANT CHANGE UP
-  PIPERACILLIN/TAZOBACTAM: SIGNIFICANT CHANGE UP
-  TIGECYCLINE: SIGNIFICANT CHANGE UP
-  TOBRAMYCIN: SIGNIFICANT CHANGE UP
-  TOBRAMYCIN: SIGNIFICANT CHANGE UP
-  TRIMETHOPRIM/SULFAMETHOXAZOLE: SIGNIFICANT CHANGE UP
-  TRIMETHOPRIM/SULFAMETHOXAZOLE: SIGNIFICANT CHANGE UP
24R-OH-CALCIDIOL SERPL-MCNC: 25.1 NG/ML — LOW (ref 30–80)
A1C WITH ESTIMATED AVERAGE GLUCOSE RESULT: 10.3 % — HIGH (ref 4–5.6)
ABO RH CONFIRMATION: SIGNIFICANT CHANGE UP
ACTH STIM ACTH BASELINE: 29.5 PG/ML — SIGNIFICANT CHANGE UP (ref 7.2–63.3)
ACTH STIM CORTISOL 30 MIN: 16 UG/DL — SIGNIFICANT CHANGE UP
ACTH STIM CORTISOL 60 MIN: 19.2 UG/DL — SIGNIFICANT CHANGE UP
ACTH STIM CORTISOL BASELINE: 8.8 UG/DL — SIGNIFICANT CHANGE UP (ref 6–18.4)
ALBUMIN SERPL ELPH-MCNC: 1.7 G/DL — LOW (ref 3.5–5)
ALBUMIN SERPL ELPH-MCNC: 2.1 G/DL — LOW (ref 3.5–5)
ALBUMIN SERPL ELPH-MCNC: 2.2 G/DL — LOW (ref 3.5–5)
ALBUMIN SERPL ELPH-MCNC: 2.3 G/DL — LOW (ref 3.5–5)
ALBUMIN SERPL ELPH-MCNC: 2.4 G/DL — LOW (ref 3.5–5)
ALBUMIN SERPL ELPH-MCNC: 2.5 G/DL — LOW (ref 3.5–5)
ALBUMIN SERPL ELPH-MCNC: 2.6 G/DL — LOW (ref 3.5–5)
ALBUMIN SERPL ELPH-MCNC: 2.7 G/DL — LOW (ref 3.5–5)
ALBUMIN SERPL ELPH-MCNC: 2.8 G/DL — LOW (ref 3.5–5)
ALBUMIN SERPL ELPH-MCNC: 2.8 G/DL — LOW (ref 3.5–5)
ALBUMIN SERPL ELPH-MCNC: 3 G/DL — LOW (ref 3.5–5)
ALBUMIN SERPL ELPH-MCNC: 3.1 G/DL — LOW (ref 3.5–5)
ALP SERPL-CCNC: 100 U/L — SIGNIFICANT CHANGE UP (ref 40–120)
ALP SERPL-CCNC: 128 U/L — HIGH (ref 40–120)
ALP SERPL-CCNC: 145 U/L — HIGH (ref 40–120)
ALP SERPL-CCNC: 66 U/L — SIGNIFICANT CHANGE UP (ref 40–120)
ALP SERPL-CCNC: 68 U/L — SIGNIFICANT CHANGE UP (ref 40–120)
ALP SERPL-CCNC: 72 U/L — SIGNIFICANT CHANGE UP (ref 40–120)
ALP SERPL-CCNC: 74 U/L — SIGNIFICANT CHANGE UP (ref 40–120)
ALP SERPL-CCNC: 75 U/L — SIGNIFICANT CHANGE UP (ref 40–120)
ALP SERPL-CCNC: 77 U/L — SIGNIFICANT CHANGE UP (ref 40–120)
ALP SERPL-CCNC: 78 U/L — SIGNIFICANT CHANGE UP (ref 40–120)
ALP SERPL-CCNC: 79 U/L — SIGNIFICANT CHANGE UP (ref 40–120)
ALP SERPL-CCNC: 81 U/L — SIGNIFICANT CHANGE UP (ref 40–120)
ALP SERPL-CCNC: 81 U/L — SIGNIFICANT CHANGE UP (ref 40–120)
ALP SERPL-CCNC: 82 U/L — SIGNIFICANT CHANGE UP (ref 40–120)
ALP SERPL-CCNC: 86 U/L — SIGNIFICANT CHANGE UP (ref 40–120)
ALP SERPL-CCNC: 88 U/L — SIGNIFICANT CHANGE UP (ref 40–120)
ALP SERPL-CCNC: 90 U/L — SIGNIFICANT CHANGE UP (ref 40–120)
ALP SERPL-CCNC: 93 U/L — SIGNIFICANT CHANGE UP (ref 40–120)
ALP SERPL-CCNC: 93 U/L — SIGNIFICANT CHANGE UP (ref 40–120)
ALP SERPL-CCNC: 95 U/L — SIGNIFICANT CHANGE UP (ref 40–120)
ALT FLD-CCNC: 208 U/L DA — HIGH (ref 10–60)
ALT FLD-CCNC: 269 U/L DA — HIGH (ref 10–60)
ALT FLD-CCNC: 27 U/L DA — SIGNIFICANT CHANGE UP (ref 10–60)
ALT FLD-CCNC: 27 U/L DA — SIGNIFICANT CHANGE UP (ref 10–60)
ALT FLD-CCNC: 30 U/L DA — SIGNIFICANT CHANGE UP (ref 10–60)
ALT FLD-CCNC: 32 U/L DA — SIGNIFICANT CHANGE UP (ref 10–60)
ALT FLD-CCNC: 33 U/L DA — SIGNIFICANT CHANGE UP (ref 10–60)
ALT FLD-CCNC: 34 U/L DA — SIGNIFICANT CHANGE UP (ref 10–60)
ALT FLD-CCNC: 36 U/L DA — SIGNIFICANT CHANGE UP (ref 10–60)
ALT FLD-CCNC: 39 U/L DA — SIGNIFICANT CHANGE UP (ref 10–60)
ALT FLD-CCNC: 40 U/L DA — SIGNIFICANT CHANGE UP (ref 10–60)
ALT FLD-CCNC: 43 U/L DA — SIGNIFICANT CHANGE UP (ref 10–60)
ALT FLD-CCNC: 45 U/L DA — SIGNIFICANT CHANGE UP (ref 10–60)
ALT FLD-CCNC: 45 U/L DA — SIGNIFICANT CHANGE UP (ref 10–60)
ALT FLD-CCNC: 47 U/L DA — SIGNIFICANT CHANGE UP (ref 10–60)
ALT FLD-CCNC: 48 U/L DA — SIGNIFICANT CHANGE UP (ref 10–60)
ALT FLD-CCNC: 49 U/L DA — SIGNIFICANT CHANGE UP (ref 10–60)
ALT FLD-CCNC: 53 U/L DA — SIGNIFICANT CHANGE UP (ref 10–60)
ALT FLD-CCNC: 57 U/L DA — SIGNIFICANT CHANGE UP (ref 10–60)
ALT FLD-CCNC: 59 U/L DA — SIGNIFICANT CHANGE UP (ref 10–60)
ALT FLD-CCNC: 75 U/L DA — HIGH (ref 10–60)
ALT FLD-CCNC: 79 U/L DA — HIGH (ref 10–60)
ALT FLD-CCNC: 87 U/L DA — HIGH (ref 10–60)
ALT FLD-CCNC: 96 U/L DA — HIGH (ref 10–60)
AMPHET UR-MCNC: NEGATIVE — SIGNIFICANT CHANGE UP
ANION GAP SERPL CALC-SCNC: 2 MMOL/L — LOW (ref 5–17)
ANION GAP SERPL CALC-SCNC: 3 MMOL/L — LOW (ref 5–17)
ANION GAP SERPL CALC-SCNC: 4 MMOL/L — LOW (ref 5–17)
ANION GAP SERPL CALC-SCNC: 5 MMOL/L — SIGNIFICANT CHANGE UP (ref 5–17)
ANION GAP SERPL CALC-SCNC: 6 MMOL/L — SIGNIFICANT CHANGE UP (ref 5–17)
ANION GAP SERPL CALC-SCNC: 6 MMOL/L — SIGNIFICANT CHANGE UP (ref 5–17)
ANION GAP SERPL CALC-SCNC: 7 MMOL/L — SIGNIFICANT CHANGE UP (ref 5–17)
ANION GAP SERPL CALC-SCNC: 8 MMOL/L — SIGNIFICANT CHANGE UP (ref 5–17)
ANISOCYTOSIS BLD QL: SLIGHT — SIGNIFICANT CHANGE UP
APPEARANCE UR: ABNORMAL
APPEARANCE UR: CLEAR — SIGNIFICANT CHANGE UP
APTT BLD: 26.3 SEC — LOW (ref 27.5–35.5)
APTT BLD: 33.2 SEC — SIGNIFICANT CHANGE UP (ref 27.5–35.5)
APTT BLD: 33.5 SEC — SIGNIFICANT CHANGE UP (ref 27.5–35.5)
APTT BLD: 34.8 SEC — SIGNIFICANT CHANGE UP (ref 27.5–35.5)
APTT BLD: 35.9 SEC — HIGH (ref 27.5–35.5)
APTT BLD: 37.4 SEC — HIGH (ref 27.5–35.5)
APTT BLD: 37.7 SEC — HIGH (ref 27.5–35.5)
APTT BLD: 39.6 SEC — HIGH (ref 27.5–35.5)
APTT BLD: 40.9 SEC — HIGH (ref 27.5–35.5)
APTT BLD: 43 SEC — HIGH (ref 27.5–35.5)
APTT BLD: 43.8 SEC — HIGH (ref 27.5–35.5)
APTT BLD: 50.6 SEC — HIGH (ref 27.5–35.5)
AST SERPL-CCNC: 12 U/L — SIGNIFICANT CHANGE UP (ref 10–40)
AST SERPL-CCNC: 14 U/L — SIGNIFICANT CHANGE UP (ref 10–40)
AST SERPL-CCNC: 14 U/L — SIGNIFICANT CHANGE UP (ref 10–40)
AST SERPL-CCNC: 15 U/L — SIGNIFICANT CHANGE UP (ref 10–40)
AST SERPL-CCNC: 17 U/L — SIGNIFICANT CHANGE UP (ref 10–40)
AST SERPL-CCNC: 17 U/L — SIGNIFICANT CHANGE UP (ref 10–40)
AST SERPL-CCNC: 18 U/L — SIGNIFICANT CHANGE UP (ref 10–40)
AST SERPL-CCNC: 19 U/L — SIGNIFICANT CHANGE UP (ref 10–40)
AST SERPL-CCNC: 21 U/L — SIGNIFICANT CHANGE UP (ref 10–40)
AST SERPL-CCNC: 21 U/L — SIGNIFICANT CHANGE UP (ref 10–40)
AST SERPL-CCNC: 245 U/L — HIGH (ref 10–40)
AST SERPL-CCNC: 28 U/L — SIGNIFICANT CHANGE UP (ref 10–40)
AST SERPL-CCNC: 31 U/L — SIGNIFICANT CHANGE UP (ref 10–40)
AST SERPL-CCNC: 32 U/L — SIGNIFICANT CHANGE UP (ref 10–40)
AST SERPL-CCNC: 35 U/L — SIGNIFICANT CHANGE UP (ref 10–40)
AST SERPL-CCNC: 37 U/L — SIGNIFICANT CHANGE UP (ref 10–40)
AST SERPL-CCNC: 42 U/L — HIGH (ref 10–40)
AST SERPL-CCNC: 45 U/L — HIGH (ref 10–40)
AST SERPL-CCNC: 46 U/L — HIGH (ref 10–40)
AST SERPL-CCNC: 47 U/L — HIGH (ref 10–40)
AST SERPL-CCNC: 48 U/L — HIGH (ref 10–40)
AST SERPL-CCNC: 48 U/L — HIGH (ref 10–40)
AST SERPL-CCNC: 51 U/L — HIGH (ref 10–40)
AST SERPL-CCNC: 54 U/L — HIGH (ref 10–40)
AST SERPL-CCNC: 55 U/L — HIGH (ref 10–40)
AST SERPL-CCNC: 59 U/L — HIGH (ref 10–40)
AST SERPL-CCNC: 91 U/L — HIGH (ref 10–40)
BACTERIA # UR AUTO: ABNORMAL /HPF
BACTERIA # UR AUTO: NEGATIVE /HPF — SIGNIFICANT CHANGE UP
BARBITURATES UR SCN-MCNC: NEGATIVE — SIGNIFICANT CHANGE UP
BASE EXCESS BLDA CALC-SCNC: 0.5 MMOL/L — SIGNIFICANT CHANGE UP (ref -2–3)
BASE EXCESS BLDA CALC-SCNC: 2 MMOL/L — SIGNIFICANT CHANGE UP (ref -2–3)
BASE EXCESS BLDA CALC-SCNC: 2.3 MMOL/L — SIGNIFICANT CHANGE UP (ref -2–3)
BASE EXCESS BLDA CALC-SCNC: 6.1 MMOL/L — HIGH (ref -2–3)
BASOPHILS # BLD AUTO: 0 K/UL — SIGNIFICANT CHANGE UP (ref 0–0.2)
BASOPHILS # BLD AUTO: 0.01 K/UL — SIGNIFICANT CHANGE UP (ref 0–0.2)
BASOPHILS # BLD AUTO: 0.01 K/UL — SIGNIFICANT CHANGE UP (ref 0–0.2)
BASOPHILS # BLD AUTO: 0.02 K/UL — SIGNIFICANT CHANGE UP (ref 0–0.2)
BASOPHILS # BLD AUTO: 0.03 K/UL — SIGNIFICANT CHANGE UP (ref 0–0.2)
BASOPHILS # BLD AUTO: 0.04 K/UL — SIGNIFICANT CHANGE UP (ref 0–0.2)
BASOPHILS # BLD AUTO: 0.04 K/UL — SIGNIFICANT CHANGE UP (ref 0–0.2)
BASOPHILS # BLD AUTO: 0.06 K/UL — SIGNIFICANT CHANGE UP (ref 0–0.2)
BASOPHILS # BLD AUTO: 0.06 K/UL — SIGNIFICANT CHANGE UP (ref 0–0.2)
BASOPHILS # BLD AUTO: 0.09 K/UL — SIGNIFICANT CHANGE UP (ref 0–0.2)
BASOPHILS NFR BLD AUTO: 0 % — SIGNIFICANT CHANGE UP (ref 0–2)
BASOPHILS NFR BLD AUTO: 0.1 % — SIGNIFICANT CHANGE UP (ref 0–2)
BASOPHILS NFR BLD AUTO: 0.2 % — SIGNIFICANT CHANGE UP (ref 0–2)
BASOPHILS NFR BLD AUTO: 0.3 % — SIGNIFICANT CHANGE UP (ref 0–2)
BASOPHILS NFR BLD AUTO: 0.4 % — SIGNIFICANT CHANGE UP (ref 0–2)
BASOPHILS NFR BLD AUTO: 0.5 % — SIGNIFICANT CHANGE UP (ref 0–2)
BASOPHILS NFR BLD AUTO: 0.6 % — SIGNIFICANT CHANGE UP (ref 0–2)
BASOPHILS NFR BLD AUTO: 0.9 % — SIGNIFICANT CHANGE UP (ref 0–2)
BASOPHILS NFR BLD AUTO: 1.2 % — SIGNIFICANT CHANGE UP (ref 0–2)
BENZODIAZ UR-MCNC: NEGATIVE — SIGNIFICANT CHANGE UP
BILIRUB SERPL-MCNC: 0.2 MG/DL — SIGNIFICANT CHANGE UP (ref 0.2–1.2)
BILIRUB SERPL-MCNC: 0.3 MG/DL — SIGNIFICANT CHANGE UP (ref 0.2–1.2)
BILIRUB SERPL-MCNC: 0.4 MG/DL — SIGNIFICANT CHANGE UP (ref 0.2–1.2)
BILIRUB SERPL-MCNC: 0.5 MG/DL — SIGNIFICANT CHANGE UP (ref 0.2–1.2)
BILIRUB UR-MCNC: NEGATIVE — SIGNIFICANT CHANGE UP
BLD GP AB SCN SERPL QL: SIGNIFICANT CHANGE UP
BLD GP AB SCN SERPL QL: SIGNIFICANT CHANGE UP
BLOOD GAS COMMENTS ARTERIAL: SIGNIFICANT CHANGE UP
BUN SERPL-MCNC: 19 MG/DL — HIGH (ref 7–18)
BUN SERPL-MCNC: 20 MG/DL — HIGH (ref 7–18)
BUN SERPL-MCNC: 20 MG/DL — HIGH (ref 7–18)
BUN SERPL-MCNC: 21 MG/DL — HIGH (ref 7–18)
BUN SERPL-MCNC: 22 MG/DL — HIGH (ref 7–18)
BUN SERPL-MCNC: 22 MG/DL — HIGH (ref 7–18)
BUN SERPL-MCNC: 23 MG/DL — HIGH (ref 7–18)
BUN SERPL-MCNC: 24 MG/DL — HIGH (ref 7–18)
BUN SERPL-MCNC: 24 MG/DL — HIGH (ref 7–18)
BUN SERPL-MCNC: 25 MG/DL — HIGH (ref 7–18)
BUN SERPL-MCNC: 26 MG/DL — HIGH (ref 7–18)
BUN SERPL-MCNC: 28 MG/DL — HIGH (ref 7–18)
BUN SERPL-MCNC: 28 MG/DL — HIGH (ref 7–18)
BUN SERPL-MCNC: 29 MG/DL — HIGH (ref 7–18)
BUN SERPL-MCNC: 31 MG/DL — HIGH (ref 7–18)
BUN SERPL-MCNC: 32 MG/DL — HIGH (ref 7–18)
BUN SERPL-MCNC: 33 MG/DL — HIGH (ref 7–18)
BUN SERPL-MCNC: 33 MG/DL — HIGH (ref 7–18)
BUN SERPL-MCNC: 34 MG/DL — HIGH (ref 7–18)
BUN SERPL-MCNC: 35 MG/DL — HIGH (ref 7–18)
BUN SERPL-MCNC: 36 MG/DL — HIGH (ref 7–18)
BUN SERPL-MCNC: 37 MG/DL — HIGH (ref 7–18)
BUN SERPL-MCNC: 43 MG/DL — HIGH (ref 7–18)
BUN SERPL-MCNC: 44 MG/DL — HIGH (ref 7–18)
BUN SERPL-MCNC: 64 MG/DL — HIGH (ref 7–18)
CALCIUM SERPL-MCNC: 10 MG/DL — SIGNIFICANT CHANGE UP (ref 8.4–10.5)
CALCIUM SERPL-MCNC: 10 MG/DL — SIGNIFICANT CHANGE UP (ref 8.4–10.5)
CALCIUM SERPL-MCNC: 7.4 MG/DL — LOW (ref 8.4–10.5)
CALCIUM SERPL-MCNC: 7.8 MG/DL — LOW (ref 8.4–10.5)
CALCIUM SERPL-MCNC: 8 MG/DL — LOW (ref 8.4–10.5)
CALCIUM SERPL-MCNC: 8.2 MG/DL — LOW (ref 8.4–10.5)
CALCIUM SERPL-MCNC: 8.2 MG/DL — LOW (ref 8.4–10.5)
CALCIUM SERPL-MCNC: 8.3 MG/DL — LOW (ref 8.4–10.5)
CALCIUM SERPL-MCNC: 8.4 MG/DL — SIGNIFICANT CHANGE UP (ref 8.4–10.5)
CALCIUM SERPL-MCNC: 8.4 MG/DL — SIGNIFICANT CHANGE UP (ref 8.4–10.5)
CALCIUM SERPL-MCNC: 8.5 MG/DL — SIGNIFICANT CHANGE UP (ref 8.4–10.5)
CALCIUM SERPL-MCNC: 8.7 MG/DL — SIGNIFICANT CHANGE UP (ref 8.4–10.5)
CALCIUM SERPL-MCNC: 8.8 MG/DL — SIGNIFICANT CHANGE UP (ref 8.4–10.5)
CALCIUM SERPL-MCNC: 8.8 MG/DL — SIGNIFICANT CHANGE UP (ref 8.4–10.5)
CALCIUM SERPL-MCNC: 8.9 MG/DL — SIGNIFICANT CHANGE UP (ref 8.4–10.5)
CALCIUM SERPL-MCNC: 9 MG/DL — SIGNIFICANT CHANGE UP (ref 8.4–10.5)
CALCIUM SERPL-MCNC: 9.1 MG/DL — SIGNIFICANT CHANGE UP (ref 8.4–10.5)
CALCIUM SERPL-MCNC: 9.2 MG/DL — SIGNIFICANT CHANGE UP (ref 8.4–10.5)
CALCIUM SERPL-MCNC: 9.3 MG/DL — SIGNIFICANT CHANGE UP (ref 8.4–10.5)
CALCIUM SERPL-MCNC: 9.3 MG/DL — SIGNIFICANT CHANGE UP (ref 8.4–10.5)
CALCIUM SERPL-MCNC: 9.4 MG/DL — SIGNIFICANT CHANGE UP (ref 8.4–10.5)
CALCIUM SERPL-MCNC: 9.4 MG/DL — SIGNIFICANT CHANGE UP (ref 8.4–10.5)
CALCIUM SERPL-MCNC: 9.5 MG/DL — SIGNIFICANT CHANGE UP (ref 8.4–10.5)
CALCIUM SERPL-MCNC: 9.7 MG/DL — SIGNIFICANT CHANGE UP (ref 8.4–10.5)
CALCIUM SERPL-MCNC: 9.7 MG/DL — SIGNIFICANT CHANGE UP (ref 8.4–10.5)
CHLORIDE SERPL-SCNC: 101 MMOL/L — SIGNIFICANT CHANGE UP (ref 96–108)
CHLORIDE SERPL-SCNC: 102 MMOL/L — SIGNIFICANT CHANGE UP (ref 96–108)
CHLORIDE SERPL-SCNC: 103 MMOL/L — SIGNIFICANT CHANGE UP (ref 96–108)
CHLORIDE SERPL-SCNC: 104 MMOL/L — SIGNIFICANT CHANGE UP (ref 96–108)
CHLORIDE SERPL-SCNC: 105 MMOL/L — SIGNIFICANT CHANGE UP (ref 96–108)
CHLORIDE SERPL-SCNC: 105 MMOL/L — SIGNIFICANT CHANGE UP (ref 96–108)
CHLORIDE SERPL-SCNC: 106 MMOL/L — SIGNIFICANT CHANGE UP (ref 96–108)
CHLORIDE SERPL-SCNC: 107 MMOL/L — SIGNIFICANT CHANGE UP (ref 96–108)
CHLORIDE SERPL-SCNC: 108 MMOL/L — SIGNIFICANT CHANGE UP (ref 96–108)
CHLORIDE SERPL-SCNC: 109 MMOL/L — HIGH (ref 96–108)
CHLORIDE SERPL-SCNC: 111 MMOL/L — HIGH (ref 96–108)
CHLORIDE SERPL-SCNC: 113 MMOL/L — HIGH (ref 96–108)
CHLORIDE SERPL-SCNC: 96 MMOL/L — SIGNIFICANT CHANGE UP (ref 96–108)
CHLORIDE SERPL-SCNC: 98 MMOL/L — SIGNIFICANT CHANGE UP (ref 96–108)
CHLORIDE SERPL-SCNC: 99 MMOL/L — SIGNIFICANT CHANGE UP (ref 96–108)
CHLORIDE SERPL-SCNC: 99 MMOL/L — SIGNIFICANT CHANGE UP (ref 96–108)
CHOLEST SERPL-MCNC: 139 MG/DL — SIGNIFICANT CHANGE UP
CK MB BLD-MCNC: 0.5 % — SIGNIFICANT CHANGE UP (ref 0–3.5)
CK MB CFR SERPL CALC: 1.5 NG/ML — SIGNIFICANT CHANGE UP (ref 0–3.6)
CK SERPL-CCNC: 320 U/L — HIGH (ref 35–232)
CO2 SERPL-SCNC: 25 MMOL/L — SIGNIFICANT CHANGE UP (ref 22–31)
CO2 SERPL-SCNC: 27 MMOL/L — SIGNIFICANT CHANGE UP (ref 22–31)
CO2 SERPL-SCNC: 27 MMOL/L — SIGNIFICANT CHANGE UP (ref 22–31)
CO2 SERPL-SCNC: 28 MMOL/L — SIGNIFICANT CHANGE UP (ref 22–31)
CO2 SERPL-SCNC: 29 MMOL/L — SIGNIFICANT CHANGE UP (ref 22–31)
CO2 SERPL-SCNC: 30 MMOL/L — SIGNIFICANT CHANGE UP (ref 22–31)
CO2 SERPL-SCNC: 31 MMOL/L — SIGNIFICANT CHANGE UP (ref 22–31)
CO2 SERPL-SCNC: 32 MMOL/L — HIGH (ref 22–31)
CO2 SERPL-SCNC: 33 MMOL/L — HIGH (ref 22–31)
CO2 SERPL-SCNC: 33 MMOL/L — HIGH (ref 22–31)
CO2 SERPL-SCNC: 34 MMOL/L — HIGH (ref 22–31)
CO2 SERPL-SCNC: 35 MMOL/L — HIGH (ref 22–31)
CO2 SERPL-SCNC: 35 MMOL/L — HIGH (ref 22–31)
CO2 SERPL-SCNC: 36 MMOL/L — HIGH (ref 22–31)
COCAINE METAB.OTHER UR-MCNC: NEGATIVE — SIGNIFICANT CHANGE UP
COLOR SPEC: YELLOW — SIGNIFICANT CHANGE UP
COMMENT - URINE: SIGNIFICANT CHANGE UP
CORTIS AM PEAK SERPL-MCNC: 10.9 UG/DL — SIGNIFICANT CHANGE UP (ref 6–18.4)
CORTIS AM PEAK SERPL-MCNC: 2.7 UG/DL — LOW (ref 6–18.4)
CORTIS AM PEAK SERPL-MCNC: 8.9 UG/DL — SIGNIFICANT CHANGE UP (ref 6–18.4)
CREAT ?TM UR-MCNC: 53 MG/DL — SIGNIFICANT CHANGE UP
CREAT SERPL-MCNC: 0.68 MG/DL — SIGNIFICANT CHANGE UP (ref 0.5–1.3)
CREAT SERPL-MCNC: 0.69 MG/DL — SIGNIFICANT CHANGE UP (ref 0.5–1.3)
CREAT SERPL-MCNC: 0.72 MG/DL — SIGNIFICANT CHANGE UP (ref 0.5–1.3)
CREAT SERPL-MCNC: 0.73 MG/DL — SIGNIFICANT CHANGE UP (ref 0.5–1.3)
CREAT SERPL-MCNC: 0.74 MG/DL — SIGNIFICANT CHANGE UP (ref 0.5–1.3)
CREAT SERPL-MCNC: 0.76 MG/DL — SIGNIFICANT CHANGE UP (ref 0.5–1.3)
CREAT SERPL-MCNC: 0.77 MG/DL — SIGNIFICANT CHANGE UP (ref 0.5–1.3)
CREAT SERPL-MCNC: 0.79 MG/DL — SIGNIFICANT CHANGE UP (ref 0.5–1.3)
CREAT SERPL-MCNC: 0.8 MG/DL — SIGNIFICANT CHANGE UP (ref 0.5–1.3)
CREAT SERPL-MCNC: 0.82 MG/DL — SIGNIFICANT CHANGE UP (ref 0.5–1.3)
CREAT SERPL-MCNC: 0.82 MG/DL — SIGNIFICANT CHANGE UP (ref 0.5–1.3)
CREAT SERPL-MCNC: 0.83 MG/DL — SIGNIFICANT CHANGE UP (ref 0.5–1.3)
CREAT SERPL-MCNC: 0.84 MG/DL — SIGNIFICANT CHANGE UP (ref 0.5–1.3)
CREAT SERPL-MCNC: 0.85 MG/DL — SIGNIFICANT CHANGE UP (ref 0.5–1.3)
CREAT SERPL-MCNC: 0.85 MG/DL — SIGNIFICANT CHANGE UP (ref 0.5–1.3)
CREAT SERPL-MCNC: 0.86 MG/DL — SIGNIFICANT CHANGE UP (ref 0.5–1.3)
CREAT SERPL-MCNC: 0.87 MG/DL — SIGNIFICANT CHANGE UP (ref 0.5–1.3)
CREAT SERPL-MCNC: 0.89 MG/DL — SIGNIFICANT CHANGE UP (ref 0.5–1.3)
CREAT SERPL-MCNC: 0.9 MG/DL — SIGNIFICANT CHANGE UP (ref 0.5–1.3)
CREAT SERPL-MCNC: 0.91 MG/DL — SIGNIFICANT CHANGE UP (ref 0.5–1.3)
CREAT SERPL-MCNC: 0.94 MG/DL — SIGNIFICANT CHANGE UP (ref 0.5–1.3)
CREAT SERPL-MCNC: 0.99 MG/DL — SIGNIFICANT CHANGE UP (ref 0.5–1.3)
CREAT SERPL-MCNC: 1.02 MG/DL — SIGNIFICANT CHANGE UP (ref 0.5–1.3)
CREAT SERPL-MCNC: 1.05 MG/DL — SIGNIFICANT CHANGE UP (ref 0.5–1.3)
CREAT SERPL-MCNC: 1.08 MG/DL — SIGNIFICANT CHANGE UP (ref 0.5–1.3)
CREAT SERPL-MCNC: 1.08 MG/DL — SIGNIFICANT CHANGE UP (ref 0.5–1.3)
CREAT SERPL-MCNC: 1.12 MG/DL — SIGNIFICANT CHANGE UP (ref 0.5–1.3)
CREAT SERPL-MCNC: 1.14 MG/DL — SIGNIFICANT CHANGE UP (ref 0.5–1.3)
CREAT SERPL-MCNC: 1.15 MG/DL — SIGNIFICANT CHANGE UP (ref 0.5–1.3)
CREAT SERPL-MCNC: 1.15 MG/DL — SIGNIFICANT CHANGE UP (ref 0.5–1.3)
CREAT SERPL-MCNC: 1.35 MG/DL — HIGH (ref 0.5–1.3)
CREAT SERPL-MCNC: 1.4 MG/DL — HIGH (ref 0.5–1.3)
CULTURE RESULTS: SIGNIFICANT CHANGE UP
DIFF PNL FLD: ABNORMAL
EGFR: 51 ML/MIN/1.73M2 — LOW
EGFR: 54 ML/MIN/1.73M2 — LOW
EGFR: 65 ML/MIN/1.73M2 — SIGNIFICANT CHANGE UP
EGFR: 65 ML/MIN/1.73M2 — SIGNIFICANT CHANGE UP
EGFR: 66 ML/MIN/1.73M2 — SIGNIFICANT CHANGE UP
EGFR: 67 ML/MIN/1.73M2 — SIGNIFICANT CHANGE UP
EGFR: 71 ML/MIN/1.73M2 — SIGNIFICANT CHANGE UP
EGFR: 71 ML/MIN/1.73M2 — SIGNIFICANT CHANGE UP
EGFR: 73 ML/MIN/1.73M2 — SIGNIFICANT CHANGE UP
EGFR: 75 ML/MIN/1.73M2 — SIGNIFICANT CHANGE UP
EGFR: 78 ML/MIN/1.73M2 — SIGNIFICANT CHANGE UP
EGFR: 83 ML/MIN/1.73M2 — SIGNIFICANT CHANGE UP
EGFR: 86 ML/MIN/1.73M2 — SIGNIFICANT CHANGE UP
EGFR: 88 ML/MIN/1.73M2 — SIGNIFICANT CHANGE UP
EGFR: 89 ML/MIN/1.73M2 — SIGNIFICANT CHANGE UP
EGFR: 90 ML/MIN/1.73M2 — SIGNIFICANT CHANGE UP
EGFR: 91 ML/MIN/1.73M2 — SIGNIFICANT CHANGE UP
EGFR: 92 ML/MIN/1.73M2 — SIGNIFICANT CHANGE UP
EGFR: 93 ML/MIN/1.73M2 — SIGNIFICANT CHANGE UP
EGFR: 93 ML/MIN/1.73M2 — SIGNIFICANT CHANGE UP
EGFR: 94 ML/MIN/1.73M2 — SIGNIFICANT CHANGE UP
EGFR: 94 ML/MIN/1.73M2 — SIGNIFICANT CHANGE UP
EGFR: 95 ML/MIN/1.73M2 — SIGNIFICANT CHANGE UP
EGFR: 96 ML/MIN/1.73M2 — SIGNIFICANT CHANGE UP
EOSINOPHIL # BLD AUTO: 0 K/UL — SIGNIFICANT CHANGE UP (ref 0–0.5)
EOSINOPHIL # BLD AUTO: 0.01 K/UL — SIGNIFICANT CHANGE UP (ref 0–0.5)
EOSINOPHIL # BLD AUTO: 0.02 K/UL — SIGNIFICANT CHANGE UP (ref 0–0.5)
EOSINOPHIL # BLD AUTO: 0.03 K/UL — SIGNIFICANT CHANGE UP (ref 0–0.5)
EOSINOPHIL # BLD AUTO: 0.08 K/UL — SIGNIFICANT CHANGE UP (ref 0–0.5)
EOSINOPHIL # BLD AUTO: 0.11 K/UL — SIGNIFICANT CHANGE UP (ref 0–0.5)
EOSINOPHIL # BLD AUTO: 0.11 K/UL — SIGNIFICANT CHANGE UP (ref 0–0.5)
EOSINOPHIL # BLD AUTO: 0.14 K/UL — SIGNIFICANT CHANGE UP (ref 0–0.5)
EOSINOPHIL # BLD AUTO: 0.15 K/UL — SIGNIFICANT CHANGE UP (ref 0–0.5)
EOSINOPHIL # BLD AUTO: 0.15 K/UL — SIGNIFICANT CHANGE UP (ref 0–0.5)
EOSINOPHIL # BLD AUTO: 0.19 K/UL — SIGNIFICANT CHANGE UP (ref 0–0.5)
EOSINOPHIL # BLD AUTO: 0.2 K/UL — SIGNIFICANT CHANGE UP (ref 0–0.5)
EOSINOPHIL # BLD AUTO: 0.2 K/UL — SIGNIFICANT CHANGE UP (ref 0–0.5)
EOSINOPHIL NFR BLD AUTO: 0 % — SIGNIFICANT CHANGE UP (ref 0–6)
EOSINOPHIL NFR BLD AUTO: 0.1 % — SIGNIFICANT CHANGE UP (ref 0–6)
EOSINOPHIL NFR BLD AUTO: 0.3 % — SIGNIFICANT CHANGE UP (ref 0–6)
EOSINOPHIL NFR BLD AUTO: 0.7 % — SIGNIFICANT CHANGE UP (ref 0–6)
EOSINOPHIL NFR BLD AUTO: 0.9 % — SIGNIFICANT CHANGE UP (ref 0–6)
EOSINOPHIL NFR BLD AUTO: 1 % — SIGNIFICANT CHANGE UP (ref 0–6)
EOSINOPHIL NFR BLD AUTO: 1.2 % — SIGNIFICANT CHANGE UP (ref 0–6)
EOSINOPHIL NFR BLD AUTO: 1.3 % — SIGNIFICANT CHANGE UP (ref 0–6)
EOSINOPHIL NFR BLD AUTO: 1.3 % — SIGNIFICANT CHANGE UP (ref 0–6)
EOSINOPHIL NFR BLD AUTO: 1.4 % — SIGNIFICANT CHANGE UP (ref 0–6)
EOSINOPHIL NFR BLD AUTO: 1.8 % — SIGNIFICANT CHANGE UP (ref 0–6)
EOSINOPHIL NFR BLD AUTO: 1.8 % — SIGNIFICANT CHANGE UP (ref 0–6)
EOSINOPHIL NFR BLD AUTO: 1.9 % — SIGNIFICANT CHANGE UP (ref 0–6)
EPI CELLS # UR: SIGNIFICANT CHANGE UP /HPF
ESTIMATED AVERAGE GLUCOSE: 249 MG/DL — HIGH (ref 68–114)
FLUAV AG NPH QL: DETECTED
FLUAV AG NPH QL: SIGNIFICANT CHANGE UP
FLUAV H1 2009 PAND RNA SPEC QL NAA+PROBE: DETECTED
FLUBV AG NPH QL: SIGNIFICANT CHANGE UP
FLUBV AG NPH QL: SIGNIFICANT CHANGE UP
FOLATE SERPL-MCNC: >20 NG/ML — SIGNIFICANT CHANGE UP
GIANT PLATELETS BLD QL SMEAR: PRESENT — SIGNIFICANT CHANGE UP
GIANT PLATELETS BLD QL SMEAR: PRESENT — SIGNIFICANT CHANGE UP
GLUCOSE BLDC GLUCOMTR-MCNC: 103 MG/DL — HIGH (ref 70–99)
GLUCOSE BLDC GLUCOMTR-MCNC: 104 MG/DL — HIGH (ref 70–99)
GLUCOSE BLDC GLUCOMTR-MCNC: 104 MG/DL — HIGH (ref 70–99)
GLUCOSE BLDC GLUCOMTR-MCNC: 107 MG/DL — HIGH (ref 70–99)
GLUCOSE BLDC GLUCOMTR-MCNC: 108 MG/DL — HIGH (ref 70–99)
GLUCOSE BLDC GLUCOMTR-MCNC: 115 MG/DL — HIGH (ref 70–99)
GLUCOSE BLDC GLUCOMTR-MCNC: 116 MG/DL — HIGH (ref 70–99)
GLUCOSE BLDC GLUCOMTR-MCNC: 118 MG/DL — HIGH (ref 70–99)
GLUCOSE BLDC GLUCOMTR-MCNC: 120 MG/DL — HIGH (ref 70–99)
GLUCOSE BLDC GLUCOMTR-MCNC: 120 MG/DL — HIGH (ref 70–99)
GLUCOSE BLDC GLUCOMTR-MCNC: 121 MG/DL — HIGH (ref 70–99)
GLUCOSE BLDC GLUCOMTR-MCNC: 124 MG/DL — HIGH (ref 70–99)
GLUCOSE BLDC GLUCOMTR-MCNC: 126 MG/DL — HIGH (ref 70–99)
GLUCOSE BLDC GLUCOMTR-MCNC: 128 MG/DL — HIGH (ref 70–99)
GLUCOSE BLDC GLUCOMTR-MCNC: 129 MG/DL — HIGH (ref 70–99)
GLUCOSE BLDC GLUCOMTR-MCNC: 129 MG/DL — HIGH (ref 70–99)
GLUCOSE BLDC GLUCOMTR-MCNC: 130 MG/DL — HIGH (ref 70–99)
GLUCOSE BLDC GLUCOMTR-MCNC: 134 MG/DL — HIGH (ref 70–99)
GLUCOSE BLDC GLUCOMTR-MCNC: 135 MG/DL — HIGH (ref 70–99)
GLUCOSE BLDC GLUCOMTR-MCNC: 136 MG/DL — HIGH (ref 70–99)
GLUCOSE BLDC GLUCOMTR-MCNC: 136 MG/DL — HIGH (ref 70–99)
GLUCOSE BLDC GLUCOMTR-MCNC: 137 MG/DL — HIGH (ref 70–99)
GLUCOSE BLDC GLUCOMTR-MCNC: 138 MG/DL — HIGH (ref 70–99)
GLUCOSE BLDC GLUCOMTR-MCNC: 138 MG/DL — HIGH (ref 70–99)
GLUCOSE BLDC GLUCOMTR-MCNC: 140 MG/DL — HIGH (ref 70–99)
GLUCOSE BLDC GLUCOMTR-MCNC: 142 MG/DL — HIGH (ref 70–99)
GLUCOSE BLDC GLUCOMTR-MCNC: 152 MG/DL — HIGH (ref 70–99)
GLUCOSE BLDC GLUCOMTR-MCNC: 152 MG/DL — HIGH (ref 70–99)
GLUCOSE BLDC GLUCOMTR-MCNC: 153 MG/DL — HIGH (ref 70–99)
GLUCOSE BLDC GLUCOMTR-MCNC: 153 MG/DL — HIGH (ref 70–99)
GLUCOSE BLDC GLUCOMTR-MCNC: 154 MG/DL — HIGH (ref 70–99)
GLUCOSE BLDC GLUCOMTR-MCNC: 155 MG/DL — HIGH (ref 70–99)
GLUCOSE BLDC GLUCOMTR-MCNC: 156 MG/DL — HIGH (ref 70–99)
GLUCOSE BLDC GLUCOMTR-MCNC: 157 MG/DL — HIGH (ref 70–99)
GLUCOSE BLDC GLUCOMTR-MCNC: 160 MG/DL — HIGH (ref 70–99)
GLUCOSE BLDC GLUCOMTR-MCNC: 161 MG/DL — HIGH (ref 70–99)
GLUCOSE BLDC GLUCOMTR-MCNC: 163 MG/DL — HIGH (ref 70–99)
GLUCOSE BLDC GLUCOMTR-MCNC: 164 MG/DL — HIGH (ref 70–99)
GLUCOSE BLDC GLUCOMTR-MCNC: 166 MG/DL — HIGH (ref 70–99)
GLUCOSE BLDC GLUCOMTR-MCNC: 167 MG/DL — HIGH (ref 70–99)
GLUCOSE BLDC GLUCOMTR-MCNC: 170 MG/DL — HIGH (ref 70–99)
GLUCOSE BLDC GLUCOMTR-MCNC: 172 MG/DL — HIGH (ref 70–99)
GLUCOSE BLDC GLUCOMTR-MCNC: 176 MG/DL — HIGH (ref 70–99)
GLUCOSE BLDC GLUCOMTR-MCNC: 179 MG/DL — HIGH (ref 70–99)
GLUCOSE BLDC GLUCOMTR-MCNC: 180 MG/DL — HIGH (ref 70–99)
GLUCOSE BLDC GLUCOMTR-MCNC: 183 MG/DL — HIGH (ref 70–99)
GLUCOSE BLDC GLUCOMTR-MCNC: 186 MG/DL — HIGH (ref 70–99)
GLUCOSE BLDC GLUCOMTR-MCNC: 187 MG/DL — HIGH (ref 70–99)
GLUCOSE BLDC GLUCOMTR-MCNC: 198 MG/DL — HIGH (ref 70–99)
GLUCOSE BLDC GLUCOMTR-MCNC: 202 MG/DL — HIGH (ref 70–99)
GLUCOSE BLDC GLUCOMTR-MCNC: 202 MG/DL — HIGH (ref 70–99)
GLUCOSE BLDC GLUCOMTR-MCNC: 203 MG/DL — HIGH (ref 70–99)
GLUCOSE BLDC GLUCOMTR-MCNC: 207 MG/DL — HIGH (ref 70–99)
GLUCOSE BLDC GLUCOMTR-MCNC: 207 MG/DL — HIGH (ref 70–99)
GLUCOSE BLDC GLUCOMTR-MCNC: 210 MG/DL — HIGH (ref 70–99)
GLUCOSE BLDC GLUCOMTR-MCNC: 211 MG/DL — HIGH (ref 70–99)
GLUCOSE BLDC GLUCOMTR-MCNC: 212 MG/DL — HIGH (ref 70–99)
GLUCOSE BLDC GLUCOMTR-MCNC: 215 MG/DL — HIGH (ref 70–99)
GLUCOSE BLDC GLUCOMTR-MCNC: 218 MG/DL — HIGH (ref 70–99)
GLUCOSE BLDC GLUCOMTR-MCNC: 219 MG/DL — HIGH (ref 70–99)
GLUCOSE BLDC GLUCOMTR-MCNC: 220 MG/DL — HIGH (ref 70–99)
GLUCOSE BLDC GLUCOMTR-MCNC: 223 MG/DL — HIGH (ref 70–99)
GLUCOSE BLDC GLUCOMTR-MCNC: 223 MG/DL — HIGH (ref 70–99)
GLUCOSE BLDC GLUCOMTR-MCNC: 225 MG/DL — HIGH (ref 70–99)
GLUCOSE BLDC GLUCOMTR-MCNC: 225 MG/DL — HIGH (ref 70–99)
GLUCOSE BLDC GLUCOMTR-MCNC: 226 MG/DL — HIGH (ref 70–99)
GLUCOSE BLDC GLUCOMTR-MCNC: 227 MG/DL — HIGH (ref 70–99)
GLUCOSE BLDC GLUCOMTR-MCNC: 235 MG/DL — HIGH (ref 70–99)
GLUCOSE BLDC GLUCOMTR-MCNC: 238 MG/DL — HIGH (ref 70–99)
GLUCOSE BLDC GLUCOMTR-MCNC: 238 MG/DL — HIGH (ref 70–99)
GLUCOSE BLDC GLUCOMTR-MCNC: 241 MG/DL — HIGH (ref 70–99)
GLUCOSE BLDC GLUCOMTR-MCNC: 243 MG/DL — HIGH (ref 70–99)
GLUCOSE BLDC GLUCOMTR-MCNC: 243 MG/DL — HIGH (ref 70–99)
GLUCOSE BLDC GLUCOMTR-MCNC: 255 MG/DL — HIGH (ref 70–99)
GLUCOSE BLDC GLUCOMTR-MCNC: 263 MG/DL — HIGH (ref 70–99)
GLUCOSE BLDC GLUCOMTR-MCNC: 267 MG/DL — HIGH (ref 70–99)
GLUCOSE BLDC GLUCOMTR-MCNC: 267 MG/DL — HIGH (ref 70–99)
GLUCOSE BLDC GLUCOMTR-MCNC: 269 MG/DL — HIGH (ref 70–99)
GLUCOSE BLDC GLUCOMTR-MCNC: 269 MG/DL — HIGH (ref 70–99)
GLUCOSE BLDC GLUCOMTR-MCNC: 271 MG/DL — HIGH (ref 70–99)
GLUCOSE BLDC GLUCOMTR-MCNC: 272 MG/DL — HIGH (ref 70–99)
GLUCOSE BLDC GLUCOMTR-MCNC: 274 MG/DL — HIGH (ref 70–99)
GLUCOSE BLDC GLUCOMTR-MCNC: 276 MG/DL — HIGH (ref 70–99)
GLUCOSE BLDC GLUCOMTR-MCNC: 280 MG/DL — HIGH (ref 70–99)
GLUCOSE BLDC GLUCOMTR-MCNC: 281 MG/DL — HIGH (ref 70–99)
GLUCOSE BLDC GLUCOMTR-MCNC: 282 MG/DL — HIGH (ref 70–99)
GLUCOSE BLDC GLUCOMTR-MCNC: 285 MG/DL — HIGH (ref 70–99)
GLUCOSE BLDC GLUCOMTR-MCNC: 290 MG/DL — HIGH (ref 70–99)
GLUCOSE BLDC GLUCOMTR-MCNC: 294 MG/DL — HIGH (ref 70–99)
GLUCOSE BLDC GLUCOMTR-MCNC: 295 MG/DL — HIGH (ref 70–99)
GLUCOSE BLDC GLUCOMTR-MCNC: 299 MG/DL — HIGH (ref 70–99)
GLUCOSE BLDC GLUCOMTR-MCNC: 299 MG/DL — HIGH (ref 70–99)
GLUCOSE BLDC GLUCOMTR-MCNC: 332 MG/DL — HIGH (ref 70–99)
GLUCOSE BLDC GLUCOMTR-MCNC: 342 MG/DL — HIGH (ref 70–99)
GLUCOSE BLDC GLUCOMTR-MCNC: 346 MG/DL — HIGH (ref 70–99)
GLUCOSE BLDC GLUCOMTR-MCNC: 355 MG/DL — HIGH (ref 70–99)
GLUCOSE BLDC GLUCOMTR-MCNC: 364 MG/DL — HIGH (ref 70–99)
GLUCOSE BLDC GLUCOMTR-MCNC: 384 MG/DL — HIGH (ref 70–99)
GLUCOSE BLDC GLUCOMTR-MCNC: 401 MG/DL — HIGH (ref 70–99)
GLUCOSE BLDC GLUCOMTR-MCNC: 404 MG/DL — HIGH (ref 70–99)
GLUCOSE BLDC GLUCOMTR-MCNC: 412 MG/DL — HIGH (ref 70–99)
GLUCOSE BLDC GLUCOMTR-MCNC: 416 MG/DL — HIGH (ref 70–99)
GLUCOSE BLDC GLUCOMTR-MCNC: 422 MG/DL — HIGH (ref 70–99)
GLUCOSE BLDC GLUCOMTR-MCNC: 432 MG/DL — HIGH (ref 70–99)
GLUCOSE BLDC GLUCOMTR-MCNC: 433 MG/DL — HIGH (ref 70–99)
GLUCOSE BLDC GLUCOMTR-MCNC: 451 MG/DL — CRITICAL HIGH (ref 70–99)
GLUCOSE BLDC GLUCOMTR-MCNC: 468 MG/DL — CRITICAL HIGH (ref 70–99)
GLUCOSE BLDC GLUCOMTR-MCNC: 523 MG/DL — CRITICAL HIGH (ref 70–99)
GLUCOSE BLDC GLUCOMTR-MCNC: 541 MG/DL — CRITICAL HIGH (ref 70–99)
GLUCOSE BLDC GLUCOMTR-MCNC: 69 MG/DL — LOW (ref 70–99)
GLUCOSE BLDC GLUCOMTR-MCNC: 72 MG/DL — SIGNIFICANT CHANGE UP (ref 70–99)
GLUCOSE BLDC GLUCOMTR-MCNC: 89 MG/DL — SIGNIFICANT CHANGE UP (ref 70–99)
GLUCOSE BLDC GLUCOMTR-MCNC: 95 MG/DL — SIGNIFICANT CHANGE UP (ref 70–99)
GLUCOSE BLDC GLUCOMTR-MCNC: 95 MG/DL — SIGNIFICANT CHANGE UP (ref 70–99)
GLUCOSE SERPL-MCNC: 105 MG/DL — HIGH (ref 70–99)
GLUCOSE SERPL-MCNC: 110 MG/DL — HIGH (ref 70–99)
GLUCOSE SERPL-MCNC: 112 MG/DL — HIGH (ref 70–99)
GLUCOSE SERPL-MCNC: 112 MG/DL — HIGH (ref 70–99)
GLUCOSE SERPL-MCNC: 118 MG/DL — HIGH (ref 70–99)
GLUCOSE SERPL-MCNC: 120 MG/DL — HIGH (ref 70–99)
GLUCOSE SERPL-MCNC: 134 MG/DL — HIGH (ref 70–99)
GLUCOSE SERPL-MCNC: 135 MG/DL — HIGH (ref 70–99)
GLUCOSE SERPL-MCNC: 137 MG/DL — HIGH (ref 70–99)
GLUCOSE SERPL-MCNC: 148 MG/DL — HIGH (ref 70–99)
GLUCOSE SERPL-MCNC: 158 MG/DL — HIGH (ref 70–99)
GLUCOSE SERPL-MCNC: 159 MG/DL — HIGH (ref 70–99)
GLUCOSE SERPL-MCNC: 161 MG/DL — HIGH (ref 70–99)
GLUCOSE SERPL-MCNC: 185 MG/DL — HIGH (ref 70–99)
GLUCOSE SERPL-MCNC: 195 MG/DL — HIGH (ref 70–99)
GLUCOSE SERPL-MCNC: 199 MG/DL — HIGH (ref 70–99)
GLUCOSE SERPL-MCNC: 217 MG/DL — HIGH (ref 70–99)
GLUCOSE SERPL-MCNC: 217 MG/DL — HIGH (ref 70–99)
GLUCOSE SERPL-MCNC: 220 MG/DL — HIGH (ref 70–99)
GLUCOSE SERPL-MCNC: 243 MG/DL — HIGH (ref 70–99)
GLUCOSE SERPL-MCNC: 250 MG/DL — HIGH (ref 70–99)
GLUCOSE SERPL-MCNC: 277 MG/DL — HIGH (ref 70–99)
GLUCOSE SERPL-MCNC: 283 MG/DL — HIGH (ref 70–99)
GLUCOSE SERPL-MCNC: 315 MG/DL — HIGH (ref 70–99)
GLUCOSE SERPL-MCNC: 32 MG/DL — CRITICAL LOW (ref 70–99)
GLUCOSE SERPL-MCNC: 47 MG/DL — CRITICAL LOW (ref 70–99)
GLUCOSE SERPL-MCNC: 489 MG/DL — CRITICAL HIGH (ref 70–99)
GLUCOSE SERPL-MCNC: 57 MG/DL — LOW (ref 70–99)
GLUCOSE SERPL-MCNC: 68 MG/DL — LOW (ref 70–99)
GLUCOSE SERPL-MCNC: 74 MG/DL — SIGNIFICANT CHANGE UP (ref 70–99)
GLUCOSE SERPL-MCNC: 79 MG/DL — SIGNIFICANT CHANGE UP (ref 70–99)
GLUCOSE SERPL-MCNC: 83 MG/DL — SIGNIFICANT CHANGE UP (ref 70–99)
GLUCOSE SERPL-MCNC: 85 MG/DL — SIGNIFICANT CHANGE UP (ref 70–99)
GLUCOSE SERPL-MCNC: 92 MG/DL — SIGNIFICANT CHANGE UP (ref 70–99)
GLUCOSE UR QL: 1000 MG/DL
GLUCOSE UR QL: 50 MG/DL
GLUCOSE UR QL: 50 MG/DL
GLUCOSE UR QL: NEGATIVE — SIGNIFICANT CHANGE UP
GLUCOSE UR QL: NEGATIVE — SIGNIFICANT CHANGE UP
GRAM STN FLD: SIGNIFICANT CHANGE UP
GRAN CASTS # UR COMP ASSIST: ABNORMAL /LPF
HCO3 BLDA-SCNC: 29 MMOL/L — HIGH (ref 21–28)
HCO3 BLDA-SCNC: 30 MMOL/L — HIGH (ref 21–28)
HCO3 BLDA-SCNC: 31 MMOL/L — HIGH (ref 21–28)
HCO3 BLDA-SCNC: 34 MMOL/L — HIGH (ref 21–28)
HCT VFR BLD CALC: 20.3 % — CRITICAL LOW (ref 39–50)
HCT VFR BLD CALC: 20.9 % — CRITICAL LOW (ref 39–50)
HCT VFR BLD CALC: 21.3 % — LOW (ref 39–50)
HCT VFR BLD CALC: 21.9 % — LOW (ref 39–50)
HCT VFR BLD CALC: 22.4 % — LOW (ref 39–50)
HCT VFR BLD CALC: 22.7 % — LOW (ref 39–50)
HCT VFR BLD CALC: 22.8 % — LOW (ref 39–50)
HCT VFR BLD CALC: 23.1 % — LOW (ref 39–50)
HCT VFR BLD CALC: 23.3 % — LOW (ref 39–50)
HCT VFR BLD CALC: 23.4 % — LOW (ref 39–50)
HCT VFR BLD CALC: 23.5 % — LOW (ref 39–50)
HCT VFR BLD CALC: 23.7 % — LOW (ref 39–50)
HCT VFR BLD CALC: 23.7 % — LOW (ref 39–50)
HCT VFR BLD CALC: 24 % — LOW (ref 39–50)
HCT VFR BLD CALC: 24.3 % — LOW (ref 39–50)
HCT VFR BLD CALC: 24.5 % — LOW (ref 39–50)
HCT VFR BLD CALC: 24.6 % — LOW (ref 39–50)
HCT VFR BLD CALC: 25.5 % — LOW (ref 39–50)
HCT VFR BLD CALC: 25.9 % — LOW (ref 39–50)
HCT VFR BLD CALC: 26.3 % — LOW (ref 39–50)
HCT VFR BLD CALC: 27.1 % — LOW (ref 39–50)
HCT VFR BLD CALC: 27.3 % — LOW (ref 39–50)
HCT VFR BLD CALC: 38.2 % — LOW (ref 39–50)
HCT VFR BLD CALC: 38.8 % — LOW (ref 39–50)
HCT VFR BLD CALC: 40.3 % — SIGNIFICANT CHANGE UP (ref 39–50)
HCT VFR BLD CALC: 41 % — SIGNIFICANT CHANGE UP (ref 39–50)
HCT VFR BLD CALC: 41.1 % — SIGNIFICANT CHANGE UP (ref 39–50)
HCT VFR BLD CALC: 41.2 % — SIGNIFICANT CHANGE UP (ref 39–50)
HCT VFR BLD CALC: 41.3 % — SIGNIFICANT CHANGE UP (ref 39–50)
HCT VFR BLD CALC: 42.1 % — SIGNIFICANT CHANGE UP (ref 39–50)
HCT VFR BLD CALC: 42.9 % — SIGNIFICANT CHANGE UP (ref 39–50)
HCT VFR BLD CALC: 43.2 % — SIGNIFICANT CHANGE UP (ref 39–50)
HCT VFR BLD CALC: 43.9 % — SIGNIFICANT CHANGE UP (ref 39–50)
HCT VFR BLD CALC: 44 % — SIGNIFICANT CHANGE UP (ref 39–50)
HCT VFR BLD CALC: 44.9 % — SIGNIFICANT CHANGE UP (ref 39–50)
HCT VFR BLD CALC: 44.9 % — SIGNIFICANT CHANGE UP (ref 39–50)
HCT VFR BLD CALC: 45.2 % — SIGNIFICANT CHANGE UP (ref 39–50)
HCT VFR BLD CALC: 45.3 % — SIGNIFICANT CHANGE UP (ref 39–50)
HCT VFR BLD CALC: 48 % — SIGNIFICANT CHANGE UP (ref 39–50)
HDLC SERPL-MCNC: 48 MG/DL — SIGNIFICANT CHANGE UP
HGB BLD-MCNC: 12.3 G/DL — LOW (ref 13–17)
HGB BLD-MCNC: 12.4 G/DL — LOW (ref 13–17)
HGB BLD-MCNC: 13 G/DL — SIGNIFICANT CHANGE UP (ref 13–17)
HGB BLD-MCNC: 13.1 G/DL — SIGNIFICANT CHANGE UP (ref 13–17)
HGB BLD-MCNC: 13.3 G/DL — SIGNIFICANT CHANGE UP (ref 13–17)
HGB BLD-MCNC: 13.3 G/DL — SIGNIFICANT CHANGE UP (ref 13–17)
HGB BLD-MCNC: 13.4 G/DL — SIGNIFICANT CHANGE UP (ref 13–17)
HGB BLD-MCNC: 13.6 G/DL — SIGNIFICANT CHANGE UP (ref 13–17)
HGB BLD-MCNC: 13.8 G/DL — SIGNIFICANT CHANGE UP (ref 13–17)
HGB BLD-MCNC: 14 G/DL — SIGNIFICANT CHANGE UP (ref 13–17)
HGB BLD-MCNC: 14.1 G/DL — SIGNIFICANT CHANGE UP (ref 13–17)
HGB BLD-MCNC: 14.2 G/DL — SIGNIFICANT CHANGE UP (ref 13–17)
HGB BLD-MCNC: 14.4 G/DL — SIGNIFICANT CHANGE UP (ref 13–17)
HGB BLD-MCNC: 14.6 G/DL — SIGNIFICANT CHANGE UP (ref 13–17)
HGB BLD-MCNC: 14.7 G/DL — SIGNIFICANT CHANGE UP (ref 13–17)
HGB BLD-MCNC: 15.2 G/DL — SIGNIFICANT CHANGE UP (ref 13–17)
HGB BLD-MCNC: 15.8 G/DL — SIGNIFICANT CHANGE UP (ref 13–17)
HGB BLD-MCNC: 6.6 G/DL — CRITICAL LOW (ref 13–17)
HGB BLD-MCNC: 6.6 G/DL — CRITICAL LOW (ref 13–17)
HGB BLD-MCNC: 7 G/DL — CRITICAL LOW (ref 13–17)
HGB BLD-MCNC: 7.1 G/DL — LOW (ref 13–17)
HGB BLD-MCNC: 7.2 G/DL — LOW (ref 13–17)
HGB BLD-MCNC: 7.2 G/DL — LOW (ref 13–17)
HGB BLD-MCNC: 7.3 G/DL — LOW (ref 13–17)
HGB BLD-MCNC: 7.5 G/DL — LOW (ref 13–17)
HGB BLD-MCNC: 7.6 G/DL — LOW (ref 13–17)
HGB BLD-MCNC: 7.7 G/DL — LOW (ref 13–17)
HGB BLD-MCNC: 7.8 G/DL — LOW (ref 13–17)
HGB BLD-MCNC: 7.8 G/DL — LOW (ref 13–17)
HGB BLD-MCNC: 7.9 G/DL — LOW (ref 13–17)
HGB BLD-MCNC: 7.9 G/DL — LOW (ref 13–17)
HGB BLD-MCNC: 8.1 G/DL — LOW (ref 13–17)
HGB BLD-MCNC: 8.2 G/DL — LOW (ref 13–17)
HGB BLD-MCNC: 8.2 G/DL — LOW (ref 13–17)
HGB BLD-MCNC: 8.4 G/DL — LOW (ref 13–17)
HGB BLD-MCNC: 8.5 G/DL — LOW (ref 13–17)
HGB BLD-MCNC: 8.7 G/DL — LOW (ref 13–17)
HOMOCYSTEINE LEVEL: 7.7 UMOL/L — SIGNIFICANT CHANGE UP
HOROWITZ INDEX BLDA+IHG-RTO: 35 — SIGNIFICANT CHANGE UP
HOROWITZ INDEX BLDA+IHG-RTO: 40 — SIGNIFICANT CHANGE UP
HOROWITZ INDEX BLDA+IHG-RTO: 40 — SIGNIFICANT CHANGE UP
HOROWITZ INDEX BLDA+IHG-RTO: 50 — SIGNIFICANT CHANGE UP
HYALINE CASTS # UR AUTO: ABNORMAL /LPF
HYPOCHROMIA BLD QL: SLIGHT — SIGNIFICANT CHANGE UP
IMM GRANULOCYTES NFR BLD AUTO: 0.3 % — SIGNIFICANT CHANGE UP (ref 0–1.5)
IMM GRANULOCYTES NFR BLD AUTO: 0.4 % — SIGNIFICANT CHANGE UP (ref 0–1.5)
IMM GRANULOCYTES NFR BLD AUTO: 0.5 % — SIGNIFICANT CHANGE UP (ref 0–1.5)
IMM GRANULOCYTES NFR BLD AUTO: 0.5 % — SIGNIFICANT CHANGE UP (ref 0–1.5)
IMM GRANULOCYTES NFR BLD AUTO: 0.6 % — SIGNIFICANT CHANGE UP (ref 0–1.5)
INR BLD: 1 RATIO — SIGNIFICANT CHANGE UP (ref 0.88–1.16)
INR BLD: 1 RATIO — SIGNIFICANT CHANGE UP (ref 0.88–1.16)
INR BLD: 1.08 RATIO — SIGNIFICANT CHANGE UP (ref 0.88–1.16)
INR BLD: 1.19 RATIO — HIGH (ref 0.88–1.16)
INR BLD: 1.22 RATIO — HIGH (ref 0.88–1.16)
INR BLD: 1.23 RATIO — HIGH (ref 0.88–1.16)
INR BLD: 1.67 RATIO — HIGH (ref 0.88–1.16)
INR BLD: 1.7 RATIO — HIGH (ref 0.88–1.16)
INR BLD: 1.72 RATIO — HIGH (ref 0.88–1.16)
INR BLD: 1.92 RATIO — HIGH (ref 0.88–1.16)
INR BLD: 1.94 RATIO — HIGH (ref 0.88–1.16)
INR BLD: 1.95 RATIO — HIGH (ref 0.88–1.16)
INR BLD: 1.99 RATIO — HIGH (ref 0.88–1.16)
INR BLD: 2.05 RATIO — HIGH (ref 0.88–1.16)
INR BLD: 2.1 RATIO — HIGH (ref 0.88–1.16)
INR BLD: 2.26 RATIO — HIGH (ref 0.88–1.16)
INR BLD: 2.28 RATIO — HIGH (ref 0.88–1.16)
INR BLD: 2.32 RATIO — HIGH (ref 0.88–1.16)
INR BLD: 2.84 RATIO — HIGH (ref 0.88–1.16)
INR BLD: 2.9 RATIO — HIGH (ref 0.88–1.16)
INR BLD: 3.02 RATIO — HIGH (ref 0.88–1.16)
INR BLD: 3.03 RATIO — HIGH (ref 0.88–1.16)
INR BLD: 3.35 RATIO — HIGH (ref 0.88–1.16)
INR BLD: 3.9 RATIO — HIGH (ref 0.88–1.16)
INR BLD: 3.98 RATIO — HIGH (ref 0.88–1.16)
KETONES UR-MCNC: NEGATIVE — SIGNIFICANT CHANGE UP
LACTATE SERPL-SCNC: 1.1 MMOL/L — SIGNIFICANT CHANGE UP (ref 0.7–2)
LACTATE SERPL-SCNC: 1.4 MMOL/L — SIGNIFICANT CHANGE UP (ref 0.7–2)
LACTATE SERPL-SCNC: 1.6 MMOL/L — SIGNIFICANT CHANGE UP (ref 0.7–2)
LACTATE SERPL-SCNC: 1.8 MMOL/L — SIGNIFICANT CHANGE UP (ref 0.7–2)
LACTATE SERPL-SCNC: 1.8 MMOL/L — SIGNIFICANT CHANGE UP (ref 0.7–2)
LACTATE SERPL-SCNC: 2.2 MMOL/L — HIGH (ref 0.7–2)
LACTATE SERPL-SCNC: 2.5 MMOL/L — HIGH (ref 0.7–2)
LEUKOCYTE ESTERASE UR-ACNC: ABNORMAL
LEUKOCYTE ESTERASE UR-ACNC: NEGATIVE — SIGNIFICANT CHANGE UP
LEUKOCYTE ESTERASE UR-ACNC: NEGATIVE — SIGNIFICANT CHANGE UP
LG PLATELETS BLD QL AUTO: SLIGHT — SIGNIFICANT CHANGE UP
LIDOCAIN IGE QN: 221 U/L — SIGNIFICANT CHANGE UP (ref 73–393)
LIPID PNL WITH DIRECT LDL SERPL: 75 MG/DL — SIGNIFICANT CHANGE UP
LYMPHOCYTES # BLD AUTO: 0.28 K/UL — LOW (ref 1–3.3)
LYMPHOCYTES # BLD AUTO: 0.52 K/UL — LOW (ref 1–3.3)
LYMPHOCYTES # BLD AUTO: 1.24 K/UL — SIGNIFICANT CHANGE UP (ref 1–3.3)
LYMPHOCYTES # BLD AUTO: 1.35 K/UL — SIGNIFICANT CHANGE UP (ref 1–3.3)
LYMPHOCYTES # BLD AUTO: 1.43 K/UL — SIGNIFICANT CHANGE UP (ref 1–3.3)
LYMPHOCYTES # BLD AUTO: 1.47 K/UL — SIGNIFICANT CHANGE UP (ref 1–3.3)
LYMPHOCYTES # BLD AUTO: 1.47 K/UL — SIGNIFICANT CHANGE UP (ref 1–3.3)
LYMPHOCYTES # BLD AUTO: 1.51 K/UL — SIGNIFICANT CHANGE UP (ref 1–3.3)
LYMPHOCYTES # BLD AUTO: 1.62 K/UL — SIGNIFICANT CHANGE UP (ref 1–3.3)
LYMPHOCYTES # BLD AUTO: 1.69 K/UL — SIGNIFICANT CHANGE UP (ref 1–3.3)
LYMPHOCYTES # BLD AUTO: 1.73 K/UL — SIGNIFICANT CHANGE UP (ref 1–3.3)
LYMPHOCYTES # BLD AUTO: 1.98 K/UL — SIGNIFICANT CHANGE UP (ref 1–3.3)
LYMPHOCYTES # BLD AUTO: 11.3 % — LOW (ref 13–44)
LYMPHOCYTES # BLD AUTO: 12.6 % — LOW (ref 13–44)
LYMPHOCYTES # BLD AUTO: 13.6 % — SIGNIFICANT CHANGE UP (ref 13–44)
LYMPHOCYTES # BLD AUTO: 16.1 % — SIGNIFICANT CHANGE UP (ref 13–44)
LYMPHOCYTES # BLD AUTO: 16.4 % — SIGNIFICANT CHANGE UP (ref 13–44)
LYMPHOCYTES # BLD AUTO: 17.9 % — SIGNIFICANT CHANGE UP (ref 13–44)
LYMPHOCYTES # BLD AUTO: 18.9 % — SIGNIFICANT CHANGE UP (ref 13–44)
LYMPHOCYTES # BLD AUTO: 19 % — SIGNIFICANT CHANGE UP (ref 13–44)
LYMPHOCYTES # BLD AUTO: 2.31 K/UL — SIGNIFICANT CHANGE UP (ref 1–3.3)
LYMPHOCYTES # BLD AUTO: 2.5 K/UL — SIGNIFICANT CHANGE UP (ref 1–3.3)
LYMPHOCYTES # BLD AUTO: 2.63 K/UL — SIGNIFICANT CHANGE UP (ref 1–3.3)
LYMPHOCYTES # BLD AUTO: 20.5 % — SIGNIFICANT CHANGE UP (ref 13–44)
LYMPHOCYTES # BLD AUTO: 20.7 % — SIGNIFICANT CHANGE UP (ref 13–44)
LYMPHOCYTES # BLD AUTO: 23 % — SIGNIFICANT CHANGE UP (ref 13–44)
LYMPHOCYTES # BLD AUTO: 25.3 % — SIGNIFICANT CHANGE UP (ref 13–44)
LYMPHOCYTES # BLD AUTO: 29.2 % — SIGNIFICANT CHANGE UP (ref 13–44)
LYMPHOCYTES # BLD AUTO: 3.05 K/UL — SIGNIFICANT CHANGE UP (ref 1–3.3)
LYMPHOCYTES # BLD AUTO: 3.06 K/UL — SIGNIFICANT CHANGE UP (ref 1–3.3)
LYMPHOCYTES # BLD AUTO: 4 % — LOW (ref 13–44)
LYMPHOCYTES # BLD AUTO: 43 % — SIGNIFICANT CHANGE UP (ref 13–44)
LYMPHOCYTES # BLD AUTO: 7.2 % — LOW (ref 13–44)
LYMPHOCYTES # BLD AUTO: 8.9 % — LOW (ref 13–44)
MACROCYTES BLD QL: SLIGHT — SIGNIFICANT CHANGE UP
MAGNESIUM SERPL-MCNC: 1.4 MG/DL — LOW (ref 1.6–2.6)
MAGNESIUM SERPL-MCNC: 1.4 MG/DL — LOW (ref 1.6–2.6)
MAGNESIUM SERPL-MCNC: 1.5 MG/DL — LOW (ref 1.6–2.6)
MAGNESIUM SERPL-MCNC: 1.6 MG/DL — SIGNIFICANT CHANGE UP (ref 1.6–2.6)
MAGNESIUM SERPL-MCNC: 1.7 MG/DL — SIGNIFICANT CHANGE UP (ref 1.6–2.6)
MAGNESIUM SERPL-MCNC: 1.7 MG/DL — SIGNIFICANT CHANGE UP (ref 1.6–2.6)
MAGNESIUM SERPL-MCNC: 1.8 MG/DL — SIGNIFICANT CHANGE UP (ref 1.6–2.6)
MAGNESIUM SERPL-MCNC: 1.9 MG/DL — SIGNIFICANT CHANGE UP (ref 1.6–2.6)
MAGNESIUM SERPL-MCNC: 2 MG/DL — SIGNIFICANT CHANGE UP (ref 1.6–2.6)
MAGNESIUM SERPL-MCNC: 2.1 MG/DL — SIGNIFICANT CHANGE UP (ref 1.6–2.6)
MAGNESIUM SERPL-MCNC: 2.2 MG/DL — SIGNIFICANT CHANGE UP (ref 1.6–2.6)
MAGNESIUM SERPL-MCNC: 2.4 MG/DL — SIGNIFICANT CHANGE UP (ref 1.6–2.6)
MANUAL SMEAR VERIFICATION: SIGNIFICANT CHANGE UP
MCHC RBC-ENTMCNC: 30.3 PG — SIGNIFICANT CHANGE UP (ref 27–34)
MCHC RBC-ENTMCNC: 30.6 GM/DL — LOW (ref 32–36)
MCHC RBC-ENTMCNC: 30.6 PG — SIGNIFICANT CHANGE UP (ref 27–34)
MCHC RBC-ENTMCNC: 30.7 PG — SIGNIFICANT CHANGE UP (ref 27–34)
MCHC RBC-ENTMCNC: 30.7 PG — SIGNIFICANT CHANGE UP (ref 27–34)
MCHC RBC-ENTMCNC: 30.8 PG — SIGNIFICANT CHANGE UP (ref 27–34)
MCHC RBC-ENTMCNC: 30.9 PG — SIGNIFICANT CHANGE UP (ref 27–34)
MCHC RBC-ENTMCNC: 31 GM/DL — LOW (ref 32–36)
MCHC RBC-ENTMCNC: 31 PG — SIGNIFICANT CHANGE UP (ref 27–34)
MCHC RBC-ENTMCNC: 31.1 PG — SIGNIFICANT CHANGE UP (ref 27–34)
MCHC RBC-ENTMCNC: 31.2 GM/DL — LOW (ref 32–36)
MCHC RBC-ENTMCNC: 31.2 PG — SIGNIFICANT CHANGE UP (ref 27–34)
MCHC RBC-ENTMCNC: 31.3 GM/DL — LOW (ref 32–36)
MCHC RBC-ENTMCNC: 31.3 PG — SIGNIFICANT CHANGE UP (ref 27–34)
MCHC RBC-ENTMCNC: 31.4 GM/DL — LOW (ref 32–36)
MCHC RBC-ENTMCNC: 31.4 GM/DL — LOW (ref 32–36)
MCHC RBC-ENTMCNC: 31.4 PG — SIGNIFICANT CHANGE UP (ref 27–34)
MCHC RBC-ENTMCNC: 31.4 PG — SIGNIFICANT CHANGE UP (ref 27–34)
MCHC RBC-ENTMCNC: 31.5 GM/DL — LOW (ref 32–36)
MCHC RBC-ENTMCNC: 31.6 GM/DL — LOW (ref 32–36)
MCHC RBC-ENTMCNC: 31.6 PG — SIGNIFICANT CHANGE UP (ref 27–34)
MCHC RBC-ENTMCNC: 31.6 PG — SIGNIFICANT CHANGE UP (ref 27–34)
MCHC RBC-ENTMCNC: 31.7 GM/DL — LOW (ref 32–36)
MCHC RBC-ENTMCNC: 31.7 GM/DL — LOW (ref 32–36)
MCHC RBC-ENTMCNC: 31.7 PG — SIGNIFICANT CHANGE UP (ref 27–34)
MCHC RBC-ENTMCNC: 31.7 PG — SIGNIFICANT CHANGE UP (ref 27–34)
MCHC RBC-ENTMCNC: 31.8 PG — SIGNIFICANT CHANGE UP (ref 27–34)
MCHC RBC-ENTMCNC: 31.9 GM/DL — LOW (ref 32–36)
MCHC RBC-ENTMCNC: 31.9 GM/DL — LOW (ref 32–36)
MCHC RBC-ENTMCNC: 32 PG — SIGNIFICANT CHANGE UP (ref 27–34)
MCHC RBC-ENTMCNC: 32.1 GM/DL — SIGNIFICANT CHANGE UP (ref 32–36)
MCHC RBC-ENTMCNC: 32.1 PG — SIGNIFICANT CHANGE UP (ref 27–34)
MCHC RBC-ENTMCNC: 32.2 GM/DL — SIGNIFICANT CHANGE UP (ref 32–36)
MCHC RBC-ENTMCNC: 32.4 GM/DL — SIGNIFICANT CHANGE UP (ref 32–36)
MCHC RBC-ENTMCNC: 32.5 GM/DL — SIGNIFICANT CHANGE UP (ref 32–36)
MCHC RBC-ENTMCNC: 32.6 GM/DL — SIGNIFICANT CHANGE UP (ref 32–36)
MCHC RBC-ENTMCNC: 32.6 GM/DL — SIGNIFICANT CHANGE UP (ref 32–36)
MCHC RBC-ENTMCNC: 32.8 GM/DL — SIGNIFICANT CHANGE UP (ref 32–36)
MCHC RBC-ENTMCNC: 32.9 GM/DL — SIGNIFICANT CHANGE UP (ref 32–36)
MCHC RBC-ENTMCNC: 32.9 GM/DL — SIGNIFICANT CHANGE UP (ref 32–36)
MCHC RBC-ENTMCNC: 33.1 GM/DL — SIGNIFICANT CHANGE UP (ref 32–36)
MCHC RBC-ENTMCNC: 33.1 GM/DL — SIGNIFICANT CHANGE UP (ref 32–36)
MCHC RBC-ENTMCNC: 33.2 GM/DL — SIGNIFICANT CHANGE UP (ref 32–36)
MCHC RBC-ENTMCNC: 33.3 GM/DL — SIGNIFICANT CHANGE UP (ref 32–36)
MCHC RBC-ENTMCNC: 33.3 GM/DL — SIGNIFICANT CHANGE UP (ref 32–36)
MCHC RBC-ENTMCNC: 33.5 GM/DL — SIGNIFICANT CHANGE UP (ref 32–36)
MCHC RBC-ENTMCNC: 33.6 GM/DL — SIGNIFICANT CHANGE UP (ref 32–36)
MCHC RBC-ENTMCNC: 33.9 GM/DL — SIGNIFICANT CHANGE UP (ref 32–36)
MCV RBC AUTO: 100.9 FL — HIGH (ref 80–100)
MCV RBC AUTO: 102.4 FL — HIGH (ref 80–100)
MCV RBC AUTO: 93.4 FL — SIGNIFICANT CHANGE UP (ref 80–100)
MCV RBC AUTO: 93.5 FL — SIGNIFICANT CHANGE UP (ref 80–100)
MCV RBC AUTO: 93.6 FL — SIGNIFICANT CHANGE UP (ref 80–100)
MCV RBC AUTO: 93.7 FL — SIGNIFICANT CHANGE UP (ref 80–100)
MCV RBC AUTO: 93.8 FL — SIGNIFICANT CHANGE UP (ref 80–100)
MCV RBC AUTO: 94 FL — SIGNIFICANT CHANGE UP (ref 80–100)
MCV RBC AUTO: 94.4 FL — SIGNIFICANT CHANGE UP (ref 80–100)
MCV RBC AUTO: 94.5 FL — SIGNIFICANT CHANGE UP (ref 80–100)
MCV RBC AUTO: 94.9 FL — SIGNIFICANT CHANGE UP (ref 80–100)
MCV RBC AUTO: 95.3 FL — SIGNIFICANT CHANGE UP (ref 80–100)
MCV RBC AUTO: 95.4 FL — SIGNIFICANT CHANGE UP (ref 80–100)
MCV RBC AUTO: 95.5 FL — SIGNIFICANT CHANGE UP (ref 80–100)
MCV RBC AUTO: 95.9 FL — SIGNIFICANT CHANGE UP (ref 80–100)
MCV RBC AUTO: 96 FL — SIGNIFICANT CHANGE UP (ref 80–100)
MCV RBC AUTO: 96 FL — SIGNIFICANT CHANGE UP (ref 80–100)
MCV RBC AUTO: 96.4 FL — SIGNIFICANT CHANGE UP (ref 80–100)
MCV RBC AUTO: 96.5 FL — SIGNIFICANT CHANGE UP (ref 80–100)
MCV RBC AUTO: 96.6 FL — SIGNIFICANT CHANGE UP (ref 80–100)
MCV RBC AUTO: 96.6 FL — SIGNIFICANT CHANGE UP (ref 80–100)
MCV RBC AUTO: 97.1 FL — SIGNIFICANT CHANGE UP (ref 80–100)
MCV RBC AUTO: 97.2 FL — SIGNIFICANT CHANGE UP (ref 80–100)
MCV RBC AUTO: 97.4 FL — SIGNIFICANT CHANGE UP (ref 80–100)
MCV RBC AUTO: 97.5 FL — SIGNIFICANT CHANGE UP (ref 80–100)
MCV RBC AUTO: 97.5 FL — SIGNIFICANT CHANGE UP (ref 80–100)
MCV RBC AUTO: 97.7 FL — SIGNIFICANT CHANGE UP (ref 80–100)
MCV RBC AUTO: 97.7 FL — SIGNIFICANT CHANGE UP (ref 80–100)
MCV RBC AUTO: 98 FL — SIGNIFICANT CHANGE UP (ref 80–100)
MCV RBC AUTO: 98.1 FL — SIGNIFICANT CHANGE UP (ref 80–100)
MCV RBC AUTO: 98.2 FL — SIGNIFICANT CHANGE UP (ref 80–100)
MCV RBC AUTO: 98.3 FL — SIGNIFICANT CHANGE UP (ref 80–100)
MCV RBC AUTO: 98.5 FL — SIGNIFICANT CHANGE UP (ref 80–100)
MCV RBC AUTO: 98.5 FL — SIGNIFICANT CHANGE UP (ref 80–100)
MCV RBC AUTO: 98.7 FL — SIGNIFICANT CHANGE UP (ref 80–100)
MCV RBC AUTO: 98.8 FL — SIGNIFICANT CHANGE UP (ref 80–100)
MCV RBC AUTO: 99.2 FL — SIGNIFICANT CHANGE UP (ref 80–100)
METHADONE UR-MCNC: NEGATIVE — SIGNIFICANT CHANGE UP
METHOD TYPE: SIGNIFICANT CHANGE UP
METHYLMALONIC ACID LEVEL: 136 NMOL/L — SIGNIFICANT CHANGE UP (ref 87–318)
MICROCYTES BLD QL: SLIGHT — SIGNIFICANT CHANGE UP
MONOCYTES # BLD AUTO: 0.04 K/UL — SIGNIFICANT CHANGE UP (ref 0–0.9)
MONOCYTES # BLD AUTO: 0.65 K/UL — SIGNIFICANT CHANGE UP (ref 0–0.9)
MONOCYTES # BLD AUTO: 0.71 K/UL — SIGNIFICANT CHANGE UP (ref 0–0.9)
MONOCYTES # BLD AUTO: 0.72 K/UL — SIGNIFICANT CHANGE UP (ref 0–0.9)
MONOCYTES # BLD AUTO: 0.78 K/UL — SIGNIFICANT CHANGE UP (ref 0–0.9)
MONOCYTES # BLD AUTO: 0.83 K/UL — SIGNIFICANT CHANGE UP (ref 0–0.9)
MONOCYTES # BLD AUTO: 0.86 K/UL — SIGNIFICANT CHANGE UP (ref 0–0.9)
MONOCYTES # BLD AUTO: 0.89 K/UL — SIGNIFICANT CHANGE UP (ref 0–0.9)
MONOCYTES # BLD AUTO: 0.92 K/UL — HIGH (ref 0–0.9)
MONOCYTES # BLD AUTO: 0.93 K/UL — HIGH (ref 0–0.9)
MONOCYTES # BLD AUTO: 0.97 K/UL — HIGH (ref 0–0.9)
MONOCYTES # BLD AUTO: 0.99 K/UL — HIGH (ref 0–0.9)
MONOCYTES # BLD AUTO: 1.05 K/UL — HIGH (ref 0–0.9)
MONOCYTES # BLD AUTO: 1.11 K/UL — HIGH (ref 0–0.9)
MONOCYTES # BLD AUTO: 1.21 K/UL — HIGH (ref 0–0.9)
MONOCYTES NFR BLD AUTO: 1.3 % — LOW (ref 2–14)
MONOCYTES NFR BLD AUTO: 10 % — SIGNIFICANT CHANGE UP (ref 2–14)
MONOCYTES NFR BLD AUTO: 10 % — SIGNIFICANT CHANGE UP (ref 2–14)
MONOCYTES NFR BLD AUTO: 10.3 % — SIGNIFICANT CHANGE UP (ref 2–14)
MONOCYTES NFR BLD AUTO: 10.7 % — SIGNIFICANT CHANGE UP (ref 2–14)
MONOCYTES NFR BLD AUTO: 10.7 % — SIGNIFICANT CHANGE UP (ref 2–14)
MONOCYTES NFR BLD AUTO: 12.2 % — SIGNIFICANT CHANGE UP (ref 2–14)
MONOCYTES NFR BLD AUTO: 12.3 % — SIGNIFICANT CHANGE UP (ref 2–14)
MONOCYTES NFR BLD AUTO: 3 % — SIGNIFICANT CHANGE UP (ref 2–14)
MONOCYTES NFR BLD AUTO: 5.9 % — SIGNIFICANT CHANGE UP (ref 2–14)
MONOCYTES NFR BLD AUTO: 6.9 % — SIGNIFICANT CHANGE UP (ref 2–14)
MONOCYTES NFR BLD AUTO: 7.1 % — SIGNIFICANT CHANGE UP (ref 2–14)
MONOCYTES NFR BLD AUTO: 7.4 % — SIGNIFICANT CHANGE UP (ref 2–14)
MONOCYTES NFR BLD AUTO: 8 % — SIGNIFICANT CHANGE UP (ref 2–14)
MONOCYTES NFR BLD AUTO: 9.1 % — SIGNIFICANT CHANGE UP (ref 2–14)
MONOCYTES NFR BLD AUTO: 9.5 % — SIGNIFICANT CHANGE UP (ref 2–14)
MONOCYTES NFR BLD AUTO: 9.9 % — SIGNIFICANT CHANGE UP (ref 2–14)
MRSA PCR RESULT.: SIGNIFICANT CHANGE UP
MRSA PCR RESULT.: SIGNIFICANT CHANGE UP
NEUTROPHILS # BLD AUTO: 10.55 K/UL — HIGH (ref 1.8–7.4)
NEUTROPHILS # BLD AUTO: 10.92 K/UL — HIGH (ref 1.8–7.4)
NEUTROPHILS # BLD AUTO: 2.76 K/UL — SIGNIFICANT CHANGE UP (ref 1.8–7.4)
NEUTROPHILS # BLD AUTO: 28.87 K/UL — HIGH (ref 1.8–7.4)
NEUTROPHILS # BLD AUTO: 3.34 K/UL — SIGNIFICANT CHANGE UP (ref 1.8–7.4)
NEUTROPHILS # BLD AUTO: 4.59 K/UL — SIGNIFICANT CHANGE UP (ref 1.8–7.4)
NEUTROPHILS # BLD AUTO: 5.01 K/UL — SIGNIFICANT CHANGE UP (ref 1.8–7.4)
NEUTROPHILS # BLD AUTO: 5.6 K/UL — SIGNIFICANT CHANGE UP (ref 1.8–7.4)
NEUTROPHILS # BLD AUTO: 5.62 K/UL — SIGNIFICANT CHANGE UP (ref 1.8–7.4)
NEUTROPHILS # BLD AUTO: 5.7 K/UL — SIGNIFICANT CHANGE UP (ref 1.8–7.4)
NEUTROPHILS # BLD AUTO: 5.91 K/UL — SIGNIFICANT CHANGE UP (ref 1.8–7.4)
NEUTROPHILS # BLD AUTO: 5.92 K/UL — SIGNIFICANT CHANGE UP (ref 1.8–7.4)
NEUTROPHILS # BLD AUTO: 7.76 K/UL — HIGH (ref 1.8–7.4)
NEUTROPHILS # BLD AUTO: 7.89 K/UL — HIGH (ref 1.8–7.4)
NEUTROPHILS # BLD AUTO: 8 K/UL — HIGH (ref 1.8–7.4)
NEUTROPHILS # BLD AUTO: 8.41 K/UL — HIGH (ref 1.8–7.4)
NEUTROPHILS # BLD AUTO: 9.61 K/UL — HIGH (ref 1.8–7.4)
NEUTROPHILS NFR BLD AUTO: 47 % — SIGNIFICANT CHANGE UP (ref 43–77)
NEUTROPHILS NFR BLD AUTO: 57.9 % — SIGNIFICANT CHANGE UP (ref 43–77)
NEUTROPHILS NFR BLD AUTO: 65 % — SIGNIFICANT CHANGE UP (ref 43–77)
NEUTROPHILS NFR BLD AUTO: 65.4 % — SIGNIFICANT CHANGE UP (ref 43–77)
NEUTROPHILS NFR BLD AUTO: 67.3 % — SIGNIFICANT CHANGE UP (ref 43–77)
NEUTROPHILS NFR BLD AUTO: 69 % — SIGNIFICANT CHANGE UP (ref 43–77)
NEUTROPHILS NFR BLD AUTO: 69.8 % — SIGNIFICANT CHANGE UP (ref 43–77)
NEUTROPHILS NFR BLD AUTO: 71.8 % — SIGNIFICANT CHANGE UP (ref 43–77)
NEUTROPHILS NFR BLD AUTO: 73 % — SIGNIFICANT CHANGE UP (ref 43–77)
NEUTROPHILS NFR BLD AUTO: 73.8 % — SIGNIFICANT CHANGE UP (ref 43–77)
NEUTROPHILS NFR BLD AUTO: 73.8 % — SIGNIFICANT CHANGE UP (ref 43–77)
NEUTROPHILS NFR BLD AUTO: 74.1 % — SIGNIFICANT CHANGE UP (ref 43–77)
NEUTROPHILS NFR BLD AUTO: 74.4 % — SIGNIFICANT CHANGE UP (ref 43–77)
NEUTROPHILS NFR BLD AUTO: 78.7 % — HIGH (ref 43–77)
NEUTROPHILS NFR BLD AUTO: 79.1 % — HIGH (ref 43–77)
NEUTROPHILS NFR BLD AUTO: 80 % — HIGH (ref 43–77)
NEUTROPHILS NFR BLD AUTO: 88.2 % — HIGH (ref 43–77)
NEUTS BAND # BLD: 13 % — HIGH (ref 0–8)
NITRITE UR-MCNC: NEGATIVE — SIGNIFICANT CHANGE UP
NITRITE UR-MCNC: POSITIVE
NITRITE UR-MCNC: POSITIVE
NON HDL CHOLESTEROL: 91 MG/DL — SIGNIFICANT CHANGE UP
NRBC # BLD: 0 /100 WBCS — SIGNIFICANT CHANGE UP (ref 0–0)
NRBC # BLD: 0 /100 — SIGNIFICANT CHANGE UP (ref 0–0)
NRBC # BLD: 1 /100 WBCS — HIGH (ref 0–0)
NRBC # BLD: 2 /100 WBCS — HIGH (ref 0–0)
NT-PROBNP SERPL-SCNC: 549 PG/ML — HIGH (ref 0–450)
NT-PROBNP SERPL-SCNC: 699 PG/ML — HIGH (ref 0–450)
OPIATES UR-MCNC: NEGATIVE — SIGNIFICANT CHANGE UP
ORGANISM # SPEC MICROSCOPIC CNT: SIGNIFICANT CHANGE UP
OVALOCYTES BLD QL SMEAR: SLIGHT — SIGNIFICANT CHANGE UP
OVALOCYTES BLD QL SMEAR: SLIGHT — SIGNIFICANT CHANGE UP
PCO2 BLDA: 61 MMHG — HIGH (ref 35–48)
PCO2 BLDA: 61 MMHG — HIGH (ref 35–48)
PCO2 BLDA: 63 MMHG — HIGH (ref 35–48)
PCO2 BLDA: 68 MMHG — HIGH (ref 35–48)
PCP SPEC-MCNC: SIGNIFICANT CHANGE UP
PCP UR-MCNC: NEGATIVE — SIGNIFICANT CHANGE UP
PH BLDA: 7.27 — LOW (ref 7.35–7.45)
PH BLDA: 7.28 — LOW (ref 7.35–7.45)
PH BLDA: 7.29 — LOW (ref 7.35–7.45)
PH BLDA: 7.35 — SIGNIFICANT CHANGE UP (ref 7.35–7.45)
PH UR: 5 — SIGNIFICANT CHANGE UP (ref 5–8)
PH UR: 6 — SIGNIFICANT CHANGE UP (ref 5–8)
PH UR: 6.5 — SIGNIFICANT CHANGE UP (ref 5–8)
PHOSPHATE SERPL-MCNC: 2.4 MG/DL — LOW (ref 2.5–4.5)
PHOSPHATE SERPL-MCNC: 2.5 MG/DL — SIGNIFICANT CHANGE UP (ref 2.5–4.5)
PHOSPHATE SERPL-MCNC: 2.7 MG/DL — SIGNIFICANT CHANGE UP (ref 2.5–4.5)
PHOSPHATE SERPL-MCNC: 2.9 MG/DL — SIGNIFICANT CHANGE UP (ref 2.5–4.5)
PHOSPHATE SERPL-MCNC: 3 MG/DL — SIGNIFICANT CHANGE UP (ref 2.5–4.5)
PHOSPHATE SERPL-MCNC: 3.2 MG/DL — SIGNIFICANT CHANGE UP (ref 2.5–4.5)
PHOSPHATE SERPL-MCNC: 3.4 MG/DL — SIGNIFICANT CHANGE UP (ref 2.5–4.5)
PHOSPHATE SERPL-MCNC: 3.4 MG/DL — SIGNIFICANT CHANGE UP (ref 2.5–4.5)
PHOSPHATE SERPL-MCNC: 3.5 MG/DL — SIGNIFICANT CHANGE UP (ref 2.5–4.5)
PHOSPHATE SERPL-MCNC: 3.6 MG/DL — SIGNIFICANT CHANGE UP (ref 2.5–4.5)
PHOSPHATE SERPL-MCNC: 3.6 MG/DL — SIGNIFICANT CHANGE UP (ref 2.5–4.5)
PHOSPHATE SERPL-MCNC: 3.8 MG/DL — SIGNIFICANT CHANGE UP (ref 2.5–4.5)
PHOSPHATE SERPL-MCNC: 4.1 MG/DL — SIGNIFICANT CHANGE UP (ref 2.5–4.5)
PHOSPHATE SERPL-MCNC: 4.2 MG/DL — SIGNIFICANT CHANGE UP (ref 2.5–4.5)
PHOSPHATE SERPL-MCNC: 4.3 MG/DL — SIGNIFICANT CHANGE UP (ref 2.5–4.5)
PHOSPHATE SERPL-MCNC: 4.4 MG/DL — SIGNIFICANT CHANGE UP (ref 2.5–4.5)
PLAT MORPH BLD: ABNORMAL
PLAT MORPH BLD: NORMAL — SIGNIFICANT CHANGE UP
PLATELET # BLD AUTO: 145 K/UL — LOW (ref 150–400)
PLATELET # BLD AUTO: 155 K/UL — SIGNIFICANT CHANGE UP (ref 150–400)
PLATELET # BLD AUTO: 156 K/UL — SIGNIFICANT CHANGE UP (ref 150–400)
PLATELET # BLD AUTO: 165 K/UL — SIGNIFICANT CHANGE UP (ref 150–400)
PLATELET # BLD AUTO: 175 K/UL — SIGNIFICANT CHANGE UP (ref 150–400)
PLATELET # BLD AUTO: 177 K/UL — SIGNIFICANT CHANGE UP (ref 150–400)
PLATELET # BLD AUTO: 185 K/UL — SIGNIFICANT CHANGE UP (ref 150–400)
PLATELET # BLD AUTO: 226 K/UL — SIGNIFICANT CHANGE UP (ref 150–400)
PLATELET # BLD AUTO: 255 K/UL — SIGNIFICANT CHANGE UP (ref 150–400)
PLATELET # BLD AUTO: 273 K/UL — SIGNIFICANT CHANGE UP (ref 150–400)
PLATELET # BLD AUTO: 278 K/UL — SIGNIFICANT CHANGE UP (ref 150–400)
PLATELET # BLD AUTO: 280 K/UL — SIGNIFICANT CHANGE UP (ref 150–400)
PLATELET # BLD AUTO: 280 K/UL — SIGNIFICANT CHANGE UP (ref 150–400)
PLATELET # BLD AUTO: 294 K/UL — SIGNIFICANT CHANGE UP (ref 150–400)
PLATELET # BLD AUTO: 296 K/UL — SIGNIFICANT CHANGE UP (ref 150–400)
PLATELET # BLD AUTO: 298 K/UL — SIGNIFICANT CHANGE UP (ref 150–400)
PLATELET # BLD AUTO: 302 K/UL — SIGNIFICANT CHANGE UP (ref 150–400)
PLATELET # BLD AUTO: 306 K/UL — SIGNIFICANT CHANGE UP (ref 150–400)
PLATELET # BLD AUTO: 308 K/UL — SIGNIFICANT CHANGE UP (ref 150–400)
PLATELET # BLD AUTO: 309 K/UL — SIGNIFICANT CHANGE UP (ref 150–400)
PLATELET # BLD AUTO: 318 K/UL — SIGNIFICANT CHANGE UP (ref 150–400)
PLATELET # BLD AUTO: 324 K/UL — SIGNIFICANT CHANGE UP (ref 150–400)
PLATELET # BLD AUTO: 325 K/UL — SIGNIFICANT CHANGE UP (ref 150–400)
PLATELET # BLD AUTO: 340 K/UL — SIGNIFICANT CHANGE UP (ref 150–400)
PLATELET # BLD AUTO: 345 K/UL — SIGNIFICANT CHANGE UP (ref 150–400)
PLATELET # BLD AUTO: 351 K/UL — SIGNIFICANT CHANGE UP (ref 150–400)
PLATELET # BLD AUTO: 359 K/UL — SIGNIFICANT CHANGE UP (ref 150–400)
PLATELET # BLD AUTO: 361 K/UL — SIGNIFICANT CHANGE UP (ref 150–400)
PLATELET # BLD AUTO: 362 K/UL — SIGNIFICANT CHANGE UP (ref 150–400)
PLATELET # BLD AUTO: 363 K/UL — SIGNIFICANT CHANGE UP (ref 150–400)
PLATELET # BLD AUTO: 369 K/UL — SIGNIFICANT CHANGE UP (ref 150–400)
PLATELET # BLD AUTO: 373 K/UL — SIGNIFICANT CHANGE UP (ref 150–400)
PLATELET # BLD AUTO: 382 K/UL — SIGNIFICANT CHANGE UP (ref 150–400)
PLATELET # BLD AUTO: 408 K/UL — HIGH (ref 150–400)
PLATELET # BLD AUTO: 456 K/UL — HIGH (ref 150–400)
PLATELET # BLD AUTO: 464 K/UL — HIGH (ref 150–400)
PLATELET # BLD AUTO: 482 K/UL — HIGH (ref 150–400)
PLATELET # BLD AUTO: 483 K/UL — HIGH (ref 150–400)
PLATELET # BLD AUTO: 491 K/UL — HIGH (ref 150–400)
PLATELET COUNT - ESTIMATE: NORMAL — SIGNIFICANT CHANGE UP
PO2 BLDA: 104 MMHG — SIGNIFICANT CHANGE UP (ref 83–108)
PO2 BLDA: 170 MMHG — HIGH (ref 83–108)
PO2 BLDA: 255 MMHG — HIGH (ref 83–108)
PO2 BLDA: 84 MMHG — SIGNIFICANT CHANGE UP (ref 83–108)
POIKILOCYTOSIS BLD QL AUTO: SLIGHT — SIGNIFICANT CHANGE UP
POLYCHROMASIA BLD QL SMEAR: SLIGHT — SIGNIFICANT CHANGE UP
POTASSIUM SERPL-MCNC: 3.3 MMOL/L — LOW (ref 3.5–5.3)
POTASSIUM SERPL-MCNC: 3.3 MMOL/L — LOW (ref 3.5–5.3)
POTASSIUM SERPL-MCNC: 3.5 MMOL/L — SIGNIFICANT CHANGE UP (ref 3.5–5.3)
POTASSIUM SERPL-MCNC: 3.5 MMOL/L — SIGNIFICANT CHANGE UP (ref 3.5–5.3)
POTASSIUM SERPL-MCNC: 3.6 MMOL/L — SIGNIFICANT CHANGE UP (ref 3.5–5.3)
POTASSIUM SERPL-MCNC: 3.7 MMOL/L — SIGNIFICANT CHANGE UP (ref 3.5–5.3)
POTASSIUM SERPL-MCNC: 3.7 MMOL/L — SIGNIFICANT CHANGE UP (ref 3.5–5.3)
POTASSIUM SERPL-MCNC: 3.8 MMOL/L — SIGNIFICANT CHANGE UP (ref 3.5–5.3)
POTASSIUM SERPL-MCNC: 3.8 MMOL/L — SIGNIFICANT CHANGE UP (ref 3.5–5.3)
POTASSIUM SERPL-MCNC: 4 MMOL/L — SIGNIFICANT CHANGE UP (ref 3.5–5.3)
POTASSIUM SERPL-MCNC: 4 MMOL/L — SIGNIFICANT CHANGE UP (ref 3.5–5.3)
POTASSIUM SERPL-MCNC: 4.1 MMOL/L — SIGNIFICANT CHANGE UP (ref 3.5–5.3)
POTASSIUM SERPL-MCNC: 4.1 MMOL/L — SIGNIFICANT CHANGE UP (ref 3.5–5.3)
POTASSIUM SERPL-MCNC: 4.2 MMOL/L — SIGNIFICANT CHANGE UP (ref 3.5–5.3)
POTASSIUM SERPL-MCNC: 4.3 MMOL/L — SIGNIFICANT CHANGE UP (ref 3.5–5.3)
POTASSIUM SERPL-MCNC: 4.3 MMOL/L — SIGNIFICANT CHANGE UP (ref 3.5–5.3)
POTASSIUM SERPL-MCNC: 4.4 MMOL/L — SIGNIFICANT CHANGE UP (ref 3.5–5.3)
POTASSIUM SERPL-MCNC: 4.5 MMOL/L — SIGNIFICANT CHANGE UP (ref 3.5–5.3)
POTASSIUM SERPL-MCNC: 4.6 MMOL/L — SIGNIFICANT CHANGE UP (ref 3.5–5.3)
POTASSIUM SERPL-MCNC: 4.7 MMOL/L — SIGNIFICANT CHANGE UP (ref 3.5–5.3)
POTASSIUM SERPL-MCNC: 4.7 MMOL/L — SIGNIFICANT CHANGE UP (ref 3.5–5.3)
POTASSIUM SERPL-MCNC: 4.8 MMOL/L — SIGNIFICANT CHANGE UP (ref 3.5–5.3)
POTASSIUM SERPL-MCNC: 5 MMOL/L — SIGNIFICANT CHANGE UP (ref 3.5–5.3)
POTASSIUM SERPL-MCNC: 5 MMOL/L — SIGNIFICANT CHANGE UP (ref 3.5–5.3)
POTASSIUM SERPL-MCNC: 5.2 MMOL/L — SIGNIFICANT CHANGE UP (ref 3.5–5.3)
POTASSIUM SERPL-MCNC: 5.2 MMOL/L — SIGNIFICANT CHANGE UP (ref 3.5–5.3)
POTASSIUM SERPL-MCNC: 5.3 MMOL/L — SIGNIFICANT CHANGE UP (ref 3.5–5.3)
POTASSIUM SERPL-MCNC: 5.3 MMOL/L — SIGNIFICANT CHANGE UP (ref 3.5–5.3)
POTASSIUM SERPL-MCNC: 5.4 MMOL/L — HIGH (ref 3.5–5.3)
POTASSIUM SERPL-MCNC: 5.4 MMOL/L — HIGH (ref 3.5–5.3)
POTASSIUM SERPL-MCNC: 5.6 MMOL/L — HIGH (ref 3.5–5.3)
POTASSIUM SERPL-MCNC: 6 MMOL/L — HIGH (ref 3.5–5.3)
POTASSIUM SERPL-SCNC: 3.3 MMOL/L — LOW (ref 3.5–5.3)
POTASSIUM SERPL-SCNC: 3.3 MMOL/L — LOW (ref 3.5–5.3)
POTASSIUM SERPL-SCNC: 3.5 MMOL/L — SIGNIFICANT CHANGE UP (ref 3.5–5.3)
POTASSIUM SERPL-SCNC: 3.5 MMOL/L — SIGNIFICANT CHANGE UP (ref 3.5–5.3)
POTASSIUM SERPL-SCNC: 3.6 MMOL/L — SIGNIFICANT CHANGE UP (ref 3.5–5.3)
POTASSIUM SERPL-SCNC: 3.7 MMOL/L — SIGNIFICANT CHANGE UP (ref 3.5–5.3)
POTASSIUM SERPL-SCNC: 3.7 MMOL/L — SIGNIFICANT CHANGE UP (ref 3.5–5.3)
POTASSIUM SERPL-SCNC: 3.8 MMOL/L — SIGNIFICANT CHANGE UP (ref 3.5–5.3)
POTASSIUM SERPL-SCNC: 3.8 MMOL/L — SIGNIFICANT CHANGE UP (ref 3.5–5.3)
POTASSIUM SERPL-SCNC: 4 MMOL/L — SIGNIFICANT CHANGE UP (ref 3.5–5.3)
POTASSIUM SERPL-SCNC: 4 MMOL/L — SIGNIFICANT CHANGE UP (ref 3.5–5.3)
POTASSIUM SERPL-SCNC: 4.1 MMOL/L — SIGNIFICANT CHANGE UP (ref 3.5–5.3)
POTASSIUM SERPL-SCNC: 4.1 MMOL/L — SIGNIFICANT CHANGE UP (ref 3.5–5.3)
POTASSIUM SERPL-SCNC: 4.2 MMOL/L — SIGNIFICANT CHANGE UP (ref 3.5–5.3)
POTASSIUM SERPL-SCNC: 4.3 MMOL/L — SIGNIFICANT CHANGE UP (ref 3.5–5.3)
POTASSIUM SERPL-SCNC: 4.3 MMOL/L — SIGNIFICANT CHANGE UP (ref 3.5–5.3)
POTASSIUM SERPL-SCNC: 4.4 MMOL/L — SIGNIFICANT CHANGE UP (ref 3.5–5.3)
POTASSIUM SERPL-SCNC: 4.5 MMOL/L — SIGNIFICANT CHANGE UP (ref 3.5–5.3)
POTASSIUM SERPL-SCNC: 4.6 MMOL/L — SIGNIFICANT CHANGE UP (ref 3.5–5.3)
POTASSIUM SERPL-SCNC: 4.7 MMOL/L — SIGNIFICANT CHANGE UP (ref 3.5–5.3)
POTASSIUM SERPL-SCNC: 4.7 MMOL/L — SIGNIFICANT CHANGE UP (ref 3.5–5.3)
POTASSIUM SERPL-SCNC: 4.8 MMOL/L — SIGNIFICANT CHANGE UP (ref 3.5–5.3)
POTASSIUM SERPL-SCNC: 5 MMOL/L — SIGNIFICANT CHANGE UP (ref 3.5–5.3)
POTASSIUM SERPL-SCNC: 5 MMOL/L — SIGNIFICANT CHANGE UP (ref 3.5–5.3)
POTASSIUM SERPL-SCNC: 5.2 MMOL/L — SIGNIFICANT CHANGE UP (ref 3.5–5.3)
POTASSIUM SERPL-SCNC: 5.2 MMOL/L — SIGNIFICANT CHANGE UP (ref 3.5–5.3)
POTASSIUM SERPL-SCNC: 5.3 MMOL/L — SIGNIFICANT CHANGE UP (ref 3.5–5.3)
POTASSIUM SERPL-SCNC: 5.3 MMOL/L — SIGNIFICANT CHANGE UP (ref 3.5–5.3)
POTASSIUM SERPL-SCNC: 5.4 MMOL/L — HIGH (ref 3.5–5.3)
POTASSIUM SERPL-SCNC: 5.4 MMOL/L — HIGH (ref 3.5–5.3)
POTASSIUM SERPL-SCNC: 5.6 MMOL/L — HIGH (ref 3.5–5.3)
POTASSIUM SERPL-SCNC: 6 MMOL/L — HIGH (ref 3.5–5.3)
PROT SERPL-MCNC: 5.2 G/DL — LOW (ref 6–8.3)
PROT SERPL-MCNC: 5.7 G/DL — LOW (ref 6–8.3)
PROT SERPL-MCNC: 5.8 G/DL — LOW (ref 6–8.3)
PROT SERPL-MCNC: 6.3 G/DL — SIGNIFICANT CHANGE UP (ref 6–8.3)
PROT SERPL-MCNC: 6.4 G/DL — SIGNIFICANT CHANGE UP (ref 6–8.3)
PROT SERPL-MCNC: 6.5 G/DL — SIGNIFICANT CHANGE UP (ref 6–8.3)
PROT SERPL-MCNC: 6.5 G/DL — SIGNIFICANT CHANGE UP (ref 6–8.3)
PROT SERPL-MCNC: 6.6 G/DL — SIGNIFICANT CHANGE UP (ref 6–8.3)
PROT SERPL-MCNC: 6.6 G/DL — SIGNIFICANT CHANGE UP (ref 6–8.3)
PROT SERPL-MCNC: 6.8 G/DL — SIGNIFICANT CHANGE UP (ref 6–8.3)
PROT SERPL-MCNC: 6.8 G/DL — SIGNIFICANT CHANGE UP (ref 6–8.3)
PROT SERPL-MCNC: 7.1 G/DL — SIGNIFICANT CHANGE UP (ref 6–8.3)
PROT SERPL-MCNC: 7.1 G/DL — SIGNIFICANT CHANGE UP (ref 6–8.3)
PROT SERPL-MCNC: 7.2 G/DL — SIGNIFICANT CHANGE UP (ref 6–8.3)
PROT SERPL-MCNC: 7.3 G/DL — SIGNIFICANT CHANGE UP (ref 6–8.3)
PROT SERPL-MCNC: 7.4 G/DL — SIGNIFICANT CHANGE UP (ref 6–8.3)
PROT SERPL-MCNC: 7.4 G/DL — SIGNIFICANT CHANGE UP (ref 6–8.3)
PROT SERPL-MCNC: 7.6 G/DL — SIGNIFICANT CHANGE UP (ref 6–8.3)
PROT SERPL-MCNC: 7.9 G/DL — SIGNIFICANT CHANGE UP (ref 6–8.3)
PROT SERPL-MCNC: 7.9 G/DL — SIGNIFICANT CHANGE UP (ref 6–8.3)
PROT SERPL-MCNC: 8 G/DL — SIGNIFICANT CHANGE UP (ref 6–8.3)
PROT SERPL-MCNC: 8.1 G/DL — SIGNIFICANT CHANGE UP (ref 6–8.3)
PROT SERPL-MCNC: 8.2 G/DL — SIGNIFICANT CHANGE UP (ref 6–8.3)
PROT SERPL-MCNC: 8.3 G/DL — SIGNIFICANT CHANGE UP (ref 6–8.3)
PROT SERPL-MCNC: 8.4 G/DL — HIGH (ref 6–8.3)
PROT UR-MCNC: 100
PROT UR-MCNC: 30 MG/DL
PROT UR-MCNC: NEGATIVE — SIGNIFICANT CHANGE UP
PROTHROM AB SERPL-ACNC: 11.9 SEC — SIGNIFICANT CHANGE UP (ref 10.5–13.4)
PROTHROM AB SERPL-ACNC: 11.9 SEC — SIGNIFICANT CHANGE UP (ref 10.5–13.4)
PROTHROM AB SERPL-ACNC: 12.9 SEC — SIGNIFICANT CHANGE UP (ref 10.5–13.4)
PROTHROM AB SERPL-ACNC: 14.2 SEC — HIGH (ref 10.5–13.4)
PROTHROM AB SERPL-ACNC: 14.6 SEC — HIGH (ref 10.5–13.4)
PROTHROM AB SERPL-ACNC: 14.7 SEC — HIGH (ref 10.5–13.4)
PROTHROM AB SERPL-ACNC: 20 SEC — HIGH (ref 10.5–13.4)
PROTHROM AB SERPL-ACNC: 20.3 SEC — HIGH (ref 10.5–13.4)
PROTHROM AB SERPL-ACNC: 20.6 SEC — HIGH (ref 10.5–13.4)
PROTHROM AB SERPL-ACNC: 23 SEC — HIGH (ref 10.5–13.4)
PROTHROM AB SERPL-ACNC: 23.3 SEC — HIGH (ref 10.5–13.4)
PROTHROM AB SERPL-ACNC: 23.4 SEC — HIGH (ref 10.5–13.4)
PROTHROM AB SERPL-ACNC: 23.8 SEC — HIGH (ref 10.5–13.4)
PROTHROM AB SERPL-ACNC: 24.6 SEC — HIGH (ref 10.5–13.4)
PROTHROM AB SERPL-ACNC: 25.2 SEC — HIGH (ref 10.5–13.4)
PROTHROM AB SERPL-ACNC: 27.1 SEC — HIGH (ref 10.5–13.4)
PROTHROM AB SERPL-ACNC: 27.4 SEC — HIGH (ref 10.5–13.4)
PROTHROM AB SERPL-ACNC: 27.8 SEC — HIGH (ref 10.5–13.4)
PROTHROM AB SERPL-ACNC: 34.1 SEC — HIGH (ref 10.5–13.4)
PROTHROM AB SERPL-ACNC: 34.9 SEC — HIGH (ref 10.5–13.4)
PROTHROM AB SERPL-ACNC: 36.4 SEC — HIGH (ref 10.5–13.4)
PROTHROM AB SERPL-ACNC: 36.5 SEC — HIGH (ref 10.5–13.4)
PROTHROM AB SERPL-ACNC: 40.3 SEC — HIGH (ref 10.5–13.4)
PROTHROM AB SERPL-ACNC: 47 SEC — HIGH (ref 10.5–13.4)
PROTHROM AB SERPL-ACNC: 48 SEC — HIGH (ref 10.5–13.4)
RAPID RVP RESULT: DETECTED
RAPID RVP RESULT: SIGNIFICANT CHANGE UP
RBC # BLD: 2.06 M/UL — LOW (ref 4.2–5.8)
RBC # BLD: 2.08 M/UL — LOW (ref 4.2–5.8)
RBC # BLD: 2.18 M/UL — LOW (ref 4.2–5.8)
RBC # BLD: 2.27 M/UL — LOW (ref 4.2–5.8)
RBC # BLD: 2.27 M/UL — LOW (ref 4.2–5.8)
RBC # BLD: 2.33 M/UL — LOW (ref 4.2–5.8)
RBC # BLD: 2.36 M/UL — LOW (ref 4.2–5.8)
RBC # BLD: 2.38 M/UL — LOW (ref 4.2–5.8)
RBC # BLD: 2.41 M/UL — LOW (ref 4.2–5.8)
RBC # BLD: 2.42 M/UL — LOW (ref 4.2–5.8)
RBC # BLD: 2.42 M/UL — LOW (ref 4.2–5.8)
RBC # BLD: 2.44 M/UL — LOW (ref 4.2–5.8)
RBC # BLD: 2.45 M/UL — LOW (ref 4.2–5.8)
RBC # BLD: 2.49 M/UL — LOW (ref 4.2–5.8)
RBC # BLD: 2.5 M/UL — LOW (ref 4.2–5.8)
RBC # BLD: 2.53 M/UL — LOW (ref 4.2–5.8)
RBC # BLD: 2.62 M/UL — LOW (ref 4.2–5.8)
RBC # BLD: 2.64 M/UL — LOW (ref 4.2–5.8)
RBC # BLD: 2.66 M/UL — LOW (ref 4.2–5.8)
RBC # BLD: 2.67 M/UL — LOW (ref 4.2–5.8)
RBC # BLD: 2.78 M/UL — LOW (ref 4.2–5.8)
RBC # BLD: 2.8 M/UL — LOW (ref 4.2–5.8)
RBC # BLD: 3.97 M/UL — LOW (ref 4.2–5.8)
RBC # BLD: 4 M/UL — LOW (ref 4.2–5.8)
RBC # BLD: 4.21 M/UL — SIGNIFICANT CHANGE UP (ref 4.2–5.8)
RBC # BLD: 4.23 M/UL — SIGNIFICANT CHANGE UP (ref 4.2–5.8)
RBC # BLD: 4.27 M/UL — SIGNIFICANT CHANGE UP (ref 4.2–5.8)
RBC # BLD: 4.29 M/UL — SIGNIFICANT CHANGE UP (ref 4.2–5.8)
RBC # BLD: 4.31 M/UL — SIGNIFICANT CHANGE UP (ref 4.2–5.8)
RBC # BLD: 4.35 M/UL — SIGNIFICANT CHANGE UP (ref 4.2–5.8)
RBC # BLD: 4.4 M/UL — SIGNIFICANT CHANGE UP (ref 4.2–5.8)
RBC # BLD: 4.52 M/UL — SIGNIFICANT CHANGE UP (ref 4.2–5.8)
RBC # BLD: 4.58 M/UL — SIGNIFICANT CHANGE UP (ref 4.2–5.8)
RBC # BLD: 4.65 M/UL — SIGNIFICANT CHANGE UP (ref 4.2–5.8)
RBC # BLD: 4.66 M/UL — SIGNIFICANT CHANGE UP (ref 4.2–5.8)
RBC # BLD: 4.68 M/UL — SIGNIFICANT CHANGE UP (ref 4.2–5.8)
RBC # BLD: 4.75 M/UL — SIGNIFICANT CHANGE UP (ref 4.2–5.8)
RBC # BLD: 4.84 M/UL — SIGNIFICANT CHANGE UP (ref 4.2–5.8)
RBC # BLD: 5.13 M/UL — SIGNIFICANT CHANGE UP (ref 4.2–5.8)
RBC # FLD: 12.6 % — SIGNIFICANT CHANGE UP (ref 10.3–14.5)
RBC # FLD: 12.6 % — SIGNIFICANT CHANGE UP (ref 10.3–14.5)
RBC # FLD: 12.7 % — SIGNIFICANT CHANGE UP (ref 10.3–14.5)
RBC # FLD: 12.7 % — SIGNIFICANT CHANGE UP (ref 10.3–14.5)
RBC # FLD: 12.8 % — SIGNIFICANT CHANGE UP (ref 10.3–14.5)
RBC # FLD: 12.8 % — SIGNIFICANT CHANGE UP (ref 10.3–14.5)
RBC # FLD: 12.9 % — SIGNIFICANT CHANGE UP (ref 10.3–14.5)
RBC # FLD: 13.2 % — SIGNIFICANT CHANGE UP (ref 10.3–14.5)
RBC # FLD: 13.3 % — SIGNIFICANT CHANGE UP (ref 10.3–14.5)
RBC # FLD: 13.4 % — SIGNIFICANT CHANGE UP (ref 10.3–14.5)
RBC # FLD: 13.4 % — SIGNIFICANT CHANGE UP (ref 10.3–14.5)
RBC # FLD: 13.5 % — SIGNIFICANT CHANGE UP (ref 10.3–14.5)
RBC # FLD: 13.5 % — SIGNIFICANT CHANGE UP (ref 10.3–14.5)
RBC # FLD: 13.6 % — SIGNIFICANT CHANGE UP (ref 10.3–14.5)
RBC # FLD: 13.6 % — SIGNIFICANT CHANGE UP (ref 10.3–14.5)
RBC # FLD: 13.7 % — SIGNIFICANT CHANGE UP (ref 10.3–14.5)
RBC # FLD: 15 % — HIGH (ref 10.3–14.5)
RBC # FLD: 15.2 % — HIGH (ref 10.3–14.5)
RBC # FLD: 16.2 % — HIGH (ref 10.3–14.5)
RBC # FLD: 16.4 % — HIGH (ref 10.3–14.5)
RBC # FLD: 16.6 % — HIGH (ref 10.3–14.5)
RBC # FLD: 16.6 % — HIGH (ref 10.3–14.5)
RBC # FLD: 16.7 % — HIGH (ref 10.3–14.5)
RBC # FLD: 17.1 % — HIGH (ref 10.3–14.5)
RBC # FLD: 17.2 % — HIGH (ref 10.3–14.5)
RBC # FLD: 17.4 % — HIGH (ref 10.3–14.5)
RBC # FLD: 17.5 % — HIGH (ref 10.3–14.5)
RBC # FLD: 17.6 % — HIGH (ref 10.3–14.5)
RBC # FLD: 17.6 % — HIGH (ref 10.3–14.5)
RBC # FLD: 17.8 % — HIGH (ref 10.3–14.5)
RBC # FLD: 17.8 % — HIGH (ref 10.3–14.5)
RBC # FLD: 18.3 % — HIGH (ref 10.3–14.5)
RBC # FLD: 19.1 % — HIGH (ref 10.3–14.5)
RBC # FLD: 19.3 % — HIGH (ref 10.3–14.5)
RBC BLD AUTO: ABNORMAL
RBC BLD AUTO: NORMAL — SIGNIFICANT CHANGE UP
RBC CASTS # UR COMP ASSIST: >50 /HPF (ref 0–2)
RBC CASTS # UR COMP ASSIST: ABNORMAL /HPF (ref 0–2)
S AUREUS DNA NOSE QL NAA+PROBE: SIGNIFICANT CHANGE UP
S AUREUS DNA NOSE QL NAA+PROBE: SIGNIFICANT CHANGE UP
SAO2 % BLDA: 96 % — SIGNIFICANT CHANGE UP
SAO2 % BLDA: 98 % — SIGNIFICANT CHANGE UP
SAO2 % BLDA: 98 % — SIGNIFICANT CHANGE UP
SAO2 % BLDA: 99 % — SIGNIFICANT CHANGE UP
SARS-COV-2 RNA SPEC QL NAA+PROBE: SIGNIFICANT CHANGE UP
SCHISTOCYTES BLD QL AUTO: SLIGHT — SIGNIFICANT CHANGE UP
SMUDGE CELLS # BLD: PRESENT — SIGNIFICANT CHANGE UP
SMUDGE CELLS # BLD: PRESENT — SIGNIFICANT CHANGE UP
SODIUM SERPL-SCNC: 133 MMOL/L — LOW (ref 135–145)
SODIUM SERPL-SCNC: 135 MMOL/L — SIGNIFICANT CHANGE UP (ref 135–145)
SODIUM SERPL-SCNC: 136 MMOL/L — SIGNIFICANT CHANGE UP (ref 135–145)
SODIUM SERPL-SCNC: 137 MMOL/L — SIGNIFICANT CHANGE UP (ref 135–145)
SODIUM SERPL-SCNC: 138 MMOL/L — SIGNIFICANT CHANGE UP (ref 135–145)
SODIUM SERPL-SCNC: 139 MMOL/L — SIGNIFICANT CHANGE UP (ref 135–145)
SODIUM SERPL-SCNC: 140 MMOL/L — SIGNIFICANT CHANGE UP (ref 135–145)
SODIUM SERPL-SCNC: 141 MMOL/L — SIGNIFICANT CHANGE UP (ref 135–145)
SODIUM SERPL-SCNC: 142 MMOL/L — SIGNIFICANT CHANGE UP (ref 135–145)
SODIUM SERPL-SCNC: 143 MMOL/L — SIGNIFICANT CHANGE UP (ref 135–145)
SODIUM SERPL-SCNC: 144 MMOL/L — SIGNIFICANT CHANGE UP (ref 135–145)
SODIUM UR-SCNC: 94 MMOL/L — SIGNIFICANT CHANGE UP
SP GR SPEC: 1 — LOW (ref 1.01–1.02)
SP GR SPEC: 1.01 — SIGNIFICANT CHANGE UP (ref 1.01–1.02)
SP GR SPEC: 1.02 — SIGNIFICANT CHANGE UP (ref 1.01–1.02)
SPECIMEN SOURCE: SIGNIFICANT CHANGE UP
THC UR QL: NEGATIVE — SIGNIFICANT CHANGE UP
TRIGL SERPL-MCNC: 79 MG/DL — SIGNIFICANT CHANGE UP
TROPONIN I, HIGH SENSITIVITY RESULT: 105.8 NG/L — HIGH
TROPONIN I, HIGH SENSITIVITY RESULT: 122.3 NG/L — HIGH
TROPONIN I, HIGH SENSITIVITY RESULT: 44.8 NG/L — SIGNIFICANT CHANGE UP
TROPONIN I, HIGH SENSITIVITY RESULT: 45.4 NG/L — SIGNIFICANT CHANGE UP
TROPONIN I, HIGH SENSITIVITY RESULT: 57.8 NG/L — SIGNIFICANT CHANGE UP
TROPONIN I, HIGH SENSITIVITY RESULT: 8.1 NG/L — SIGNIFICANT CHANGE UP
TSH SERPL-MCNC: 1.37 UU/ML — SIGNIFICANT CHANGE UP (ref 0.34–4.82)
UROBILINOGEN FLD QL: NEGATIVE — SIGNIFICANT CHANGE UP
VARIANT LYMPHS # BLD: 2 % — SIGNIFICANT CHANGE UP (ref 0–6)
VIT B12 SERPL-MCNC: 1851 PG/ML — HIGH (ref 232–1245)
WBC # BLD: 10.18 K/UL — SIGNIFICANT CHANGE UP (ref 3.8–10.5)
WBC # BLD: 10.3 K/UL — SIGNIFICANT CHANGE UP (ref 3.8–10.5)
WBC # BLD: 10.47 K/UL — SIGNIFICANT CHANGE UP (ref 3.8–10.5)
WBC # BLD: 10.5 K/UL — SIGNIFICANT CHANGE UP (ref 3.8–10.5)
WBC # BLD: 10.82 K/UL — HIGH (ref 3.8–10.5)
WBC # BLD: 10.92 K/UL — HIGH (ref 3.8–10.5)
WBC # BLD: 11.34 K/UL — HIGH (ref 3.8–10.5)
WBC # BLD: 11.43 K/UL — HIGH (ref 3.8–10.5)
WBC # BLD: 11.51 K/UL — HIGH (ref 3.8–10.5)
WBC # BLD: 11.52 K/UL — HIGH (ref 3.8–10.5)
WBC # BLD: 11.82 K/UL — HIGH (ref 3.8–10.5)
WBC # BLD: 12.08 K/UL — HIGH (ref 3.8–10.5)
WBC # BLD: 12.93 K/UL — HIGH (ref 3.8–10.5)
WBC # BLD: 13.16 K/UL — HIGH (ref 3.8–10.5)
WBC # BLD: 13.33 K/UL — HIGH (ref 3.8–10.5)
WBC # BLD: 13.36 K/UL — HIGH (ref 3.8–10.5)
WBC # BLD: 13.5 K/UL — HIGH (ref 3.8–10.5)
WBC # BLD: 13.66 K/UL — HIGH (ref 3.8–10.5)
WBC # BLD: 14.07 K/UL — HIGH (ref 3.8–10.5)
WBC # BLD: 14.69 K/UL — HIGH (ref 3.8–10.5)
WBC # BLD: 3.13 K/UL — LOW (ref 3.8–10.5)
WBC # BLD: 31.04 K/UL — HIGH (ref 3.8–10.5)
WBC # BLD: 31.38 K/UL — HIGH (ref 3.8–10.5)
WBC # BLD: 31.94 K/UL — HIGH (ref 3.8–10.5)
WBC # BLD: 6.78 K/UL — SIGNIFICANT CHANGE UP (ref 3.8–10.5)
WBC # BLD: 7.1 K/UL — SIGNIFICANT CHANGE UP (ref 3.8–10.5)
WBC # BLD: 7.17 K/UL — SIGNIFICANT CHANGE UP (ref 3.8–10.5)
WBC # BLD: 7.24 K/UL — SIGNIFICANT CHANGE UP (ref 3.8–10.5)
WBC # BLD: 7.99 K/UL — SIGNIFICANT CHANGE UP (ref 3.8–10.5)
WBC # BLD: 8.2 K/UL — SIGNIFICANT CHANGE UP (ref 3.8–10.5)
WBC # BLD: 8.24 K/UL — SIGNIFICANT CHANGE UP (ref 3.8–10.5)
WBC # BLD: 8.25 K/UL — SIGNIFICANT CHANGE UP (ref 3.8–10.5)
WBC # BLD: 8.35 K/UL — SIGNIFICANT CHANGE UP (ref 3.8–10.5)
WBC # BLD: 8.58 K/UL — SIGNIFICANT CHANGE UP (ref 3.8–10.5)
WBC # BLD: 8.61 K/UL — SIGNIFICANT CHANGE UP (ref 3.8–10.5)
WBC # BLD: 9.17 K/UL — SIGNIFICANT CHANGE UP (ref 3.8–10.5)
WBC # BLD: 9.41 K/UL — SIGNIFICANT CHANGE UP (ref 3.8–10.5)
WBC # BLD: 9.58 K/UL — SIGNIFICANT CHANGE UP (ref 3.8–10.5)
WBC # BLD: 9.65 K/UL — SIGNIFICANT CHANGE UP (ref 3.8–10.5)
WBC # FLD AUTO: 10.18 K/UL — SIGNIFICANT CHANGE UP (ref 3.8–10.5)
WBC # FLD AUTO: 10.3 K/UL — SIGNIFICANT CHANGE UP (ref 3.8–10.5)
WBC # FLD AUTO: 10.47 K/UL — SIGNIFICANT CHANGE UP (ref 3.8–10.5)
WBC # FLD AUTO: 10.5 K/UL — SIGNIFICANT CHANGE UP (ref 3.8–10.5)
WBC # FLD AUTO: 10.82 K/UL — HIGH (ref 3.8–10.5)
WBC # FLD AUTO: 10.92 K/UL — HIGH (ref 3.8–10.5)
WBC # FLD AUTO: 11.34 K/UL — HIGH (ref 3.8–10.5)
WBC # FLD AUTO: 11.43 K/UL — HIGH (ref 3.8–10.5)
WBC # FLD AUTO: 11.51 K/UL — HIGH (ref 3.8–10.5)
WBC # FLD AUTO: 11.52 K/UL — HIGH (ref 3.8–10.5)
WBC # FLD AUTO: 11.82 K/UL — HIGH (ref 3.8–10.5)
WBC # FLD AUTO: 12.08 K/UL — HIGH (ref 3.8–10.5)
WBC # FLD AUTO: 12.93 K/UL — HIGH (ref 3.8–10.5)
WBC # FLD AUTO: 13.16 K/UL — HIGH (ref 3.8–10.5)
WBC # FLD AUTO: 13.33 K/UL — HIGH (ref 3.8–10.5)
WBC # FLD AUTO: 13.36 K/UL — HIGH (ref 3.8–10.5)
WBC # FLD AUTO: 13.5 K/UL — HIGH (ref 3.8–10.5)
WBC # FLD AUTO: 13.66 K/UL — HIGH (ref 3.8–10.5)
WBC # FLD AUTO: 14.07 K/UL — HIGH (ref 3.8–10.5)
WBC # FLD AUTO: 14.69 K/UL — HIGH (ref 3.8–10.5)
WBC # FLD AUTO: 3.13 K/UL — LOW (ref 3.8–10.5)
WBC # FLD AUTO: 31.04 K/UL — HIGH (ref 3.8–10.5)
WBC # FLD AUTO: 31.38 K/UL — HIGH (ref 3.8–10.5)
WBC # FLD AUTO: 31.94 K/UL — HIGH (ref 3.8–10.5)
WBC # FLD AUTO: 6.78 K/UL — SIGNIFICANT CHANGE UP (ref 3.8–10.5)
WBC # FLD AUTO: 7.1 K/UL — SIGNIFICANT CHANGE UP (ref 3.8–10.5)
WBC # FLD AUTO: 7.17 K/UL — SIGNIFICANT CHANGE UP (ref 3.8–10.5)
WBC # FLD AUTO: 7.24 K/UL — SIGNIFICANT CHANGE UP (ref 3.8–10.5)
WBC # FLD AUTO: 7.99 K/UL — SIGNIFICANT CHANGE UP (ref 3.8–10.5)
WBC # FLD AUTO: 8.2 K/UL — SIGNIFICANT CHANGE UP (ref 3.8–10.5)
WBC # FLD AUTO: 8.24 K/UL — SIGNIFICANT CHANGE UP (ref 3.8–10.5)
WBC # FLD AUTO: 8.25 K/UL — SIGNIFICANT CHANGE UP (ref 3.8–10.5)
WBC # FLD AUTO: 8.35 K/UL — SIGNIFICANT CHANGE UP (ref 3.8–10.5)
WBC # FLD AUTO: 8.58 K/UL — SIGNIFICANT CHANGE UP (ref 3.8–10.5)
WBC # FLD AUTO: 8.61 K/UL — SIGNIFICANT CHANGE UP (ref 3.8–10.5)
WBC # FLD AUTO: 9.17 K/UL — SIGNIFICANT CHANGE UP (ref 3.8–10.5)
WBC # FLD AUTO: 9.41 K/UL — SIGNIFICANT CHANGE UP (ref 3.8–10.5)
WBC # FLD AUTO: 9.58 K/UL — SIGNIFICANT CHANGE UP (ref 3.8–10.5)
WBC # FLD AUTO: 9.65 K/UL — SIGNIFICANT CHANGE UP (ref 3.8–10.5)
WBC UR QL: ABNORMAL /HPF (ref 0–5)
WBC UR QL: ABNORMAL /HPF (ref 0–5)
WBC UR QL: SIGNIFICANT CHANGE UP /HPF (ref 0–5)

## 2022-01-01 PROCEDURE — 87150 DNA/RNA AMPLIFIED PROBE: CPT

## 2022-01-01 PROCEDURE — 85025 COMPLETE CBC W/AUTO DIFF WBC: CPT

## 2022-01-01 PROCEDURE — 87637 SARSCOV2&INF A&B&RSV AMP PRB: CPT

## 2022-01-01 PROCEDURE — 85027 COMPLETE CBC AUTOMATED: CPT

## 2022-01-01 PROCEDURE — 99233 SBSQ HOSP IP/OBS HIGH 50: CPT

## 2022-01-01 PROCEDURE — 99232 SBSQ HOSP IP/OBS MODERATE 35: CPT

## 2022-01-01 PROCEDURE — 83921 ORGANIC ACID SINGLE QUANT: CPT

## 2022-01-01 PROCEDURE — 81001 URINALYSIS AUTO W/SCOPE: CPT

## 2022-01-01 PROCEDURE — 94640 AIRWAY INHALATION TREATMENT: CPT

## 2022-01-01 PROCEDURE — 86901 BLOOD TYPING SEROLOGIC RH(D): CPT

## 2022-01-01 PROCEDURE — 93005 ELECTROCARDIOGRAM TRACING: CPT

## 2022-01-01 PROCEDURE — 93306 TTE W/DOPPLER COMPLETE: CPT

## 2022-01-01 PROCEDURE — 70450 CT HEAD/BRAIN W/O DYE: CPT | Mod: 26,MA

## 2022-01-01 PROCEDURE — 93010 ELECTROCARDIOGRAM REPORT: CPT

## 2022-01-01 PROCEDURE — 72131 CT LUMBAR SPINE W/O DYE: CPT | Mod: 26

## 2022-01-01 PROCEDURE — 71045 X-RAY EXAM CHEST 1 VIEW: CPT | Mod: 26

## 2022-01-01 PROCEDURE — 99285 EMERGENCY DEPT VISIT HI MDM: CPT | Mod: 25

## 2022-01-01 PROCEDURE — 84443 ASSAY THYROID STIM HORMONE: CPT

## 2022-01-01 PROCEDURE — 99222 1ST HOSP IP/OBS MODERATE 55: CPT

## 2022-01-01 PROCEDURE — 85730 THROMBOPLASTIN TIME PARTIAL: CPT

## 2022-01-01 PROCEDURE — 82306 VITAMIN D 25 HYDROXY: CPT

## 2022-01-01 PROCEDURE — 99233 SBSQ HOSP IP/OBS HIGH 50: CPT | Mod: GC

## 2022-01-01 PROCEDURE — 72131 CT LUMBAR SPINE W/O DYE: CPT

## 2022-01-01 PROCEDURE — 99239 HOSP IP/OBS DSCHRG MGMT >30: CPT | Mod: GC

## 2022-01-01 PROCEDURE — 99223 1ST HOSP IP/OBS HIGH 75: CPT | Mod: GC

## 2022-01-01 PROCEDURE — 86900 BLOOD TYPING SEROLOGIC ABO: CPT

## 2022-01-01 PROCEDURE — 71275 CT ANGIOGRAPHY CHEST: CPT | Mod: MA

## 2022-01-01 PROCEDURE — 87641 MR-STAPH DNA AMP PROBE: CPT

## 2022-01-01 PROCEDURE — 97110 THERAPEUTIC EXERCISES: CPT

## 2022-01-01 PROCEDURE — 97161 PT EVAL LOW COMPLEX 20 MIN: CPT

## 2022-01-01 PROCEDURE — 80053 COMPREHEN METABOLIC PANEL: CPT

## 2022-01-01 PROCEDURE — 82024 ASSAY OF ACTH: CPT

## 2022-01-01 PROCEDURE — 85610 PROTHROMBIN TIME: CPT

## 2022-01-01 PROCEDURE — 83036 HEMOGLOBIN GLYCOSYLATED A1C: CPT

## 2022-01-01 PROCEDURE — 84300 ASSAY OF URINE SODIUM: CPT

## 2022-01-01 PROCEDURE — 76705 ECHO EXAM OF ABDOMEN: CPT | Mod: 26

## 2022-01-01 PROCEDURE — 83090 ASSAY OF HOMOCYSTEINE: CPT

## 2022-01-01 PROCEDURE — 99223 1ST HOSP IP/OBS HIGH 75: CPT

## 2022-01-01 PROCEDURE — 99232 SBSQ HOSP IP/OBS MODERATE 35: CPT | Mod: GC

## 2022-01-01 PROCEDURE — 99285 EMERGENCY DEPT VISIT HI MDM: CPT

## 2022-01-01 PROCEDURE — 99497 ADVNCD CARE PLAN 30 MIN: CPT | Mod: 25

## 2022-01-01 PROCEDURE — 84100 ASSAY OF PHOSPHORUS: CPT

## 2022-01-01 PROCEDURE — 87640 STAPH A DNA AMP PROBE: CPT

## 2022-01-01 PROCEDURE — 84484 ASSAY OF TROPONIN QUANT: CPT

## 2022-01-01 PROCEDURE — 97116 GAIT TRAINING THERAPY: CPT

## 2022-01-01 PROCEDURE — 36415 COLL VENOUS BLD VENIPUNCTURE: CPT

## 2022-01-01 PROCEDURE — 83605 ASSAY OF LACTIC ACID: CPT

## 2022-01-01 PROCEDURE — 76705 ECHO EXAM OF ABDOMEN: CPT

## 2022-01-01 PROCEDURE — 82746 ASSAY OF FOLIC ACID SERUM: CPT

## 2022-01-01 PROCEDURE — 82803 BLOOD GASES ANY COMBINATION: CPT

## 2022-01-01 PROCEDURE — 82533 TOTAL CORTISOL: CPT

## 2022-01-01 PROCEDURE — 74174 CTA ABD&PLVS W/CONTRAST: CPT | Mod: 26

## 2022-01-01 PROCEDURE — 97530 THERAPEUTIC ACTIVITIES: CPT

## 2022-01-01 PROCEDURE — 99356: CPT | Mod: GC

## 2022-01-01 PROCEDURE — 74019 RADEX ABDOMEN 2 VIEWS: CPT | Mod: 26

## 2022-01-01 PROCEDURE — 86850 RBC ANTIBODY SCREEN: CPT

## 2022-01-01 PROCEDURE — 87635 SARS-COV-2 COVID-19 AMP PRB: CPT

## 2022-01-01 PROCEDURE — 92610 EVALUATE SWALLOWING FUNCTION: CPT

## 2022-01-01 PROCEDURE — 87493 C DIFF AMPLIFIED PROBE: CPT

## 2022-01-01 PROCEDURE — P9040: CPT

## 2022-01-01 PROCEDURE — 99497 ADVNCD CARE PLAN 30 MIN: CPT | Mod: GC

## 2022-01-01 PROCEDURE — 80048 BASIC METABOLIC PNL TOTAL CA: CPT

## 2022-01-01 PROCEDURE — 36000 PLACE NEEDLE IN VEIN: CPT

## 2022-01-01 PROCEDURE — 71045 X-RAY EXAM CHEST 1 VIEW: CPT

## 2022-01-01 PROCEDURE — 71275 CT ANGIOGRAPHY CHEST: CPT | Mod: 26,MA

## 2022-01-01 PROCEDURE — 83880 ASSAY OF NATRIURETIC PEPTIDE: CPT

## 2022-01-01 PROCEDURE — 74019 RADEX ABDOMEN 2 VIEWS: CPT

## 2022-01-01 PROCEDURE — 83690 ASSAY OF LIPASE: CPT

## 2022-01-01 PROCEDURE — 80061 LIPID PANEL: CPT

## 2022-01-01 PROCEDURE — 87186 SC STD MICRODIL/AGAR DIL: CPT

## 2022-01-01 PROCEDURE — 82550 ASSAY OF CK (CPK): CPT

## 2022-01-01 PROCEDURE — 87086 URINE CULTURE/COLONY COUNT: CPT

## 2022-01-01 PROCEDURE — 99284 EMERGENCY DEPT VISIT MOD MDM: CPT

## 2022-01-01 PROCEDURE — 82570 ASSAY OF URINE CREATININE: CPT

## 2022-01-01 PROCEDURE — 0225U NFCT DS DNA&RNA 21 SARSCOV2: CPT

## 2022-01-01 PROCEDURE — 82553 CREATINE MB FRACTION: CPT

## 2022-01-01 PROCEDURE — 82962 GLUCOSE BLOOD TEST: CPT

## 2022-01-01 PROCEDURE — 83735 ASSAY OF MAGNESIUM: CPT

## 2022-01-01 PROCEDURE — 87040 BLOOD CULTURE FOR BACTERIA: CPT

## 2022-01-01 PROCEDURE — 97162 PT EVAL MOD COMPLEX 30 MIN: CPT

## 2022-01-01 PROCEDURE — 86923 COMPATIBILITY TEST ELECTRIC: CPT

## 2022-01-01 PROCEDURE — 36430 TRANSFUSION BLD/BLD COMPNT: CPT

## 2022-01-01 PROCEDURE — 96374 THER/PROPH/DIAG INJ IV PUSH: CPT

## 2022-01-01 PROCEDURE — 80307 DRUG TEST PRSMV CHEM ANLYZR: CPT

## 2022-01-01 PROCEDURE — 93306 TTE W/DOPPLER COMPLETE: CPT | Mod: 26

## 2022-01-01 PROCEDURE — 87077 CULTURE AEROBIC IDENTIFY: CPT

## 2022-01-01 PROCEDURE — 99239 HOSP IP/OBS DSCHRG MGMT >30: CPT

## 2022-01-01 PROCEDURE — 99357: CPT | Mod: GC

## 2022-01-01 PROCEDURE — 74174 CTA ABD&PLVS W/CONTRAST: CPT

## 2022-01-01 PROCEDURE — 94660 CPAP INITIATION&MGMT: CPT

## 2022-01-01 PROCEDURE — 70450 CT HEAD/BRAIN W/O DYE: CPT | Mod: MA

## 2022-01-01 PROCEDURE — 82607 VITAMIN B-12: CPT

## 2022-01-01 RX ORDER — DILTIAZEM HCL 120 MG
20 CAPSULE, EXT RELEASE 24 HR ORAL ONCE
Refills: 0 | Status: COMPLETED | OUTPATIENT
Start: 2022-01-01 | End: 2022-01-01

## 2022-01-01 RX ORDER — WARFARIN SODIUM 2.5 MG/1
1 TABLET ORAL
Qty: 0 | Refills: 0 | DISCHARGE
Start: 2022-01-01

## 2022-01-01 RX ORDER — ASPIRIN/CALCIUM CARB/MAGNESIUM 324 MG
81 TABLET ORAL DAILY
Refills: 0 | Status: DISCONTINUED | OUTPATIENT
Start: 2022-01-01 | End: 2022-01-01

## 2022-01-01 RX ORDER — LIDOCAINE 4 G/100G
1 CREAM TOPICAL ONCE
Refills: 0 | Status: COMPLETED | OUTPATIENT
Start: 2022-01-01 | End: 2022-01-01

## 2022-01-01 RX ORDER — PHENOBARBITAL 60 MG
130 TABLET ORAL
Refills: 0 | Status: DISCONTINUED | OUTPATIENT
Start: 2022-01-01 | End: 2022-01-01

## 2022-01-01 RX ORDER — DEXTROSE 50 % IN WATER 50 %
100 SYRINGE (ML) INTRAVENOUS ONCE
Refills: 0 | Status: COMPLETED | OUTPATIENT
Start: 2022-01-01 | End: 2022-01-01

## 2022-01-01 RX ORDER — METOPROLOL TARTRATE 50 MG
5 TABLET ORAL ONCE
Refills: 0 | Status: COMPLETED | OUTPATIENT
Start: 2022-01-01 | End: 2022-01-01

## 2022-01-01 RX ORDER — MIRTAZAPINE 45 MG/1
1 TABLET, ORALLY DISINTEGRATING ORAL
Qty: 0 | Refills: 0 | DISCHARGE

## 2022-01-01 RX ORDER — INSULIN GLARGINE 100 [IU]/ML
6 INJECTION, SOLUTION SUBCUTANEOUS AT BEDTIME
Refills: 0 | Status: DISCONTINUED | OUTPATIENT
Start: 2022-01-01 | End: 2022-01-01

## 2022-01-01 RX ORDER — HEPARIN SODIUM 5000 [USP'U]/ML
INJECTION INTRAVENOUS; SUBCUTANEOUS
Qty: 25000 | Refills: 0 | Status: DISCONTINUED | OUTPATIENT
Start: 2022-01-01 | End: 2022-01-01

## 2022-01-01 RX ORDER — TIOTROPIUM BROMIDE 18 UG/1
1 CAPSULE ORAL; RESPIRATORY (INHALATION)
Qty: 0 | Refills: 0 | DISCHARGE
Start: 2022-01-01

## 2022-01-01 RX ORDER — LANOLIN ALCOHOL/MO/W.PET/CERES
1 CREAM (GRAM) TOPICAL
Qty: 0 | Refills: 0 | DISCHARGE
Start: 2022-01-01

## 2022-01-01 RX ORDER — MECLIZINE HCL 12.5 MG
12.5 TABLET ORAL THREE TIMES A DAY
Refills: 0 | Status: DISCONTINUED | OUTPATIENT
Start: 2022-01-01 | End: 2022-01-01

## 2022-01-01 RX ORDER — PIPERACILLIN AND TAZOBACTAM 4; .5 G/20ML; G/20ML
3.38 INJECTION, POWDER, LYOPHILIZED, FOR SOLUTION INTRAVENOUS ONCE
Refills: 0 | Status: COMPLETED | OUTPATIENT
Start: 2022-01-01 | End: 2022-01-01

## 2022-01-01 RX ORDER — ALBUTEROL 90 UG/1
2.5 AEROSOL, METERED ORAL
Refills: 0 | Status: COMPLETED | OUTPATIENT
Start: 2022-01-01 | End: 2022-01-01

## 2022-01-01 RX ORDER — INSULIN LISPRO 100/ML
4 VIAL (ML) SUBCUTANEOUS
Refills: 0 | Status: DISCONTINUED | OUTPATIENT
Start: 2022-01-01 | End: 2022-01-01

## 2022-01-01 RX ORDER — BUDESONIDE AND FORMOTEROL FUMARATE DIHYDRATE 160; 4.5 UG/1; UG/1
2 AEROSOL RESPIRATORY (INHALATION)
Refills: 0 | Status: DISCONTINUED | OUTPATIENT
Start: 2022-01-01 | End: 2022-01-01

## 2022-01-01 RX ORDER — POLYETHYLENE GLYCOL 3350 17 G/17G
17 POWDER, FOR SOLUTION ORAL DAILY
Refills: 0 | Status: DISCONTINUED | OUTPATIENT
Start: 2022-01-01 | End: 2022-01-01

## 2022-01-01 RX ORDER — MEROPENEM 1 G/30ML
1000 INJECTION INTRAVENOUS EVERY 8 HOURS
Refills: 0 | Status: DISCONTINUED | OUTPATIENT
Start: 2022-01-01 | End: 2022-01-01

## 2022-01-01 RX ORDER — DILTIAZEM HCL 120 MG
5 CAPSULE, EXT RELEASE 24 HR ORAL
Qty: 125 | Refills: 0 | Status: DISCONTINUED | OUTPATIENT
Start: 2022-01-01 | End: 2022-01-01

## 2022-01-01 RX ORDER — PANTOPRAZOLE SODIUM 20 MG/1
40 TABLET, DELAYED RELEASE ORAL DAILY
Refills: 0 | Status: DISCONTINUED | OUTPATIENT
Start: 2022-01-01 | End: 2022-01-01

## 2022-01-01 RX ORDER — WARFARIN SODIUM 2.5 MG/1
5 TABLET ORAL ONCE
Refills: 0 | Status: COMPLETED | OUTPATIENT
Start: 2022-01-01 | End: 2022-01-01

## 2022-01-01 RX ORDER — METOPROLOL TARTRATE 50 MG
1 TABLET ORAL
Qty: 0 | Refills: 0 | DISCHARGE
Start: 2022-01-01

## 2022-01-01 RX ORDER — SODIUM CHLORIDE 9 MG/ML
250 INJECTION INTRAMUSCULAR; INTRAVENOUS; SUBCUTANEOUS ONCE
Refills: 0 | Status: COMPLETED | OUTPATIENT
Start: 2022-01-01 | End: 2022-01-01

## 2022-01-01 RX ORDER — CEFTRIAXONE 500 MG/1
1000 INJECTION, POWDER, FOR SOLUTION INTRAMUSCULAR; INTRAVENOUS EVERY 24 HOURS
Refills: 0 | Status: DISCONTINUED | OUTPATIENT
Start: 2022-01-01 | End: 2022-01-01

## 2022-01-01 RX ORDER — DEXAMETHASONE 0.5 MG/5ML
2 ELIXIR ORAL
Refills: 0 | Status: DISCONTINUED | OUTPATIENT
Start: 2022-01-01 | End: 2022-01-01

## 2022-01-01 RX ORDER — MEROPENEM 1 G/30ML
1000 INJECTION INTRAVENOUS EVERY 12 HOURS
Refills: 0 | Status: DISCONTINUED | OUTPATIENT
Start: 2022-01-01 | End: 2022-01-01

## 2022-01-01 RX ORDER — THIAMINE MONONITRATE (VIT B1) 100 MG
100 TABLET ORAL DAILY
Refills: 0 | Status: DISCONTINUED | OUTPATIENT
Start: 2022-01-01 | End: 2022-01-01

## 2022-01-01 RX ORDER — KETOROLAC TROMETHAMINE 30 MG/ML
30 SYRINGE (ML) INJECTION ONCE
Refills: 0 | Status: DISCONTINUED | OUTPATIENT
Start: 2022-01-01 | End: 2022-01-01

## 2022-01-01 RX ORDER — FOLIC ACID 0.8 MG
1 TABLET ORAL DAILY
Refills: 0 | Status: DISCONTINUED | OUTPATIENT
Start: 2022-01-01 | End: 2022-01-01

## 2022-01-01 RX ORDER — WARFARIN SODIUM 2.5 MG/1
7.5 TABLET ORAL ONCE
Refills: 0 | Status: COMPLETED | OUTPATIENT
Start: 2022-01-01 | End: 2022-01-01

## 2022-01-01 RX ORDER — INSULIN LISPRO 100/ML
VIAL (ML) SUBCUTANEOUS
Refills: 0 | Status: DISCONTINUED | OUTPATIENT
Start: 2022-01-01 | End: 2022-01-01

## 2022-01-01 RX ORDER — ACETAMINOPHEN 500 MG
975 TABLET ORAL ONCE
Refills: 0 | Status: COMPLETED | OUTPATIENT
Start: 2022-01-01 | End: 2022-01-01

## 2022-01-01 RX ORDER — SODIUM CHLORIDE 9 MG/ML
1000 INJECTION INTRAMUSCULAR; INTRAVENOUS; SUBCUTANEOUS
Refills: 0 | Status: DISCONTINUED | OUTPATIENT
Start: 2022-01-01 | End: 2022-01-01

## 2022-01-01 RX ORDER — MIDODRINE HYDROCHLORIDE 2.5 MG/1
5 TABLET ORAL ONCE
Refills: 0 | Status: DISCONTINUED | OUTPATIENT
Start: 2022-01-01 | End: 2022-01-01

## 2022-01-01 RX ORDER — NICOTINE POLACRILEX 2 MG
1 GUM BUCCAL DAILY
Refills: 0 | Status: DISCONTINUED | OUTPATIENT
Start: 2022-01-01 | End: 2022-01-01

## 2022-01-01 RX ORDER — METOPROLOL TARTRATE 50 MG
0.5 TABLET ORAL
Qty: 0 | Refills: 0 | DISCHARGE
Start: 2022-01-01

## 2022-01-01 RX ORDER — TAMSULOSIN HYDROCHLORIDE 0.4 MG/1
0.4 CAPSULE ORAL AT BEDTIME
Refills: 0 | Status: DISCONTINUED | OUTPATIENT
Start: 2022-01-01 | End: 2022-01-01

## 2022-01-01 RX ORDER — WARFARIN SODIUM 2.5 MG/1
6 TABLET ORAL ONCE
Refills: 0 | Status: COMPLETED | OUTPATIENT
Start: 2022-01-01 | End: 2022-01-01

## 2022-01-01 RX ORDER — WARFARIN SODIUM 2.5 MG/1
7.5 TABLET ORAL AT BEDTIME
Refills: 0 | Status: DISCONTINUED | OUTPATIENT
Start: 2022-01-01 | End: 2022-01-01

## 2022-01-01 RX ORDER — LANOLIN ALCOHOL/MO/W.PET/CERES
3 CREAM (GRAM) TOPICAL AT BEDTIME
Refills: 0 | Status: DISCONTINUED | OUTPATIENT
Start: 2022-01-01 | End: 2022-01-01

## 2022-01-01 RX ORDER — HALOPERIDOL DECANOATE 100 MG/ML
1 INJECTION INTRAMUSCULAR ONCE
Refills: 0 | Status: COMPLETED | OUTPATIENT
Start: 2022-01-01 | End: 2022-01-01

## 2022-01-01 RX ORDER — DEXMEDETOMIDINE HYDROCHLORIDE IN 0.9% SODIUM CHLORIDE 4 UG/ML
0.2 INJECTION INTRAVENOUS
Qty: 400 | Refills: 0 | Status: DISCONTINUED | OUTPATIENT
Start: 2022-01-01 | End: 2022-01-01

## 2022-01-01 RX ORDER — CALCIUM GLUCONATE 100 MG/ML
2 VIAL (ML) INTRAVENOUS ONCE
Refills: 0 | Status: COMPLETED | OUTPATIENT
Start: 2022-01-01 | End: 2022-01-01

## 2022-01-01 RX ORDER — TIOTROPIUM BROMIDE 18 UG/1
1 CAPSULE ORAL; RESPIRATORY (INHALATION) DAILY
Refills: 0 | Status: DISCONTINUED | OUTPATIENT
Start: 2022-01-01 | End: 2022-01-01

## 2022-01-01 RX ORDER — WARFARIN SODIUM 2.5 MG/1
1 TABLET ORAL
Qty: 30 | Refills: 0
Start: 2022-01-01 | End: 2022-06-04

## 2022-01-01 RX ORDER — HALOPERIDOL DECANOATE 100 MG/ML
5 INJECTION INTRAMUSCULAR ONCE
Refills: 0 | Status: COMPLETED | OUTPATIENT
Start: 2022-01-01 | End: 2022-01-01

## 2022-01-01 RX ORDER — INSULIN LISPRO 100/ML
VIAL (ML) SUBCUTANEOUS EVERY 6 HOURS
Refills: 0 | Status: DISCONTINUED | OUTPATIENT
Start: 2022-01-01 | End: 2022-01-01

## 2022-01-01 RX ORDER — ADENOSINE 3 MG/ML
12 INJECTION INTRAVENOUS ONCE
Refills: 0 | Status: COMPLETED | OUTPATIENT
Start: 2022-01-01 | End: 2022-01-01

## 2022-01-01 RX ORDER — PANTOPRAZOLE SODIUM 20 MG/1
40 TABLET, DELAYED RELEASE ORAL
Refills: 0 | Status: DISCONTINUED | OUTPATIENT
Start: 2022-01-01 | End: 2022-01-01

## 2022-01-01 RX ORDER — DEXTROSE 50 % IN WATER 50 %
50 SYRINGE (ML) INTRAVENOUS ONCE
Refills: 0 | Status: COMPLETED | OUTPATIENT
Start: 2022-01-01 | End: 2022-01-01

## 2022-01-01 RX ORDER — INSULIN HUMAN 100 [IU]/ML
4 INJECTION, SOLUTION SUBCUTANEOUS ONCE
Refills: 0 | Status: COMPLETED | OUTPATIENT
Start: 2022-01-01 | End: 2022-01-01

## 2022-01-01 RX ORDER — CHOLECALCIFEROL (VITAMIN D3) 125 MCG
1000 CAPSULE ORAL DAILY
Refills: 0 | Status: DISCONTINUED | OUTPATIENT
Start: 2022-01-01 | End: 2022-01-01

## 2022-01-01 RX ORDER — MAGNESIUM SULFATE 500 MG/ML
2 VIAL (ML) INJECTION ONCE
Refills: 0 | Status: COMPLETED | OUTPATIENT
Start: 2022-01-01 | End: 2022-01-01

## 2022-01-01 RX ORDER — GABAPENTIN 400 MG/1
300 CAPSULE ORAL THREE TIMES A DAY
Refills: 0 | Status: DISCONTINUED | OUTPATIENT
Start: 2022-01-01 | End: 2022-01-01

## 2022-01-01 RX ORDER — WARFARIN SODIUM 2.5 MG/1
3 TABLET ORAL ONCE
Refills: 0 | Status: DISCONTINUED | OUTPATIENT
Start: 2022-01-01 | End: 2022-01-01

## 2022-01-01 RX ORDER — CYCLOBENZAPRINE HYDROCHLORIDE 10 MG/1
5 TABLET, FILM COATED ORAL THREE TIMES A DAY
Refills: 0 | Status: DISCONTINUED | OUTPATIENT
Start: 2022-01-01 | End: 2022-01-01

## 2022-01-01 RX ORDER — MAGNESIUM SULFATE 500 MG/ML
1 VIAL (ML) INJECTION ONCE
Refills: 0 | Status: COMPLETED | OUTPATIENT
Start: 2022-01-01 | End: 2022-01-01

## 2022-01-01 RX ORDER — SODIUM CHLORIDE 9 MG/ML
3 INJECTION INTRAMUSCULAR; INTRAVENOUS; SUBCUTANEOUS EVERY 8 HOURS
Refills: 0 | Status: DISCONTINUED | OUTPATIENT
Start: 2022-01-01 | End: 2022-01-01

## 2022-01-01 RX ORDER — ACETAMINOPHEN 500 MG
325 TABLET ORAL EVERY 4 HOURS
Refills: 0 | Status: DISCONTINUED | OUTPATIENT
Start: 2022-01-01 | End: 2022-01-01

## 2022-01-01 RX ORDER — PHENYLEPHRINE HYDROCHLORIDE 10 MG/ML
0.1 INJECTION INTRAVENOUS
Qty: 40 | Refills: 0 | Status: DISCONTINUED | OUTPATIENT
Start: 2022-01-01 | End: 2022-01-01

## 2022-01-01 RX ORDER — ONDANSETRON 8 MG/1
4 TABLET, FILM COATED ORAL EVERY 8 HOURS
Refills: 0 | Status: DISCONTINUED | OUTPATIENT
Start: 2022-01-01 | End: 2022-01-01

## 2022-01-01 RX ORDER — INSULIN HUMAN 100 [IU]/ML
5 INJECTION, SOLUTION SUBCUTANEOUS ONCE
Refills: 0 | Status: DISCONTINUED | OUTPATIENT
Start: 2022-01-01 | End: 2022-01-01

## 2022-01-01 RX ORDER — ACETAMINOPHEN 500 MG
650 TABLET ORAL EVERY 6 HOURS
Refills: 0 | Status: DISCONTINUED | OUTPATIENT
Start: 2022-01-01 | End: 2022-01-01

## 2022-01-01 RX ORDER — GLUCAGON INJECTION, SOLUTION 0.5 MG/.1ML
1 INJECTION, SOLUTION SUBCUTANEOUS ONCE
Refills: 0 | Status: DISCONTINUED | OUTPATIENT
Start: 2022-01-01 | End: 2022-01-01

## 2022-01-01 RX ORDER — SODIUM,POTASSIUM PHOSPHATES 278-250MG
1 POWDER IN PACKET (EA) ORAL ONCE
Refills: 0 | Status: COMPLETED | OUTPATIENT
Start: 2022-01-01 | End: 2022-01-01

## 2022-01-01 RX ORDER — CHOLECALCIFEROL (VITAMIN D3) 125 MCG
1000 CAPSULE ORAL
Qty: 0 | Refills: 0 | DISCHARGE
Start: 2022-01-01

## 2022-01-01 RX ORDER — SODIUM CHLORIDE 9 MG/ML
1000 INJECTION, SOLUTION INTRAVENOUS
Refills: 0 | Status: DISCONTINUED | OUTPATIENT
Start: 2022-01-01 | End: 2022-01-01

## 2022-01-01 RX ORDER — PANTOPRAZOLE SODIUM 20 MG/1
1 TABLET, DELAYED RELEASE ORAL
Qty: 0 | Refills: 0 | DISCHARGE
Start: 2022-01-01

## 2022-01-01 RX ORDER — ADENOSINE 3 MG/ML
6 INJECTION INTRAVENOUS ONCE
Refills: 0 | Status: COMPLETED | OUTPATIENT
Start: 2022-01-01 | End: 2022-01-01

## 2022-01-01 RX ORDER — SENNA PLUS 8.6 MG/1
2 TABLET ORAL AT BEDTIME
Refills: 0 | Status: DISCONTINUED | OUTPATIENT
Start: 2022-01-01 | End: 2022-01-01

## 2022-01-01 RX ORDER — QUETIAPINE FUMARATE 200 MG/1
1 TABLET, FILM COATED ORAL
Qty: 0 | Refills: 0 | DISCHARGE
Start: 2022-01-01

## 2022-01-01 RX ORDER — IPRATROPIUM/ALBUTEROL SULFATE 18-103MCG
6 AEROSOL WITH ADAPTER (GRAM) INHALATION ONCE
Refills: 0 | Status: COMPLETED | OUTPATIENT
Start: 2022-01-01 | End: 2022-01-01

## 2022-01-01 RX ORDER — IPRATROPIUM/ALBUTEROL SULFATE 18-103MCG
3 AEROSOL WITH ADAPTER (GRAM) INHALATION ONCE
Refills: 0 | Status: COMPLETED | OUTPATIENT
Start: 2022-01-01 | End: 2022-01-01

## 2022-01-01 RX ORDER — ENOXAPARIN SODIUM 100 MG/ML
75 INJECTION SUBCUTANEOUS EVERY 12 HOURS
Refills: 0 | Status: DISCONTINUED | OUTPATIENT
Start: 2022-01-01 | End: 2022-01-01

## 2022-01-01 RX ORDER — MIDAZOLAM HYDROCHLORIDE 1 MG/ML
5 INJECTION, SOLUTION INTRAMUSCULAR; INTRAVENOUS ONCE
Refills: 0 | Status: DISCONTINUED | OUTPATIENT
Start: 2022-01-01 | End: 2022-01-01

## 2022-01-01 RX ORDER — HYDROMORPHONE HYDROCHLORIDE 2 MG/ML
0.5 INJECTION INTRAMUSCULAR; INTRAVENOUS; SUBCUTANEOUS
Refills: 0 | Status: DISCONTINUED | OUTPATIENT
Start: 2022-01-01 | End: 2022-01-01

## 2022-01-01 RX ORDER — SODIUM CHLORIDE 9 MG/ML
500 INJECTION INTRAMUSCULAR; INTRAVENOUS; SUBCUTANEOUS ONCE
Refills: 0 | Status: COMPLETED | OUTPATIENT
Start: 2022-01-01 | End: 2022-01-01

## 2022-01-01 RX ORDER — METOPROLOL TARTRATE 50 MG
25 TABLET ORAL
Refills: 0 | Status: DISCONTINUED | OUTPATIENT
Start: 2022-01-01 | End: 2022-01-01

## 2022-01-01 RX ORDER — THIAMINE MONONITRATE (VIT B1) 100 MG
500 TABLET ORAL EVERY 8 HOURS
Refills: 0 | Status: COMPLETED | OUTPATIENT
Start: 2022-01-01 | End: 2022-01-01

## 2022-01-01 RX ORDER — ATORVASTATIN CALCIUM 80 MG/1
1 TABLET, FILM COATED ORAL
Qty: 0 | Refills: 0 | DISCHARGE
Start: 2022-01-01

## 2022-01-01 RX ORDER — ALBUTEROL 90 UG/1
2 AEROSOL, METERED ORAL EVERY 6 HOURS
Refills: 0 | Status: DISCONTINUED | OUTPATIENT
Start: 2022-01-01 | End: 2022-01-01

## 2022-01-01 RX ORDER — ONDANSETRON 8 MG/1
5 TABLET, FILM COATED ORAL ONCE
Refills: 0 | Status: COMPLETED | OUTPATIENT
Start: 2022-01-01 | End: 2022-01-01

## 2022-01-01 RX ORDER — PHENOBARBITAL 60 MG
130 TABLET ORAL ONCE
Refills: 0 | Status: DISCONTINUED | OUTPATIENT
Start: 2022-01-01 | End: 2022-01-01

## 2022-01-01 RX ORDER — METOPROLOL TARTRATE 50 MG
12.5 TABLET ORAL
Refills: 0 | Status: DISCONTINUED | OUTPATIENT
Start: 2022-01-01 | End: 2022-01-01

## 2022-01-01 RX ORDER — DEXTROSE 50 % IN WATER 50 %
15 SYRINGE (ML) INTRAVENOUS ONCE
Refills: 0 | Status: COMPLETED | OUTPATIENT
Start: 2022-01-01 | End: 2022-01-01

## 2022-01-01 RX ORDER — VANCOMYCIN HCL 1 G
1000 VIAL (EA) INTRAVENOUS EVERY 12 HOURS
Refills: 0 | Status: DISCONTINUED | OUTPATIENT
Start: 2022-01-01 | End: 2022-01-01

## 2022-01-01 RX ORDER — MIRTAZAPINE 45 MG/1
15 TABLET, ORALLY DISINTEGRATING ORAL AT BEDTIME
Refills: 0 | Status: DISCONTINUED | OUTPATIENT
Start: 2022-01-01 | End: 2022-01-01

## 2022-01-01 RX ORDER — SENNA PLUS 8.6 MG/1
1 TABLET ORAL
Qty: 0 | Refills: 0 | DISCHARGE
Start: 2022-01-01

## 2022-01-01 RX ORDER — MIDAZOLAM HYDROCHLORIDE 1 MG/ML
2 INJECTION, SOLUTION INTRAMUSCULAR; INTRAVENOUS ONCE
Refills: 0 | Status: DISCONTINUED | OUTPATIENT
Start: 2022-01-01 | End: 2022-01-01

## 2022-01-01 RX ORDER — TIOTROPIUM BROMIDE 18 UG/1
1 CAPSULE ORAL; RESPIRATORY (INHALATION)
Qty: 0 | Refills: 0 | DISCHARGE

## 2022-01-01 RX ORDER — ACETAMINOPHEN 500 MG
650 TABLET ORAL ONCE
Refills: 0 | Status: COMPLETED | OUTPATIENT
Start: 2022-01-01 | End: 2022-01-01

## 2022-01-01 RX ORDER — HALOPERIDOL DECANOATE 100 MG/ML
0.5 INJECTION INTRAMUSCULAR ONCE
Refills: 0 | Status: COMPLETED | OUTPATIENT
Start: 2022-01-01 | End: 2022-01-01

## 2022-01-01 RX ORDER — QUETIAPINE FUMARATE 200 MG/1
25 TABLET, FILM COATED ORAL AT BEDTIME
Refills: 0 | Status: DISCONTINUED | OUTPATIENT
Start: 2022-01-01 | End: 2022-01-01

## 2022-01-01 RX ORDER — ACETAMINOPHEN 500 MG
650 TABLET ORAL EVERY 8 HOURS
Refills: 0 | Status: DISCONTINUED | OUTPATIENT
Start: 2022-01-01 | End: 2022-01-01

## 2022-01-01 RX ORDER — INSULIN HUMAN 100 [IU]/ML
5 INJECTION, SOLUTION SUBCUTANEOUS ONCE
Refills: 0 | Status: COMPLETED | OUTPATIENT
Start: 2022-01-01 | End: 2022-01-01

## 2022-01-01 RX ORDER — SODIUM ZIRCONIUM CYCLOSILICATE 10 G/10G
10 POWDER, FOR SUSPENSION ORAL ONCE
Refills: 0 | Status: COMPLETED | OUTPATIENT
Start: 2022-01-01 | End: 2022-01-01

## 2022-01-01 RX ORDER — GABAPENTIN 400 MG/1
200 CAPSULE ORAL
Refills: 0 | Status: DISCONTINUED | OUTPATIENT
Start: 2022-01-01 | End: 2022-01-01

## 2022-01-01 RX ORDER — DEXAMETHASONE 0.5 MG/5ML
10 ELIXIR ORAL ONCE
Refills: 0 | Status: COMPLETED | OUTPATIENT
Start: 2022-01-01 | End: 2022-01-01

## 2022-01-01 RX ORDER — MORPHINE SULFATE 50 MG/1
1 CAPSULE, EXTENDED RELEASE ORAL ONCE
Refills: 0 | Status: DISCONTINUED | OUTPATIENT
Start: 2022-01-01 | End: 2022-01-01

## 2022-01-01 RX ORDER — WARFARIN SODIUM 2.5 MG/1
2 TABLET ORAL AT BEDTIME
Refills: 0 | Status: DISCONTINUED | OUTPATIENT
Start: 2022-01-01 | End: 2022-01-01

## 2022-01-01 RX ORDER — GLIMEPIRIDE 1 MG
1 TABLET ORAL
Qty: 0 | Refills: 0 | DISCHARGE

## 2022-01-01 RX ORDER — INSULIN GLARGINE 100 [IU]/ML
7 INJECTION, SOLUTION SUBCUTANEOUS AT BEDTIME
Refills: 0 | Status: DISCONTINUED | OUTPATIENT
Start: 2022-01-01 | End: 2022-01-01

## 2022-01-01 RX ORDER — MAGNESIUM SULFATE 500 MG/ML
2 VIAL (ML) INJECTION ONCE
Refills: 0 | Status: DISCONTINUED | OUTPATIENT
Start: 2022-01-01 | End: 2022-01-01

## 2022-01-01 RX ORDER — WARFARIN SODIUM 2.5 MG/1
1.5 TABLET ORAL
Qty: 0 | Refills: 0 | DISCHARGE

## 2022-01-01 RX ORDER — FINASTERIDE 5 MG/1
5 TABLET, FILM COATED ORAL DAILY
Refills: 0 | Status: DISCONTINUED | OUTPATIENT
Start: 2022-01-01 | End: 2022-01-01

## 2022-01-01 RX ORDER — COSYNTROPIN 0.25 MG/ML
0.25 INJECTION, SOLUTION INTRAVENOUS ONCE
Refills: 0 | Status: COMPLETED | OUTPATIENT
Start: 2022-01-01 | End: 2022-01-01

## 2022-01-01 RX ORDER — TAMSULOSIN HYDROCHLORIDE 0.4 MG/1
1 CAPSULE ORAL
Qty: 0 | Refills: 0 | DISCHARGE
Start: 2022-01-01

## 2022-01-01 RX ORDER — HYDROCORTISONE 20 MG
50 TABLET ORAL
Refills: 0 | Status: DISCONTINUED | OUTPATIENT
Start: 2022-01-01 | End: 2022-01-01

## 2022-01-01 RX ORDER — ACETAMINOPHEN 500 MG
1000 TABLET ORAL ONCE
Refills: 0 | Status: COMPLETED | OUTPATIENT
Start: 2022-01-01 | End: 2022-01-01

## 2022-01-01 RX ORDER — AZITHROMYCIN 500 MG/1
500 TABLET, FILM COATED ORAL EVERY 24 HOURS
Refills: 0 | Status: COMPLETED | OUTPATIENT
Start: 2022-01-01 | End: 2022-01-01

## 2022-01-01 RX ORDER — ACETAMINOPHEN 500 MG
1000 TABLET ORAL EVERY 8 HOURS
Refills: 0 | Status: COMPLETED | OUTPATIENT
Start: 2022-01-01 | End: 2022-01-01

## 2022-01-01 RX ORDER — INSULIN LISPRO 100/ML
6 VIAL (ML) SUBCUTANEOUS
Refills: 0 | Status: DISCONTINUED | OUTPATIENT
Start: 2022-01-01 | End: 2022-01-01

## 2022-01-01 RX ORDER — CHLORHEXIDINE GLUCONATE 213 G/1000ML
1 SOLUTION TOPICAL
Refills: 0 | Status: DISCONTINUED | OUTPATIENT
Start: 2022-01-01 | End: 2022-01-01

## 2022-01-01 RX ORDER — SODIUM CHLORIDE 9 MG/ML
1000 INJECTION INTRAMUSCULAR; INTRAVENOUS; SUBCUTANEOUS ONCE
Refills: 0 | Status: COMPLETED | OUTPATIENT
Start: 2022-01-01 | End: 2022-01-01

## 2022-01-01 RX ORDER — KETOROLAC TROMETHAMINE 30 MG/ML
30 SYRINGE (ML) INJECTION EVERY 6 HOURS
Refills: 0 | Status: DISCONTINUED | OUTPATIENT
Start: 2022-01-01 | End: 2022-01-01

## 2022-01-01 RX ORDER — GABAPENTIN 400 MG/1
200 CAPSULE ORAL THREE TIMES A DAY
Refills: 0 | Status: DISCONTINUED | OUTPATIENT
Start: 2022-01-01 | End: 2022-01-01

## 2022-01-01 RX ORDER — IPRATROPIUM/ALBUTEROL SULFATE 18-103MCG
3 AEROSOL WITH ADAPTER (GRAM) INHALATION EVERY 6 HOURS
Refills: 0 | Status: DISCONTINUED | OUTPATIENT
Start: 2022-01-01 | End: 2022-01-01

## 2022-01-01 RX ORDER — POTASSIUM CHLORIDE 20 MEQ
40 PACKET (EA) ORAL ONCE
Refills: 0 | Status: COMPLETED | OUTPATIENT
Start: 2022-01-01 | End: 2022-01-01

## 2022-01-01 RX ORDER — DEXMEDETOMIDINE HYDROCHLORIDE IN 0.9% SODIUM CHLORIDE 4 UG/ML
0.2 INJECTION INTRAVENOUS
Qty: 200 | Refills: 0 | Status: DISCONTINUED | OUTPATIENT
Start: 2022-01-01 | End: 2022-01-01

## 2022-01-01 RX ORDER — KETOROLAC TROMETHAMINE 30 MG/ML
15 SYRINGE (ML) INJECTION EVERY 6 HOURS
Refills: 0 | Status: DISCONTINUED | OUTPATIENT
Start: 2022-01-01 | End: 2022-01-01

## 2022-01-01 RX ORDER — VANCOMYCIN HCL 1 G
1250 VIAL (EA) INTRAVENOUS ONCE
Refills: 0 | Status: COMPLETED | OUTPATIENT
Start: 2022-01-01 | End: 2022-01-01

## 2022-01-01 RX ORDER — METOPROLOL TARTRATE 50 MG
5 TABLET ORAL EVERY 6 HOURS
Refills: 0 | Status: DISCONTINUED | OUTPATIENT
Start: 2022-01-01 | End: 2022-01-01

## 2022-01-01 RX ORDER — SODIUM CHLORIDE 9 MG/ML
1000 INJECTION, SOLUTION INTRAVENOUS ONCE
Refills: 0 | Status: COMPLETED | OUTPATIENT
Start: 2022-01-01 | End: 2022-01-01

## 2022-01-01 RX ORDER — SODIUM ZIRCONIUM CYCLOSILICATE 10 G/10G
10 POWDER, FOR SUSPENSION ORAL ONCE
Refills: 0 | Status: DISCONTINUED | OUTPATIENT
Start: 2022-01-01 | End: 2022-01-01

## 2022-01-01 RX ORDER — FOLIC ACID 0.8 MG
1 TABLET ORAL
Qty: 0 | Refills: 0 | DISCHARGE
Start: 2022-01-01

## 2022-01-01 RX ORDER — SENNA PLUS 8.6 MG/1
2 TABLET ORAL
Qty: 0 | Refills: 0 | DISCHARGE
Start: 2022-01-01

## 2022-01-01 RX ORDER — GABAPENTIN 400 MG/1
100 CAPSULE ORAL THREE TIMES A DAY
Refills: 0 | Status: DISCONTINUED | OUTPATIENT
Start: 2022-01-01 | End: 2022-01-01

## 2022-01-01 RX ORDER — DEXAMETHASONE 0.5 MG/5ML
10 ELIXIR ORAL ONCE
Refills: 0 | Status: DISCONTINUED | OUTPATIENT
Start: 2022-01-01 | End: 2022-01-01

## 2022-01-01 RX ORDER — THIAMINE MONONITRATE (VIT B1) 100 MG
1 TABLET ORAL
Qty: 0 | Refills: 0 | DISCHARGE
Start: 2022-01-01

## 2022-01-01 RX ORDER — ATORVASTATIN CALCIUM 80 MG/1
40 TABLET, FILM COATED ORAL AT BEDTIME
Refills: 0 | Status: DISCONTINUED | OUTPATIENT
Start: 2022-01-01 | End: 2022-01-01

## 2022-01-01 RX ORDER — ENOXAPARIN SODIUM 100 MG/ML
40 INJECTION SUBCUTANEOUS EVERY 24 HOURS
Refills: 0 | Status: DISCONTINUED | OUTPATIENT
Start: 2022-01-01 | End: 2022-01-01

## 2022-01-01 RX ORDER — WARFARIN SODIUM 2.5 MG/1
6 TABLET ORAL ONCE
Refills: 0 | Status: DISCONTINUED | OUTPATIENT
Start: 2022-01-01 | End: 2022-01-01

## 2022-01-01 RX ORDER — DEXMEDETOMIDINE HYDROCHLORIDE IN 0.9% SODIUM CHLORIDE 4 UG/ML
0.7 INJECTION INTRAVENOUS
Qty: 400 | Refills: 0 | Status: DISCONTINUED | OUTPATIENT
Start: 2022-01-01 | End: 2022-01-01

## 2022-01-01 RX ORDER — DEXTROSE 50 % IN WATER 50 %
25 SYRINGE (ML) INTRAVENOUS ONCE
Refills: 0 | Status: COMPLETED | OUTPATIENT
Start: 2022-01-01 | End: 2022-01-01

## 2022-01-01 RX ORDER — POLYETHYLENE GLYCOL 3350 17 G/17G
17 POWDER, FOR SOLUTION ORAL
Qty: 0 | Refills: 0 | DISCHARGE
Start: 2022-01-01

## 2022-01-01 RX ORDER — SODIUM CHLORIDE 9 MG/ML
2000 INJECTION INTRAMUSCULAR; INTRAVENOUS; SUBCUTANEOUS ONCE
Refills: 0 | Status: COMPLETED | OUTPATIENT
Start: 2022-01-01 | End: 2022-01-01

## 2022-01-01 RX ORDER — VANCOMYCIN HCL 1 G
1000 VIAL (EA) INTRAVENOUS ONCE
Refills: 0 | Status: COMPLETED | OUTPATIENT
Start: 2022-01-01 | End: 2022-01-01

## 2022-01-01 RX ORDER — FINASTERIDE 5 MG/1
1 TABLET, FILM COATED ORAL
Qty: 30 | Refills: 0
Start: 2022-01-01 | End: 2022-06-04

## 2022-01-01 RX ORDER — FUROSEMIDE 40 MG
40 TABLET ORAL ONCE
Refills: 0 | Status: COMPLETED | OUTPATIENT
Start: 2022-01-01 | End: 2022-01-01

## 2022-01-01 RX ORDER — OXYCODONE HYDROCHLORIDE 5 MG/1
2.5 TABLET ORAL EVERY 4 HOURS
Refills: 0 | Status: DISCONTINUED | OUTPATIENT
Start: 2022-01-01 | End: 2022-01-01

## 2022-01-01 RX ORDER — LIDOCAINE 4 G/100G
2 CREAM TOPICAL DAILY
Refills: 0 | Status: DISCONTINUED | OUTPATIENT
Start: 2022-01-01 | End: 2022-01-01

## 2022-01-01 RX ORDER — SENNA PLUS 8.6 MG/1
1 TABLET ORAL DAILY
Refills: 0 | Status: DISCONTINUED | OUTPATIENT
Start: 2022-01-01 | End: 2022-01-01

## 2022-01-01 RX ORDER — WARFARIN SODIUM 2.5 MG/1
7.5 TABLET ORAL ONCE
Refills: 0 | Status: DISCONTINUED | OUTPATIENT
Start: 2022-01-01 | End: 2022-01-01

## 2022-01-01 RX ORDER — ENOXAPARIN SODIUM 100 MG/ML
60 INJECTION SUBCUTANEOUS EVERY 12 HOURS
Refills: 0 | Status: DISCONTINUED | OUTPATIENT
Start: 2022-01-01 | End: 2022-01-01

## 2022-01-01 RX ORDER — ROBINUL 0.2 MG/ML
0.2 INJECTION INTRAMUSCULAR; INTRAVENOUS EVERY 6 HOURS
Refills: 0 | Status: DISCONTINUED | OUTPATIENT
Start: 2022-01-01 | End: 2022-01-01

## 2022-01-01 RX ORDER — ALBUTEROL 90 UG/1
2 AEROSOL, METERED ORAL ONCE
Refills: 0 | Status: DISCONTINUED | OUTPATIENT
Start: 2022-01-01 | End: 2022-01-01

## 2022-01-01 RX ORDER — DEXTROSE 50 % IN WATER 50 %
25 SYRINGE (ML) INTRAVENOUS ONCE
Refills: 0 | Status: DISCONTINUED | OUTPATIENT
Start: 2022-01-01 | End: 2022-01-01

## 2022-01-01 RX ADMIN — BUDESONIDE AND FORMOTEROL FUMARATE DIHYDRATE 2 PUFF(S): 160; 4.5 AEROSOL RESPIRATORY (INHALATION) at 10:22

## 2022-01-01 RX ADMIN — SODIUM CHLORIDE 1000 MILLILITER(S): 9 INJECTION INTRAMUSCULAR; INTRAVENOUS; SUBCUTANEOUS at 07:06

## 2022-01-01 RX ADMIN — Medication 1 MILLIGRAM(S): at 12:27

## 2022-01-01 RX ADMIN — Medication 325 MILLIGRAM(S): at 17:17

## 2022-01-01 RX ADMIN — INSULIN GLARGINE 7 UNIT(S): 100 INJECTION, SOLUTION SUBCUTANEOUS at 21:15

## 2022-01-01 RX ADMIN — PHENYLEPHRINE HYDROCHLORIDE 2.78 MICROGRAM(S)/KG/MIN: 10 INJECTION INTRAVENOUS at 10:31

## 2022-01-01 RX ADMIN — Medication 30 MILLILITER(S): at 05:39

## 2022-01-01 RX ADMIN — Medication 1 MILLIGRAM(S): at 11:32

## 2022-01-01 RX ADMIN — Medication 1 PATCH: at 12:28

## 2022-01-01 RX ADMIN — Medication 3: at 11:48

## 2022-01-01 RX ADMIN — Medication 81 MILLIGRAM(S): at 10:09

## 2022-01-01 RX ADMIN — Medication 1 MILLIGRAM(S): at 11:56

## 2022-01-01 RX ADMIN — MIRTAZAPINE 15 MILLIGRAM(S): 45 TABLET, ORALLY DISINTEGRATING ORAL at 22:26

## 2022-01-01 RX ADMIN — POLYETHYLENE GLYCOL 3350 17 GRAM(S): 17 POWDER, FOR SOLUTION ORAL at 11:52

## 2022-01-01 RX ADMIN — Medication 30 MILLILITER(S): at 11:42

## 2022-01-01 RX ADMIN — Medication 1 PATCH: at 06:33

## 2022-01-01 RX ADMIN — BUDESONIDE AND FORMOTEROL FUMARATE DIHYDRATE 2 PUFF(S): 160; 4.5 AEROSOL RESPIRATORY (INHALATION) at 11:33

## 2022-01-01 RX ADMIN — Medication 2: at 18:00

## 2022-01-01 RX ADMIN — Medication 75 MILLIGRAM(S): at 17:03

## 2022-01-01 RX ADMIN — POLYETHYLENE GLYCOL 3350 17 GRAM(S): 17 POWDER, FOR SOLUTION ORAL at 11:39

## 2022-01-01 RX ADMIN — Medication 650 MILLIGRAM(S): at 17:14

## 2022-01-01 RX ADMIN — Medication 12.5 MILLIGRAM(S): at 22:00

## 2022-01-01 RX ADMIN — AZITHROMYCIN 255 MILLIGRAM(S): 500 TABLET, FILM COATED ORAL at 05:41

## 2022-01-01 RX ADMIN — ATORVASTATIN CALCIUM 40 MILLIGRAM(S): 80 TABLET, FILM COATED ORAL at 21:33

## 2022-01-01 RX ADMIN — Medication 4 UNIT(S): at 17:41

## 2022-01-01 RX ADMIN — Medication 30 MILLIGRAM(S): at 05:13

## 2022-01-01 RX ADMIN — Medication 1000 MILLIGRAM(S): at 22:53

## 2022-01-01 RX ADMIN — PANTOPRAZOLE SODIUM 40 MILLIGRAM(S): 20 TABLET, DELAYED RELEASE ORAL at 07:05

## 2022-01-01 RX ADMIN — Medication 325 MILLIGRAM(S): at 16:32

## 2022-01-01 RX ADMIN — Medication 325 MILLIGRAM(S): at 11:54

## 2022-01-01 RX ADMIN — Medication 1 PATCH: at 19:52

## 2022-01-01 RX ADMIN — Medication 75 MILLIGRAM(S): at 06:09

## 2022-01-01 RX ADMIN — Medication 250 MILLIGRAM(S): at 08:36

## 2022-01-01 RX ADMIN — HALOPERIDOL DECANOATE 1 MILLIGRAM(S): 100 INJECTION INTRAMUSCULAR at 05:39

## 2022-01-01 RX ADMIN — BUDESONIDE AND FORMOTEROL FUMARATE DIHYDRATE 2 PUFF(S): 160; 4.5 AEROSOL RESPIRATORY (INHALATION) at 10:14

## 2022-01-01 RX ADMIN — PANTOPRAZOLE SODIUM 40 MILLIGRAM(S): 20 TABLET, DELAYED RELEASE ORAL at 05:35

## 2022-01-01 RX ADMIN — BUDESONIDE AND FORMOTEROL FUMARATE DIHYDRATE 2 PUFF(S): 160; 4.5 AEROSOL RESPIRATORY (INHALATION) at 21:52

## 2022-01-01 RX ADMIN — Medication 1 PATCH: at 08:47

## 2022-01-01 RX ADMIN — Medication 30 MILLILITER(S): at 23:47

## 2022-01-01 RX ADMIN — Medication 975 MILLIGRAM(S): at 08:21

## 2022-01-01 RX ADMIN — Medication 3 MILLILITER(S): at 08:32

## 2022-01-01 RX ADMIN — POLYETHYLENE GLYCOL 3350 17 GRAM(S): 17 POWDER, FOR SOLUTION ORAL at 11:36

## 2022-01-01 RX ADMIN — INSULIN GLARGINE 7 UNIT(S): 100 INJECTION, SOLUTION SUBCUTANEOUS at 21:31

## 2022-01-01 RX ADMIN — PIPERACILLIN AND TAZOBACTAM 3.38 GRAM(S): 4; .5 INJECTION, POWDER, LYOPHILIZED, FOR SOLUTION INTRAVENOUS at 23:00

## 2022-01-01 RX ADMIN — Medication 25 MILLILITER(S): at 13:48

## 2022-01-01 RX ADMIN — Medication 2 MILLIGRAM(S): at 06:06

## 2022-01-01 RX ADMIN — FINASTERIDE 5 MILLIGRAM(S): 5 TABLET, FILM COATED ORAL at 12:12

## 2022-01-01 RX ADMIN — Medication 1: at 12:33

## 2022-01-01 RX ADMIN — SODIUM CHLORIDE 3 MILLILITER(S): 9 INJECTION INTRAMUSCULAR; INTRAVENOUS; SUBCUTANEOUS at 21:12

## 2022-01-01 RX ADMIN — GABAPENTIN 300 MILLIGRAM(S): 400 CAPSULE ORAL at 05:03

## 2022-01-01 RX ADMIN — Medication 40 MILLIGRAM(S): at 05:31

## 2022-01-01 RX ADMIN — Medication 100 MILLIGRAM(S): at 11:55

## 2022-01-01 RX ADMIN — ATORVASTATIN CALCIUM 40 MILLIGRAM(S): 80 TABLET, FILM COATED ORAL at 21:14

## 2022-01-01 RX ADMIN — BUDESONIDE AND FORMOTEROL FUMARATE DIHYDRATE 2 PUFF(S): 160; 4.5 AEROSOL RESPIRATORY (INHALATION) at 23:11

## 2022-01-01 RX ADMIN — Medication 1 PATCH: at 19:30

## 2022-01-01 RX ADMIN — AZITHROMYCIN 255 MILLIGRAM(S): 500 TABLET, FILM COATED ORAL at 05:58

## 2022-01-01 RX ADMIN — Medication 1 PATCH: at 08:06

## 2022-01-01 RX ADMIN — Medication 30 MILLILITER(S): at 00:12

## 2022-01-01 RX ADMIN — Medication 325 MILLIGRAM(S): at 21:15

## 2022-01-01 RX ADMIN — TAMSULOSIN HYDROCHLORIDE 0.4 MILLIGRAM(S): 0.4 CAPSULE ORAL at 23:13

## 2022-01-01 RX ADMIN — Medication 105 MILLIGRAM(S): at 21:39

## 2022-01-01 RX ADMIN — GABAPENTIN 300 MILLIGRAM(S): 400 CAPSULE ORAL at 05:02

## 2022-01-01 RX ADMIN — BUDESONIDE AND FORMOTEROL FUMARATE DIHYDRATE 2 PUFF(S): 160; 4.5 AEROSOL RESPIRATORY (INHALATION) at 09:58

## 2022-01-01 RX ADMIN — Medication 75 MILLIGRAM(S): at 05:39

## 2022-01-01 RX ADMIN — MIRTAZAPINE 15 MILLIGRAM(S): 45 TABLET, ORALLY DISINTEGRATING ORAL at 21:36

## 2022-01-01 RX ADMIN — Medication 1 MILLIGRAM(S): at 12:14

## 2022-01-01 RX ADMIN — SODIUM CHLORIDE 3 MILLILITER(S): 9 INJECTION INTRAMUSCULAR; INTRAVENOUS; SUBCUTANEOUS at 13:52

## 2022-01-01 RX ADMIN — Medication 25 MILLIGRAM(S): at 05:14

## 2022-01-01 RX ADMIN — Medication 325 MILLIGRAM(S): at 21:48

## 2022-01-01 RX ADMIN — HALOPERIDOL DECANOATE 1 MILLIGRAM(S): 100 INJECTION INTRAMUSCULAR at 06:17

## 2022-01-01 RX ADMIN — DEXMEDETOMIDINE HYDROCHLORIDE IN 0.9% SODIUM CHLORIDE 3.71 MICROGRAM(S)/KG/HR: 4 INJECTION INTRAVENOUS at 05:43

## 2022-01-01 RX ADMIN — LIDOCAINE 2 PATCH: 4 CREAM TOPICAL at 11:56

## 2022-01-01 RX ADMIN — Medication 1 PATCH: at 11:43

## 2022-01-01 RX ADMIN — GABAPENTIN 200 MILLIGRAM(S): 400 CAPSULE ORAL at 21:22

## 2022-01-01 RX ADMIN — GABAPENTIN 300 MILLIGRAM(S): 400 CAPSULE ORAL at 19:17

## 2022-01-01 RX ADMIN — Medication 1000 MILLIGRAM(S): at 05:38

## 2022-01-01 RX ADMIN — ENOXAPARIN SODIUM 75 MILLIGRAM(S): 100 INJECTION SUBCUTANEOUS at 06:09

## 2022-01-01 RX ADMIN — Medication 2 MILLIGRAM(S): at 21:59

## 2022-01-01 RX ADMIN — Medication 5 MILLIGRAM(S): at 23:33

## 2022-01-01 RX ADMIN — Medication 325 MILLIGRAM(S): at 10:44

## 2022-01-01 RX ADMIN — Medication 325 MILLIGRAM(S): at 06:25

## 2022-01-01 RX ADMIN — PANTOPRAZOLE SODIUM 40 MILLIGRAM(S): 20 TABLET, DELAYED RELEASE ORAL at 05:45

## 2022-01-01 RX ADMIN — ENOXAPARIN SODIUM 75 MILLIGRAM(S): 100 INJECTION SUBCUTANEOUS at 05:47

## 2022-01-01 RX ADMIN — POLYETHYLENE GLYCOL 3350 17 GRAM(S): 17 POWDER, FOR SOLUTION ORAL at 11:51

## 2022-01-01 RX ADMIN — Medication 4 UNIT(S): at 17:28

## 2022-01-01 RX ADMIN — Medication 325 MILLIGRAM(S): at 05:45

## 2022-01-01 RX ADMIN — Medication 1 PATCH: at 12:42

## 2022-01-01 RX ADMIN — Medication 4 UNIT(S): at 12:09

## 2022-01-01 RX ADMIN — Medication 650 MILLIGRAM(S): at 06:15

## 2022-01-01 RX ADMIN — Medication 30 MILLILITER(S): at 05:48

## 2022-01-01 RX ADMIN — Medication 3 MILLILITER(S): at 00:10

## 2022-01-01 RX ADMIN — Medication 1000 MILLIGRAM(S): at 22:48

## 2022-01-01 RX ADMIN — Medication 1 PATCH: at 12:13

## 2022-01-01 RX ADMIN — BUDESONIDE AND FORMOTEROL FUMARATE DIHYDRATE 2 PUFF(S): 160; 4.5 AEROSOL RESPIRATORY (INHALATION) at 11:00

## 2022-01-01 RX ADMIN — Medication 25 MILLIGRAM(S): at 17:03

## 2022-01-01 RX ADMIN — Medication 1 PATCH: at 12:30

## 2022-01-01 RX ADMIN — Medication 130 MILLIGRAM(S): at 19:26

## 2022-01-01 RX ADMIN — ALBUTEROL 2.5 MILLIGRAM(S): 90 AEROSOL, METERED ORAL at 23:05

## 2022-01-01 RX ADMIN — SODIUM CHLORIDE 500 MILLILITER(S): 9 INJECTION INTRAMUSCULAR; INTRAVENOUS; SUBCUTANEOUS at 10:22

## 2022-01-01 RX ADMIN — Medication 650 MILLIGRAM(S): at 22:21

## 2022-01-01 RX ADMIN — DEXMEDETOMIDINE HYDROCHLORIDE IN 0.9% SODIUM CHLORIDE 3.71 MICROGRAM(S)/KG/HR: 4 INJECTION INTRAVENOUS at 22:09

## 2022-01-01 RX ADMIN — TAMSULOSIN HYDROCHLORIDE 0.4 MILLIGRAM(S): 0.4 CAPSULE ORAL at 22:26

## 2022-01-01 RX ADMIN — Medication 25 MILLIGRAM(S): at 17:47

## 2022-01-01 RX ADMIN — Medication 1: at 08:06

## 2022-01-01 RX ADMIN — PANTOPRAZOLE SODIUM 40 MILLIGRAM(S): 20 TABLET, DELAYED RELEASE ORAL at 12:45

## 2022-01-01 RX ADMIN — Medication 25 MILLIGRAM(S): at 17:53

## 2022-01-01 RX ADMIN — Medication 75 MILLIGRAM(S): at 05:49

## 2022-01-01 RX ADMIN — Medication 5 MG/HR: at 09:51

## 2022-01-01 RX ADMIN — Medication 5 MILLIGRAM(S): at 18:03

## 2022-01-01 RX ADMIN — SODIUM CHLORIDE 60 MILLILITER(S): 9 INJECTION INTRAMUSCULAR; INTRAVENOUS; SUBCUTANEOUS at 15:22

## 2022-01-01 RX ADMIN — GABAPENTIN 200 MILLIGRAM(S): 400 CAPSULE ORAL at 05:39

## 2022-01-01 RX ADMIN — Medication 5: at 11:37

## 2022-01-01 RX ADMIN — TAMSULOSIN HYDROCHLORIDE 0.4 MILLIGRAM(S): 0.4 CAPSULE ORAL at 00:04

## 2022-01-01 RX ADMIN — MIRTAZAPINE 15 MILLIGRAM(S): 45 TABLET, ORALLY DISINTEGRATING ORAL at 21:10

## 2022-01-01 RX ADMIN — SODIUM CHLORIDE 250 MILLILITER(S): 9 INJECTION INTRAMUSCULAR; INTRAVENOUS; SUBCUTANEOUS at 11:11

## 2022-01-01 RX ADMIN — Medication 1000 UNIT(S): at 12:57

## 2022-01-01 RX ADMIN — Medication 30 MILLILITER(S): at 17:42

## 2022-01-01 RX ADMIN — SODIUM CHLORIDE 3 MILLILITER(S): 9 INJECTION INTRAMUSCULAR; INTRAVENOUS; SUBCUTANEOUS at 23:04

## 2022-01-01 RX ADMIN — Medication 12.5 MILLIGRAM(S): at 05:38

## 2022-01-01 RX ADMIN — FINASTERIDE 5 MILLIGRAM(S): 5 TABLET, FILM COATED ORAL at 12:58

## 2022-01-01 RX ADMIN — Medication 1 PATCH: at 12:29

## 2022-01-01 RX ADMIN — Medication 1 MILLIGRAM(S): at 13:16

## 2022-01-01 RX ADMIN — Medication 1 PATCH: at 11:51

## 2022-01-01 RX ADMIN — Medication 81 MILLIGRAM(S): at 11:42

## 2022-01-01 RX ADMIN — ATORVASTATIN CALCIUM 40 MILLIGRAM(S): 80 TABLET, FILM COATED ORAL at 21:36

## 2022-01-01 RX ADMIN — INSULIN HUMAN 5 UNIT(S): 100 INJECTION, SOLUTION SUBCUTANEOUS at 09:29

## 2022-01-01 RX ADMIN — Medication 81 MILLIGRAM(S): at 12:27

## 2022-01-01 RX ADMIN — HALOPERIDOL DECANOATE 5 MILLIGRAM(S): 100 INJECTION INTRAMUSCULAR at 07:46

## 2022-01-01 RX ADMIN — Medication 3: at 08:13

## 2022-01-01 RX ADMIN — Medication 5 MILLIGRAM(S): at 12:05

## 2022-01-01 RX ADMIN — TIOTROPIUM BROMIDE 1 CAPSULE(S): 18 CAPSULE ORAL; RESPIRATORY (INHALATION) at 11:44

## 2022-01-01 RX ADMIN — Medication 30 MILLILITER(S): at 06:25

## 2022-01-01 RX ADMIN — Medication 1 PATCH: at 19:57

## 2022-01-01 RX ADMIN — TIOTROPIUM BROMIDE 1 CAPSULE(S): 18 CAPSULE ORAL; RESPIRATORY (INHALATION) at 12:19

## 2022-01-01 RX ADMIN — BUDESONIDE AND FORMOTEROL FUMARATE DIHYDRATE 2 PUFF(S): 160; 4.5 AEROSOL RESPIRATORY (INHALATION) at 12:07

## 2022-01-01 RX ADMIN — SENNA PLUS 2 TABLET(S): 8.6 TABLET ORAL at 22:31

## 2022-01-01 RX ADMIN — Medication 1 PATCH: at 13:05

## 2022-01-01 RX ADMIN — BUDESONIDE AND FORMOTEROL FUMARATE DIHYDRATE 2 PUFF(S): 160; 4.5 AEROSOL RESPIRATORY (INHALATION) at 12:43

## 2022-01-01 RX ADMIN — WARFARIN SODIUM 5 MILLIGRAM(S): 2.5 TABLET ORAL at 21:33

## 2022-01-01 RX ADMIN — BUDESONIDE AND FORMOTEROL FUMARATE DIHYDRATE 2 PUFF(S): 160; 4.5 AEROSOL RESPIRATORY (INHALATION) at 23:49

## 2022-01-01 RX ADMIN — GABAPENTIN 300 MILLIGRAM(S): 400 CAPSULE ORAL at 05:37

## 2022-01-01 RX ADMIN — INSULIN GLARGINE 7 UNIT(S): 100 INJECTION, SOLUTION SUBCUTANEOUS at 21:45

## 2022-01-01 RX ADMIN — GABAPENTIN 200 MILLIGRAM(S): 400 CAPSULE ORAL at 14:26

## 2022-01-01 RX ADMIN — ATORVASTATIN CALCIUM 40 MILLIGRAM(S): 80 TABLET, FILM COATED ORAL at 21:32

## 2022-01-01 RX ADMIN — Medication 1000 MILLIGRAM(S): at 13:47

## 2022-01-01 RX ADMIN — Medication 25 MILLIGRAM(S): at 17:42

## 2022-01-01 RX ADMIN — Medication 400 MILLIGRAM(S): at 13:04

## 2022-01-01 RX ADMIN — TIOTROPIUM BROMIDE 1 CAPSULE(S): 18 CAPSULE ORAL; RESPIRATORY (INHALATION) at 12:33

## 2022-01-01 RX ADMIN — Medication 1: at 21:46

## 2022-01-01 RX ADMIN — ENOXAPARIN SODIUM 60 MILLIGRAM(S): 100 INJECTION SUBCUTANEOUS at 18:15

## 2022-01-01 RX ADMIN — TAMSULOSIN HYDROCHLORIDE 0.4 MILLIGRAM(S): 0.4 CAPSULE ORAL at 21:09

## 2022-01-01 RX ADMIN — INSULIN GLARGINE 7 UNIT(S): 100 INJECTION, SOLUTION SUBCUTANEOUS at 21:39

## 2022-01-01 RX ADMIN — ONDANSETRON 5 MILLIGRAM(S): 8 TABLET, FILM COATED ORAL at 23:09

## 2022-01-01 RX ADMIN — SODIUM CHLORIDE 3 MILLILITER(S): 9 INJECTION INTRAMUSCULAR; INTRAVENOUS; SUBCUTANEOUS at 21:10

## 2022-01-01 RX ADMIN — BUDESONIDE AND FORMOTEROL FUMARATE DIHYDRATE 2 PUFF(S): 160; 4.5 AEROSOL RESPIRATORY (INHALATION) at 09:59

## 2022-01-01 RX ADMIN — Medication 2 MILLIGRAM(S): at 06:54

## 2022-01-01 RX ADMIN — ENOXAPARIN SODIUM 40 MILLIGRAM(S): 100 INJECTION SUBCUTANEOUS at 05:44

## 2022-01-01 RX ADMIN — Medication 200 GRAM(S): at 09:58

## 2022-01-01 RX ADMIN — Medication 81 MILLIGRAM(S): at 12:38

## 2022-01-01 RX ADMIN — Medication 105 MILLIGRAM(S): at 21:08

## 2022-01-01 RX ADMIN — Medication 75 MILLIGRAM(S): at 17:47

## 2022-01-01 RX ADMIN — Medication 325 MILLIGRAM(S): at 15:24

## 2022-01-01 RX ADMIN — Medication 650 MILLIGRAM(S): at 05:03

## 2022-01-01 RX ADMIN — Medication 12.5 MILLIGRAM(S): at 17:42

## 2022-01-01 RX ADMIN — Medication 325 MILLIGRAM(S): at 16:07

## 2022-01-01 RX ADMIN — Medication 30 MILLIGRAM(S): at 05:49

## 2022-01-01 RX ADMIN — Medication 30 MILLILITER(S): at 18:22

## 2022-01-01 RX ADMIN — Medication 100 MILLIGRAM(S): at 12:33

## 2022-01-01 RX ADMIN — LIDOCAINE 2 PATCH: 4 CREAM TOPICAL at 19:50

## 2022-01-01 RX ADMIN — INSULIN GLARGINE 7 UNIT(S): 100 INJECTION, SOLUTION SUBCUTANEOUS at 21:33

## 2022-01-01 RX ADMIN — ENOXAPARIN SODIUM 60 MILLIGRAM(S): 100 INJECTION SUBCUTANEOUS at 17:43

## 2022-01-01 RX ADMIN — Medication 30 MILLILITER(S): at 17:28

## 2022-01-01 RX ADMIN — ATORVASTATIN CALCIUM 40 MILLIGRAM(S): 80 TABLET, FILM COATED ORAL at 21:22

## 2022-01-01 RX ADMIN — Medication 25 MILLIGRAM(S): at 17:19

## 2022-01-01 RX ADMIN — Medication 3: at 22:09

## 2022-01-01 RX ADMIN — TAMSULOSIN HYDROCHLORIDE 0.4 MILLIGRAM(S): 0.4 CAPSULE ORAL at 21:53

## 2022-01-01 RX ADMIN — Medication 100 MILLIGRAM(S): at 13:04

## 2022-01-01 RX ADMIN — Medication 12.5 MILLIGRAM(S): at 05:13

## 2022-01-01 RX ADMIN — Medication 125 MILLIGRAM(S): at 02:48

## 2022-01-01 RX ADMIN — Medication 105 MILLIGRAM(S): at 15:38

## 2022-01-01 RX ADMIN — PANTOPRAZOLE SODIUM 40 MILLIGRAM(S): 20 TABLET, DELAYED RELEASE ORAL at 08:50

## 2022-01-01 RX ADMIN — Medication 1 PATCH: at 10:09

## 2022-01-01 RX ADMIN — TAMSULOSIN HYDROCHLORIDE 0.4 MILLIGRAM(S): 0.4 CAPSULE ORAL at 21:33

## 2022-01-01 RX ADMIN — Medication 650 MILLIGRAM(S): at 12:20

## 2022-01-01 RX ADMIN — ATORVASTATIN CALCIUM 40 MILLIGRAM(S): 80 TABLET, FILM COATED ORAL at 21:44

## 2022-01-01 RX ADMIN — Medication 325 MILLIGRAM(S): at 22:44

## 2022-01-01 RX ADMIN — Medication 5 MILLIGRAM(S): at 06:53

## 2022-01-01 RX ADMIN — Medication 81 MILLIGRAM(S): at 12:25

## 2022-01-01 RX ADMIN — Medication 1 TABLET(S): at 08:57

## 2022-01-01 RX ADMIN — SODIUM CHLORIDE 3 MILLILITER(S): 9 INJECTION INTRAMUSCULAR; INTRAVENOUS; SUBCUTANEOUS at 06:03

## 2022-01-01 RX ADMIN — GABAPENTIN 300 MILLIGRAM(S): 400 CAPSULE ORAL at 23:10

## 2022-01-01 RX ADMIN — Medication 650 MILLIGRAM(S): at 01:24

## 2022-01-01 RX ADMIN — GABAPENTIN 100 MILLIGRAM(S): 400 CAPSULE ORAL at 13:35

## 2022-01-01 RX ADMIN — POLYETHYLENE GLYCOL 3350 17 GRAM(S): 17 POWDER, FOR SOLUTION ORAL at 10:10

## 2022-01-01 RX ADMIN — Medication 325 MILLIGRAM(S): at 02:06

## 2022-01-01 RX ADMIN — QUETIAPINE FUMARATE 25 MILLIGRAM(S): 200 TABLET, FILM COATED ORAL at 21:49

## 2022-01-01 RX ADMIN — Medication 3 MILLIGRAM(S): at 01:13

## 2022-01-01 RX ADMIN — Medication 30 MILLIGRAM(S): at 06:45

## 2022-01-01 RX ADMIN — Medication 25 MILLIGRAM(S): at 18:34

## 2022-01-01 RX ADMIN — Medication 1: at 08:30

## 2022-01-01 RX ADMIN — Medication 4: at 11:08

## 2022-01-01 RX ADMIN — BUDESONIDE AND FORMOTEROL FUMARATE DIHYDRATE 2 PUFF(S): 160; 4.5 AEROSOL RESPIRATORY (INHALATION) at 10:02

## 2022-01-01 RX ADMIN — Medication 12.5 MILLIGRAM(S): at 21:37

## 2022-01-01 RX ADMIN — QUETIAPINE FUMARATE 25 MILLIGRAM(S): 200 TABLET, FILM COATED ORAL at 21:30

## 2022-01-01 RX ADMIN — BUDESONIDE AND FORMOTEROL FUMARATE DIHYDRATE 2 PUFF(S): 160; 4.5 AEROSOL RESPIRATORY (INHALATION) at 12:12

## 2022-01-01 RX ADMIN — Medication 81 MILLIGRAM(S): at 13:05

## 2022-01-01 RX ADMIN — PANTOPRAZOLE SODIUM 40 MILLIGRAM(S): 20 TABLET, DELAYED RELEASE ORAL at 05:15

## 2022-01-01 RX ADMIN — Medication 30 MILLIGRAM(S): at 18:27

## 2022-01-01 RX ADMIN — Medication 1: at 21:45

## 2022-01-01 RX ADMIN — Medication 3 MILLILITER(S): at 04:25

## 2022-01-01 RX ADMIN — SODIUM CHLORIDE 500 MILLILITER(S): 9 INJECTION INTRAMUSCULAR; INTRAVENOUS; SUBCUTANEOUS at 00:53

## 2022-01-01 RX ADMIN — Medication 1 PATCH: at 12:01

## 2022-01-01 RX ADMIN — PANTOPRAZOLE SODIUM 40 MILLIGRAM(S): 20 TABLET, DELAYED RELEASE ORAL at 06:22

## 2022-01-01 RX ADMIN — Medication 30 MILLILITER(S): at 11:36

## 2022-01-01 RX ADMIN — Medication 12.5 MILLIGRAM(S): at 18:02

## 2022-01-01 RX ADMIN — Medication 1 MILLIGRAM(S): at 11:48

## 2022-01-01 RX ADMIN — Medication 75 MILLIGRAM(S): at 17:53

## 2022-01-01 RX ADMIN — Medication 5 MILLIGRAM(S): at 17:42

## 2022-01-01 RX ADMIN — ENOXAPARIN SODIUM 75 MILLIGRAM(S): 100 INJECTION SUBCUTANEOUS at 05:26

## 2022-01-01 RX ADMIN — LIDOCAINE 2 PATCH: 4 CREAM TOPICAL at 19:30

## 2022-01-01 RX ADMIN — Medication 3 MILLIGRAM(S): at 21:34

## 2022-01-01 RX ADMIN — INSULIN GLARGINE 6 UNIT(S): 100 INJECTION, SOLUTION SUBCUTANEOUS at 23:09

## 2022-01-01 RX ADMIN — Medication 25 MILLIGRAM(S): at 18:24

## 2022-01-01 RX ADMIN — BUDESONIDE AND FORMOTEROL FUMARATE DIHYDRATE 2 PUFF(S): 160; 4.5 AEROSOL RESPIRATORY (INHALATION) at 21:22

## 2022-01-01 RX ADMIN — ENOXAPARIN SODIUM 75 MILLIGRAM(S): 100 INJECTION SUBCUTANEOUS at 05:48

## 2022-01-01 RX ADMIN — Medication 25 MILLIGRAM(S): at 06:09

## 2022-01-01 RX ADMIN — MORPHINE SULFATE 1 MILLIGRAM(S): 50 CAPSULE, EXTENDED RELEASE ORAL at 03:00

## 2022-01-01 RX ADMIN — DEXMEDETOMIDINE HYDROCHLORIDE IN 0.9% SODIUM CHLORIDE 3.71 MICROGRAM(S)/KG/HR: 4 INJECTION INTRAVENOUS at 23:59

## 2022-01-01 RX ADMIN — POLYETHYLENE GLYCOL 3350 17 GRAM(S): 17 POWDER, FOR SOLUTION ORAL at 11:40

## 2022-01-01 RX ADMIN — Medication 1 MILLIGRAM(S): at 12:56

## 2022-01-01 RX ADMIN — Medication 650 MILLIGRAM(S): at 19:22

## 2022-01-01 RX ADMIN — GABAPENTIN 200 MILLIGRAM(S): 400 CAPSULE ORAL at 06:40

## 2022-01-01 RX ADMIN — Medication 5 MILLIGRAM(S): at 19:06

## 2022-01-01 RX ADMIN — Medication 1 PATCH: at 00:09

## 2022-01-01 RX ADMIN — SODIUM CHLORIDE 3 MILLILITER(S): 9 INJECTION INTRAMUSCULAR; INTRAVENOUS; SUBCUTANEOUS at 05:49

## 2022-01-01 RX ADMIN — Medication 650 MILLIGRAM(S): at 23:00

## 2022-01-01 RX ADMIN — LIDOCAINE 2 PATCH: 4 CREAM TOPICAL at 12:12

## 2022-01-01 RX ADMIN — HYDROMORPHONE HYDROCHLORIDE 0.5 MILLIGRAM(S): 2 INJECTION INTRAMUSCULAR; INTRAVENOUS; SUBCUTANEOUS at 07:47

## 2022-01-01 RX ADMIN — TIOTROPIUM BROMIDE 1 CAPSULE(S): 18 CAPSULE ORAL; RESPIRATORY (INHALATION) at 13:26

## 2022-01-01 RX ADMIN — Medication 1 PATCH: at 11:40

## 2022-01-01 RX ADMIN — ENOXAPARIN SODIUM 75 MILLIGRAM(S): 100 INJECTION SUBCUTANEOUS at 17:52

## 2022-01-01 RX ADMIN — Medication 81 MILLIGRAM(S): at 11:43

## 2022-01-01 RX ADMIN — Medication 75 MILLIGRAM(S): at 18:34

## 2022-01-01 RX ADMIN — Medication 2: at 17:16

## 2022-01-01 RX ADMIN — OXYCODONE HYDROCHLORIDE 2.5 MILLIGRAM(S): 5 TABLET ORAL at 15:05

## 2022-01-01 RX ADMIN — GABAPENTIN 200 MILLIGRAM(S): 400 CAPSULE ORAL at 18:22

## 2022-01-01 RX ADMIN — POLYETHYLENE GLYCOL 3350 17 GRAM(S): 17 POWDER, FOR SOLUTION ORAL at 11:58

## 2022-01-01 RX ADMIN — GABAPENTIN 300 MILLIGRAM(S): 400 CAPSULE ORAL at 05:13

## 2022-01-01 RX ADMIN — Medication 1000 MILLIGRAM(S): at 22:26

## 2022-01-01 RX ADMIN — Medication 650 MILLIGRAM(S): at 22:14

## 2022-01-01 RX ADMIN — TIOTROPIUM BROMIDE 1 CAPSULE(S): 18 CAPSULE ORAL; RESPIRATORY (INHALATION) at 12:35

## 2022-01-01 RX ADMIN — Medication 3: at 17:27

## 2022-01-01 RX ADMIN — ATORVASTATIN CALCIUM 40 MILLIGRAM(S): 80 TABLET, FILM COATED ORAL at 21:10

## 2022-01-01 RX ADMIN — SODIUM CHLORIDE 2000 MILLILITER(S): 9 INJECTION INTRAMUSCULAR; INTRAVENOUS; SUBCUTANEOUS at 22:30

## 2022-01-01 RX ADMIN — Medication 40 MILLIGRAM(S): at 05:44

## 2022-01-01 RX ADMIN — Medication 6: at 08:13

## 2022-01-01 RX ADMIN — Medication 1000 MILLIGRAM(S): at 23:55

## 2022-01-01 RX ADMIN — QUETIAPINE FUMARATE 25 MILLIGRAM(S): 200 TABLET, FILM COATED ORAL at 21:46

## 2022-01-01 RX ADMIN — LIDOCAINE 2 PATCH: 4 CREAM TOPICAL at 23:55

## 2022-01-01 RX ADMIN — Medication 30 MILLILITER(S): at 06:42

## 2022-01-01 RX ADMIN — Medication 40 MILLIGRAM(S): at 05:47

## 2022-01-01 RX ADMIN — TIOTROPIUM BROMIDE 1 CAPSULE(S): 18 CAPSULE ORAL; RESPIRATORY (INHALATION) at 11:42

## 2022-01-01 RX ADMIN — Medication 1 PATCH: at 08:03

## 2022-01-01 RX ADMIN — BUDESONIDE AND FORMOTEROL FUMARATE DIHYDRATE 2 PUFF(S): 160; 4.5 AEROSOL RESPIRATORY (INHALATION) at 11:44

## 2022-01-01 RX ADMIN — WARFARIN SODIUM 7.5 MILLIGRAM(S): 2.5 TABLET ORAL at 21:34

## 2022-01-01 RX ADMIN — LIDOCAINE 1 PATCH: 4 CREAM TOPICAL at 13:17

## 2022-01-01 RX ADMIN — SODIUM CHLORIDE 3 MILLILITER(S): 9 INJECTION INTRAMUSCULAR; INTRAVENOUS; SUBCUTANEOUS at 14:10

## 2022-01-01 RX ADMIN — BUDESONIDE AND FORMOTEROL FUMARATE DIHYDRATE 2 PUFF(S): 160; 4.5 AEROSOL RESPIRATORY (INHALATION) at 09:28

## 2022-01-01 RX ADMIN — Medication 4 UNIT(S): at 17:24

## 2022-01-01 RX ADMIN — Medication 15 MILLIGRAM(S): at 22:48

## 2022-01-01 RX ADMIN — Medication 75 MILLIGRAM(S): at 05:28

## 2022-01-01 RX ADMIN — Medication 6 MILLILITER(S): at 13:48

## 2022-01-01 RX ADMIN — POLYETHYLENE GLYCOL 3350 17 GRAM(S): 17 POWDER, FOR SOLUTION ORAL at 12:35

## 2022-01-01 RX ADMIN — Medication 1 PATCH: at 12:14

## 2022-01-01 RX ADMIN — BUDESONIDE AND FORMOTEROL FUMARATE DIHYDRATE 2 PUFF(S): 160; 4.5 AEROSOL RESPIRATORY (INHALATION) at 21:13

## 2022-01-01 RX ADMIN — Medication 1: at 17:29

## 2022-01-01 RX ADMIN — PANTOPRAZOLE SODIUM 40 MILLIGRAM(S): 20 TABLET, DELAYED RELEASE ORAL at 06:05

## 2022-01-01 RX ADMIN — Medication 4 UNIT(S): at 11:55

## 2022-01-01 RX ADMIN — Medication 1 PATCH: at 11:42

## 2022-01-01 RX ADMIN — DEXMEDETOMIDINE HYDROCHLORIDE IN 0.9% SODIUM CHLORIDE 3.71 MICROGRAM(S)/KG/HR: 4 INJECTION INTRAVENOUS at 18:21

## 2022-01-01 RX ADMIN — Medication 30 MILLILITER(S): at 17:21

## 2022-01-01 RX ADMIN — GABAPENTIN 100 MILLIGRAM(S): 400 CAPSULE ORAL at 05:15

## 2022-01-01 RX ADMIN — Medication 3 MILLIGRAM(S): at 00:12

## 2022-01-01 RX ADMIN — Medication 1 PATCH: at 11:54

## 2022-01-01 RX ADMIN — Medication 25 GRAM(S): at 22:28

## 2022-01-01 RX ADMIN — ATORVASTATIN CALCIUM 40 MILLIGRAM(S): 80 TABLET, FILM COATED ORAL at 21:59

## 2022-01-01 RX ADMIN — Medication 2 MILLIGRAM(S): at 18:22

## 2022-01-01 RX ADMIN — Medication 30 MILLILITER(S): at 05:56

## 2022-01-01 RX ADMIN — WARFARIN SODIUM 5 MILLIGRAM(S): 2.5 TABLET ORAL at 21:14

## 2022-01-01 RX ADMIN — Medication 100 MILLIGRAM(S): at 11:40

## 2022-01-01 RX ADMIN — Medication 650 MILLIGRAM(S): at 00:35

## 2022-01-01 RX ADMIN — Medication 1 PATCH: at 19:03

## 2022-01-01 RX ADMIN — Medication 100 MILLIGRAM(S): at 11:48

## 2022-01-01 RX ADMIN — QUETIAPINE FUMARATE 25 MILLIGRAM(S): 200 TABLET, FILM COATED ORAL at 21:22

## 2022-01-01 RX ADMIN — WARFARIN SODIUM 6 MILLIGRAM(S): 2.5 TABLET ORAL at 23:50

## 2022-01-01 RX ADMIN — Medication 1000 MILLIGRAM(S): at 15:29

## 2022-01-01 RX ADMIN — CEFTRIAXONE 100 MILLIGRAM(S): 500 INJECTION, POWDER, FOR SOLUTION INTRAMUSCULAR; INTRAVENOUS at 05:39

## 2022-01-01 RX ADMIN — SODIUM CHLORIDE 250 MILLILITER(S): 9 INJECTION INTRAMUSCULAR; INTRAVENOUS; SUBCUTANEOUS at 11:52

## 2022-01-01 RX ADMIN — Medication 3: at 06:25

## 2022-01-01 RX ADMIN — Medication 2: at 11:36

## 2022-01-01 RX ADMIN — SODIUM CHLORIDE 1000 MILLILITER(S): 9 INJECTION INTRAMUSCULAR; INTRAVENOUS; SUBCUTANEOUS at 09:24

## 2022-01-01 RX ADMIN — SODIUM CHLORIDE 250 MILLILITER(S): 9 INJECTION INTRAMUSCULAR; INTRAVENOUS; SUBCUTANEOUS at 01:57

## 2022-01-01 RX ADMIN — HALOPERIDOL DECANOATE 5 MILLIGRAM(S): 100 INJECTION INTRAMUSCULAR at 09:45

## 2022-01-01 RX ADMIN — ATORVASTATIN CALCIUM 40 MILLIGRAM(S): 80 TABLET, FILM COATED ORAL at 22:26

## 2022-01-01 RX ADMIN — Medication 75 MILLIGRAM(S): at 06:37

## 2022-01-01 RX ADMIN — Medication 1 PATCH: at 15:12

## 2022-01-01 RX ADMIN — Medication 3 MILLILITER(S): at 08:34

## 2022-01-01 RX ADMIN — CYCLOBENZAPRINE HYDROCHLORIDE 5 MILLIGRAM(S): 10 TABLET, FILM COATED ORAL at 21:59

## 2022-01-01 RX ADMIN — Medication 3 MILLILITER(S): at 15:46

## 2022-01-01 RX ADMIN — Medication 75 MILLIGRAM(S): at 05:32

## 2022-01-01 RX ADMIN — Medication 100 GRAM(S): at 11:52

## 2022-01-01 RX ADMIN — Medication 325 MILLIGRAM(S): at 15:10

## 2022-01-01 RX ADMIN — Medication 30 MILLILITER(S): at 13:16

## 2022-01-01 RX ADMIN — Medication 1 PATCH: at 19:22

## 2022-01-01 RX ADMIN — Medication 40 MILLIGRAM(S): at 06:01

## 2022-01-01 RX ADMIN — Medication 1 PATCH: at 07:00

## 2022-01-01 RX ADMIN — Medication 1 MILLIGRAM(S): at 11:44

## 2022-01-01 RX ADMIN — ENOXAPARIN SODIUM 75 MILLIGRAM(S): 100 INJECTION SUBCUTANEOUS at 17:41

## 2022-01-01 RX ADMIN — TIOTROPIUM BROMIDE 1 CAPSULE(S): 18 CAPSULE ORAL; RESPIRATORY (INHALATION) at 15:59

## 2022-01-01 RX ADMIN — Medication 40 MILLIEQUIVALENT(S): at 13:50

## 2022-01-01 RX ADMIN — Medication 3 MILLIGRAM(S): at 21:31

## 2022-01-01 RX ADMIN — SODIUM CHLORIDE 3 MILLILITER(S): 9 INJECTION INTRAMUSCULAR; INTRAVENOUS; SUBCUTANEOUS at 21:40

## 2022-01-01 RX ADMIN — HYDROMORPHONE HYDROCHLORIDE 0.5 MILLIGRAM(S): 2 INJECTION INTRAMUSCULAR; INTRAVENOUS; SUBCUTANEOUS at 08:47

## 2022-01-01 RX ADMIN — ENOXAPARIN SODIUM 75 MILLIGRAM(S): 100 INJECTION SUBCUTANEOUS at 17:47

## 2022-01-01 RX ADMIN — Medication 3 MILLILITER(S): at 20:27

## 2022-01-01 RX ADMIN — Medication 1000 MILLIGRAM(S): at 15:22

## 2022-01-01 RX ADMIN — Medication 100 MILLIGRAM(S): at 12:12

## 2022-01-01 RX ADMIN — Medication 30 MILLILITER(S): at 05:06

## 2022-01-01 RX ADMIN — Medication 12.5 MILLIGRAM(S): at 23:12

## 2022-01-01 RX ADMIN — Medication 30 MILLILITER(S): at 15:16

## 2022-01-01 RX ADMIN — Medication 1 PATCH: at 11:36

## 2022-01-01 RX ADMIN — Medication 81 MILLIGRAM(S): at 12:55

## 2022-01-01 RX ADMIN — Medication 1 MILLIGRAM(S): at 11:37

## 2022-01-01 RX ADMIN — Medication 30 MILLILITER(S): at 00:43

## 2022-01-01 RX ADMIN — Medication 15 MILLIGRAM(S): at 22:18

## 2022-01-01 RX ADMIN — MIRTAZAPINE 15 MILLIGRAM(S): 45 TABLET, ORALLY DISINTEGRATING ORAL at 22:30

## 2022-01-01 RX ADMIN — MIRTAZAPINE 15 MILLIGRAM(S): 45 TABLET, ORALLY DISINTEGRATING ORAL at 23:11

## 2022-01-01 RX ADMIN — BUDESONIDE AND FORMOTEROL FUMARATE DIHYDRATE 2 PUFF(S): 160; 4.5 AEROSOL RESPIRATORY (INHALATION) at 21:49

## 2022-01-01 RX ADMIN — HYDROMORPHONE HYDROCHLORIDE 0.5 MILLIGRAM(S): 2 INJECTION INTRAMUSCULAR; INTRAVENOUS; SUBCUTANEOUS at 12:15

## 2022-01-01 RX ADMIN — Medication 1000 MILLIGRAM(S): at 18:25

## 2022-01-01 RX ADMIN — SODIUM CHLORIDE 3 MILLILITER(S): 9 INJECTION INTRAMUSCULAR; INTRAVENOUS; SUBCUTANEOUS at 22:46

## 2022-01-01 RX ADMIN — Medication 650 MILLIGRAM(S): at 17:24

## 2022-01-01 RX ADMIN — Medication 30 MILLILITER(S): at 12:34

## 2022-01-01 RX ADMIN — WARFARIN SODIUM 7.5 MILLIGRAM(S): 2.5 TABLET ORAL at 21:18

## 2022-01-01 RX ADMIN — ENOXAPARIN SODIUM 75 MILLIGRAM(S): 100 INJECTION SUBCUTANEOUS at 05:39

## 2022-01-01 RX ADMIN — Medication 1 PATCH: at 19:05

## 2022-01-01 RX ADMIN — Medication 4 UNIT(S): at 08:08

## 2022-01-01 RX ADMIN — BUDESONIDE AND FORMOTEROL FUMARATE DIHYDRATE 2 PUFF(S): 160; 4.5 AEROSOL RESPIRATORY (INHALATION) at 21:34

## 2022-01-01 RX ADMIN — QUETIAPINE FUMARATE 25 MILLIGRAM(S): 200 TABLET, FILM COATED ORAL at 21:14

## 2022-01-01 RX ADMIN — Medication 12.5 MILLIGRAM(S): at 05:56

## 2022-01-01 RX ADMIN — POLYETHYLENE GLYCOL 3350 17 GRAM(S): 17 POWDER, FOR SOLUTION ORAL at 13:06

## 2022-01-01 RX ADMIN — BUDESONIDE AND FORMOTEROL FUMARATE DIHYDRATE 2 PUFF(S): 160; 4.5 AEROSOL RESPIRATORY (INHALATION) at 11:56

## 2022-01-01 RX ADMIN — TIOTROPIUM BROMIDE 1 CAPSULE(S): 18 CAPSULE ORAL; RESPIRATORY (INHALATION) at 11:37

## 2022-01-01 RX ADMIN — Medication 1 PATCH: at 11:28

## 2022-01-01 RX ADMIN — INSULIN GLARGINE 6 UNIT(S): 100 INJECTION, SOLUTION SUBCUTANEOUS at 21:52

## 2022-01-01 RX ADMIN — Medication 650 MILLIGRAM(S): at 05:15

## 2022-01-01 RX ADMIN — Medication 4 UNIT(S): at 12:34

## 2022-01-01 RX ADMIN — OXYCODONE HYDROCHLORIDE 2.5 MILLIGRAM(S): 5 TABLET ORAL at 12:54

## 2022-01-01 RX ADMIN — INSULIN GLARGINE 7 UNIT(S): 100 INJECTION, SOLUTION SUBCUTANEOUS at 21:49

## 2022-01-01 RX ADMIN — Medication 1 PATCH: at 17:49

## 2022-01-01 RX ADMIN — Medication 30 MILLILITER(S): at 17:17

## 2022-01-01 RX ADMIN — Medication 100 MILLIGRAM(S): at 12:04

## 2022-01-01 RX ADMIN — Medication 12.5 MILLIGRAM(S): at 05:01

## 2022-01-01 RX ADMIN — MIRTAZAPINE 15 MILLIGRAM(S): 45 TABLET, ORALLY DISINTEGRATING ORAL at 21:34

## 2022-01-01 RX ADMIN — BUDESONIDE AND FORMOTEROL FUMARATE DIHYDRATE 2 PUFF(S): 160; 4.5 AEROSOL RESPIRATORY (INHALATION) at 21:23

## 2022-01-01 RX ADMIN — Medication 12.5 MILLIGRAM(S): at 18:24

## 2022-01-01 RX ADMIN — WARFARIN SODIUM 5 MILLIGRAM(S): 2.5 TABLET ORAL at 23:38

## 2022-01-01 RX ADMIN — WARFARIN SODIUM 6 MILLIGRAM(S): 2.5 TABLET ORAL at 23:10

## 2022-01-01 RX ADMIN — ALBUTEROL 2.5 MILLIGRAM(S): 90 AEROSOL, METERED ORAL at 23:28

## 2022-01-01 RX ADMIN — Medication 1 PATCH: at 11:37

## 2022-01-01 RX ADMIN — PANTOPRAZOLE SODIUM 40 MILLIGRAM(S): 20 TABLET, DELAYED RELEASE ORAL at 06:17

## 2022-01-01 RX ADMIN — AZITHROMYCIN 255 MILLIGRAM(S): 500 TABLET, FILM COATED ORAL at 05:32

## 2022-01-01 RX ADMIN — FINASTERIDE 5 MILLIGRAM(S): 5 TABLET, FILM COATED ORAL at 13:04

## 2022-01-01 RX ADMIN — Medication 250 MILLIGRAM(S): at 19:29

## 2022-01-01 RX ADMIN — WARFARIN SODIUM 6 MILLIGRAM(S): 2.5 TABLET ORAL at 21:29

## 2022-01-01 RX ADMIN — Medication 1000 UNIT(S): at 11:36

## 2022-01-01 RX ADMIN — Medication 6: at 13:20

## 2022-01-01 RX ADMIN — WARFARIN SODIUM 6 MILLIGRAM(S): 2.5 TABLET ORAL at 22:26

## 2022-01-01 RX ADMIN — Medication 1 PATCH: at 20:00

## 2022-01-01 RX ADMIN — INSULIN GLARGINE 6 UNIT(S): 100 INJECTION, SOLUTION SUBCUTANEOUS at 22:13

## 2022-01-01 RX ADMIN — Medication 40 MILLIGRAM(S): at 05:32

## 2022-01-01 RX ADMIN — Medication 81 MILLIGRAM(S): at 12:33

## 2022-01-01 RX ADMIN — WARFARIN SODIUM 2 MILLIGRAM(S): 2.5 TABLET ORAL at 21:22

## 2022-01-01 RX ADMIN — Medication 5: at 11:54

## 2022-01-01 RX ADMIN — BUDESONIDE AND FORMOTEROL FUMARATE DIHYDRATE 2 PUFF(S): 160; 4.5 AEROSOL RESPIRATORY (INHALATION) at 21:20

## 2022-01-01 RX ADMIN — LIDOCAINE 2 PATCH: 4 CREAM TOPICAL at 13:05

## 2022-01-01 RX ADMIN — Medication 1: at 17:20

## 2022-01-01 RX ADMIN — SENNA PLUS 2 TABLET(S): 8.6 TABLET ORAL at 22:17

## 2022-01-01 RX ADMIN — Medication 1 MILLIGRAM(S): at 11:35

## 2022-01-01 RX ADMIN — Medication 100 MILLIGRAM(S): at 13:43

## 2022-01-01 RX ADMIN — SODIUM CHLORIDE 3 MILLILITER(S): 9 INJECTION INTRAMUSCULAR; INTRAVENOUS; SUBCUTANEOUS at 13:39

## 2022-01-01 RX ADMIN — BUDESONIDE AND FORMOTEROL FUMARATE DIHYDRATE 2 PUFF(S): 160; 4.5 AEROSOL RESPIRATORY (INHALATION) at 10:08

## 2022-01-01 RX ADMIN — COSYNTROPIN 0.25 MILLIGRAM(S): 0.25 INJECTION, SOLUTION INTRAVENOUS at 10:52

## 2022-01-01 RX ADMIN — Medication 1 PATCH: at 07:22

## 2022-01-01 RX ADMIN — ENOXAPARIN SODIUM 75 MILLIGRAM(S): 100 INJECTION SUBCUTANEOUS at 05:45

## 2022-01-01 RX ADMIN — BUDESONIDE AND FORMOTEROL FUMARATE DIHYDRATE 2 PUFF(S): 160; 4.5 AEROSOL RESPIRATORY (INHALATION) at 21:32

## 2022-01-01 RX ADMIN — SODIUM ZIRCONIUM CYCLOSILICATE 10 GRAM(S): 10 POWDER, FOR SUSPENSION ORAL at 11:05

## 2022-01-01 RX ADMIN — Medication 1 PATCH: at 12:05

## 2022-01-01 RX ADMIN — CEFTRIAXONE 100 MILLIGRAM(S): 500 INJECTION, POWDER, FOR SOLUTION INTRAMUSCULAR; INTRAVENOUS at 06:14

## 2022-01-01 RX ADMIN — Medication 6: at 22:22

## 2022-01-01 RX ADMIN — Medication 25 MILLIGRAM(S): at 05:32

## 2022-01-01 RX ADMIN — SENNA PLUS 2 TABLET(S): 8.6 TABLET ORAL at 23:50

## 2022-01-01 RX ADMIN — Medication 2: at 21:41

## 2022-01-01 RX ADMIN — Medication 50 MILLILITER(S): at 09:29

## 2022-01-01 RX ADMIN — INSULIN GLARGINE 6 UNIT(S): 100 INJECTION, SOLUTION SUBCUTANEOUS at 22:27

## 2022-01-01 RX ADMIN — Medication 81 MILLIGRAM(S): at 12:04

## 2022-01-01 RX ADMIN — Medication 325 MILLIGRAM(S): at 18:53

## 2022-01-01 RX ADMIN — Medication 6: at 11:48

## 2022-01-01 RX ADMIN — TIOTROPIUM BROMIDE 1 CAPSULE(S): 18 CAPSULE ORAL; RESPIRATORY (INHALATION) at 11:46

## 2022-01-01 RX ADMIN — PANTOPRAZOLE SODIUM 40 MILLIGRAM(S): 20 TABLET, DELAYED RELEASE ORAL at 06:25

## 2022-01-01 RX ADMIN — Medication 325 MILLIGRAM(S): at 14:14

## 2022-01-01 RX ADMIN — POLYETHYLENE GLYCOL 3350 17 GRAM(S): 17 POWDER, FOR SOLUTION ORAL at 12:38

## 2022-01-01 RX ADMIN — FINASTERIDE 5 MILLIGRAM(S): 5 TABLET, FILM COATED ORAL at 11:56

## 2022-01-01 RX ADMIN — ADENOSINE 12 MILLIGRAM(S): 3 INJECTION INTRAVENOUS at 09:25

## 2022-01-01 RX ADMIN — Medication 2: at 21:17

## 2022-01-01 RX ADMIN — LIDOCAINE 2 PATCH: 4 CREAM TOPICAL at 14:00

## 2022-01-01 RX ADMIN — Medication 1 PATCH: at 12:25

## 2022-01-01 RX ADMIN — BUDESONIDE AND FORMOTEROL FUMARATE DIHYDRATE 2 PUFF(S): 160; 4.5 AEROSOL RESPIRATORY (INHALATION) at 22:18

## 2022-01-01 RX ADMIN — Medication 1 PATCH: at 07:29

## 2022-01-01 RX ADMIN — Medication 3 MILLILITER(S): at 14:54

## 2022-01-01 RX ADMIN — QUETIAPINE FUMARATE 25 MILLIGRAM(S): 200 TABLET, FILM COATED ORAL at 21:09

## 2022-01-01 RX ADMIN — Medication 2: at 08:18

## 2022-01-01 RX ADMIN — Medication 1 PATCH: at 12:37

## 2022-01-01 RX ADMIN — BUDESONIDE AND FORMOTEROL FUMARATE DIHYDRATE 2 PUFF(S): 160; 4.5 AEROSOL RESPIRATORY (INHALATION) at 11:45

## 2022-01-01 RX ADMIN — Medication 25 MILLIGRAM(S): at 05:49

## 2022-01-01 RX ADMIN — MIRTAZAPINE 15 MILLIGRAM(S): 45 TABLET, ORALLY DISINTEGRATING ORAL at 22:24

## 2022-01-01 RX ADMIN — CEFTRIAXONE 100 MILLIGRAM(S): 500 INJECTION, POWDER, FOR SOLUTION INTRAMUSCULAR; INTRAVENOUS at 22:48

## 2022-01-01 RX ADMIN — Medication 1 MILLIGRAM(S): at 15:13

## 2022-01-01 RX ADMIN — Medication 100 MILLIGRAM(S): at 12:50

## 2022-01-01 RX ADMIN — GABAPENTIN 300 MILLIGRAM(S): 400 CAPSULE ORAL at 17:28

## 2022-01-01 RX ADMIN — Medication 130 MILLIGRAM(S): at 12:46

## 2022-01-01 RX ADMIN — Medication 100 MILLIGRAM(S): at 11:38

## 2022-01-01 RX ADMIN — Medication 30 MILLILITER(S): at 17:40

## 2022-01-01 RX ADMIN — Medication 1 PATCH: at 11:10

## 2022-01-01 RX ADMIN — Medication 5 MILLIGRAM(S): at 21:26

## 2022-01-01 RX ADMIN — SODIUM CHLORIDE 500 MILLILITER(S): 9 INJECTION INTRAMUSCULAR; INTRAVENOUS; SUBCUTANEOUS at 00:09

## 2022-01-01 RX ADMIN — BUDESONIDE AND FORMOTEROL FUMARATE DIHYDRATE 2 PUFF(S): 160; 4.5 AEROSOL RESPIRATORY (INHALATION) at 12:35

## 2022-01-01 RX ADMIN — Medication 75 MILLIGRAM(S): at 05:44

## 2022-01-01 RX ADMIN — Medication 1 PATCH: at 19:00

## 2022-01-01 RX ADMIN — BUDESONIDE AND FORMOTEROL FUMARATE DIHYDRATE 2 PUFF(S): 160; 4.5 AEROSOL RESPIRATORY (INHALATION) at 21:46

## 2022-01-01 RX ADMIN — POLYETHYLENE GLYCOL 3350 17 GRAM(S): 17 POWDER, FOR SOLUTION ORAL at 13:50

## 2022-01-01 RX ADMIN — Medication 1 MILLIGRAM(S): at 11:49

## 2022-01-01 RX ADMIN — GABAPENTIN 200 MILLIGRAM(S): 400 CAPSULE ORAL at 17:40

## 2022-01-01 RX ADMIN — ATORVASTATIN CALCIUM 40 MILLIGRAM(S): 80 TABLET, FILM COATED ORAL at 22:42

## 2022-01-01 RX ADMIN — BUDESONIDE AND FORMOTEROL FUMARATE DIHYDRATE 2 PUFF(S): 160; 4.5 AEROSOL RESPIRATORY (INHALATION) at 21:09

## 2022-01-01 RX ADMIN — PANTOPRAZOLE SODIUM 40 MILLIGRAM(S): 20 TABLET, DELAYED RELEASE ORAL at 12:32

## 2022-01-01 RX ADMIN — GABAPENTIN 200 MILLIGRAM(S): 400 CAPSULE ORAL at 05:13

## 2022-01-01 RX ADMIN — Medication 975 MILLIGRAM(S): at 23:28

## 2022-01-01 RX ADMIN — Medication 1 PATCH: at 14:19

## 2022-01-01 RX ADMIN — ALBUTEROL 2.5 MILLIGRAM(S): 90 AEROSOL, METERED ORAL at 22:45

## 2022-01-01 RX ADMIN — INSULIN GLARGINE 7 UNIT(S): 100 INJECTION, SOLUTION SUBCUTANEOUS at 21:47

## 2022-01-01 RX ADMIN — ENOXAPARIN SODIUM 40 MILLIGRAM(S): 100 INJECTION SUBCUTANEOUS at 05:58

## 2022-01-01 RX ADMIN — GABAPENTIN 200 MILLIGRAM(S): 400 CAPSULE ORAL at 17:22

## 2022-01-01 RX ADMIN — MIRTAZAPINE 15 MILLIGRAM(S): 45 TABLET, ORALLY DISINTEGRATING ORAL at 22:00

## 2022-01-01 RX ADMIN — SODIUM CHLORIDE 3 MILLILITER(S): 9 INJECTION INTRAMUSCULAR; INTRAVENOUS; SUBCUTANEOUS at 21:36

## 2022-01-01 RX ADMIN — Medication 4: at 08:32

## 2022-01-01 RX ADMIN — WARFARIN SODIUM 7.5 MILLIGRAM(S): 2.5 TABLET ORAL at 22:40

## 2022-01-01 RX ADMIN — Medication 100 MILLIGRAM(S): at 11:33

## 2022-01-01 RX ADMIN — Medication 1 MILLIGRAM(S): at 12:33

## 2022-01-01 RX ADMIN — Medication 100 MILLIGRAM(S): at 12:57

## 2022-01-01 RX ADMIN — Medication 25 MILLIGRAM(S): at 06:22

## 2022-01-01 RX ADMIN — Medication 40 MILLIGRAM(S): at 05:49

## 2022-01-01 RX ADMIN — Medication 3 MILLIGRAM(S): at 21:40

## 2022-01-01 RX ADMIN — Medication 1 PATCH: at 12:45

## 2022-01-01 RX ADMIN — TIOTROPIUM BROMIDE 1 CAPSULE(S): 18 CAPSULE ORAL; RESPIRATORY (INHALATION) at 12:25

## 2022-01-01 RX ADMIN — Medication 12.5 MILLIGRAM(S): at 15:22

## 2022-01-01 RX ADMIN — Medication 30 MILLIGRAM(S): at 05:36

## 2022-01-01 RX ADMIN — Medication 30 MILLILITER(S): at 05:52

## 2022-01-01 RX ADMIN — ENOXAPARIN SODIUM 40 MILLIGRAM(S): 100 INJECTION SUBCUTANEOUS at 05:42

## 2022-01-01 RX ADMIN — Medication 1 PATCH: at 12:00

## 2022-01-01 RX ADMIN — ATORVASTATIN CALCIUM 40 MILLIGRAM(S): 80 TABLET, FILM COATED ORAL at 23:50

## 2022-01-01 RX ADMIN — Medication 12.5 MILLIGRAM(S): at 19:24

## 2022-01-01 RX ADMIN — Medication 100 MILLIGRAM(S): at 13:26

## 2022-01-01 RX ADMIN — Medication 105 MILLIGRAM(S): at 05:26

## 2022-01-01 RX ADMIN — Medication 4: at 17:40

## 2022-01-01 RX ADMIN — PANTOPRAZOLE SODIUM 40 MILLIGRAM(S): 20 TABLET, DELAYED RELEASE ORAL at 06:11

## 2022-01-01 RX ADMIN — INSULIN GLARGINE 7 UNIT(S): 100 INJECTION, SOLUTION SUBCUTANEOUS at 21:54

## 2022-01-01 RX ADMIN — Medication 325 MILLIGRAM(S): at 17:49

## 2022-01-01 RX ADMIN — Medication 30 MILLILITER(S): at 05:16

## 2022-01-01 RX ADMIN — SENNA PLUS 1 TABLET(S): 8.6 TABLET ORAL at 14:27

## 2022-01-01 RX ADMIN — TIOTROPIUM BROMIDE 1 CAPSULE(S): 18 CAPSULE ORAL; RESPIRATORY (INHALATION) at 12:38

## 2022-01-01 RX ADMIN — POLYETHYLENE GLYCOL 3350 17 GRAM(S): 17 POWDER, FOR SOLUTION ORAL at 12:44

## 2022-01-01 RX ADMIN — BUDESONIDE AND FORMOTEROL FUMARATE DIHYDRATE 2 PUFF(S): 160; 4.5 AEROSOL RESPIRATORY (INHALATION) at 21:41

## 2022-01-01 RX ADMIN — Medication 30 MILLILITER(S): at 19:20

## 2022-01-01 RX ADMIN — BUDESONIDE AND FORMOTEROL FUMARATE DIHYDRATE 2 PUFF(S): 160; 4.5 AEROSOL RESPIRATORY (INHALATION) at 11:34

## 2022-01-01 RX ADMIN — Medication 81 MILLIGRAM(S): at 17:23

## 2022-01-01 RX ADMIN — Medication 1 PATCH: at 12:06

## 2022-01-01 RX ADMIN — Medication 1 PATCH: at 11:49

## 2022-01-01 RX ADMIN — Medication 100 MILLIGRAM(S): at 13:16

## 2022-01-01 RX ADMIN — Medication 100 MILLIGRAM(S): at 12:26

## 2022-01-01 RX ADMIN — Medication 1: at 11:39

## 2022-01-01 RX ADMIN — ENOXAPARIN SODIUM 75 MILLIGRAM(S): 100 INJECTION SUBCUTANEOUS at 05:51

## 2022-01-01 RX ADMIN — Medication 1 PATCH: at 08:30

## 2022-01-01 RX ADMIN — Medication 325 MILLIGRAM(S): at 20:25

## 2022-01-01 RX ADMIN — Medication 200 GRAM(S): at 08:57

## 2022-01-01 RX ADMIN — PANTOPRAZOLE SODIUM 40 MILLIGRAM(S): 20 TABLET, DELAYED RELEASE ORAL at 05:50

## 2022-01-01 RX ADMIN — Medication 1 PATCH: at 20:08

## 2022-01-01 RX ADMIN — PANTOPRAZOLE SODIUM 40 MILLIGRAM(S): 20 TABLET, DELAYED RELEASE ORAL at 05:04

## 2022-01-01 RX ADMIN — POLYETHYLENE GLYCOL 3350 17 GRAM(S): 17 POWDER, FOR SOLUTION ORAL at 12:33

## 2022-01-01 RX ADMIN — ENOXAPARIN SODIUM 75 MILLIGRAM(S): 100 INJECTION SUBCUTANEOUS at 18:34

## 2022-01-01 RX ADMIN — Medication 130 MILLIGRAM(S): at 09:46

## 2022-01-01 RX ADMIN — Medication 30 MILLILITER(S): at 13:27

## 2022-01-01 RX ADMIN — Medication 105 MILLIGRAM(S): at 05:43

## 2022-01-01 RX ADMIN — Medication 30 MILLILITER(S): at 12:28

## 2022-01-01 RX ADMIN — GABAPENTIN 200 MILLIGRAM(S): 400 CAPSULE ORAL at 19:19

## 2022-01-01 RX ADMIN — QUETIAPINE FUMARATE 25 MILLIGRAM(S): 200 TABLET, FILM COATED ORAL at 21:39

## 2022-01-01 RX ADMIN — Medication 1 PATCH: at 13:25

## 2022-01-01 RX ADMIN — TIOTROPIUM BROMIDE 1 CAPSULE(S): 18 CAPSULE ORAL; RESPIRATORY (INHALATION) at 14:27

## 2022-01-01 RX ADMIN — Medication 1 MILLIGRAM(S): at 11:00

## 2022-01-01 RX ADMIN — Medication 325 MILLIGRAM(S): at 17:46

## 2022-01-01 RX ADMIN — INSULIN GLARGINE 6 UNIT(S): 100 INJECTION, SOLUTION SUBCUTANEOUS at 21:34

## 2022-01-01 RX ADMIN — BUDESONIDE AND FORMOTEROL FUMARATE DIHYDRATE 2 PUFF(S): 160; 4.5 AEROSOL RESPIRATORY (INHALATION) at 00:57

## 2022-01-01 RX ADMIN — POLYETHYLENE GLYCOL 3350 17 GRAM(S): 17 POWDER, FOR SOLUTION ORAL at 11:37

## 2022-01-01 RX ADMIN — PANTOPRAZOLE SODIUM 40 MILLIGRAM(S): 20 TABLET, DELAYED RELEASE ORAL at 06:44

## 2022-01-01 RX ADMIN — PANTOPRAZOLE SODIUM 40 MILLIGRAM(S): 20 TABLET, DELAYED RELEASE ORAL at 06:24

## 2022-01-01 RX ADMIN — Medication 1 PATCH: at 21:41

## 2022-01-01 RX ADMIN — PANTOPRAZOLE SODIUM 40 MILLIGRAM(S): 20 TABLET, DELAYED RELEASE ORAL at 05:39

## 2022-01-01 RX ADMIN — SODIUM CHLORIDE 2000 MILLILITER(S): 9 INJECTION INTRAMUSCULAR; INTRAVENOUS; SUBCUTANEOUS at 23:31

## 2022-01-01 RX ADMIN — CEFTRIAXONE 100 MILLIGRAM(S): 500 INJECTION, POWDER, FOR SOLUTION INTRAMUSCULAR; INTRAVENOUS at 05:01

## 2022-01-01 RX ADMIN — Medication 2: at 21:31

## 2022-01-01 RX ADMIN — Medication 3 MILLIGRAM(S): at 00:43

## 2022-01-01 RX ADMIN — ATORVASTATIN CALCIUM 40 MILLIGRAM(S): 80 TABLET, FILM COATED ORAL at 21:46

## 2022-01-01 RX ADMIN — TIOTROPIUM BROMIDE 1 CAPSULE(S): 18 CAPSULE ORAL; RESPIRATORY (INHALATION) at 12:50

## 2022-01-01 RX ADMIN — Medication 30 MILLIGRAM(S): at 17:22

## 2022-01-01 RX ADMIN — SENNA PLUS 2 TABLET(S): 8.6 TABLET ORAL at 21:30

## 2022-01-01 RX ADMIN — Medication 25 MILLIGRAM(S): at 05:39

## 2022-01-01 RX ADMIN — Medication 4 UNIT(S): at 08:25

## 2022-01-01 RX ADMIN — SODIUM CHLORIDE 3 MILLILITER(S): 9 INJECTION INTRAMUSCULAR; INTRAVENOUS; SUBCUTANEOUS at 05:13

## 2022-01-01 RX ADMIN — OXYCODONE HYDROCHLORIDE 2.5 MILLIGRAM(S): 5 TABLET ORAL at 11:54

## 2022-01-01 RX ADMIN — PIPERACILLIN AND TAZOBACTAM 200 GRAM(S): 4; .5 INJECTION, POWDER, LYOPHILIZED, FOR SOLUTION INTRAVENOUS at 22:30

## 2022-01-01 RX ADMIN — Medication 100 MILLIGRAM(S): at 10:09

## 2022-01-01 RX ADMIN — Medication 1 PATCH: at 20:28

## 2022-01-01 RX ADMIN — Medication 25 MILLIGRAM(S): at 17:22

## 2022-01-01 RX ADMIN — TAMSULOSIN HYDROCHLORIDE 0.4 MILLIGRAM(S): 0.4 CAPSULE ORAL at 22:25

## 2022-01-01 RX ADMIN — SENNA PLUS 2 TABLET(S): 8.6 TABLET ORAL at 22:22

## 2022-01-01 RX ADMIN — Medication 6 UNIT(S): at 13:19

## 2022-01-01 RX ADMIN — Medication 75 MILLIGRAM(S): at 05:45

## 2022-01-01 RX ADMIN — BUDESONIDE AND FORMOTEROL FUMARATE DIHYDRATE 2 PUFF(S): 160; 4.5 AEROSOL RESPIRATORY (INHALATION) at 21:31

## 2022-01-01 RX ADMIN — Medication 2: at 11:50

## 2022-01-01 RX ADMIN — Medication 2: at 11:51

## 2022-01-01 RX ADMIN — GABAPENTIN 200 MILLIGRAM(S): 400 CAPSULE ORAL at 17:52

## 2022-01-01 RX ADMIN — Medication 25 MILLIGRAM(S): at 05:51

## 2022-01-01 RX ADMIN — Medication 12.5 MILLIGRAM(S): at 17:33

## 2022-01-01 RX ADMIN — INSULIN GLARGINE 7 UNIT(S): 100 INJECTION, SOLUTION SUBCUTANEOUS at 21:40

## 2022-01-01 RX ADMIN — QUETIAPINE FUMARATE 25 MILLIGRAM(S): 200 TABLET, FILM COATED ORAL at 22:42

## 2022-01-01 RX ADMIN — Medication 100 MILLILITER(S): at 07:39

## 2022-01-01 RX ADMIN — Medication 325 MILLIGRAM(S): at 17:40

## 2022-01-01 RX ADMIN — INSULIN GLARGINE 7 UNIT(S): 100 INJECTION, SOLUTION SUBCUTANEOUS at 21:18

## 2022-01-01 RX ADMIN — BUDESONIDE AND FORMOTEROL FUMARATE DIHYDRATE 2 PUFF(S): 160; 4.5 AEROSOL RESPIRATORY (INHALATION) at 15:25

## 2022-01-01 RX ADMIN — Medication 2: at 21:26

## 2022-01-01 RX ADMIN — Medication 325 MILLIGRAM(S): at 06:15

## 2022-01-01 RX ADMIN — ATORVASTATIN CALCIUM 40 MILLIGRAM(S): 80 TABLET, FILM COATED ORAL at 21:30

## 2022-01-01 RX ADMIN — Medication 25 MILLIGRAM(S): at 17:32

## 2022-01-01 RX ADMIN — Medication 100 MILLIGRAM(S): at 12:16

## 2022-01-01 RX ADMIN — Medication 30 MILLILITER(S): at 06:12

## 2022-01-01 RX ADMIN — Medication 1000 UNIT(S): at 13:25

## 2022-01-01 RX ADMIN — Medication 130 MILLIGRAM(S): at 09:37

## 2022-01-01 RX ADMIN — Medication 40 MILLIGRAM(S): at 10:36

## 2022-01-01 RX ADMIN — Medication 125 MILLIGRAM(S): at 13:47

## 2022-01-01 RX ADMIN — SENNA PLUS 2 TABLET(S): 8.6 TABLET ORAL at 21:10

## 2022-01-01 RX ADMIN — Medication 1 PATCH: at 12:33

## 2022-01-01 RX ADMIN — Medication 2: at 08:16

## 2022-01-01 RX ADMIN — GABAPENTIN 300 MILLIGRAM(S): 400 CAPSULE ORAL at 14:36

## 2022-01-01 RX ADMIN — Medication 3 MILLIGRAM(S): at 22:22

## 2022-01-01 RX ADMIN — Medication 650 MILLIGRAM(S): at 02:35

## 2022-01-01 RX ADMIN — LIDOCAINE 2 PATCH: 4 CREAM TOPICAL at 12:53

## 2022-01-01 RX ADMIN — ENOXAPARIN SODIUM 75 MILLIGRAM(S): 100 INJECTION SUBCUTANEOUS at 17:23

## 2022-01-01 RX ADMIN — Medication 1 PATCH: at 08:23

## 2022-01-01 RX ADMIN — Medication 81 MILLIGRAM(S): at 12:11

## 2022-01-01 RX ADMIN — Medication 3: at 17:15

## 2022-01-01 RX ADMIN — BUDESONIDE AND FORMOTEROL FUMARATE DIHYDRATE 2 PUFF(S): 160; 4.5 AEROSOL RESPIRATORY (INHALATION) at 22:16

## 2022-01-01 RX ADMIN — Medication 1: at 07:47

## 2022-01-01 RX ADMIN — Medication 81 MILLIGRAM(S): at 11:39

## 2022-01-01 RX ADMIN — Medication 1 MILLIGRAM(S): at 12:25

## 2022-01-01 RX ADMIN — Medication 30 MILLIGRAM(S): at 05:06

## 2022-01-01 RX ADMIN — Medication 1 PATCH: at 12:54

## 2022-01-01 RX ADMIN — Medication 30 MILLIGRAM(S): at 18:22

## 2022-01-01 RX ADMIN — Medication 650 MILLIGRAM(S): at 09:00

## 2022-01-01 RX ADMIN — Medication 6 UNIT(S): at 17:23

## 2022-01-01 RX ADMIN — ENOXAPARIN SODIUM 75 MILLIGRAM(S): 100 INJECTION SUBCUTANEOUS at 06:11

## 2022-01-01 RX ADMIN — WARFARIN SODIUM 5 MILLIGRAM(S): 2.5 TABLET ORAL at 21:49

## 2022-01-01 RX ADMIN — Medication 3 MILLIGRAM(S): at 22:31

## 2022-01-01 RX ADMIN — WARFARIN SODIUM 7.5 MILLIGRAM(S): 2.5 TABLET ORAL at 21:10

## 2022-01-01 RX ADMIN — LIDOCAINE 2 PATCH: 4 CREAM TOPICAL at 01:18

## 2022-01-01 RX ADMIN — Medication 325 MILLIGRAM(S): at 07:21

## 2022-01-01 RX ADMIN — ATORVASTATIN CALCIUM 40 MILLIGRAM(S): 80 TABLET, FILM COATED ORAL at 22:21

## 2022-01-01 RX ADMIN — MORPHINE SULFATE 1 MILLIGRAM(S): 50 CAPSULE, EXTENDED RELEASE ORAL at 02:36

## 2022-01-01 RX ADMIN — PANTOPRAZOLE SODIUM 40 MILLIGRAM(S): 20 TABLET, DELAYED RELEASE ORAL at 06:41

## 2022-01-01 RX ADMIN — Medication 12.5 MILLIGRAM(S): at 17:25

## 2022-01-01 RX ADMIN — Medication 1 PATCH: at 11:38

## 2022-01-01 RX ADMIN — CEFTRIAXONE 100 MILLIGRAM(S): 500 INJECTION, POWDER, FOR SOLUTION INTRAMUSCULAR; INTRAVENOUS at 21:59

## 2022-01-01 RX ADMIN — Medication 105 MILLIGRAM(S): at 14:47

## 2022-01-01 RX ADMIN — MIRTAZAPINE 15 MILLIGRAM(S): 45 TABLET, ORALLY DISINTEGRATING ORAL at 21:53

## 2022-01-01 RX ADMIN — FINASTERIDE 5 MILLIGRAM(S): 5 TABLET, FILM COATED ORAL at 12:52

## 2022-01-01 RX ADMIN — BUDESONIDE AND FORMOTEROL FUMARATE DIHYDRATE 2 PUFF(S): 160; 4.5 AEROSOL RESPIRATORY (INHALATION) at 21:19

## 2022-01-01 RX ADMIN — Medication 81 MILLIGRAM(S): at 11:32

## 2022-01-01 RX ADMIN — Medication 1000 UNIT(S): at 13:55

## 2022-01-01 RX ADMIN — Medication 30 MILLILITER(S): at 10:12

## 2022-01-01 RX ADMIN — Medication 1 PATCH: at 19:14

## 2022-01-01 RX ADMIN — Medication 325 MILLIGRAM(S): at 14:27

## 2022-01-01 RX ADMIN — ATORVASTATIN CALCIUM 40 MILLIGRAM(S): 80 TABLET, FILM COATED ORAL at 21:53

## 2022-01-01 RX ADMIN — Medication 12.5 MILLIGRAM(S): at 22:26

## 2022-01-01 RX ADMIN — LIDOCAINE 2 PATCH: 4 CREAM TOPICAL at 19:58

## 2022-01-01 RX ADMIN — Medication 1000 UNIT(S): at 14:26

## 2022-01-01 RX ADMIN — Medication 105 MILLIGRAM(S): at 05:59

## 2022-01-01 RX ADMIN — Medication 650 MILLIGRAM(S): at 18:22

## 2022-01-01 RX ADMIN — Medication 1 MILLIGRAM(S): at 09:59

## 2022-01-01 RX ADMIN — Medication 1000 UNIT(S): at 11:35

## 2022-01-01 RX ADMIN — BUDESONIDE AND FORMOTEROL FUMARATE DIHYDRATE 2 PUFF(S): 160; 4.5 AEROSOL RESPIRATORY (INHALATION) at 11:48

## 2022-01-01 RX ADMIN — Medication 650 MILLIGRAM(S): at 06:42

## 2022-01-01 RX ADMIN — Medication 3 MILLIGRAM(S): at 23:49

## 2022-01-01 RX ADMIN — Medication 1: at 12:18

## 2022-01-01 RX ADMIN — ENOXAPARIN SODIUM 75 MILLIGRAM(S): 100 INJECTION SUBCUTANEOUS at 06:22

## 2022-01-01 RX ADMIN — INSULIN GLARGINE 6 UNIT(S): 100 INJECTION, SOLUTION SUBCUTANEOUS at 21:37

## 2022-01-01 RX ADMIN — Medication 2: at 11:41

## 2022-01-01 RX ADMIN — Medication 1000 MILLIGRAM(S): at 15:52

## 2022-01-01 RX ADMIN — ENOXAPARIN SODIUM 75 MILLIGRAM(S): 100 INJECTION SUBCUTANEOUS at 17:32

## 2022-01-01 RX ADMIN — Medication 325 MILLIGRAM(S): at 22:30

## 2022-01-01 RX ADMIN — DEXMEDETOMIDINE HYDROCHLORIDE IN 0.9% SODIUM CHLORIDE 3.71 MICROGRAM(S)/KG/HR: 4 INJECTION INTRAVENOUS at 09:47

## 2022-01-01 RX ADMIN — Medication 400 MILLIGRAM(S): at 16:43

## 2022-01-01 RX ADMIN — Medication 1 PATCH: at 21:27

## 2022-01-01 RX ADMIN — Medication 650 MILLIGRAM(S): at 06:03

## 2022-01-01 RX ADMIN — Medication 25 MILLIGRAM(S): at 17:20

## 2022-01-01 RX ADMIN — Medication 650 MILLIGRAM(S): at 16:24

## 2022-01-01 RX ADMIN — Medication 1 PATCH: at 09:52

## 2022-01-01 RX ADMIN — TIOTROPIUM BROMIDE 1 CAPSULE(S): 18 CAPSULE ORAL; RESPIRATORY (INHALATION) at 11:55

## 2022-01-01 RX ADMIN — Medication 325 MILLIGRAM(S): at 21:46

## 2022-01-01 RX ADMIN — Medication 1: at 21:14

## 2022-01-01 RX ADMIN — MORPHINE SULFATE 1 MILLIGRAM(S): 50 CAPSULE, EXTENDED RELEASE ORAL at 12:04

## 2022-01-01 RX ADMIN — Medication 75 MILLIGRAM(S): at 17:09

## 2022-01-01 RX ADMIN — Medication 3: at 17:24

## 2022-01-01 RX ADMIN — BUDESONIDE AND FORMOTEROL FUMARATE DIHYDRATE 2 PUFF(S): 160; 4.5 AEROSOL RESPIRATORY (INHALATION) at 12:46

## 2022-01-01 RX ADMIN — Medication 1 PATCH: at 12:32

## 2022-01-01 RX ADMIN — Medication 1 PATCH: at 19:59

## 2022-01-01 RX ADMIN — ATORVASTATIN CALCIUM 40 MILLIGRAM(S): 80 TABLET, FILM COATED ORAL at 22:32

## 2022-01-01 RX ADMIN — Medication 325 MILLIGRAM(S): at 07:29

## 2022-01-01 RX ADMIN — Medication 75 MILLIGRAM(S): at 18:25

## 2022-01-01 RX ADMIN — BUDESONIDE AND FORMOTEROL FUMARATE DIHYDRATE 2 PUFF(S): 160; 4.5 AEROSOL RESPIRATORY (INHALATION) at 22:42

## 2022-01-01 RX ADMIN — Medication 1: at 21:57

## 2022-01-01 RX ADMIN — Medication 1 PATCH: at 10:59

## 2022-01-01 RX ADMIN — PANTOPRAZOLE SODIUM 40 MILLIGRAM(S): 20 TABLET, DELAYED RELEASE ORAL at 05:02

## 2022-01-01 RX ADMIN — DEXMEDETOMIDINE HYDROCHLORIDE IN 0.9% SODIUM CHLORIDE 3.71 MICROGRAM(S)/KG/HR: 4 INJECTION INTRAVENOUS at 09:32

## 2022-01-01 RX ADMIN — CHLORHEXIDINE GLUCONATE 1 APPLICATION(S): 213 SOLUTION TOPICAL at 05:29

## 2022-01-01 RX ADMIN — TIOTROPIUM BROMIDE 1 CAPSULE(S): 18 CAPSULE ORAL; RESPIRATORY (INHALATION) at 12:14

## 2022-01-01 RX ADMIN — Medication 130 MILLIGRAM(S): at 18:24

## 2022-01-01 RX ADMIN — TIOTROPIUM BROMIDE 1 CAPSULE(S): 18 CAPSULE ORAL; RESPIRATORY (INHALATION) at 15:21

## 2022-01-01 RX ADMIN — Medication 1 MILLIGRAM(S): at 11:40

## 2022-01-01 RX ADMIN — Medication 1: at 21:29

## 2022-01-01 RX ADMIN — GABAPENTIN 200 MILLIGRAM(S): 400 CAPSULE ORAL at 05:56

## 2022-01-01 RX ADMIN — Medication 100 MILLIGRAM(S): at 11:43

## 2022-01-01 RX ADMIN — Medication 6: at 08:49

## 2022-01-01 RX ADMIN — TIOTROPIUM BROMIDE 1 CAPSULE(S): 18 CAPSULE ORAL; RESPIRATORY (INHALATION) at 17:22

## 2022-01-01 RX ADMIN — TIOTROPIUM BROMIDE 1 CAPSULE(S): 18 CAPSULE ORAL; RESPIRATORY (INHALATION) at 15:11

## 2022-01-01 RX ADMIN — POLYETHYLENE GLYCOL 3350 17 GRAM(S): 17 POWDER, FOR SOLUTION ORAL at 12:06

## 2022-01-01 RX ADMIN — Medication 3 MILLILITER(S): at 15:10

## 2022-01-01 RX ADMIN — Medication 1 MILLIGRAM(S): at 12:04

## 2022-01-01 RX ADMIN — Medication 650 MILLIGRAM(S): at 15:16

## 2022-01-01 RX ADMIN — Medication 25 MILLIGRAM(S): at 17:17

## 2022-01-01 RX ADMIN — Medication 1000 MILLIGRAM(S): at 05:03

## 2022-01-01 RX ADMIN — Medication 25 MILLIGRAM(S): at 05:45

## 2022-01-01 RX ADMIN — PANTOPRAZOLE SODIUM 40 MILLIGRAM(S): 20 TABLET, DELAYED RELEASE ORAL at 12:13

## 2022-01-01 RX ADMIN — Medication 30 MILLILITER(S): at 17:51

## 2022-01-01 RX ADMIN — Medication 650 MILLIGRAM(S): at 07:30

## 2022-01-01 RX ADMIN — BUDESONIDE AND FORMOTEROL FUMARATE DIHYDRATE 2 PUFF(S): 160; 4.5 AEROSOL RESPIRATORY (INHALATION) at 22:05

## 2022-01-01 RX ADMIN — Medication 25 MILLIGRAM(S): at 06:25

## 2022-01-01 RX ADMIN — CEFTRIAXONE 100 MILLIGRAM(S): 500 INJECTION, POWDER, FOR SOLUTION INTRAMUSCULAR; INTRAVENOUS at 06:01

## 2022-01-01 RX ADMIN — Medication 3: at 22:42

## 2022-01-01 RX ADMIN — SODIUM CHLORIDE 1000 MILLILITER(S): 9 INJECTION INTRAMUSCULAR; INTRAVENOUS; SUBCUTANEOUS at 12:59

## 2022-01-01 RX ADMIN — Medication 30 MILLILITER(S): at 12:16

## 2022-01-01 RX ADMIN — ALBUTEROL 2 PUFF(S): 90 AEROSOL, METERED ORAL at 17:22

## 2022-01-01 RX ADMIN — QUETIAPINE FUMARATE 25 MILLIGRAM(S): 200 TABLET, FILM COATED ORAL at 21:18

## 2022-01-01 RX ADMIN — Medication 325 MILLIGRAM(S): at 22:18

## 2022-01-01 RX ADMIN — MORPHINE SULFATE 1 MILLIGRAM(S): 50 CAPSULE, EXTENDED RELEASE ORAL at 11:34

## 2022-01-01 RX ADMIN — CHLORHEXIDINE GLUCONATE 1 APPLICATION(S): 213 SOLUTION TOPICAL at 05:41

## 2022-01-01 RX ADMIN — MEROPENEM 100 MILLIGRAM(S): 1 INJECTION INTRAVENOUS at 17:41

## 2022-01-01 RX ADMIN — Medication 1000 MILLIGRAM(S): at 14:36

## 2022-01-01 RX ADMIN — Medication 1: at 12:13

## 2022-01-01 RX ADMIN — QUETIAPINE FUMARATE 25 MILLIGRAM(S): 200 TABLET, FILM COATED ORAL at 21:44

## 2022-01-01 RX ADMIN — Medication 4 UNIT(S): at 08:33

## 2022-01-01 RX ADMIN — Medication 1 PATCH: at 06:26

## 2022-01-01 RX ADMIN — Medication 3 MILLIGRAM(S): at 01:16

## 2022-01-01 RX ADMIN — QUETIAPINE FUMARATE 25 MILLIGRAM(S): 200 TABLET, FILM COATED ORAL at 21:35

## 2022-01-01 RX ADMIN — Medication 5 MILLIGRAM(S): at 09:52

## 2022-01-01 RX ADMIN — POLYETHYLENE GLYCOL 3350 17 GRAM(S): 17 POWDER, FOR SOLUTION ORAL at 12:15

## 2022-01-01 RX ADMIN — Medication 5 MILLIGRAM(S): at 17:08

## 2022-01-01 RX ADMIN — Medication 130 MILLIGRAM(S): at 11:59

## 2022-01-01 RX ADMIN — DEXMEDETOMIDINE HYDROCHLORIDE IN 0.9% SODIUM CHLORIDE 3.71 MICROGRAM(S)/KG/HR: 4 INJECTION INTRAVENOUS at 14:47

## 2022-01-01 RX ADMIN — Medication 81 MILLIGRAM(S): at 12:35

## 2022-01-01 RX ADMIN — Medication 3: at 16:55

## 2022-01-01 RX ADMIN — Medication 1000 UNIT(S): at 11:57

## 2022-01-01 RX ADMIN — Medication 30 MILLILITER(S): at 11:55

## 2022-01-01 RX ADMIN — Medication 1 PATCH: at 11:30

## 2022-01-01 RX ADMIN — Medication 20 MILLIGRAM(S): at 17:41

## 2022-01-01 RX ADMIN — BUDESONIDE AND FORMOTEROL FUMARATE DIHYDRATE 2 PUFF(S): 160; 4.5 AEROSOL RESPIRATORY (INHALATION) at 19:30

## 2022-01-01 RX ADMIN — Medication 1 PATCH: at 16:39

## 2022-01-01 RX ADMIN — BUDESONIDE AND FORMOTEROL FUMARATE DIHYDRATE 2 PUFF(S): 160; 4.5 AEROSOL RESPIRATORY (INHALATION) at 21:10

## 2022-01-01 RX ADMIN — Medication 6: at 21:37

## 2022-01-01 RX ADMIN — Medication 130 MILLIGRAM(S): at 21:12

## 2022-01-01 RX ADMIN — ATORVASTATIN CALCIUM 40 MILLIGRAM(S): 80 TABLET, FILM COATED ORAL at 21:12

## 2022-01-01 RX ADMIN — Medication 1 PATCH: at 20:19

## 2022-01-01 RX ADMIN — SENNA PLUS 2 TABLET(S): 8.6 TABLET ORAL at 21:41

## 2022-01-01 RX ADMIN — Medication 105 MILLIGRAM(S): at 14:48

## 2022-01-01 RX ADMIN — GABAPENTIN 300 MILLIGRAM(S): 400 CAPSULE ORAL at 22:26

## 2022-01-01 RX ADMIN — GABAPENTIN 200 MILLIGRAM(S): 400 CAPSULE ORAL at 05:48

## 2022-01-01 RX ADMIN — ENOXAPARIN SODIUM 40 MILLIGRAM(S): 100 INJECTION SUBCUTANEOUS at 05:32

## 2022-01-01 RX ADMIN — CHLORHEXIDINE GLUCONATE 1 APPLICATION(S): 213 SOLUTION TOPICAL at 06:01

## 2022-01-01 RX ADMIN — SODIUM CHLORIDE 500 MILLILITER(S): 9 INJECTION INTRAMUSCULAR; INTRAVENOUS; SUBCUTANEOUS at 20:24

## 2022-01-01 RX ADMIN — Medication 40 MILLIGRAM(S): at 09:53

## 2022-01-01 RX ADMIN — Medication 6: at 21:58

## 2022-01-01 RX ADMIN — DEXMEDETOMIDINE HYDROCHLORIDE IN 0.9% SODIUM CHLORIDE 13 MICROGRAM(S)/KG/HR: 4 INJECTION INTRAVENOUS at 04:57

## 2022-01-01 RX ADMIN — Medication 650 MILLIGRAM(S): at 00:02

## 2022-01-01 RX ADMIN — Medication 25 MILLIGRAM(S): at 05:27

## 2022-01-01 RX ADMIN — Medication 6: at 17:24

## 2022-01-01 RX ADMIN — Medication 2: at 12:33

## 2022-01-01 RX ADMIN — Medication 975 MILLIGRAM(S): at 09:24

## 2022-01-01 RX ADMIN — Medication 1: at 08:15

## 2022-01-01 RX ADMIN — Medication 2: at 17:18

## 2022-01-01 RX ADMIN — Medication 650 MILLIGRAM(S): at 00:43

## 2022-01-01 RX ADMIN — Medication 1 PATCH: at 20:07

## 2022-01-01 RX ADMIN — ATORVASTATIN CALCIUM 40 MILLIGRAM(S): 80 TABLET, FILM COATED ORAL at 23:10

## 2022-01-01 RX ADMIN — TIOTROPIUM BROMIDE 1 CAPSULE(S): 18 CAPSULE ORAL; RESPIRATORY (INHALATION) at 11:39

## 2022-01-01 RX ADMIN — Medication 650 MILLIGRAM(S): at 19:20

## 2022-01-01 RX ADMIN — Medication 1 PATCH: at 12:34

## 2022-01-01 RX ADMIN — GABAPENTIN 100 MILLIGRAM(S): 400 CAPSULE ORAL at 21:18

## 2022-01-01 RX ADMIN — SENNA PLUS 2 TABLET(S): 8.6 TABLET ORAL at 21:12

## 2022-01-01 RX ADMIN — Medication 1 PATCH: at 19:49

## 2022-01-01 RX ADMIN — GABAPENTIN 200 MILLIGRAM(S): 400 CAPSULE ORAL at 05:06

## 2022-01-01 RX ADMIN — SENNA PLUS 1 TABLET(S): 8.6 TABLET ORAL at 08:52

## 2022-01-01 RX ADMIN — Medication 20 MILLIGRAM(S): at 07:18

## 2022-01-01 RX ADMIN — Medication 1 PATCH: at 03:12

## 2022-01-01 RX ADMIN — Medication 1 PATCH: at 11:00

## 2022-01-01 RX ADMIN — Medication 2: at 17:23

## 2022-01-01 RX ADMIN — Medication 75 MILLIGRAM(S): at 16:24

## 2022-01-01 RX ADMIN — Medication 100 MILLIGRAM(S): at 15:12

## 2022-01-01 RX ADMIN — Medication 1 PATCH: at 15:38

## 2022-01-01 RX ADMIN — FINASTERIDE 5 MILLIGRAM(S): 5 TABLET, FILM COATED ORAL at 12:04

## 2022-01-01 RX ADMIN — Medication 4: at 09:31

## 2022-01-01 RX ADMIN — Medication 325 MILLIGRAM(S): at 09:57

## 2022-01-01 RX ADMIN — Medication 1000 UNIT(S): at 12:04

## 2022-01-01 RX ADMIN — Medication 2 MILLIGRAM(S): at 17:42

## 2022-01-01 RX ADMIN — BUDESONIDE AND FORMOTEROL FUMARATE DIHYDRATE 2 PUFF(S): 160; 4.5 AEROSOL RESPIRATORY (INHALATION) at 21:42

## 2022-01-01 RX ADMIN — Medication 1 MILLIGRAM(S): at 12:12

## 2022-01-01 RX ADMIN — Medication 1000 MILLIGRAM(S): at 06:03

## 2022-01-01 RX ADMIN — MORPHINE SULFATE 1 MILLIGRAM(S): 50 CAPSULE, EXTENDED RELEASE ORAL at 18:50

## 2022-01-01 RX ADMIN — BUDESONIDE AND FORMOTEROL FUMARATE DIHYDRATE 2 PUFF(S): 160; 4.5 AEROSOL RESPIRATORY (INHALATION) at 21:36

## 2022-01-01 RX ADMIN — Medication 1000 UNIT(S): at 11:44

## 2022-01-01 RX ADMIN — Medication 12.5 MILLIGRAM(S): at 14:36

## 2022-01-01 RX ADMIN — QUETIAPINE FUMARATE 25 MILLIGRAM(S): 200 TABLET, FILM COATED ORAL at 21:33

## 2022-01-01 RX ADMIN — Medication 975 MILLIGRAM(S): at 00:22

## 2022-01-01 RX ADMIN — Medication 105 MILLIGRAM(S): at 21:15

## 2022-01-01 RX ADMIN — TIOTROPIUM BROMIDE 1 CAPSULE(S): 18 CAPSULE ORAL; RESPIRATORY (INHALATION) at 13:45

## 2022-01-01 RX ADMIN — WARFARIN SODIUM 6 MILLIGRAM(S): 2.5 TABLET ORAL at 21:53

## 2022-01-01 RX ADMIN — ENOXAPARIN SODIUM 75 MILLIGRAM(S): 100 INJECTION SUBCUTANEOUS at 17:03

## 2022-01-01 RX ADMIN — Medication 2: at 08:22

## 2022-01-01 RX ADMIN — WARFARIN SODIUM 7.5 MILLIGRAM(S): 2.5 TABLET ORAL at 21:22

## 2022-01-01 RX ADMIN — Medication 325 MILLIGRAM(S): at 06:45

## 2022-01-01 RX ADMIN — TIOTROPIUM BROMIDE 1 CAPSULE(S): 18 CAPSULE ORAL; RESPIRATORY (INHALATION) at 11:49

## 2022-01-01 RX ADMIN — Medication 1 PATCH: at 19:40

## 2022-01-01 RX ADMIN — Medication 20 MILLIGRAM(S): at 09:26

## 2022-01-01 RX ADMIN — MIRTAZAPINE 15 MILLIGRAM(S): 45 TABLET, ORALLY DISINTEGRATING ORAL at 21:39

## 2022-01-01 RX ADMIN — INSULIN GLARGINE 7 UNIT(S): 100 INJECTION, SOLUTION SUBCUTANEOUS at 22:18

## 2022-01-01 RX ADMIN — Medication 100 MILLIGRAM(S): at 11:01

## 2022-01-01 RX ADMIN — Medication 325 MILLIGRAM(S): at 06:05

## 2022-01-01 RX ADMIN — GABAPENTIN 300 MILLIGRAM(S): 400 CAPSULE ORAL at 22:00

## 2022-01-01 RX ADMIN — Medication 1: at 17:03

## 2022-01-01 RX ADMIN — MIRTAZAPINE 15 MILLIGRAM(S): 45 TABLET, ORALLY DISINTEGRATING ORAL at 21:11

## 2022-01-01 RX ADMIN — Medication 1000 MILLIGRAM(S): at 14:29

## 2022-01-01 RX ADMIN — Medication 1 PATCH: at 11:58

## 2022-01-01 RX ADMIN — INSULIN GLARGINE 7 UNIT(S): 100 INJECTION, SOLUTION SUBCUTANEOUS at 21:30

## 2022-01-01 RX ADMIN — Medication 4 UNIT(S): at 17:25

## 2022-01-01 RX ADMIN — SODIUM CHLORIDE 60 MILLILITER(S): 9 INJECTION INTRAMUSCULAR; INTRAVENOUS; SUBCUTANEOUS at 22:02

## 2022-01-01 RX ADMIN — Medication 325 MILLIGRAM(S): at 05:50

## 2022-01-01 RX ADMIN — LIDOCAINE 2 PATCH: 4 CREAM TOPICAL at 00:05

## 2022-01-01 RX ADMIN — Medication 25 MILLIGRAM(S): at 17:40

## 2022-01-01 RX ADMIN — Medication 1000 UNIT(S): at 12:27

## 2022-01-01 RX ADMIN — Medication 3 MILLILITER(S): at 14:16

## 2022-01-01 RX ADMIN — Medication 1 PATCH: at 11:50

## 2022-01-01 RX ADMIN — GABAPENTIN 200 MILLIGRAM(S): 400 CAPSULE ORAL at 06:24

## 2022-01-01 RX ADMIN — Medication 81 MILLIGRAM(S): at 11:54

## 2022-01-01 RX ADMIN — Medication 81 MILLIGRAM(S): at 11:49

## 2022-01-01 RX ADMIN — SODIUM CHLORIDE 3 MILLILITER(S): 9 INJECTION INTRAMUSCULAR; INTRAVENOUS; SUBCUTANEOUS at 05:35

## 2022-01-01 RX ADMIN — POLYETHYLENE GLYCOL 3350 17 GRAM(S): 17 POWDER, FOR SOLUTION ORAL at 12:28

## 2022-01-01 RX ADMIN — Medication 30 MILLILITER(S): at 18:19

## 2022-01-01 RX ADMIN — Medication 1000 UNIT(S): at 12:33

## 2022-01-01 RX ADMIN — CHLORHEXIDINE GLUCONATE 1 APPLICATION(S): 213 SOLUTION TOPICAL at 05:44

## 2022-01-01 RX ADMIN — TIOTROPIUM BROMIDE 1 CAPSULE(S): 18 CAPSULE ORAL; RESPIRATORY (INHALATION) at 10:10

## 2022-01-01 RX ADMIN — SODIUM CHLORIDE 500 MILLILITER(S): 9 INJECTION INTRAMUSCULAR; INTRAVENOUS; SUBCUTANEOUS at 09:03

## 2022-01-01 RX ADMIN — Medication 1 PATCH: at 08:08

## 2022-01-01 RX ADMIN — Medication 1: at 21:39

## 2022-01-01 RX ADMIN — GABAPENTIN 200 MILLIGRAM(S): 400 CAPSULE ORAL at 18:02

## 2022-01-01 RX ADMIN — Medication 81 MILLIGRAM(S): at 13:44

## 2022-01-01 RX ADMIN — Medication 650 MILLIGRAM(S): at 11:42

## 2022-01-01 RX ADMIN — Medication 1: at 12:06

## 2022-01-01 RX ADMIN — Medication 3 MILLILITER(S): at 20:38

## 2022-01-01 RX ADMIN — GABAPENTIN 300 MILLIGRAM(S): 400 CAPSULE ORAL at 13:06

## 2022-01-01 RX ADMIN — PANTOPRAZOLE SODIUM 40 MILLIGRAM(S): 20 TABLET, DELAYED RELEASE ORAL at 05:56

## 2022-01-01 RX ADMIN — Medication 1000 MILLIGRAM(S): at 05:13

## 2022-01-01 RX ADMIN — OXYCODONE HYDROCHLORIDE 2.5 MILLIGRAM(S): 5 TABLET ORAL at 19:17

## 2022-01-01 RX ADMIN — Medication 12.5 MILLIGRAM(S): at 12:08

## 2022-01-01 RX ADMIN — ADENOSINE 6 MILLIGRAM(S): 3 INJECTION INTRAVENOUS at 09:25

## 2022-01-01 RX ADMIN — ATORVASTATIN CALCIUM 40 MILLIGRAM(S): 80 TABLET, FILM COATED ORAL at 21:19

## 2022-01-01 RX ADMIN — SODIUM CHLORIDE 60 MILLILITER(S): 9 INJECTION INTRAMUSCULAR; INTRAVENOUS; SUBCUTANEOUS at 12:52

## 2022-01-01 RX ADMIN — SODIUM CHLORIDE 3 MILLILITER(S): 9 INJECTION INTRAMUSCULAR; INTRAVENOUS; SUBCUTANEOUS at 06:48

## 2022-01-01 RX ADMIN — Medication 5 MILLIGRAM(S): at 00:05

## 2022-01-01 RX ADMIN — ATORVASTATIN CALCIUM 40 MILLIGRAM(S): 80 TABLET, FILM COATED ORAL at 21:48

## 2022-01-01 RX ADMIN — TAMSULOSIN HYDROCHLORIDE 0.4 MILLIGRAM(S): 0.4 CAPSULE ORAL at 21:49

## 2022-01-01 RX ADMIN — ENOXAPARIN SODIUM 60 MILLIGRAM(S): 100 INJECTION SUBCUTANEOUS at 06:54

## 2022-01-01 RX ADMIN — ATORVASTATIN CALCIUM 40 MILLIGRAM(S): 80 TABLET, FILM COATED ORAL at 21:54

## 2022-01-01 RX ADMIN — PANTOPRAZOLE SODIUM 40 MILLIGRAM(S): 20 TABLET, DELAYED RELEASE ORAL at 06:03

## 2022-01-01 RX ADMIN — Medication 1000 MILLIGRAM(S): at 05:54

## 2022-01-01 RX ADMIN — BUDESONIDE AND FORMOTEROL FUMARATE DIHYDRATE 2 PUFF(S): 160; 4.5 AEROSOL RESPIRATORY (INHALATION) at 10:23

## 2022-01-01 RX ADMIN — Medication 1 PATCH: at 10:55

## 2022-01-01 RX ADMIN — PIPERACILLIN AND TAZOBACTAM 3.38 GRAM(S): 4; .5 INJECTION, POWDER, LYOPHILIZED, FOR SOLUTION INTRAVENOUS at 09:24

## 2022-01-01 RX ADMIN — TAMSULOSIN HYDROCHLORIDE 0.4 MILLIGRAM(S): 0.4 CAPSULE ORAL at 22:31

## 2022-01-01 RX ADMIN — MIRTAZAPINE 15 MILLIGRAM(S): 45 TABLET, ORALLY DISINTEGRATING ORAL at 23:49

## 2022-01-01 RX ADMIN — Medication 1 MILLIGRAM(S): at 09:11

## 2022-01-01 RX ADMIN — SODIUM CHLORIDE 500 MILLILITER(S): 9 INJECTION INTRAMUSCULAR; INTRAVENOUS; SUBCUTANEOUS at 21:12

## 2022-01-01 RX ADMIN — Medication 4: at 23:00

## 2022-01-01 RX ADMIN — Medication 25 MILLIGRAM(S): at 06:24

## 2022-01-01 RX ADMIN — Medication 325 MILLIGRAM(S): at 12:58

## 2022-01-01 RX ADMIN — Medication 30 MILLILITER(S): at 17:18

## 2022-01-01 RX ADMIN — GABAPENTIN 300 MILLIGRAM(S): 400 CAPSULE ORAL at 21:53

## 2022-01-01 RX ADMIN — ENOXAPARIN SODIUM 75 MILLIGRAM(S): 100 INJECTION SUBCUTANEOUS at 17:09

## 2022-01-01 RX ADMIN — Medication 3 MILLIGRAM(S): at 21:53

## 2022-01-01 RX ADMIN — WARFARIN SODIUM 5 MILLIGRAM(S): 2.5 TABLET ORAL at 21:30

## 2022-01-01 RX ADMIN — Medication 1: at 17:13

## 2022-01-01 RX ADMIN — Medication 3 MILLILITER(S): at 03:50

## 2022-01-01 RX ADMIN — Medication 81 MILLIGRAM(S): at 11:40

## 2022-01-01 RX ADMIN — HALOPERIDOL DECANOATE 0.5 MILLIGRAM(S): 100 INJECTION INTRAMUSCULAR at 06:17

## 2022-01-01 RX ADMIN — TIOTROPIUM BROMIDE 1 CAPSULE(S): 18 CAPSULE ORAL; RESPIRATORY (INHALATION) at 12:05

## 2022-01-01 RX ADMIN — Medication 325 MILLIGRAM(S): at 11:56

## 2022-01-01 RX ADMIN — Medication 1 PATCH: at 13:17

## 2022-01-01 RX ADMIN — BUDESONIDE AND FORMOTEROL FUMARATE DIHYDRATE 2 PUFF(S): 160; 4.5 AEROSOL RESPIRATORY (INHALATION) at 22:49

## 2022-01-01 RX ADMIN — POLYETHYLENE GLYCOL 3350 17 GRAM(S): 17 POWDER, FOR SOLUTION ORAL at 11:01

## 2022-01-01 RX ADMIN — Medication 1000 MILLIGRAM(S): at 06:52

## 2022-01-01 RX ADMIN — ENOXAPARIN SODIUM 75 MILLIGRAM(S): 100 INJECTION SUBCUTANEOUS at 18:24

## 2022-01-01 RX ADMIN — Medication 75 MILLIGRAM(S): at 17:32

## 2022-01-01 RX ADMIN — Medication 25 GRAM(S): at 08:51

## 2022-01-01 RX ADMIN — Medication 1 PATCH: at 12:04

## 2022-01-01 RX ADMIN — Medication 30 MILLILITER(S): at 17:22

## 2022-01-01 RX ADMIN — WARFARIN SODIUM 5 MILLIGRAM(S): 2.5 TABLET ORAL at 21:35

## 2022-01-01 RX ADMIN — Medication 325 MILLIGRAM(S): at 14:19

## 2022-01-01 RX ADMIN — SODIUM CHLORIDE 3 MILLILITER(S): 9 INJECTION INTRAMUSCULAR; INTRAVENOUS; SUBCUTANEOUS at 15:00

## 2022-01-01 RX ADMIN — Medication 1 PATCH: at 13:48

## 2022-01-01 RX ADMIN — CHLORHEXIDINE GLUCONATE 1 APPLICATION(S): 213 SOLUTION TOPICAL at 05:40

## 2022-01-01 RX ADMIN — Medication 1000 MILLIGRAM(S): at 21:53

## 2022-01-01 RX ADMIN — Medication 325 MILLIGRAM(S): at 11:00

## 2022-01-01 RX ADMIN — Medication 6 UNIT(S): at 08:49

## 2022-01-01 RX ADMIN — Medication 1000 UNIT(S): at 13:16

## 2022-01-01 RX ADMIN — SODIUM CHLORIDE 333.33 MILLILITER(S): 9 INJECTION, SOLUTION INTRAVENOUS at 21:08

## 2022-01-01 RX ADMIN — SODIUM CHLORIDE 3 MILLILITER(S): 9 INJECTION INTRAMUSCULAR; INTRAVENOUS; SUBCUTANEOUS at 15:07

## 2022-01-01 RX ADMIN — Medication 650 MILLIGRAM(S): at 01:43

## 2022-01-01 RX ADMIN — SODIUM CHLORIDE 60 MILLILITER(S): 9 INJECTION, SOLUTION INTRAVENOUS at 20:04

## 2022-01-01 RX ADMIN — Medication 1: at 08:32

## 2022-01-01 RX ADMIN — Medication 325 MILLIGRAM(S): at 17:57

## 2022-01-01 RX ADMIN — Medication 325 MILLIGRAM(S): at 21:35

## 2022-01-01 RX ADMIN — INSULIN HUMAN 4 UNIT(S): 100 INJECTION, SOLUTION SUBCUTANEOUS at 16:00

## 2022-01-01 RX ADMIN — Medication 650 MILLIGRAM(S): at 08:52

## 2022-01-01 RX ADMIN — Medication 81 MILLIGRAM(S): at 12:16

## 2022-01-01 RX ADMIN — BUDESONIDE AND FORMOTEROL FUMARATE DIHYDRATE 2 PUFF(S): 160; 4.5 AEROSOL RESPIRATORY (INHALATION) at 21:39

## 2022-01-01 RX ADMIN — BUDESONIDE AND FORMOTEROL FUMARATE DIHYDRATE 2 PUFF(S): 160; 4.5 AEROSOL RESPIRATORY (INHALATION) at 12:18

## 2022-01-01 RX ADMIN — Medication 25 MILLIGRAM(S): at 05:47

## 2022-01-01 RX ADMIN — BUDESONIDE AND FORMOTEROL FUMARATE DIHYDRATE 2 PUFF(S): 160; 4.5 AEROSOL RESPIRATORY (INHALATION) at 13:51

## 2022-01-01 RX ADMIN — GABAPENTIN 200 MILLIGRAM(S): 400 CAPSULE ORAL at 17:23

## 2022-01-01 RX ADMIN — Medication 4 UNIT(S): at 08:17

## 2022-01-01 RX ADMIN — Medication 100 MILLIGRAM(S): at 11:36

## 2022-01-01 RX ADMIN — Medication 1 MILLIGRAM(S): at 13:26

## 2022-01-01 RX ADMIN — BUDESONIDE AND FORMOTEROL FUMARATE DIHYDRATE 2 PUFF(S): 160; 4.5 AEROSOL RESPIRATORY (INHALATION) at 22:26

## 2022-01-01 RX ADMIN — Medication 12.5 MILLIGRAM(S): at 06:46

## 2022-01-01 RX ADMIN — Medication 25 MILLIGRAM(S): at 17:12

## 2022-01-01 RX ADMIN — Medication 50 MILLIGRAM(S): at 18:46

## 2022-01-01 RX ADMIN — Medication 325 MILLIGRAM(S): at 21:33

## 2022-01-01 RX ADMIN — GABAPENTIN 300 MILLIGRAM(S): 400 CAPSULE ORAL at 21:34

## 2022-01-01 RX ADMIN — GABAPENTIN 200 MILLIGRAM(S): 400 CAPSULE ORAL at 17:27

## 2022-01-01 RX ADMIN — Medication 3 MILLILITER(S): at 09:01

## 2022-01-01 RX ADMIN — Medication 325 MILLIGRAM(S): at 14:25

## 2022-01-01 RX ADMIN — Medication 30 MILLILITER(S): at 06:24

## 2022-01-01 RX ADMIN — Medication 30 MILLILITER(S): at 21:58

## 2022-01-01 RX ADMIN — Medication 1 PATCH: at 07:35

## 2022-01-01 RX ADMIN — QUETIAPINE FUMARATE 25 MILLIGRAM(S): 200 TABLET, FILM COATED ORAL at 21:32

## 2022-01-01 RX ADMIN — SODIUM CHLORIDE 3 MILLILITER(S): 9 INJECTION INTRAMUSCULAR; INTRAVENOUS; SUBCUTANEOUS at 05:06

## 2022-01-01 RX ADMIN — Medication 1000 MILLIGRAM(S): at 21:29

## 2022-01-01 RX ADMIN — Medication 1 PATCH: at 07:21

## 2022-01-01 RX ADMIN — Medication 30 MILLIGRAM(S): at 14:15

## 2022-01-01 RX ADMIN — PIPERACILLIN AND TAZOBACTAM 200 GRAM(S): 4; .5 INJECTION, POWDER, LYOPHILIZED, FOR SOLUTION INTRAVENOUS at 08:17

## 2022-01-01 RX ADMIN — Medication 1 MILLIGRAM(S): at 13:46

## 2022-01-01 RX ADMIN — Medication 30 MILLILITER(S): at 00:24

## 2022-01-01 RX ADMIN — ENOXAPARIN SODIUM 75 MILLIGRAM(S): 100 INJECTION SUBCUTANEOUS at 17:12

## 2022-01-01 RX ADMIN — INSULIN GLARGINE 7 UNIT(S): 100 INJECTION, SOLUTION SUBCUTANEOUS at 21:27

## 2022-01-01 RX ADMIN — Medication 1 MILLIGRAM(S): at 13:04

## 2022-01-01 RX ADMIN — Medication 50 MILLIGRAM(S): at 05:13

## 2022-01-01 RX ADMIN — Medication 30 MILLIGRAM(S): at 19:29

## 2022-01-01 RX ADMIN — Medication 1000 MILLIGRAM(S): at 15:17

## 2022-01-01 RX ADMIN — Medication 650 MILLIGRAM(S): at 19:55

## 2022-01-01 RX ADMIN — BUDESONIDE AND FORMOTEROL FUMARATE DIHYDRATE 2 PUFF(S): 160; 4.5 AEROSOL RESPIRATORY (INHALATION) at 10:11

## 2022-01-01 RX ADMIN — TAMSULOSIN HYDROCHLORIDE 0.4 MILLIGRAM(S): 0.4 CAPSULE ORAL at 21:11

## 2022-01-01 RX ADMIN — DEXMEDETOMIDINE HYDROCHLORIDE IN 0.9% SODIUM CHLORIDE 3.71 MICROGRAM(S)/KG/HR: 4 INJECTION INTRAVENOUS at 08:10

## 2022-01-01 RX ADMIN — Medication 1 PATCH: at 07:50

## 2022-01-01 RX ADMIN — Medication 102 MILLIGRAM(S): at 19:33

## 2022-01-01 RX ADMIN — MORPHINE SULFATE 1 MILLIGRAM(S): 50 CAPSULE, EXTENDED RELEASE ORAL at 18:11

## 2022-01-01 RX ADMIN — Medication 25 MILLIGRAM(S): at 06:11

## 2022-01-01 RX ADMIN — Medication 650 MILLIGRAM(S): at 23:44

## 2022-01-01 RX ADMIN — Medication 325 MILLIGRAM(S): at 00:12

## 2022-01-01 RX ADMIN — INSULIN HUMAN 4 UNIT(S): 100 INJECTION, SOLUTION SUBCUTANEOUS at 09:32

## 2022-01-01 RX ADMIN — POLYETHYLENE GLYCOL 3350 17 GRAM(S): 17 POWDER, FOR SOLUTION ORAL at 11:42

## 2022-01-01 RX ADMIN — Medication 325 MILLIGRAM(S): at 12:16

## 2022-01-01 RX ADMIN — INSULIN GLARGINE 6 UNIT(S): 100 INJECTION, SOLUTION SUBCUTANEOUS at 21:58

## 2022-01-01 RX ADMIN — Medication 5: at 11:49

## 2022-01-01 RX ADMIN — Medication 1: at 07:29

## 2022-01-01 RX ADMIN — BUDESONIDE AND FORMOTEROL FUMARATE DIHYDRATE 2 PUFF(S): 160; 4.5 AEROSOL RESPIRATORY (INHALATION) at 09:33

## 2022-01-01 RX ADMIN — WARFARIN SODIUM 5 MILLIGRAM(S): 2.5 TABLET ORAL at 21:46

## 2022-01-01 RX ADMIN — Medication 3: at 12:09

## 2022-01-01 RX ADMIN — Medication 2: at 17:21

## 2022-01-01 RX ADMIN — Medication 325 MILLIGRAM(S): at 01:00

## 2022-01-01 RX ADMIN — Medication 30 MILLILITER(S): at 11:48

## 2022-01-01 RX ADMIN — Medication 30 MILLILITER(S): at 18:02

## 2022-01-01 RX ADMIN — Medication 325 MILLIGRAM(S): at 03:06

## 2022-01-01 RX ADMIN — Medication 1 PATCH: at 12:15

## 2022-01-01 RX ADMIN — ENOXAPARIN SODIUM 75 MILLIGRAM(S): 100 INJECTION SUBCUTANEOUS at 17:19

## 2022-01-01 RX ADMIN — Medication 1000 UNIT(S): at 15:27

## 2022-01-01 RX ADMIN — Medication 325 MILLIGRAM(S): at 22:33

## 2022-04-03 NOTE — ED ADULT NURSE NOTE - NSIMPLEMENTINTERV_GEN_ALL_ED
Implemented All Universal Safety Interventions:  [place] to call system. Call bell, personal items and telephone within reach. Instruct patient to call for assistance. Room bathroom lighting operational. Non-slip footwear when patient is off stretcher. Physically safe environment: no spills, clutter or unnecessary equipment. Stretcher in lowest position, wheels locked, appropriate side rails in place.

## 2022-04-03 NOTE — ED PROVIDER NOTE - CARE PLAN
1 Principal Discharge DX:	Pneumonia  Secondary Diagnosis:	Acute respiratory failure with hypoxia and hypercapnia  Secondary Diagnosis:	Encephalopathy acute

## 2022-04-03 NOTE — ED PROVIDER NOTE - BREATH SOUNDS
Returned call. Conveyed that he is supposed to be on quetiapine 200mg in am and 600mg at night. She said he is doing better and asking for his 3pm meds so she was getting confused as to what to give him. She was confused over the phone. Had to keep reiterating to her to give him meds as prescribed, not to skip meds just because he says he does not need nor to give him more just because he asks for it. Suggested may be it was better for daughter in law to manage his meds since she is so confused about them. She refuses, stating that there are problems there   mild, expiratory/WHEEZES

## 2022-04-03 NOTE — ED ADULT NURSE NOTE - OBJECTIVE STATEMENT
Pt presents to the ED with c/o weakness and SOB x 1 day. As per son, pt was noted to be weak and confused yesterday but resolved. History of bronchitis and diabetes. Pt saturating 84% on room air in ED and was placed on NRM 15L, O2 now 100%.

## 2022-04-03 NOTE — ED ADULT TRIAGE NOTE - CHIEF COMPLAINT QUOTE
Patient presents to ED from home for weakness, shortness of breath x1 day. History of bronchitis, diabetes. Saturating 84% on room air, placed on NRM 15 L now satting 100%.

## 2022-04-03 NOTE — ED PROVIDER NOTE - CLINICAL SUMMARY MEDICAL DECISION MAKING FREE TEXT BOX
75yo M with hx HTN, HLD, CAD, COPD/active smoker presents with alteral mental status and generalized weakness. In ED patient tachycardic, hypoxic and febrile. Code sepsis initiated. Given empiric abx and NS 30ml/kg, tylenol and intermittent nebs. In ED O2sat 84% on RA, 100% on NRBmask. 77yo M with hx HTN, HLD, CAD, COPD/active smoker presents with alteral mental status and generalized weakness. In ED patient tachycardic, hypoxic and febrile. Code sepsis initiated. Given empiric abx and NS 30ml/kg, tylenol and intermittent nebs. In ED O2sat 84% on RA, 100% on NRBmask.    ekg nonischemic, trop wnl, probnp 600s, CXR no focal infiltrate  abg shows pH 7.26 and pCO2 63, placed on BiPAP  CT head unremarkable  CTA chest shows Negative for pulmonary embolus. Small region of nodular groundglass opacities in the right lower lobe and multiple nodular opacities in the left lower lobe. These are nonspecific, however the close grouping of these nodular opacities may represent an infectious process. Recommend clinical correlation. Posttreatment follow-up CT scan can be obtained.There is a nonspecific 1.5 x 1 x 1.9 cm nodular opacity of the left lung apex and an additional 1.8 x 0.6 x 1 cm nodular opacity of the left upper lobe. Further evaluation with PET/CT, biopsy or short-term three-month CT follow-up can be obtained as per Fleischner Society recommendations.  ICU consulted for further management.

## 2022-04-04 NOTE — H&P ADULT - ATTENDING COMMENTS
Patient is a 75 y/o male with PMH of HTN, CAD, COPD with active smoking admitted from home with chief complaint of altered mental state as per son. The patient reportedly is also weak. HE was evaluated in ED and decision made to place him on noninvasive positive pressure ventilation due to his perceived work of breathing. He had an ABG that frankly wasn't that bad. 7.29/63/255 on a 40% aerosolized mask. Bipap started and ABG became 7.28/61/84. Upon arrival to ICU the patient is alert and will be placed back on bipap ( assuming he'll cooperate and wear the mask ), continue antibiotics, beta agonist bronchodilators and systemic corticosteroids. Lung sound are clear w/o wheeze. Dx- COPD exacerbation. I reviewed all laboratory and roentgenographic data and any previous medical record from [facility] that existed. I also discussed the case with the ED attending or referring physician.

## 2022-04-04 NOTE — PROGRESS NOTE ADULT - SUBJECTIVE AND OBJECTIVE BOX
INTERVAL HPI/OVERNIGHT EVENTS: ***    PRESSORS: [ ] YES [ ] NO    Antimicrobial:  azithromycin  IVPB 500 milliGRAM(s) IV Intermittent every 24 hours  cefTRIAXone   IVPB 1000 milliGRAM(s) IV Intermittent every 24 hours    Cardiovascular:    Pulmonary:  ALBUTerol    90 MICROgram(s) HFA Inhaler 2 Puff(s) Inhalation every 6 hours  budesonide 160 MICROgram(s)/formoterol 4.5 MICROgram(s) Inhaler 2 Puff(s) Inhalation two times a day  tiotropium 18 MICROgram(s) Capsule 1 Capsule(s) Inhalation daily    Hematalogic:  enoxaparin Injectable 40 milliGRAM(s) SubCutaneous every 24 hours    Other:  chlorhexidine 2% Cloths 1 Application(s) Topical <User Schedule>  insulin lispro (ADMELOG) corrective regimen sliding scale   SubCutaneous every 6 hours  methylPREDNISolone sodium succinate Injectable 40 milliGRAM(s) IV Push daily  nicotine -  14 mG/24Hr(s) Patch 1 patch Transdermal daily    ALBUTerol    90 MICROgram(s) HFA Inhaler 2 Puff(s) Inhalation every 6 hours  azithromycin  IVPB 500 milliGRAM(s) IV Intermittent every 24 hours  budesonide 160 MICROgram(s)/formoterol 4.5 MICROgram(s) Inhaler 2 Puff(s) Inhalation two times a day  cefTRIAXone   IVPB 1000 milliGRAM(s) IV Intermittent every 24 hours  chlorhexidine 2% Cloths 1 Application(s) Topical <User Schedule>  enoxaparin Injectable 40 milliGRAM(s) SubCutaneous every 24 hours  insulin lispro (ADMELOG) corrective regimen sliding scale   SubCutaneous every 6 hours  methylPREDNISolone sodium succinate Injectable 40 milliGRAM(s) IV Push daily  nicotine -  14 mG/24Hr(s) Patch 1 patch Transdermal daily  tiotropium 18 MICROgram(s) Capsule 1 Capsule(s) Inhalation daily    Drug Dosing Weight  Height (cm): 167.6 (04 Apr 2022 05:00)  Weight (kg): 74.2 (04 Apr 2022 05:00)  BMI (kg/m2): 26.4 (04 Apr 2022 05:00)  BSA (m2): 1.84 (04 Apr 2022 05:00)    CENTRAL LINE: [ ] YES [ ] NO  LOCATION:   DATE INSERTED:  REMOVE: [ ] YES [ ] NO  EXPLAIN:    KINNEY: [ ] YES [ ] NO    DATE INSERTED:  REMOVE:  [ ] YES [ ] NO  EXPLAIN:    A-LINE:  [ ] YES [ ] NO  LOCATION:   DATE INSERTED:  REMOVE:  [ ] YES [ ] NO  EXPLAIN:    PMH -reviewed admission note, no change since admission      ABG - ( 04 Apr 2022 02:13 )  pH, Arterial: 7.28  pH, Blood: x     /  pCO2: 61    /  pO2: 84    / HCO3: 29    / Base Excess: 0.5   /  SaO2: 96                    04-03 @ 07:01  -  04-04 @ 07:00  --------------------------------------------------------  IN: 300 mL / OUT: 0 mL / NET: 300 mL            PHYSICAL EXAM:    GENERAL: NAD  HEAD:  Atraumatic, Normocephalic  EYES: conjunctiva and sclera clear  ENMT: No tonsillar erythema, exudates, or enlargement; Moist mucous membranes, Good dentition, [ ]No lesions  NECK: Supple, normal appearance, No JVD; Normal thyroid; Trachea midline  NERVOUS SYSTEM:  Alert & Oriented X3, Good concentration; Motor Strength 5/5 B/L upper and lower extremities; DTRs 2+ intact and symmetric  CHEST/LUNG: No chest deformity; Normal percussion bilaterally; No rales, rhonchi, wheezing   HEART: Regular rate and rhythm; No murmurs, rubs, or gallops  ABDOMEN: Soft, Nontender, Nondistended;Bowel sounds present  EXTREMITIES:  2+ Peripheral Pulses, No clubbing, cyanosis, or edema  LYMPH: No lymphadenopathy noted  SKIN: No rashes or lesions; Good capillary refill      LABS:  CBC Full  -  ( 03 Apr 2022 22:44 )  WBC Count : 7.24 K/uL  RBC Count : 4.68 M/uL  Hemoglobin : 14.6 g/dL  Hematocrit : 44.0 %  Platelet Count - Automated : 185 K/uL  Mean Cell Volume : 94.0 fl  Mean Cell Hemoglobin : 31.2 pg  Mean Cell Hemoglobin Concentration : 33.2 gm/dL  Auto Neutrophil # : 5.70 K/uL  Auto Lymphocyte # : 0.52 K/uL  Auto Monocyte # : 0.89 K/uL  Auto Eosinophil # : 0.02 K/uL  Auto Basophil # : 0.09 K/uL  Auto Neutrophil % : 78.7 %  Auto Lymphocyte % : 7.2 %  Auto Monocyte % : 12.3 %  Auto Eosinophil % : 0.3 %  Auto Basophil % : 1.2 %    04-03    133<L>  |  99  |  33<H>  ----------------------------<  250<H>  5.3   |  30  |  1.14    Ca    8.3<L>      03 Apr 2022 22:44    TPro  8.3  /  Alb  3.1<L>  /  TBili  0.2  /  DBili  x   /  AST  46<H>  /  ALT  45  /  AlkPhos  93  04-03    PT/INR - ( 03 Apr 2022 22:44 )   PT: 11.9 sec;   INR: 1.00 ratio         PTT - ( 03 Apr 2022 22:44 )  PTT:34.8 sec        RADIOLOGY & ADDITIONAL STUDIES REVIEWED:  ***    [ ]GOALS OF CARE DISCUSSION WITH PATIENT/FAMILY/PROXY:    CRITICAL CARE TIME SPENT: 35 minutes INTERVAL HPI/OVERNIGHT EVENTS: Pt seen and assessed at bedside. Pt was agitated and trying to get out of bed. Pt also attempting to remove BIPAP    PRESSORS: [ ] YES [X] NO    Antimicrobial:  azithromycin  IVPB 500 milliGRAM(s) IV Intermittent every 24 hours  cefTRIAXone   IVPB 1000 milliGRAM(s) IV Intermittent every 24 hours    Cardiovascular:    Pulmonary:  ALBUTerol    90 MICROgram(s) HFA Inhaler 2 Puff(s) Inhalation every 6 hours  budesonide 160 MICROgram(s)/formoterol 4.5 MICROgram(s) Inhaler 2 Puff(s) Inhalation two times a day  tiotropium 18 MICROgram(s) Capsule 1 Capsule(s) Inhalation daily    Hematalogic:  enoxaparin Injectable 40 milliGRAM(s) SubCutaneous every 24 hours    Other:  chlorhexidine 2% Cloths 1 Application(s) Topical <User Schedule>  insulin lispro (ADMELOG) corrective regimen sliding scale   SubCutaneous every 6 hours  methylPREDNISolone sodium succinate Injectable 40 milliGRAM(s) IV Push daily  nicotine -  14 mG/24Hr(s) Patch 1 patch Transdermal daily    ALBUTerol    90 MICROgram(s) HFA Inhaler 2 Puff(s) Inhalation every 6 hours  azithromycin  IVPB 500 milliGRAM(s) IV Intermittent every 24 hours  budesonide 160 MICROgram(s)/formoterol 4.5 MICROgram(s) Inhaler 2 Puff(s) Inhalation two times a day  cefTRIAXone   IVPB 1000 milliGRAM(s) IV Intermittent every 24 hours  chlorhexidine 2% Cloths 1 Application(s) Topical <User Schedule>  enoxaparin Injectable 40 milliGRAM(s) SubCutaneous every 24 hours  insulin lispro (ADMELOG) corrective regimen sliding scale   SubCutaneous every 6 hours  methylPREDNISolone sodium succinate Injectable 40 milliGRAM(s) IV Push daily  nicotine -  14 mG/24Hr(s) Patch 1 patch Transdermal daily  tiotropium 18 MICROgram(s) Capsule 1 Capsule(s) Inhalation daily    Drug Dosing Weight  Height (cm): 167.6 (04 Apr 2022 05:00)  Weight (kg): 74.2 (04 Apr 2022 05:00)  BMI (kg/m2): 26.4 (04 Apr 2022 05:00)  BSA (m2): 1.84 (04 Apr 2022 05:00)    CENTRAL LINE: [ ] YES [X] NO  LOCATION:   DATE INSERTED:  REMOVE: [ ] YES [ ] NO  EXPLAIN:    NIRMAL: [ ] YES [X] NO    DATE INSERTED:  REMOVE:  [ ] YES [ ] NO  EXPLAIN:    A-LINE:  [ ] YES [X] NO  LOCATION:   DATE INSERTED:  REMOVE:  [ ] YES [ ] NO  EXPLAIN:    PMH -reviewed admission note, no change since admission      ABG - ( 04 Apr 2022 02:13 )  pH, Arterial: 7.28  pH, Blood: x     /  pCO2: 61    /  pO2: 84    / HCO3: 29    / Base Excess: 0.5   /  SaO2: 96                    04-03 @ 07:01  -  04-04 @ 07:00  --------------------------------------------------------  IN: 300 mL / OUT: 0 mL / NET: 300 mL            PHYSICAL EXAM:  GENERAL: Agitated however cooperative  HEAD:  Atraumatic, Normocephalic  EYES: EOMI, PERRLA, conjunctiva and sclera clear  NECK: Supple, No JVD, Normal thyroid, no enlarged nodes  NERVOUS SYSTEM:  Alert & Oriented X 2-3, no focal deficits  CHEST/LUNG: crackles in lung bases L>R, no wheezes  HEART: S1S2 regular tachycardia  ABDOMEN: Soft, Nontender, Nondistended; Bowel sounds present  EXTREMITIES:  2+ Peripheral Pulses, No clubbing, cyanosis, or edema  LYMPH: No lymphadenopathy noted  SKIN: No rashes or lesions      LABS:  CBC Full  -  ( 03 Apr 2022 22:44 )  WBC Count : 7.24 K/uL  RBC Count : 4.68 M/uL  Hemoglobin : 14.6 g/dL  Hematocrit : 44.0 %  Platelet Count - Automated : 185 K/uL  Mean Cell Volume : 94.0 fl  Mean Cell Hemoglobin : 31.2 pg  Mean Cell Hemoglobin Concentration : 33.2 gm/dL  Auto Neutrophil # : 5.70 K/uL  Auto Lymphocyte # : 0.52 K/uL  Auto Monocyte # : 0.89 K/uL  Auto Eosinophil # : 0.02 K/uL  Auto Basophil # : 0.09 K/uL  Auto Neutrophil % : 78.7 %  Auto Lymphocyte % : 7.2 %  Auto Monocyte % : 12.3 %  Auto Eosinophil % : 0.3 %  Auto Basophil % : 1.2 %    04-03    133<L>  |  99  |  33<H>  ----------------------------<  250<H>  5.3   |  30  |  1.14    Ca    8.3<L>      03 Apr 2022 22:44    TPro  8.3  /  Alb  3.1<L>  /  TBili  0.2  /  DBili  x   /  AST  46<H>  /  ALT  45  /  AlkPhos  93  04-03    PT/INR - ( 03 Apr 2022 22:44 )   PT: 11.9 sec;   INR: 1.00 ratio         PTT - ( 03 Apr 2022 22:44 )  PTT:34.8 sec        RADIOLOGY & ADDITIONAL STUDIES REVIEWED:  ***    [ ]GOALS OF CARE DISCUSSION WITH PATIENT/FAMILY/PROXY:    CRITICAL CARE TIME SPENT: 35 minutes

## 2022-04-04 NOTE — H&P ADULT - ASSESSMENT
Patient is a 76 year old male with PMHx of HTN, CAD, ?COPD- smoker, who presented with generalized weakness and confusion. Workup in the ED revealed possible bilateral pneumonia, COVID-19 negative. ICU consulted due to necessity of BiPAP use.    Assessment:  - Acute hypoxic/hypercapnic respiratory failure  - ?COPD  - Bilateral community acquired pneumonia  - DM  - HTN    Plan:  Neuro:  # No acute issues  - Patient able to tolerate BiPAP    CV:  # HTN  - Hold all home hypertensives  - monitor BP, suspect sepsis    Pulm:  # Acute Hypoxic/ Hypercapnic Resp Failure  - Documented hx of asthma, however noted to be a smoker  - Likely precipitated by a current pneumonia  - Continue abx for CAP  - Continue with BiPAP. Repeat ABG with BiPAP with minimal improvement in pH and Co2, increased IPAP to 16 and EPAP to 6. Repeat ABG  - Pulmonology consult  - Start on bronchodilators     ID:  # Pneumonia, community acquired  - Start on ceftriaxone and azithromycin  - follow urine strept and legionella  - follow blood cultures, sputum culture if possible.    Nephro:  # No acute issues.    GI:  - npo until off bipap      Heme:  - no indication for transfusion       Endo:  # DM  - target CBG < 180  - Start Sliding Scale  - hold all oral agents  - rpt a1c    FEN:  - Continue IVF    Prophy:  Lovenox    Dispo:  - monitor in the ICU until off bipap  Patient is a 76 year old male with PMHx of HTN, CAD, ?COPD- smoker, who presented with generalized weakness and confusion. Workup in the ED revealed possible bilateral pneumonia, COVID-19 negative. ICU consulted due to necessity of BiPAP use.    Assessment:  - Acute hypoxic/hypercapnic respiratory failure  - ?COPD  - Bilateral community acquired pneumonia  - DM  - HTN    Plan:  Neuro:  # No acute issues  - Patient able to tolerate BiPAP  - it was noted that patient became more agitated and started pulling off his mask in which he was given 0.5mg of haloperidol    CV:  # HTN  - Hold all home hypertensives  - monitor BP, suspect sepsis    Pulm:  # Acute Hypoxic/ Hypercapnic Resp Failure  - Documented hx of asthma, however noted to be a smoker  - started IV steroids, bronchodilator   - Likely precipitated by a current pneumonia  - Continue abx for CAP  - Continue with BiPAP. Repeat ABG with BiPAP with minimal improvement in pH and Co2, increased IPAP to 16 and EPAP to 6. Repeat ABG  - Pulmonology consult  - Start on bronchodilators     ID:  # Pneumonia, community acquired  - Start on ceftriaxone and azithromycin  - follow urine strept and legionella  - follow blood cultures, sputum culture if possible.    Nephro:  # No acute issues.    GI:  - npo until off bipap      Heme:  - no indication for transfusion       Endo:  # DM  - target CBG < 180  - Start Sliding Scale  - hold all oral agents  - rpt a1c    FEN:  - Continue IVF    Prophy:  Lovenox    Dispo:  - monitor in the ICU until off bipap  Patient is a 76 year old male with PMHx of HTN, CAD, ?COPD- smoker, who presented with generalized weakness and confusion. Workup in the ED revealed possible bilateral pneumonia, COVID-19 negative. ICU consulted due to necessity of BiPAP use.    Assessment:  - Acute hypoxic/hypercapnic respiratory failure  - ?COPD  - Bilateral community acquired pneumonia  - Sepsis  - DM  - HTN    Plan:  Neuro:  # No acute issues  - Patient able to tolerate BiPAP  - it was noted that patient became more agitated and started pulling off his mask in which he was given 0.5mg of haloperidol    CV:  # HTN  - Hold all home hypertensives  - monitor BP, suspect sepsis    Pulm:  # Acute Hypoxic/ Hypercapnic Resp Failure  - Documented hx of asthma, however noted to be a smoker  - started IV steroids, bronchodilator   - Likely precipitated by a current pneumonia  - Continue abx for CAP  - Continue with BiPAP. Repeat ABG with BiPAP with minimal improvement in pH and Co2, increased IPAP to 16 and EPAP to 6. Repeat ABG  - Pulmonology consult  - Start on bronchodilators     ID:  # Pneumonia, community acquired  - Start on ceftriaxone and azithromycin  - follow urine strept and legionella  - follow blood cultures, sputum culture if possible.    Nephro:  # No acute issues.    GI:  - npo until off bipap      Heme:  - no indication for transfusion       Endo:  # DM  - target CBG < 180  - Start Sliding Scale  - hold all oral agents  - rpt a1c    FEN:  - Continue IVF    Prophy:  Lovenox    Dispo:  - monitor in the ICU until off bipap

## 2022-04-04 NOTE — PATIENT PROFILE ADULT - FALL HARM RISK - HARM RISK INTERVENTIONS
Assistance with ambulation/Assistance OOB with selected safe patient handling equipment/Communicate Risk of Fall with Harm to all staff/Discuss with provider need for PT consult/Monitor gait and stability/Provide patient with walking aids - walker, cane, crutches/Reinforce activity limits and safety measures with patient and family/Review medications for side effects contributing to fall risk/Sit up slowly, dangle for a short time, stand at bedside before walking/Tailored Fall Risk Interventions/Toileting schedule using arm’s reach rule for commode and bathroom/Visual Cue: Yellow wristband and red socks/Bed in lowest position, wheels locked, appropriate side rails in place/Call bell, personal items and telephone in reach/Instruct patient to call for assistance before getting out of bed or chair/Non-slip footwear when patient is out of bed/[place] to call system/Physically safe environment - no spills, clutter or unnecessary equipment/Purposeful Proactive Rounding/Room/bathroom lighting operational, light cord in reach

## 2022-04-04 NOTE — PROGRESS NOTE ADULT - ATTENDING COMMENTS
IMP: This is a 76 year old man  with uncontrol DM   HTN, CAD, ?COPD- smoker, who presented with generalized weakness and confusion. AMS due to sepsis and hypoxia . Acute hypoxic hypercapnic resp failure due to acute ex of COPD and influenza A pna requiring BiPaP with supp Os support     Assessment:  - Acute hypoxic/hypercapnic respiratory failure  - Acute ex of COPD  - Influenza A pneumonia  - Sepsis  - DM uncontrol   - HTN      Plan   - Continue precedex as needed for AMS / Encephalopathy   - Continue BiPaP alternate with Supp as needed to maintain sat >90%  - Droplet isolation   - Tamiflu   - Continue albuterol / atrovent neb q6h   - Solumedrol   - Continue antibx   - Asp precaution   - NPO while on BiPaP  - F/u cultures   - Monitor blood sugar with coverage   - Hemodynamic monitoring   - Nicotine patch   - DVT GI prophy

## 2022-04-04 NOTE — PROGRESS NOTE ADULT - ASSESSMENT
Patient is a 76 year old male with PMHx of HTN, CAD, ?COPD- smoker, who presented with generalized weakness and confusion. Workup in the ED revealed possible bilateral pneumonia, COVID-19 negative. ICU consulted due to necessity of BiPAP use.    Assessment:  - Acute hypoxic/hypercapnic respiratory failure  - ?COPD  - Bilateral community acquired pneumonia  - Sepsis  - DM  - HTN    Plan:  Neuro:  # No acute issues  - Patient able to tolerate BiPAP  - it was noted that patient became more agitated and started pulling off his mask in which he was given 0.5mg of haloperidol    CV:  # HTN  - Hold all home hypertensives  - monitor BP, suspect sepsis    Pulm:  # Acute Hypoxic/ Hypercapnic Resp Failure  - Documented hx of asthma, however noted to be a smoker  - started IV steroids, bronchodilator   - Likely precipitated by a current pneumonia  - Continue abx for CAP  - Continue with BiPAP. Repeat ABG with BiPAP with minimal improvement in pH and Co2, increased IPAP to 16 and EPAP to 6. Repeat ABG  - Pulmonology consult  - Start on bronchodilators     ID:  # Pneumonia, community acquired  - Start on ceftriaxone and azithromycin  - follow urine strept and legionella  - follow blood cultures, sputum culture if possible.    Nephro:  # No acute issues.    GI:  - npo until off bipap      Heme:  - no indication for transfusion       Endo:  # DM  - target CBG < 180  - Start Sliding Scale  - hold all oral agents  - rpt a1c    FEN:  - Continue IVF    Prophy:  Lovenox    Dispo:  - monitor in the ICU until off bipap  Patient is a 76 year old male with PMHx of HTN, CAD, ?COPD- smoker, who presented with generalized weakness and confusion. Workup in the ED revealed possible bilateral pneumonia, COVID-19 negative. ICU consulted due to necessity of BiPAP use.    Assessment:  - Acute hypoxic/hypercapnic respiratory failure  - ?COPD  - Bilateral community acquired pneumonia  - Sepsis  - DM  - HTN    Plan:  Neuro:  # No acute issues  - Patient able to tolerate BiPAP  - it was noted that patient became more agitated and started pulling off his mask in which he was given 0.5mg of haloperidol  -Started on precedex for agitation and attempting to get out of bed    CV:  # HTN  - Hold all home hypertensives  - monitor BP, suspect sepsis    Pulm:  # Acute Hypoxic/ Hypercapnic Resp Failure  - Documented hx of asthma, however noted to be a smoker  - started IV steroids, bronchodilator   - Likely precipitated by a current pneumonia  - Continue abx for CAP  - Continue with BiPAP. Repeat ABG with BiPAP with minimal improvement in pH and Co2, increased IPAP to 16 and EPAP to 6. Repeat ABG  - Pulmonology consult  - Start on bronchodilators     ID:  # Pneumonia, community acquired  - Start on ceftriaxone and azithromycin  - follow urine strept and legionella  - follow blood cultures, sputum culture if possible.    Nephro:  # No acute issues.    GI:  - npo until off bipap      Heme:  - no indication for transfusion       Endo:  # DM  - target CBG < 180  - Start Sliding Scale  - hold all oral agents  - rpt a1c    FEN:  - Continue IVF    Prophy:  Lovenox    Dispo:  - monitor in the ICU until off bipap  Patient is a 76 year old male with PMHx of HTN, CAD, ?COPD- smoker, who presented with generalized weakness and confusion. Workup in the ED revealed possible bilateral pneumonia, COVID-19 negative. ICU consulted due to necessity of BiPAP use.    Assessment:  - Acute hypoxic/hypercapnic respiratory failure  - ?COPD  - Bilateral community acquired pneumonia  - Sepsis  - DM  - HTN    Plan:  Neuro:  # No acute issues  - Patient able to tolerate BiPAP  - it was noted that patient became more agitated and started pulling off his mask in which he was given 0.5mg of haloperidol  - Pt received 2.5 Haldol then an additional 5mg IV haldol, pt continued to be agitated  -Started on precedex for agitation and attempting to get out of bed    CV:  #CHF Exacerbation  - Pt has crackles on exam, no LE edema  - Bedside POCUS revealed B-lines  -   - F/u TTE    # HTN  - Hold all home hypertensives  - monitor BP, suspect sepsis    #Hypotension  - Pt developed hypotension while on bipap  - started on peripheral phenylephrine  - wean vasopressor as tolerated    Pulm:  # Acute Hypoxic/ Hypercapnic Resp Failure  - Documented hx of asthma, however noted to be a smoker  - started IV steroids, bronchodilator   - Likely precipitated by a current pneumonia  - Continue abx for CAP  - Continue with BiPAP. Repeat ABG with BiPAP with minimal improvement in pH and Co2, increased IPAP to 16 and EPAP to 6. Repeat ABG  - Start on duonebs  - F/u RVP, sputum Cx and legionella    ID:  # Pneumonia, community acquired  - Start on ceftriaxone and azithromycin  - follow urine strept and legionella  - follow blood cultures, f/u sputum culture    Nephro:  # No acute issues.    GI:  - npo until off bipap      Heme:  - no indication for transfusion       Endo:  # DM  - target CBG < 180  - Start Sliding Scale  - hold all oral agents  - rpt a1c    FEN:  - Continue IVF    Prophy:  Lovenox    Dispo:  - Continue monitoring in ICU

## 2022-04-04 NOTE — GOALS OF CARE CONVERSATION - ADVANCED CARE PLANNING - CONVERSATION DETAILS
Discussed with patient's wife, Chavez Farmer. She endorses that she spoke to the patient a week ago regarding decisions such as intubation, in which patient stated that he would not want to be intubated even if it meant imminent danger. Further discussed that without intubation patient should also be DNR, as resuscitation without intubation would be much less effective. mrs Farmer agreed and stated that that falls in line with patient wishes. Patient to be DNR/DNI. Continue all othermedical management.

## 2022-04-04 NOTE — H&P ADULT - HISTORY OF PRESENT ILLNESS
Patient is a 76y old  Male who presents with a chief complaint of SOB    HPI: Patient is a 76 year old male with PMHx of HTN, CAD, active smoker, who was sent to hospital by family members due to altered mental status and generalized weakness. Given the fact that patient was on BiPAP and inability to reach family members, history obtained from chart. Per son, patient noted with generalized weakness that resolved. Then today, noted to be confused and not making sense. Son noted that patient was having difficulty breathing. Has sick contacts at home with cough. Not vaccinated for COVID-19. Patient himself was able to endorse that he is feeling feverish and chills, with cough the past couple of days. Denied any pain. Attempted to reach son for collateral information, however unsuccessful.    ICU Vital Signs Last 24 Hrs  T(C): 37.2 (04 Apr 2022 00:21), Max: 38.3 (03 Apr 2022 22:11)  T(F): 99 (04 Apr 2022 00:21), Max: 100.9 (03 Apr 2022 22:11)  HR: 106 (04 Apr 2022 02:52) (72 - 137)  BP: 102/86 (04 Apr 2022 01:42) (102/86 - 162/74)  BP(mean): --  ABP: --  ABP(mean): --  RR: 21 (04 Apr 2022 02:52) (20 - 28)  SpO2: 97% (04 Apr 2022 02:52) (84% - 100%)    I&O's Summary        LABS:                        14.6   7.24  )-----------( 185      ( 03 Apr 2022 22:44 )             44.0     04-03    133<L>  |  99  |  33<H>  ----------------------------<  250<H>  5.3   |  30  |  1.14    Ca    8.3<L>      03 Apr 2022 22:44    TPro  8.3  /  Alb  3.1<L>  /  TBili  0.2  /  DBili  x   /  AST  46<H>  /  ALT  45  /  AlkPhos  93  04-03    PT/INR - ( 03 Apr 2022 22:44 )   PT: 11.9 sec;   INR: 1.00 ratio         PTT - ( 03 Apr 2022 22:44 )  PTT:34.8 sec    CAPILLARY BLOOD GLUCOSE        ABG - ( 04 Apr 2022 02:13 )  pH, Arterial: 7.28  pH, Blood: x     /  pCO2: 61    /  pO2: 84    / HCO3: 29    / Base Excess: 0.5   /  SaO2: 96                  RADIOLOGY & ADDITIONAL TESTS:    Consultant(s) Notes Reviewed:  [x ] YES  [ ] NO    MEDICATIONS  (STANDING):    MEDICATIONS  (PRN):      PHYSICAL EXAM:  GENERAL: well developed. appears stated age, laying in bed with BiPAP, shivering, in mild respiratory distress  HEAD:  Atraumatic, Normocephalic  EYES: EOMI, PERRLA, conjunctiva and sclera clear  NECK: Supple, No JVD, Normal thyroid, no enlarged nodes  NERVOUS SYSTEM:  Alert & Oriented X 3, no focal deficits  CHEST/LUNG: clear bilaterally, no obvious wheezes  HEART: S1S2 regular tachycardia  ABDOMEN: Soft, Nontender, Nondistended; Bowel sounds present  EXTREMITIES:  2+ Peripheral Pulses, No clubbing, cyanosis, or edema  LYMPH: No lymphadenopathy noted  SKIN: No rashes or lesions    Care Discussed with Consultants/Other Providers [ x] YES  [ ] NO

## 2022-04-05 NOTE — PHYSICAL THERAPY INITIAL EVALUATION ADULT - PATIENT/FAMILY/SIGNIFICANT OTHER GOALS STATEMENT, PT EVAL
Pt unable to express goals at this time. PT spoke to pt's wife Chavez on the phone who wants her  to return to being independent in ADLs and ambulation.

## 2022-04-05 NOTE — PHYSICAL THERAPY INITIAL EVALUATION ADULT - DIAGNOSIS, PT EVAL
Pt is presenting with severe lethargy, impaired cognition, decreased muscle tone, and impaired respiration secondary to influenza illness and subsequent sedation, impacting pt's ability to perform any functional tasks and mobility.

## 2022-04-05 NOTE — PROGRESS NOTE ADULT - ATTENDING COMMENTS
IMP: This is a 76 year old man  with uncontrol DM   HTN, CAD, ?COPD- smoker, who presented with generalized weakness and confusion. AMS due to sepsis and hypoxia . Acute hypoxic hypercapnic resp failure due to acute ex of COPD and influenza A pna requiring BiPaP with supp Os support     Assessment:  - Acute hypoxic/hypercapnic respiratory failure  - Acute ex of COPD  - Influenza A pneumonia  - Sepsis  - DM uncontrol   - HTN      Plan   - Continue precedex as needed for AMS / Encephalopathy   - Add phenobarb since precedex is at max dose and is still delirious   - Pat informed nursing staff that he is actively drinking alcohol daily   - Continue BiPaP alternate with Supp as needed to maintain sat >90%  - Droplet isolation   - Tamiflu x 10 doses total   - Continue albuterol / atrovent neb q6h   - Solumedrol   - Continue antibx   - Asp precaution   - NPO while on BiPaP  - F/u cultures   - Droplet precaution   - Monitor blood sugar with coverage   - Hemodynamic monitoring   - Nicotine patch   - DVT GI prophy .    I spoke with sister in icu regarding care IMP: This is a 76 year old man  with uncontrol DM   HTN, CAD, ?COPD- smoker, who presented with generalized weakness and confusion. AMS due to sepsis and hypoxia . Acute hypoxic hypercapnic resp failure due to acute ex of COPD and influenza A pna requiring BiPaP with supp Os support     Assessment:  - Acute hypoxic/hypercapnic respiratory failure  - Acute ex of COPD  - Influenza A pneumonia  - Sepsis  - DM uncontrol   - HTN      Plan   - Continue precedex as needed for AMS / Encephalopathy   - Add phenobarb since precedex is at max dose and is still delirious   - Pat informed nursing staff that he is actively drinking alcohol daily   - Continue BiPaP alternate with Supp as needed to maintain sat >90%  - New Severe AS and moderate MS  - Cards eval   - Droplet isolation   - Tamiflu x 10 doses total   - Continue albuterol / atrovent neb q6h   - Solumedrol   - Continue antibx   - Asp precaution   - NPO while on BiPaP  - F/u cultures   - Droplet precaution   - Monitor blood sugar with coverage   - Hemodynamic monitoring   - Nicotine patch   - DVT GI prophy .    I spoke with sister in icu regarding care

## 2022-04-05 NOTE — PROGRESS NOTE ADULT - ASSESSMENT
Patient is a 76 year old male with PMHx of HTN, CAD, ?COPD- smoker, who presented with generalized weakness and confusion. Workup in the ED revealed possible bilateral pneumonia, COVID-19 negative. ICU consulted due to necessity of BiPAP use.    Assessment:  - Acute hypoxic/hypercapnic respiratory failure  - ?COPD  - Bilateral community acquired pneumonia  - Sepsis  - DM  - HTN    Plan:  Neuro:  # No acute issues  - Patient able to tolerate BiPAP  - it was noted that patient became more agitated and started pulling off his mask in which he was given 0.5mg of haloperidol  - Pt received 2.5 Haldol then an additional 5mg IV haldol, pt continued to be agitated  -Started on precedex for agitation and attempting to get out of bed    CV:  #CHF Exacerbation  - Pt has crackles on exam, no LE edema  - Bedside POCUS revealed B-lines  -   - F/u TTE    # HTN  - Hold all home hypertensives  - monitor BP, suspect sepsis    #Hypotension  - Pt developed hypotension while on bipap  - started on peripheral phenylephrine  - wean vasopressor as tolerated    Pulm:  # Acute Hypoxic/ Hypercapnic Resp Failure  - Documented hx of asthma, however noted to be a smoker  - started IV steroids, bronchodilator   - Likely precipitated by a current pneumonia  - Continue abx for CAP  - Continue with BiPAP. Repeat ABG with BiPAP with minimal improvement in pH and Co2, increased IPAP to 16 and EPAP to 6. Repeat ABG  - Start on duonebs  - F/u RVP, sputum Cx and legionella    ID:  # Pneumonia, community acquired  - Start on ceftriaxone and azithromycin  - follow urine strept and legionella  - follow blood cultures, f/u sputum culture    Nephro:  # No acute issues.    GI:  - npo until off bipap      Heme:  - no indication for transfusion       Endo:  # DM  - target CBG < 180  - Start Sliding Scale  - hold all oral agents  - rpt a1c    FEN:  - Continue IVF    Prophy:  Lovenox    Dispo:  - Continue monitoring in ICU  Patient is a 76 year old male with PMHx of HTN, CAD, ?COPD- smoker, who presented with generalized weakness and confusion. Workup in the ED revealed possible bilateral pneumonia, COVID-19 negative. ICU consulted due to necessity of BiPAP use.    Assessment:  - Acute hypoxic/hypercapnic respiratory failure  - ?COPD  - Bilateral community acquired pneumonia  - Sepsis  - DM  - HTN    Plan:  Neuro:  # Acute metabolic encephalopathy  - Patient able to tolerate BiPAP  - it was noted that patient became more agitated and started pulling off his mask in which he was given 0.5mg of haloperidol  - Pt received 2.5 Haldol then an additional 5mg IV haldol, pt continued to be agitated  -Started on precedex for agitation and attempting to get out of bed      CV:  #CHF Exacerbation  - Pt has crackles on exam, no LE edema  - Bedside POCUS revealed B-lines  -   - F/u TTE    # HTN  - Hold all home hypertensives  - monitor BP, suspect sepsis    #Hypotension  - Pt developed hypotension while on bipap  - started on peripheral phenylephrine  - wean vasopressor as tolerated    Pulm:  # Acute Hypoxic/ Hypercapnic Resp Failure  - Documented hx of asthma, however noted to be a smoker  - started IV steroids, bronchodilator   - Likely precipitated by a current pneumonia  - Continue abx for CAP  - Continue with BiPAP. Repeat ABG with BiPAP with minimal improvement in pH and Co2, increased IPAP to 16 and EPAP to 6. Repeat ABG  - Start on duonebs  - F/u RVP, sputum Cx and legionella    ID:  # Pneumonia, community acquired  - Start on ceftriaxone and azithromycin  - follow urine strept and legionella  - follow blood cultures, f/u sputum culture    Nephro:  # No acute issues.    GI:  - npo until off bipap      Heme:  - no indication for transfusion       Endo:  # DM  - target CBG < 180  - Start Sliding Scale  - hold all oral agents  - rpt a1c    FEN:  - Continue IVF    Prophy:  Lovenox    Dispo:  - Continue monitoring in ICU  Patient is a 76 year old male with PMHx of HTN, CAD, ?COPD- smoker, who presented with generalized weakness and confusion. Workup in the ED revealed possible bilateral pneumonia, COVID-19 negative. ICU consulted due to necessity of BiPAP use.    Assessment:  - Acute hypoxic/hypercapnic respiratory failure  - ?COPD  - Bilateral community acquired pneumonia  - Sepsis  - DM  - HTN    Plan:  Neuro:  # Acute metabolic encephalopathy  - Patient able to tolerate BiPAP  - it was noted that patient became more agitated and started pulling off his mask in which he was given 0.5mg of haloperidol  - Pt received 2.5 Haldol then an additional 5mg IV haldol, pt continued to be agitated  - Started on Phenobarbital 130 q15min for a total of 12 doses    CV:  #CHF Exacerbation  - Pt has crackles on exam, no LE edema  - Bedside POCUS revealed B-lines  -   - F/u TTE    # HTN  - Hold all home hypertensives  - monitor BP, suspect sepsis    #Hypotension  - Pt developed hypotension while on bipap  - started on peripheral phenylephrine  - wean vasopressor as tolerated    Pulm:  # Acute Hypoxic/ Hypercapnic Resp Failure  - Documented hx of asthma, however noted to be a smoker  - started IV steroids, bronchodilator   - Likely precipitated by a current pneumonia  - Continue abx for CAP  - Continue with BiPAP. Repeat ABG with BiPAP with minimal improvement in pH and Co2, increased IPAP to 16 and EPAP to 6. Repeat ABG  - Start on duonebs  - RVP influenza AH3 positive  -f/u sputum Cx and legionella    ID:  # Pneumonia, community acquired  - Start on ceftriaxone and azithromycin  - follow urine strept and legionella  - follow blood cultures, f/u sputum culture    Nephro:  # No acute issues.    GI:  - npo until off bipap      Heme:  - no indication for transfusion       Endo:  # DM  - target CBG < 180  - Start Sliding Scale  - hold all oral agents  - rpt a1c    FEN:  - Continue IVF    Prophy:  Lovenox    Dispo:  - Continue monitoring in ICU  Patient is a 76 year old male with PMHx of HTN, CAD, ?COPD- smoker, who presented with generalized weakness and confusion. Workup in the ED revealed possible bilateral pneumonia, COVID-19 negative. ICU consulted due to necessity of BiPAP use.    Assessment:  - Acute hypoxic/hypercapnic respiratory failure  - ?COPD  - Bilateral community acquired pneumonia  - Sepsis  - DM  - HTN    Plan:  Neuro:  # Acute metabolic encephalopathy  - Patient able to tolerate BiPAP  - it was noted that patient became more agitated and started pulling off his mask in which he was given 0.5mg of haloperidol  - Pt received 2.5 Haldol then an additional 5mg IV haldol, pt continued to be agitated  - Started on Phenobarbital 130 q15min for a total of 12 doses    CV:  #CHF Exacerbation  - Pt has crackles on exam, no LE edema  - Bedside POCUS revealed B-lines  -   - F/u TTE    # HTN  - Hold all home hypertensives  - monitor BP, suspect sepsis    #Hypotension  - Pt developed hypotension while on bipap  - started on peripheral phenylephrine  - wean vasopressor as tolerated    Pulm:  # Acute Hypoxic/ Hypercapnic Resp Failure  - Documented hx of asthma, however noted to be a smoker  - started IV steroids, bronchodilator   - Likely precipitated by a current pneumonia  - Continue abx for CAP  - Continue with BiPAP. Repeat ABG with BiPAP with minimal improvement in pH and Co2, increased IPAP to 16 and EPAP to 6. Repeat ABG  - Start on duonebs  - RVP influenza AH3 positive  -f/u sputum Cx and legionella    ID:  # Pneumonia, community acquired  - Start on ceftriaxone and azithromycin  - follow urine strept and legionella  - follow blood cultures, f/u sputum culture    Nephro:  # No acute issues.    GI:  - npo until off bipap      Heme:  - no indication for transfusion       Endo:  # DM  - target CBG < 180  - Start Sliding Scale  - hold all oral agents  - a1c 10.3  - on moderate sliding scale    FEN:  - Continue IVF    Prophy:  Lovenox    Dispo:  - Continue monitoring in ICU  Patient is a 76 year old male with PMHx of HTN, CAD, ?COPD- smoker, who presented with generalized weakness and confusion. Workup in the ED revealed possible bilateral pneumonia, COVID-19 negative. ICU consulted due to necessity of BiPAP use.    Assessment:  - Acute hypoxic/hypercapnic respiratory failure  - ?COPD  - Bilateral community acquired pneumonia  - Sepsis  - DM  - HTN    Plan:  Neuro:  # Acute metabolic encephalopathy  - Patient able to tolerate BiPAP  - it was noted that patient became more agitated and started pulling off his mask in which he was given 0.5mg of haloperidol  - Pt received 2.5 Haldol then an additional 5mg IV haldol, pt continued to be agitated  - Started on Phenobarbital 130 q15min for a total of 12 doses    CV:  #CHF Exacerbation  - Pt has crackles on exam, no LE edema  - Bedside POCUS revealed B-lines  -   - TTE reveals Moderate-severe mitral annular calcification. Mild mitral regurgitation. Moderate-severe mitral stenosis. Severe aortic stenosis. Mild aortic regurgitation.    # HTN  - Hold all home hypertensives  - monitor BP, suspect sepsis    #Hypotension  - Pt developed hypotension while on bipap  - started on peripheral phenylephrine  - wean vasopressor as tolerated    Pulm:  # Acute Hypoxic/ Hypercapnic Resp Failure  - Documented hx of asthma, however noted to be a smoker  - started IV steroids, bronchodilator   - Likely precipitated by a current pneumonia  - Continue abx for CAP  - Continue with BiPAP. Repeat ABG with BiPAP with minimal improvement in pH and Co2, increased IPAP to 16 and EPAP to 6. Repeat ABG  - Start on duonebs  - RVP influenza AH3 positive  -f/u sputum Cx and legionella  - Will start Tamiflu once pt can tolerate PO    ID:  # Pneumonia, community acquired  - Start on ceftriaxone and azithromycin  - follow urine strept and legionella  - follow blood cultures, f/u sputum culture    Nephro:  # No acute issues.    GI:  - npo until off bipap      Heme:  - no indication for transfusion       Endo:  # DM  - target CBG < 180  - Start Sliding Scale  - hold all oral agents  - a1c 10.3  - on moderate sliding scale    FEN:  - Continue IVF    Prophy:  Lovenox    Dispo:  - Continue monitoring in ICU

## 2022-04-05 NOTE — PHYSICAL THERAPY INITIAL EVALUATION ADULT - GENERAL OBSERVATIONS, REHAB EVAL
Consult received, chart reviewed. Pt received supine in bed, NAD, +indwelling catheter, +NGT, +BiPAP FiO2 35%. IV access in BUE antecubital fossas. LUE wrist brace.

## 2022-04-05 NOTE — CONSULT NOTE ADULT - SUBJECTIVE AND OBJECTIVE BOX
C A R D I O L O G Y  *********************    DATE OF SERVICE: 04-05-22    HISTORY OF PRESENT ILLNESS: HPI:      HPI: Patient is a 76 year old male with PMHx of HTN, CAD, active smoker, who was sent to hospital by family members due to altered mental status and generalized weakness. Given the fact that patient was on BiPAP and inability to reach family members, history obtained from chart. Per son, patient noted with generalized weakness that resolved. Then today, noted to be confused and not making sense. Son noted that patient was having difficulty breathing. Has sick contacts at home with cough. Not vaccinated for COVID-19. Patient himself was able to endorse that he is feeling feverish and chills, with cough the past couple of days. Denied any pain.  He is currently on Bipap in the ICU.  Cardiology is called for severe AS and moderate MS          PAST MEDICAL & SURGICAL HISTORY:  HTN (hypertension)    Hyperlipemia    COPD (chronic obstructive pulmonary disease)    DM (diabetes mellitus)    Asthma    Status post spinal surgery  lumbar    Encounter for screening colonoscopy    H/O endoscopy        MEDICATIONS:  MEDICATIONS  (STANDING):  albuterol/ipratropium for Nebulization 3 milliLiter(s) Nebulizer every 6 hours  azithromycin  IVPB 500 milliGRAM(s) IV Intermittent every 24 hours  budesonide 160 MICROgram(s)/formoterol 4.5 MICROgram(s) Inhaler 2 Puff(s) Inhalation two times a day  cefTRIAXone   IVPB 1000 milliGRAM(s) IV Intermittent every 24 hours  chlorhexidine 2% Cloths 1 Application(s) Topical <User Schedule>  dexMEDEtomidine Infusion 0.2 MICROgram(s)/kG/Hr (3.71 mL/Hr) IV Continuous <Continuous>  enoxaparin Injectable 40 milliGRAM(s) SubCutaneous every 24 hours  insulin glargine Injectable (LANTUS) 7 Unit(s) SubCutaneous at bedtime  insulin lispro (ADMELOG) corrective regimen sliding scale   SubCutaneous Before meals and at bedtime  methylPREDNISolone sodium succinate Injectable 40 milliGRAM(s) IV Push daily  nicotine - 21 mG/24Hr(s) Patch 1 patch Transdermal daily  oseltamivir 75 milliGRAM(s) Oral two times a day  pantoprazole    Tablet 40 milliGRAM(s) Oral before breakfast  phenylephrine    Infusion 0.1 MICROgram(s)/kG/Min (2.78 mL/Hr) IV Continuous <Continuous>  thiamine IVPB 500 milliGRAM(s) IV Intermittent every 8 hours  tiotropium 18 MICROgram(s) Capsule 1 Capsule(s) Inhalation daily      Allergies    No Known Allergies    Intolerances        FAMILY HISTORY:  Family history of diabetes mellitus (Sibling)      Non-contributary for premature coronary disease or sudden cardiac death    SOCIAL HISTORY:    [ ] Non-smoker  [X ] Smoker  [ ] Alcohol    FLU VACCINE THIS YEAR STARTS IN AUGUST:  [ ] Yes    [ ] No    IF OVER 65 HAVE YOU EVER HAD A PNA VACCINE:  [ ] Yes    [ ] No       [ ] N/A      REVIEW OF SYSTEMS:  [ ]chest pain  [ X ]shortness of breath  [  ]palpitations  [  ]syncope  [ ]near syncope [ ]upper extremity weakness   [ ] lower extremity weakness  [  ]diplopia  [  ]altered mental status   [  ]fevers  [ ]chills [ ]nausea  [ ]vomitting  [  ]dysphagia    [ ]abdominal pain  [ ]melena  [ ]BRBPR    [  ]epistaxis  [  ]rash    [ ]lower extremity edema        [X] All others negative	  [ ] Unable to obtain      LABS:	 	    CARDIAC MARKERS:        Troponin I, High Sensitivity Result: 45.4 ng/L (04-03-22 @ 22:44)                            13.3   8.24  )-----------( 175      ( 05 Apr 2022 03:55 )             42.1     Hb Trend: 13.3<--    04-05    141  |  104  |  44<H>  ----------------------------<  79  4.7   |  34<H>  |  1.08    Ca    8.5      05 Apr 2022 03:55  Phos  3.4     04-05  Mg     2.4     04-05    TPro  7.3  /  Alb  2.6<L>  /  TBili  0.2  /  DBili  x   /  AST  48<H>  /  ALT  36  /  AlkPhos  72  04-05    Creatinine Trend: 1.08<--, 1.08<--, 1.14<--        PHYSICAL EXAM:  T(C): 37.2 (04-05-22 @ 08:00), Max: 37.3 (04-04-22 @ 15:00)  HR: 86 (04-05-22 @ 10:00) (57 - 117)  BP: 146/59 (04-05-22 @ 10:00) (93/44 - 153/64)  RR: 33 (04-05-22 @ 10:00) (17 - 34)  SpO2: 96% (04-05-22 @ 10:00) (94% - 100%)  Wt(kg): --   BMI (kg/m2): 26.4 (04-04-22 @ 05:00)  I&O's Summary    04 Apr 2022 07:01  -  05 Apr 2022 07:00  --------------------------------------------------------  IN: 126.4 mL / OUT: 1420 mL / NET: -1293.6 mL    05 Apr 2022 07:01  -  05 Apr 2022 11:30  --------------------------------------------------------  IN: 0 mL / OUT: 70 mL / NET: -70 mL    HEENT:  (-)icterus (-)pallor  CV: N S1 S2 1/6 FELICITA (+)2 Pulses B/l  Resp:  Clear to ausculatation B/L, normal effort  GI: (+) BS Soft, NT, ND  Lymph:  (-)Edema, (-)obvious lymphadenopathy  Skin: Warm to touch, Normal turgor  Psych: Unablet to adequately assess mood and affect      ECG:  	Sinus 76 BPM, LAE cannot exclude septal infarct of indeterminant age    RADIOLOGY:         CXR:   ild ill-defined opacity at the lateral right base, of   indeterminate nature.    Ill-defined 1.4 cm left upper lung nodular opacity, better seen on a   subsequent CTA of the chest. Please see that report for further details.      ASSESSMENT/PLAN: 	76y Male  PMHx of HTN, CAD, active smoker, who was sent to hospital by family members due to altered mental status and generalized weakness found with Influenza, PNA and severe AS.    - Does not appear to be in pulmonary edema  - Keep net Even, A low BNP suggests low filling pressures   - No need for diuretics for now given need for pressor support   - Will need to address his valve disease once his acute illness stabilizes.  - consider trending CVP to help wean pressors off    I once again thank you for allowing me to participate in the care of your patient.  If you have any questions or concerns please do not hesitate to contact me.    Casper Bush MD, [facility]  BEEPER [phone]

## 2022-04-05 NOTE — PHYSICAL THERAPY INITIAL EVALUATION ADULT - BED MOBILITY LIMITATIONS, REHAB EVAL
Unable to assess bed mobility d/t pt's inability to follow commands or perform any purposeful movements decreased ability to use arms for pushing/pulling/decreased ability to use legs for bridging/pushing/impaired ability to control trunk for mobility

## 2022-04-05 NOTE — PHYSICAL THERAPY INITIAL EVALUATION ADULT - ADDITIONAL COMMENTS
Pt currently unresponsive. Pt's wife, Chavez, contacted via phone. She reports that pt uses cane for ambulation outside of home but typically does not use it inside the home. She states that he has been using the cane since he had his gallbladder removed a few years ago. She mentions that he has been largely inactive since the start of the pandemic in 2020. Pt's wife also states that pt came to hospital wearing LUE wrist brace d/t arthritis that affects both of his hands, L more than R, impacting his fine motor ability.

## 2022-04-05 NOTE — PROGRESS NOTE ADULT - SUBJECTIVE AND OBJECTIVE BOX
INTERVAL HPI/OVERNIGHT EVENTS: ***    PRESSORS: [ ] YES [ ] NO    Antimicrobial:  azithromycin  IVPB 500 milliGRAM(s) IV Intermittent every 24 hours  cefTRIAXone   IVPB 1000 milliGRAM(s) IV Intermittent every 24 hours  oseltamivir 75 milliGRAM(s) Oral two times a day    Cardiovascular:  phenylephrine    Infusion 0.1 MICROgram(s)/kG/Min IV Continuous <Continuous>    Pulmonary:  albuterol/ipratropium for Nebulization 3 milliLiter(s) Nebulizer every 6 hours  budesonide 160 MICROgram(s)/formoterol 4.5 MICROgram(s) Inhaler 2 Puff(s) Inhalation two times a day  tiotropium 18 MICROgram(s) Capsule 1 Capsule(s) Inhalation daily    Hematalogic:  enoxaparin Injectable 40 milliGRAM(s) SubCutaneous every 24 hours    Other:  chlorhexidine 2% Cloths 1 Application(s) Topical <User Schedule>  dexMEDEtomidine Infusion 0.2 MICROgram(s)/kG/Hr IV Continuous <Continuous>  insulin glargine Injectable (LANTUS) 7 Unit(s) SubCutaneous at bedtime  insulin lispro (ADMELOG) corrective regimen sliding scale   SubCutaneous Before meals and at bedtime  methylPREDNISolone sodium succinate Injectable 40 milliGRAM(s) IV Push daily  nicotine - 21 mG/24Hr(s) Patch 1 patch Transdermal daily    albuterol/ipratropium for Nebulization 3 milliLiter(s) Nebulizer every 6 hours  azithromycin  IVPB 500 milliGRAM(s) IV Intermittent every 24 hours  budesonide 160 MICROgram(s)/formoterol 4.5 MICROgram(s) Inhaler 2 Puff(s) Inhalation two times a day  cefTRIAXone   IVPB 1000 milliGRAM(s) IV Intermittent every 24 hours  chlorhexidine 2% Cloths 1 Application(s) Topical <User Schedule>  dexMEDEtomidine Infusion 0.2 MICROgram(s)/kG/Hr IV Continuous <Continuous>  enoxaparin Injectable 40 milliGRAM(s) SubCutaneous every 24 hours  insulin glargine Injectable (LANTUS) 7 Unit(s) SubCutaneous at bedtime  insulin lispro (ADMELOG) corrective regimen sliding scale   SubCutaneous Before meals and at bedtime  methylPREDNISolone sodium succinate Injectable 40 milliGRAM(s) IV Push daily  nicotine - 21 mG/24Hr(s) Patch 1 patch Transdermal daily  oseltamivir 75 milliGRAM(s) Oral two times a day  phenylephrine    Infusion 0.1 MICROgram(s)/kG/Min IV Continuous <Continuous>  tiotropium 18 MICROgram(s) Capsule 1 Capsule(s) Inhalation daily    Drug Dosing Weight  Height (cm): 167.6 (2022 05:00)  Weight (kg): 74.2 (2022 05:00)  BMI (kg/m2): 26.4 (2022 05:00)  BSA (m2): 1.84 (2022 05:00)    CENTRAL LINE: [ ] YES [ ] NO  LOCATION:   DATE INSERTED:  REMOVE: [ ] YES [ ] NO  EXPLAIN:    KINNEY: [ ] YES [ ] NO    DATE INSERTED:  REMOVE:  [ ] YES [ ] NO  EXPLAIN:    A-LINE:  [ ] YES [ ] NO  LOCATION:   DATE INSERTED:  REMOVE:  [ ] YES [ ] NO  EXPLAIN:    PMH -reviewed admission note, no change since admission      ABG - ( 2022 05:28 )  pH, Arterial: 7.35  pH, Blood: x     /  pCO2: 61    /  pO2: 104   / HCO3: 34    / Base Excess: 6.1   /  SaO2: 98                    04 @ 07:01  -  - @ 07:00  --------------------------------------------------------  IN: 126.4 mL / OUT: 1350 mL / NET: -1223.6 mL            PHYSICAL EXAM:    GENERAL: NAD  HEAD:  Atraumatic, Normocephalic  EYES: conjunctiva and sclera clear  ENMT: No tonsillar erythema, exudates, or enlargement; Moist mucous membranes, Good dentition, [ ]No lesions  NECK: Supple, normal appearance, No JVD; Normal thyroid; Trachea midline  NERVOUS SYSTEM:  Alert & Oriented X3, Good concentration; Motor Strength 5/5 B/L upper and lower extremities; DTRs 2+ intact and symmetric  CHEST/LUNG: No chest deformity; Normal percussion bilaterally; No rales, rhonchi, wheezing   HEART: Regular rate and rhythm; No murmurs, rubs, or gallops  ABDOMEN: Soft, Nontender, Nondistended;Bowel sounds present  EXTREMITIES:  2+ Peripheral Pulses, No clubbing, cyanosis, or edema  LYMPH: No lymphadenopathy noted  SKIN: No rashes or lesions; Good capillary refill      LABS:  CBC Full  -  ( 2022 03:55 )  WBC Count : 8.24 K/uL  RBC Count : 4.31 M/uL  Hemoglobin : 13.3 g/dL  Hematocrit : 42.1 %  Platelet Count - Automated : 175 K/uL  Mean Cell Volume : 97.7 fl  Mean Cell Hemoglobin : 30.9 pg  Mean Cell Hemoglobin Concentration : 31.6 gm/dL  Auto Neutrophil # : 5.92 K/uL  Auto Lymphocyte # : 1.35 K/uL  Auto Monocyte # : 0.78 K/uL  Auto Eosinophil # : 0.15 K/uL  Auto Basophil # : 0.01 K/uL  Auto Neutrophil % : 71.8 %  Auto Lymphocyte % : 16.4 %  Auto Monocyte % : 9.5 %  Auto Eosinophil % : 1.8 %  Auto Basophil % : 0.1 %    04-    141  |  104  |  44<H>  ----------------------------<  79  4.7   |  34<H>  |  1.08    Ca    8.5      2022 03:55  Phos  3.4     04-05  Mg     2.4     04-05    TPro  7.3  /  Alb  2.6<L>  /  TBili  0.2  /  DBili  x   /  AST  48<H>  /  ALT  36  /  AlkPhos  72  04-05    PT/INR - ( 2022 22:44 )   PT: 11.9 sec;   INR: 1.00 ratio         PTT - ( 2022 22:44 )  PTT:34.8 sec  Urinalysis Basic - ( 2022 10:12 )    Color: Yellow / Appearance: Clear / S.020 / pH: x  Gluc: x / Ketone: Negative  / Bili: Negative / Urobili: Negative   Blood: x / Protein: 100 / Nitrite: Negative   Leuk Esterase: Negative / RBC: >50 /HPF / WBC 3-5 /HPF   Sq Epi: x / Non Sq Epi: Few /HPF / Bacteria: Negative /HPF      Culture Results:   No growth to date. ( @ 06:15)  Culture Results:   No growth to date. ( @ 06:15)      RADIOLOGY & ADDITIONAL STUDIES REVIEWED:  ***    [ ]GOALS OF CARE DISCUSSION WITH PATIENT/FAMILY/PROXY:    CRITICAL CARE TIME SPENT: 35 minutes INTERVAL HPI/OVERNIGHT EVENTS: Pt was seen and assessed at bedside, pt was switched from BIPAP to NC at 6am, Pt was calm on 0.3 precedex. Pt was tachypneic and was replaced on BIPAP     PRESSORS: [ ] YES [X] NO    Antimicrobial:  azithromycin  IVPB 500 milliGRAM(s) IV Intermittent every 24 hours  cefTRIAXone   IVPB 1000 milliGRAM(s) IV Intermittent every 24 hours  oseltamivir 75 milliGRAM(s) Oral two times a day    Cardiovascular:  phenylephrine    Infusion 0.1 MICROgram(s)/kG/Min IV Continuous <Continuous>    Pulmonary:  albuterol/ipratropium for Nebulization 3 milliLiter(s) Nebulizer every 6 hours  budesonide 160 MICROgram(s)/formoterol 4.5 MICROgram(s) Inhaler 2 Puff(s) Inhalation two times a day  tiotropium 18 MICROgram(s) Capsule 1 Capsule(s) Inhalation daily    Hematalogic:  enoxaparin Injectable 40 milliGRAM(s) SubCutaneous every 24 hours    Other:  chlorhexidine 2% Cloths 1 Application(s) Topical <User Schedule>  dexMEDEtomidine Infusion 0.2 MICROgram(s)/kG/Hr IV Continuous <Continuous>  insulin glargine Injectable (LANTUS) 7 Unit(s) SubCutaneous at bedtime  insulin lispro (ADMELOG) corrective regimen sliding scale   SubCutaneous Before meals and at bedtime  methylPREDNISolone sodium succinate Injectable 40 milliGRAM(s) IV Push daily  nicotine - 21 mG/24Hr(s) Patch 1 patch Transdermal daily    albuterol/ipratropium for Nebulization 3 milliLiter(s) Nebulizer every 6 hours  azithromycin  IVPB 500 milliGRAM(s) IV Intermittent every 24 hours  budesonide 160 MICROgram(s)/formoterol 4.5 MICROgram(s) Inhaler 2 Puff(s) Inhalation two times a day  cefTRIAXone   IVPB 1000 milliGRAM(s) IV Intermittent every 24 hours  chlorhexidine 2% Cloths 1 Application(s) Topical <User Schedule>  dexMEDEtomidine Infusion 0.2 MICROgram(s)/kG/Hr IV Continuous <Continuous>  enoxaparin Injectable 40 milliGRAM(s) SubCutaneous every 24 hours  insulin glargine Injectable (LANTUS) 7 Unit(s) SubCutaneous at bedtime  insulin lispro (ADMELOG) corrective regimen sliding scale   SubCutaneous Before meals and at bedtime  methylPREDNISolone sodium succinate Injectable 40 milliGRAM(s) IV Push daily  nicotine - 21 mG/24Hr(s) Patch 1 patch Transdermal daily  oseltamivir 75 milliGRAM(s) Oral two times a day  phenylephrine    Infusion 0.1 MICROgram(s)/kG/Min IV Continuous <Continuous>  tiotropium 18 MICROgram(s) Capsule 1 Capsule(s) Inhalation daily    Drug Dosing Weight  Height (cm): 167.6 (2022 05:00)  Weight (kg): 74.2 (2022 05:00)  BMI (kg/m2): 26.4 (2022 05:00)  BSA (m2): 1.84 (2022 05:00)    CENTRAL LINE: [ ] YES [X] NO  LOCATION:   DATE INSERTED:  REMOVE: [ ] YES [ ] NO  EXPLAIN:    KINNEY: [X] YES [ ] NO    DATE INSERTED:  REMOVE:  [ ] YES [ ] NO  EXPLAIN:    A-LINE:  [ ] YES [X] NO  LOCATION:   DATE INSERTED:  REMOVE:  [ ] YES [ ] NO  EXPLAIN:    PMH -reviewed admission note, no change since admission      ABG - ( 2022 05:28 )  pH, Arterial: 7.35  pH, Blood: x     /  pCO2: 61    /  pO2: 104   / HCO3: 34    / Base Excess: 6.1   /  SaO2: 98                     @ 07:01  -  - @ 07:00  --------------------------------------------------------  IN: 126.4 mL / OUT: 1350 mL / NET: -1223.6 mL            PHYSICAL EXAM:    GENERAL: Pt is calm and cooperative  HEAD:  Atraumatic, Normocephalic  EYES: EOMI, PERRLA, conjunctiva and sclera clear  NECK: Supple, No JVD, Normal thyroid, no enlarged nodes  NERVOUS SYSTEM:  Alert & Oriented X 3, no focal deficits  CHEST/LUNG: Wheezing throughout lungs bilaterally. decreased crackles in lung bases  HEART: S1S2 regular tachycardia  ABDOMEN: Soft, Nontender, Nondistended; Bowel sounds present  EXTREMITIES:  2+ Peripheral Pulses, No clubbing, cyanosis, or edema  LYMPH: No lymphadenopathy noted  SKIN: No rashes or lesions      LABS:  CBC Full  -  ( 2022 03:55 )  WBC Count : 8.24 K/uL  RBC Count : 4.31 M/uL  Hemoglobin : 13.3 g/dL  Hematocrit : 42.1 %  Platelet Count - Automated : 175 K/uL  Mean Cell Volume : 97.7 fl  Mean Cell Hemoglobin : 30.9 pg  Mean Cell Hemoglobin Concentration : 31.6 gm/dL  Auto Neutrophil # : 5.92 K/uL  Auto Lymphocyte # : 1.35 K/uL  Auto Monocyte # : 0.78 K/uL  Auto Eosinophil # : 0.15 K/uL  Auto Basophil # : 0.01 K/uL  Auto Neutrophil % : 71.8 %  Auto Lymphocyte % : 16.4 %  Auto Monocyte % : 9.5 %  Auto Eosinophil % : 1.8 %  Auto Basophil % : 0.1 %    04    141  |  104  |  44<H>  ----------------------------<  79  4.7   |  34<H>  |  1.08    Ca    8.5      2022 03:55  Phos  3.4     04-05  Mg     2.4     04-05    TPro  7.3  /  Alb  2.6<L>  /  TBili  0.2  /  DBili  x   /  AST  48<H>  /  ALT  36  /  AlkPhos  72  04-05    PT/INR - ( 2022 22:44 )   PT: 11.9 sec;   INR: 1.00 ratio         PTT - ( 2022 22:44 )  PTT:34.8 sec  Urinalysis Basic - ( 2022 10:12 )    Color: Yellow / Appearance: Clear / S.020 / pH: x  Gluc: x / Ketone: Negative  / Bili: Negative / Urobili: Negative   Blood: x / Protein: 100 / Nitrite: Negative   Leuk Esterase: Negative / RBC: >50 /HPF / WBC 3-5 /HPF   Sq Epi: x / Non Sq Epi: Few /HPF / Bacteria: Negative /HPF      Culture Results:   No growth to date. ( @ 06:15)  Culture Results:   No growth to date. ( @ 06:15)      RADIOLOGY & ADDITIONAL STUDIES REVIEWED:  ***    [ ]GOALS OF CARE DISCUSSION WITH PATIENT/FAMILY/PROXY:    CRITICAL CARE TIME SPENT: 35 minutes INTERVAL HPI/OVERNIGHT EVENTS: Pt was seen and assessed at bedside, pt was switched from BIPAP to NC at 6am, Pt was calm on 0.3 precedex. Pt was tachypneic and was replaced on BIPAP.  Later during the day, the patient became agitated and attempted to get out of bed. He was pulled back into the bed by the ICU team. Patient was given Haldol 5mg IVP and Phenobarbital     PRESSORS: [ ] YES [X] NO    Antimicrobial:  azithromycin  IVPB 500 milliGRAM(s) IV Intermittent every 24 hours  cefTRIAXone   IVPB 1000 milliGRAM(s) IV Intermittent every 24 hours  oseltamivir 75 milliGRAM(s) Oral two times a day    Cardiovascular:  phenylephrine    Infusion 0.1 MICROgram(s)/kG/Min IV Continuous <Continuous>    Pulmonary:  albuterol/ipratropium for Nebulization 3 milliLiter(s) Nebulizer every 6 hours  budesonide 160 MICROgram(s)/formoterol 4.5 MICROgram(s) Inhaler 2 Puff(s) Inhalation two times a day  tiotropium 18 MICROgram(s) Capsule 1 Capsule(s) Inhalation daily    Hematalogic:  enoxaparin Injectable 40 milliGRAM(s) SubCutaneous every 24 hours    Other:  chlorhexidine 2% Cloths 1 Application(s) Topical <User Schedule>  dexMEDEtomidine Infusion 0.2 MICROgram(s)/kG/Hr IV Continuous <Continuous>  insulin glargine Injectable (LANTUS) 7 Unit(s) SubCutaneous at bedtime  insulin lispro (ADMELOG) corrective regimen sliding scale   SubCutaneous Before meals and at bedtime  methylPREDNISolone sodium succinate Injectable 40 milliGRAM(s) IV Push daily  nicotine - 21 mG/24Hr(s) Patch 1 patch Transdermal daily    albuterol/ipratropium for Nebulization 3 milliLiter(s) Nebulizer every 6 hours  azithromycin  IVPB 500 milliGRAM(s) IV Intermittent every 24 hours  budesonide 160 MICROgram(s)/formoterol 4.5 MICROgram(s) Inhaler 2 Puff(s) Inhalation two times a day  cefTRIAXone   IVPB 1000 milliGRAM(s) IV Intermittent every 24 hours  chlorhexidine 2% Cloths 1 Application(s) Topical <User Schedule>  dexMEDEtomidine Infusion 0.2 MICROgram(s)/kG/Hr IV Continuous <Continuous>  enoxaparin Injectable 40 milliGRAM(s) SubCutaneous every 24 hours  insulin glargine Injectable (LANTUS) 7 Unit(s) SubCutaneous at bedtime  insulin lispro (ADMELOG) corrective regimen sliding scale   SubCutaneous Before meals and at bedtime  methylPREDNISolone sodium succinate Injectable 40 milliGRAM(s) IV Push daily  nicotine - 21 mG/24Hr(s) Patch 1 patch Transdermal daily  oseltamivir 75 milliGRAM(s) Oral two times a day  phenylephrine    Infusion 0.1 MICROgram(s)/kG/Min IV Continuous <Continuous>  tiotropium 18 MICROgram(s) Capsule 1 Capsule(s) Inhalation daily    Drug Dosing Weight  Height (cm): 167.6 (2022 05:00)  Weight (kg): 74.2 (2022 05:00)  BMI (kg/m2): 26.4 (2022 05:00)  BSA (m2): 1.84 (2022 05:00)    CENTRAL LINE: [ ] YES [X] NO  LOCATION:   DATE INSERTED:  REMOVE: [ ] YES [ ] NO  EXPLAIN:    KINNEY: [X] YES [ ] NO    DATE INSERTED:  REMOVE:  [ ] YES [ ] NO  EXPLAIN:    A-LINE:  [ ] YES [X] NO  LOCATION:   DATE INSERTED:  REMOVE:  [ ] YES [ ] NO  EXPLAIN:    PMH -reviewed admission note, no change since admission      ABG - ( 2022 05:28 )  pH, Arterial: 7.35  pH, Blood: x     /  pCO2: 61    /  pO2: 104   / HCO3: 34    / Base Excess: 6.1   /  SaO2: 98                     @ 07:01  -  04-05 @ 07:00  --------------------------------------------------------  IN: 126.4 mL / OUT: 1350 mL / NET: -1223.6 mL            PHYSICAL EXAM:    GENERAL: Pt is calm and cooperative  HEAD:  Atraumatic, Normocephalic  EYES: EOMI, PERRLA, conjunctiva and sclera clear  NECK: Supple, No JVD, Normal thyroid, no enlarged nodes  NERVOUS SYSTEM:  Alert & Oriented X 3, no focal deficits  CHEST/LUNG: Wheezing throughout lungs bilaterally. decreased crackles in lung bases  HEART: S1S2 regular tachycardia  ABDOMEN: Soft, Nontender, Nondistended; Bowel sounds present  EXTREMITIES:  2+ Peripheral Pulses, No clubbing, cyanosis, or edema  LYMPH: No lymphadenopathy noted  SKIN: No rashes or lesions      LABS:  CBC Full  -  ( 2022 03:55 )  WBC Count : 8.24 K/uL  RBC Count : 4.31 M/uL  Hemoglobin : 13.3 g/dL  Hematocrit : 42.1 %  Platelet Count - Automated : 175 K/uL  Mean Cell Volume : 97.7 fl  Mean Cell Hemoglobin : 30.9 pg  Mean Cell Hemoglobin Concentration : 31.6 gm/dL  Auto Neutrophil # : 5.92 K/uL  Auto Lymphocyte # : 1.35 K/uL  Auto Monocyte # : 0.78 K/uL  Auto Eosinophil # : 0.15 K/uL  Auto Basophil # : 0.01 K/uL  Auto Neutrophil % : 71.8 %  Auto Lymphocyte % : 16.4 %  Auto Monocyte % : 9.5 %  Auto Eosinophil % : 1.8 %  Auto Basophil % : 0.1 %    04-05    141  |  104  |  44<H>  ----------------------------<  79  4.7   |  34<H>  |  1.08    Ca    8.5      2022 03:55  Phos  3.4     04-05  Mg     2.4     04-05    TPro  7.3  /  Alb  2.6<L>  /  TBili  0.2  /  DBili  x   /  AST  48<H>  /  ALT  36  /  AlkPhos  72  04-05    PT/INR - ( 2022 22:44 )   PT: 11.9 sec;   INR: 1.00 ratio         PTT - ( 2022 22:44 )  PTT:34.8 sec  Urinalysis Basic - ( 2022 10:12 )    Color: Yellow / Appearance: Clear / S.020 / pH: x  Gluc: x / Ketone: Negative  / Bili: Negative / Urobili: Negative   Blood: x / Protein: 100 / Nitrite: Negative   Leuk Esterase: Negative / RBC: >50 /HPF / WBC 3-5 /HPF   Sq Epi: x / Non Sq Epi: Few /HPF / Bacteria: Negative /HPF      Culture Results:   No growth to date. ( @ 06:15)  Culture Results:   No growth to date. (04-04 @ 06:15)      RADIOLOGY & ADDITIONAL STUDIES REVIEWED:  ***    [ ]GOALS OF CARE DISCUSSION WITH PATIENT/FAMILY/PROXY:    CRITICAL CARE TIME SPENT: 35 minutes INTERVAL HPI/OVERNIGHT EVENTS: Pt was seen and assessed at bedside, pt was switched from BIPAP to NC at 6am, Pt was calm on 0.3 precedex. Pt was tachypneic and was replaced on BIPAP.  Later during the day, the patient became agitated and attempted to get out of bed. He was pulled back into the bed by the ICU team. Patient was given Haldol 5mg IVP and Phenobarbital     PRESSORS: [ ] YES [X] NO    Antimicrobial:  azithromycin  IVPB 500 milliGRAM(s) IV Intermittent every 24 hours  cefTRIAXone   IVPB 1000 milliGRAM(s) IV Intermittent every 24 hours  oseltamivir 75 milliGRAM(s) Oral two times a day    Cardiovascular:  phenylephrine    Infusion 0.1 MICROgram(s)/kG/Min IV Continuous <Continuous>    Pulmonary:  albuterol/ipratropium for Nebulization 3 milliLiter(s) Nebulizer every 6 hours  budesonide 160 MICROgram(s)/formoterol 4.5 MICROgram(s) Inhaler 2 Puff(s) Inhalation two times a day  tiotropium 18 MICROgram(s) Capsule 1 Capsule(s) Inhalation daily    Hematalogic:  enoxaparin Injectable 40 milliGRAM(s) SubCutaneous every 24 hours    Other:  chlorhexidine 2% Cloths 1 Application(s) Topical <User Schedule>  dexMEDEtomidine Infusion 0.2 MICROgram(s)/kG/Hr IV Continuous <Continuous>  insulin glargine Injectable (LANTUS) 7 Unit(s) SubCutaneous at bedtime  insulin lispro (ADMELOG) corrective regimen sliding scale   SubCutaneous Before meals and at bedtime  methylPREDNISolone sodium succinate Injectable 40 milliGRAM(s) IV Push daily  nicotine - 21 mG/24Hr(s) Patch 1 patch Transdermal daily    albuterol/ipratropium for Nebulization 3 milliLiter(s) Nebulizer every 6 hours  azithromycin  IVPB 500 milliGRAM(s) IV Intermittent every 24 hours  budesonide 160 MICROgram(s)/formoterol 4.5 MICROgram(s) Inhaler 2 Puff(s) Inhalation two times a day  cefTRIAXone   IVPB 1000 milliGRAM(s) IV Intermittent every 24 hours  chlorhexidine 2% Cloths 1 Application(s) Topical <User Schedule>  dexMEDEtomidine Infusion 0.2 MICROgram(s)/kG/Hr IV Continuous <Continuous>  enoxaparin Injectable 40 milliGRAM(s) SubCutaneous every 24 hours  insulin glargine Injectable (LANTUS) 7 Unit(s) SubCutaneous at bedtime  insulin lispro (ADMELOG) corrective regimen sliding scale   SubCutaneous Before meals and at bedtime  methylPREDNISolone sodium succinate Injectable 40 milliGRAM(s) IV Push daily  nicotine - 21 mG/24Hr(s) Patch 1 patch Transdermal daily  oseltamivir 75 milliGRAM(s) Oral two times a day  phenylephrine    Infusion 0.1 MICROgram(s)/kG/Min IV Continuous <Continuous>  tiotropium 18 MICROgram(s) Capsule 1 Capsule(s) Inhalation daily    Drug Dosing Weight  Height (cm): 167.6 (2022 05:00)  Weight (kg): 74.2 (2022 05:00)  BMI (kg/m2): 26.4 (2022 05:00)  BSA (m2): 1.84 (2022 05:00)    CENTRAL LINE: [ ] YES [X] NO  LOCATION:   DATE INSERTED:  REMOVE: [ ] YES [ ] NO  EXPLAIN:    KINNEY: [X] YES [ ] NO    DATE INSERTED:  REMOVE:  [ ] YES [ ] NO  EXPLAIN:    A-LINE:  [ ] YES [X] NO  LOCATION:   DATE INSERTED:  REMOVE:  [ ] YES [ ] NO  EXPLAIN:    PMH -reviewed admission note, no change since admission      ABG - ( 2022 05:28 )  pH, Arterial: 7.35  pH, Blood: x     /  pCO2: 61    /  pO2: 104   / HCO3: 34    / Base Excess: 6.1   /  SaO2: 98                     @ 07:01  -  04-05 @ 07:00  --------------------------------------------------------  IN: 126.4 mL / OUT: 1350 mL / NET: -1223.6 mL            PHYSICAL EXAM:    GENERAL: Pt is calm and cooperative  HEAD:  Atraumatic, Normocephalic  EYES: EOMI, PERRLA, conjunctiva and sclera clear  NECK: Supple, No JVD, Normal thyroid, no enlarged nodes  NERVOUS SYSTEM:  Alert & Oriented X 3, no focal deficits  CHEST/LUNG: Wheezing throughout lungs bilaterally. decreased crackles in lung bases  HEART: S1S2 regular tachycardia  ABDOMEN: Soft, Nontender, Nondistended; Bowel sounds present  EXTREMITIES:  2+ Peripheral Pulses, No clubbing, cyanosis, or edema  LYMPH: No lymphadenopathy noted  SKIN: No rashes or lesions      LABS:  CBC Full  -  ( 2022 03:55 )  WBC Count : 8.24 K/uL  RBC Count : 4.31 M/uL  Hemoglobin : 13.3 g/dL  Hematocrit : 42.1 %  Platelet Count - Automated : 175 K/uL  Mean Cell Volume : 97.7 fl  Mean Cell Hemoglobin : 30.9 pg  Mean Cell Hemoglobin Concentration : 31.6 gm/dL  Auto Neutrophil # : 5.92 K/uL  Auto Lymphocyte # : 1.35 K/uL  Auto Monocyte # : 0.78 K/uL  Auto Eosinophil # : 0.15 K/uL  Auto Basophil # : 0.01 K/uL  Auto Neutrophil % : 71.8 %  Auto Lymphocyte % : 16.4 %  Auto Monocyte % : 9.5 %  Auto Eosinophil % : 1.8 %  Auto Basophil % : 0.1 %    04-05    141  |  104  |  44<H>  ----------------------------<  79  4.7   |  34<H>  |  1.08    Ca    8.5      2022 03:55  Phos  3.4     04-05  Mg     2.4     04-05    TPro  7.3  /  Alb  2.6<L>  /  TBili  0.2  /  DBili  x   /  AST  48<H>  /  ALT  36  /  AlkPhos  72  04-05    PT/INR - ( 2022 22:44 )   PT: 11.9 sec;   INR: 1.00 ratio         PTT - ( 2022 22:44 )  PTT:34.8 sec  Urinalysis Basic - ( 2022 10:12 )    Color: Yellow / Appearance: Clear / S.020 / pH: x  Gluc: x / Ketone: Negative  / Bili: Negative / Urobili: Negative   Blood: x / Protein: 100 / Nitrite: Negative   Leuk Esterase: Negative / RBC: >50 /HPF / WBC 3-5 /HPF   Sq Epi: x / Non Sq Epi: Few /HPF / Bacteria: Negative /HPF      Culture Results:   No growth to date. ( @ 06:15)  Culture Results:   No growth to date. ( @ 06:15)      2 Decho:< from: Transthoracic Echocardiogram (22 @ 09:38) >    Patient name: MARTHA PERKINS  YOB: 1945   Age: 76 (M)   MR#: [id number]  Study Date: 2022  Location: [place] [id number]: [facility]  Study quality: Technically difficult  Referring Physician:  RADAMES COLLINS MD  Blood Pressure: 123/57 mmHg  Height: 168 cm  Weight: 74 kg  BSA: 1.8 m2  ------------------------------------------------------------------------    PROCEDURE: Transthoracic echocardiogram with 2-D, M-Mode  and complete spectral and color flow Doppler.  INDICATION: Abnormal electrocardiogram [ECG] [EKG] (R94.31)  HISTORY:  ------------------------------------------------------------------------  DIMENSIONS:  Dimensions:     Normal Values:  LA:     4.4 cm    2.0 - 4.0 cm  Ao:     3.5 cm    2.0 - 3.8 cm  SEPTUM: 0.9 cm    0.6 - 1.2 cm  PWT:    1.0 cm    0.6 - 1.1 cm  LVIDd:  4.5 cm    3.0 - 5.6 cm  LVIDs:  3.6 cm    1.8 - 4.0 cm      Derived Variables:  LVMI: 78 g/m2  RWT: 0.44  Ejection Fraction Visual Estimate: 55-60 %  Peak Velocity (m/sec): AoV=3.4  ------------------------------------------------------------------------  OBSERVATIONS:  Mitral Valve: Moderate-severe mitral annular calcification,  mitral valve leaflets are not well seen.   Mild mitral  regurgitation.  Moderate-severe mitral stenosis; mean  gradient of 9mmHg, MVA 1.5cm^2 (by continuity) at a HR 85.  Aortic Root: Aortic Root: 3.5 cm. Ascending aorta not well  seen.  Aortic Valve: Calcified aortic valve with decreased  opening, leaflets not well seen.  Peak transaortic valve  gradient equals 46.2 mm Hg, mean transaortic valve gradient  equals 29 mm Hg, estimated aortic valve area equals 0.8  sqcm (by continuity equation), DI 0.2 consistent with  severe aortic stenosis. Mild aortic regurgitation.  Peak  left ventricular outflow tract gradient equals 2.6 mm Hg.  Left Atrium: Normal left atrium.  LA volume index = 26  cc/m2.  Left Ventricle: Endocardium not well visualized; normal  left ventricular systolic function.  Increased relative  wall thickness with normal left ventricular (LV) mass  index, consistent with concentric LV remodeling. The  diastolic function is indeterminate based on current  diagnostic criteria.  Right Heart: Normal right atrium. Right ventricle not well  seen, grossly normal size and function on limited views.  Tricuspid valve not well seen. Trace tricuspid  regurgitation. Pulmonic valve not well seen.  Pericardium/PleuraNormal pericardium with no pericardial  effusion.  Hemodynamic: RA Pressure is 8 mm Hg. RV systolic pressure  is normal at  17 mm Hg.  ------------------------------------------------------------------------  CONCLUSIONS:  1. Moderate-severe mitral annular calcification, mitral  valve leaflets are not well seen.   Mild mitral  regurgitation.  Moderate-severe mitral stenosis; mean  gradient of 9mmHg, MVA 1.5cm^2 (by continuity) at a HR 85.  2. Calcified aortic valve with decreased opening, leaflets  not well seen.  Peak transaortic valve gradient equals 46.2  mm Hg, mean transaortic valve gradient equals 29 mm Hg,  estimated aortic valve area equals 0.8 sqcm (by continuity  equation), DI 0.2 consistent with severe aortic stenosis.  Mild aortic regurgitation.  3. Increased relative wall thickness with normal left  ventricular (LV) mass index, consistent with concentric LV  remodeling.  4. Endocardium not well visualized; normal left ventricular  systolic function.  5. The diastolic function is indeterminate based on current  diagnostic criteria.  6. Normal right atrium.  7. Right ventricle not well seen, grossly normal size and  function on limited views.  8. Tricuspid valve not well seen. Trace tricuspid  regurgitation.  9. Pulmonic valve not well seen.    *** Compared with echocardiogram report of 3/3/2017, there  is severe aortic stenosis and moderate mitral stenosis on  current study.  Findings discussed with  from ICU  ------------------------------------------------------------------------  Confirmed on  2022 - 10:47:48 by Rena Aguilar MD  ------------------------------------------------------------------------    < end of copied text >      RADIOLOGY & ADDITIONAL STUDIES REVIEWED:  ***    [ ]GOALS OF CARE DISCUSSION WITH PATIENT/FAMILY/PROXY:    CRITICAL CARE TIME SPENT: 35 minutes

## 2022-04-05 NOTE — PHYSICAL THERAPY INITIAL EVALUATION ADULT - DISCHARGE DISPOSITION, PT EVAL
PT currently recommends discharge to sub-acute rehab facility, pending pt's functional improvement during hospital stay, for intensive strengthening, conditioning, transfer and gait training./rehabilitation facility

## 2022-04-05 NOTE — PHYSICAL THERAPY INITIAL EVALUATION ADULT - PERTINENT HX OF CURRENT PROBLEM, REHAB EVAL
Pt is a 77 y/o male admitted on 4/4 with chief complaint of SOB. PMH of HTN, CAD, active smoker. Pt presented with weakness and AMS. Pt (+) influenza and placed on BiPAP.

## 2022-04-05 NOTE — PHYSICAL THERAPY INITIAL EVALUATION ADULT - IMPAIRMENTS FOUND, PT EVAL
aerobic capacity/endurance/arousal, attention, and cognition/cognitive impairment/fine motor/gait, locomotion, and balance/gross motor/muscle strength/poor safety awareness/posture/ROM/tone/ventilation and respiration/gas exchange

## 2022-04-06 NOTE — DIETITIAN INITIAL EVALUATION ADULT. - PERTINENT LABORATORY DATA
04-06 Na141 mmol/L Glu 185 mg/dL<H> K+ 5.2 mmol/L Cr  0.90 mg/dL BUN 43 mg/dL<H>   04-06 Phos 2.5 mg/dL   04-06 Alb 2.4 g/dL<L>     04-05 Chol 139 mg/dL LDL --    HDL 48 mg/dL Trig 79 mg/dL    04-05-22 @ 07:29 HgbA1C 10.3

## 2022-04-06 NOTE — SWALLOW BEDSIDE ASSESSMENT ADULT - PHARYNGEAL PHASE
audible gulping, presumed premature propulsion of PO. Within functional limits audible gulping, presumed premature propulsion of PO

## 2022-04-06 NOTE — PROGRESS NOTE ADULT - ATTENDING COMMENTS
IMP: This is a 76 year old man  with uncontrol DM   HTN, CAD, ?COPD- smoker, who presented with generalized weakness and confusion. AMS due to sepsis and hypoxia . Acute hypoxic hypercapnic resp failure due to acute ex of COPD and influenza A pna requiring BiPaP with supp Os support     Assessment:  - Acute hypoxic/hypercapnic respiratory failure  - Acute exacerbation of COPD  - Influenza A pneumonia  - Sepsis  - DM uncontrol   - HTN      Plan   - Continue precedex as needed for AMS / Encephalopathy, taper down as tolerated   - Cont. phenobarb as needed and decrease precedex   - Pat informed nursing staff that he is actively drinking alcohol daily   - Continue BiPaP alternate with Supp as needed to maintain sat >90%  - New Severe AS and moderate MS, will need work up once medically stable, does not appear to be in heart failure currently   - Dora montoya appreciated   - Droplet isolation   - Tamiflu x 10 doses total   - Continue albuterol / atrovent neb q6h   - Solumedrol   - No evidence of pneumonia on CT, d/c ceftriaxone   - Asp precaution   - NPO while on BiPaP  - F/u cultures   - Droplet precaution   - Monitor blood sugar with coverage   - Hemodynamic monitoring   - Nicotine patch   - DVT GI prophy  - Oral diet once off bipap IMP: This is a 76 year old man  with uncontrol DM   HTN, CAD, ?COPD- smoker, who presented with generalized weakness and confusion. AMS due to sepsis and hypoxia . Acute hypoxic hypercapnic resp failure due to acute ex of COPD and influenza A pna requiring BiPaP with supp Os support     Assessment:  - Acute hypoxic/hypercapnic respiratory failure  - Acute exacerbation of COPD  - Influenza A infection  - Sepsis  - DM uncontrol   - HTN      Plan   - Continue precedex as needed for AMS / Encephalopathy, taper down as tolerated   - Cont. phenobarb as needed and decrease precedex   - Pat informed nursing staff that he is actively drinking alcohol daily   - Continue BiPaP alternate with Supp as needed to maintain sat >90%  - New Severe AS and moderate MS, will need work up once medically stable, does not appear to be in heart failure currently   - Dora montoya appreciated   - Droplet isolation   - Tamiflu x 10 doses total   - Continue albuterol / atrovent neb q6h   - Solumedrol   - No evidence of pneumonia on CT, d/c ceftriaxone   - Asp precaution   - NPO while on BiPaP  - F/u cultures   - Droplet precaution   - Monitor blood sugar with coverage   - Hemodynamic monitoring   - Nicotine patch   - DVT GI prophy  - Oral diet once off bipap

## 2022-04-06 NOTE — SWALLOW BEDSIDE ASSESSMENT ADULT - SLP PERTINENT HISTORY OF CURRENT PROBLEM
Pt. is a 76y  Male PMHx of HTN, CAD, active smoker, who was sent to hospital by family members due to altered mental status and generalized weakness found with Influenza, PNA and severe AS. ICU consulted due to necessity of BiPAP use. Pt. is off BiPAP as of 4/6/22.

## 2022-04-06 NOTE — PROGRESS NOTE ADULT - SUBJECTIVE AND OBJECTIVE BOX
C A R D I O L O G Y  **********************************     DATE OF SERVICE: 04-06-22    Remains on Bipap, unable to obtain  full ROS but appears comfortable   	  MEDICATIONS:  MEDICATIONS  (STANDING):  ALBUTerol    90 MICROgram(s) HFA Inhaler 2 Puff(s) Inhalation once  albuterol/ipratropium for Nebulization 3 milliLiter(s) Nebulizer every 6 hours  budesonide 160 MICROgram(s)/formoterol 4.5 MICROgram(s) Inhaler 2 Puff(s) Inhalation two times a day  chlorhexidine 2% Cloths 1 Application(s) Topical <User Schedule>  dexMEDEtomidine Infusion 0.2 MICROgram(s)/kG/Hr (3.71 mL/Hr) IV Continuous <Continuous>  enoxaparin Injectable 40 milliGRAM(s) SubCutaneous every 24 hours  insulin glargine Injectable (LANTUS) 7 Unit(s) SubCutaneous at bedtime  insulin lispro (ADMELOG) corrective regimen sliding scale   SubCutaneous Before meals and at bedtime  methylPREDNISolone sodium succinate Injectable 40 milliGRAM(s) IV Push daily  nicotine - 21 mG/24Hr(s) Patch 1 patch Transdermal daily  oseltamivir 75 milliGRAM(s) Oral two times a day  pantoprazole  Injectable 40 milliGRAM(s) IV Push daily  thiamine IVPB 500 milliGRAM(s) IV Intermittent every 8 hours      LABS:	 	    CARDIAC MARKERS:                            14.2   7.17  )-----------( 155      ( 06 Apr 2022 05:35 )             44.9     Hemoglobin: 14.2 g/dL (04-06 @ 05:35)  Hemoglobin: 13.3 g/dL (04-05 @ 03:55)  Hemoglobin: 13.3 g/dL (04-04 @ 09:30)  Hemoglobin: 14.6 g/dL (04-03 @ 22:44)      04-06    141  |  105  |  43<H>  ----------------------------<  185<H>  5.2   |  34<H>  |  0.90    Ca    8.2<L>      06 Apr 2022 05:35  Phos  2.5     04-06  Mg     2.1     04-06    TPro  7.1  /  Alb  2.4<L>  /  TBili  0.3  /  DBili  x   /  AST  55<H>  /  ALT  36  /  AlkPhos  68  04-06    Creatinine Trend: 0.90<--, 1.08<--, 1.08<--, 1.14<--    PHYSICAL EXAM:  T(C): 37.3 (04-06-22 @ 06:30), Max: 38.6 (04-05-22 @ 12:30)  HR: 88 (04-06-22 @ 08:14) (58 - 123)  BP: 104/56 (04-06-22 @ 07:00) (104/56 - 158/68)  RR: 24 (04-06-22 @ 07:00) (18 - 36)  SpO2: 98% (04-06-22 @ 08:14) (91% - 100%)  Wt(kg): --  I&O's Summary    05 Apr 2022 07:01  -  06 Apr 2022 07:00  --------------------------------------------------------  IN: 539.5 mL / OUT: 1335 mL / NET: -795.5 mL      HEENT:  (-)icterus (-)pallor  CV: N S1 S2 1/6 FELICITA (+)2 Pulses B/l  Resp:  Clear to ausculatation B/L, normal effort  GI: (+) BS Soft, NT, ND  Lymph:  (-)Edema, (-)obvious lymphadenopathy  Skin: Warm to touch, Normal turgor  Psych: Unable to adequately assess mood and affect      TELEMETRY: 	  sinus, Sinus Tack APCs        ASSESSMENT/PLAN: 	76y  Male PMHx of HTN, CAD, active smoker, who was sent to hospital by family members due to altered mental status and generalized weakness found with Influenza, PNA and severe AS.    - Does not appear to be in pulmonary edema  - Keep net Even, A low BNP suggests low filling pressures   - Will need to address his valve disease once his acute illness stabilizes.  - Steroids and Tamiflu per micu    Casper Bush MD, [facility]  BEEPER [phone]

## 2022-04-06 NOTE — PROGRESS NOTE ADULT - ASSESSMENT
Patient is a 76 year old male with PMHx of HTN, CAD, ?COPD- smoker, who presented with generalized weakness and confusion. Workup in the ED revealed possible bilateral pneumonia, COVID-19 negative. ICU consulted due to necessity of BiPAP use.    Assessment:  - Acute hypoxic/hypercapnic respiratory failure  - ?COPD  - Bilateral community acquired pneumonia  - Sepsis  - DM  - HTN    Plan:  Neuro:  # Acute metabolic encephalopathy  - Patient able to tolerate BiPAP  - it was noted that patient became more agitated and started pulling off his mask in which he was given 0.5mg of haloperidol  - Pt received 2.5 Haldol then an additional 5mg IV haldol, pt continued to be agitated  - Started on Phenobarbital 130 q15min for a total of 12 doses    CV:  #CHF Exacerbation  - Pt has crackles on exam, no LE edema  - Bedside POCUS revealed B-lines  -   - TTE reveals Moderate-severe mitral annular calcification. Mild mitral regurgitation. Moderate-severe mitral stenosis. Severe aortic stenosis. Mild aortic regurgitation.    # HTN  - Hold all home hypertensives  - monitor BP, suspect sepsis    #Hypotension  - Pt developed hypotension while on bipap  - started on peripheral phenylephrine  - wean vasopressor as tolerated    Pulm:  # Acute Hypoxic/ Hypercapnic Resp Failure  - Documented hx of asthma, however noted to be a smoker  - started IV steroids, bronchodilator   - Likely precipitated by a current pneumonia  - Continue abx for CAP  - Continue with BiPAP. Repeat ABG with BiPAP with minimal improvement in pH and Co2, increased IPAP to 16 and EPAP to 6. Repeat ABG  - Start on duonebs  - RVP influenza AH3 positive  -f/u sputum Cx and legionella  - Will start Tamiflu once pt can tolerate PO    ID:  # Pneumonia, community acquired  - Start on ceftriaxone and azithromycin  - follow urine strept and legionella  - follow blood cultures, f/u sputum culture    Nephro:  # No acute issues.    GI:  - npo until off bipap      Heme:  - no indication for transfusion       Endo:  # DM  - target CBG < 180  - Start Sliding Scale  - hold all oral agents  - a1c 10.3  - on moderate sliding scale    FEN:  - Continue IVF    Prophy:  Lovenox    Dispo:  - Continue monitoring in ICU  Patient is a 76 year old male with PMHx of HTN, CAD, ?COPD- smoker, who presented with generalized weakness and confusion. Workup in the ED revealed possible bilateral pneumonia, COVID-19 negative. ICU consulted due to necessity of BiPAP use.    Assessment:  - Acute hypoxic/hypercapnic respiratory failure  - ?COPD  - Bilateral community acquired pneumonia  - Sepsis  - DM  - HTN    Plan:  Neuro:  # Acute metabolic encephalopathy  - Patient able to tolerate BiPAP  - it was noted that patient became more agitated and started pulling off his mask in which he was given 0.5mg of haloperidol  - Pt received 2.5 Haldol then an additional 5mg IV haldol, pt continued to be agitated  - Started on Phenobarbital 130 q5min for a total of 12 doses  - Will dc precedex    CV:  #CHF Exacerbation  - Pt has crackles on exam, no LE edema  - Bedside POCUS revealed B-lines  -   - TTE reveals Moderate-severe mitral annular calcification. Mild mitral regurgitation. Moderate-severe mitral stenosis. Severe aortic stenosis. Mild aortic regurgitation.    # HTN  - Hold all home hypertensives  - monitor BP, suspect sepsis    #Hypotension  - Pt developed hypotension while on bipap  - started on peripheral phenylephrine  - wean vasopressor as tolerated    Pulm:  # Acute Hypoxic/ Hypercapnic Resp Failure  - Documented hx of asthma, however noted to be a smoker  - started IV steroids, bronchodilator   - Likely precipitated by a current pneumonia  - Continue abx for CAP  - Continue with BiPAP. Repeat ABG with BiPAP with minimal improvement in pH and Co2, increased IPAP to 16 and EPAP to 6. Repeat ABG  - Start on duonebs  - RVP influenza AH3 positive  -f/u sputum Cx and legionella  - Will start Tamiflu once pt can tolerate PO    ID:  # Pneumonia, community acquired  - Start on ceftriaxone and azithromycin  - follow urine strept and legionella  - follow blood cultures, f/u sputum culture    Nephro:  # No acute issues.    GI:  - npo until off bipap      Heme:  - no indication for transfusion       Endo:  # DM  - target CBG < 180  - Start Sliding Scale  - hold all oral agents  - a1c 10.3  - on moderate sliding scale    FEN:  - Continue IVF    Prophy:  Lovenox    Dispo:  - Continue monitoring in ICU  Patient is a 76 year old male with PMHx of HTN, CAD, ?COPD- smoker, who presented with generalized weakness and confusion. Workup in the ED revealed possible bilateral pneumonia, COVID-19 negative. ICU consulted due to necessity of BiPAP use.    Assessment:  - Acute hypoxic/hypercapnic respiratory failure  - ?COPD  - Bilateral community acquired pneumonia  - Sepsis  - DM  - HTN    Plan:  Neuro:  # Acute metabolic encephalopathy  - Patient able to tolerate BiPAP  - it was noted that patient became more agitated and started pulling off his mask in which he was given 0.5mg of haloperidol  - Pt received 2.5 Haldol then an additional 5mg IV haldol, pt continued to be agitated  - Started on Phenobarbital 130 q5min for a total of 12 doses  - Will dc precedex    CV:  #CHF Exacerbation  - Pt has crackles on exam, no LE edema  - Bedside POCUS revealed B-lines  -   - TTE reveals Moderate-severe mitral annular calcification. Mild mitral regurgitation. Moderate-severe mitral stenosis. Severe aortic stenosis. Mild aortic regurgitation.    # HTN  - Hold all home hypertensives  - monitor BP, suspect sepsis    #Hypotension  - Pt developed hypotension while on bipap  - started on peripheral phenylephrine  - Off vasopressors now    Pulm:  # Acute Hypoxic/ Hypercapnic Resp Failure  - Documented hx of asthma, however noted to be a smoker  - started IV steroids, bronchodilator   - Likely precipitated by a current pneumonia  - Continue abx for CAP  - Continue with BiPAP. Repeat ABG with BiPAP with minimal improvement in pH and Co2, increased IPAP to 16 and EPAP to 6. Repeat ABG  - c/w duonebs  - RVP influenza AH3 positive  -f/u sputum Cx and legionella  - Will start Tamiflu once pt can tolerate PO    ID:  # Pneumonia, community acquired  - Start on ceftriaxone and azithromycin  - follow urine strept and legionella  - follow blood cultures, f/u sputum culture    Nephro:  # No acute issues.    GI:  - npo until off bipap      Heme:  - no issues    Endo:  # DM  - target CBG < 180  - Start Sliding Scale  - hold all oral agents  - a1c 10.3  - on moderate sliding scale    FEN:  - Continue IVF    Prophy:  Lovenox  PPI    Dispo:  - Continue monitoring in ICU  Patient is a 76 year old male with PMHx of HTN, CAD, ?COPD- smoker, who presented with generalized weakness and confusion. Workup in the ED revealed possible bilateral pneumonia, COVID-19 negative. ICU consulted due to necessity of BiPAP use.    Assessment:  - Acute hypoxic/hypercapnic respiratory failure  - ?COPD  - Bilateral community acquired pneumonia  - Sepsis  - DM  - HTN    Plan:  Neuro:  # Acute metabolic encephalopathy  - Patient able to tolerate BiPAP  - it was noted that patient became more agitated and started pulling off his mask in which he was given 0.5mg of haloperidol  - Pt received 2.5 Haldol then an additional 5mg IV haldol, pt continued to be agitated  - Started on Phenobarbital 130 q5min for a total of 12 doses  - Will dc precedex    CV:  #CHF Exacerbation  - Pt has crackles on exam, no LE edema  - Bedside POCUS revealed B-lines  -   - TTE reveals Moderate-severe mitral annular calcification. Mild mitral regurgitation. Moderate-severe mitral stenosis. Severe aortic stenosis. Mild aortic regurgitation.    # HTN  - Hold all home hypertensives  - monitor BP, suspect sepsis    #Hypotension  - Pt developed hypotension while on bipap  - started on peripheral phenylephrine  - Off vasopressors now    Pulm:  # Acute Hypoxic/ Hypercapnic Resp Failure  - Documented hx of asthma, however noted to be a smoker  - started IV steroids, bronchodilator   - Likely precipitated by a current pneumonia  - Continue with BiPAP. Repeat ABG with BiPAP with minimal improvement in pH and Co2, increased IPAP to 16 and EPAP to 6. Repeat ABG  - c/w duonebs  - RVP influenza AH3 positive  -f/u sputum Cx and legionella  - Will start Tamiflu once pt can tolerate PO  - completed 3 days of Azithromycin, will d/c ceftriaxone at this time  -     ID:  # Pneumonia, community acquired  - Start on ceftriaxone and azithromycin  - follow urine strept and legionella  - follow blood cultures, f/u sputum culture    Nephro:  # No acute issues.    GI:  - npo until off bipap      Heme:  - no issues    Endo:  # DM  - target CBG < 180  - Start Sliding Scale  - hold all oral agents  - a1c 10.3  - on moderate sliding scale    FEN:  - Continue IVF    Prophy:  Lovenox  PPI    Dispo:  - Continue monitoring in ICU  Patient is a 76 year old male with PMHx of HTN, CAD, ?COPD- smoker, who presented with generalized weakness and confusion. Workup in the ED revealed possible bilateral pneumonia, COVID-19 negative. ICU consulted due to necessity of BiPAP use.    Assessment:  - Acute hypoxic/hypercapnic respiratory failure  - ?COPD  - Bilateral community acquired pneumonia  - Sepsis  - DM  - HTN    Plan:  Neuro:  # Acute metabolic encephalopathy  - Patient able to tolerate BiPAP  - it was noted that patient became more agitated and started pulling off his mask in which he was given 0.5mg of haloperidol  - Pt received 2.5 Haldol then an additional 5mg IV haldol, pt continued to be agitated  - Started on Phenobarbital 130 q5min for a total of 12 doses  - Will dc precedex    CV:  #CHF Exacerbation  - Pt has crackles on exam, no LE edema  - Bedside POCUS revealed B-lines  -   - TTE reveals Moderate-severe mitral annular calcification. Mild mitral regurgitation. Moderate-severe mitral stenosis. Severe aortic stenosis. Mild aortic regurgitation.    # HTN  - Hold all home hypertensives  - monitor BP, suspect sepsis    #Hypotension  - Pt developed hypotension while on bipap  - started on peripheral phenylephrine  - Off vasopressors now    Pulm:  # Acute Hypoxic/ Hypercapnic Resp Failure  - Documented hx of asthma, however noted to be a smoker  - started IV steroids, bronchodilator   - Likely precipitated by a current pneumonia  - Continue with BiPAP. Repeat ABG with BiPAP with minimal improvement in pH and Co2, increased IPAP to 16 and EPAP to 6. Repeat ABG  - c/w duonebs  - RVP influenza AH3 positive  -f/u sputum Cx and legionella  - Will start Tamiflu once pt can tolerate PO  - completed 3 days of Azithromycin, will d/c ceftriaxone at this time    # Left lung nodules  nonspecific 1.5 x 1 x 1.9 cm nodular opacity of the left lung apex and an additional 1.8 x 0.6 x 1 cm nodular opacity of the left upper lobe.   Further evaluation with PET/CT, biopsy or short-term three-month CT outpatient      ID:  # Pneumonia, community acquired  - Start on ceftriaxone and azithromycin  - follow urine strept and legionella  - follow blood cultures, f/u sputum culture    Nephro:  # No acute issues.    GI:  - npo until off bipap      Heme:  - no issues    Endo:  # DM  - target CBG < 180  - Start Sliding Scale  - hold all oral agents  - a1c 10.3  - on moderate sliding scale    FEN:  - Continue IVF    Prophy:  Lovenox  PPI    Dispo:  - Continue monitoring in ICU

## 2022-04-06 NOTE — SWALLOW BEDSIDE ASSESSMENT ADULT - COMMENTS
Pt. p/w fleeting eye contact & intermittent eye closure. HOB elevated to 90°. AA+Ox2-3(with cues), but confused.

## 2022-04-06 NOTE — PROGRESS NOTE ADULT - SUBJECTIVE AND OBJECTIVE BOX
INTERVAL HPI/OVERNIGHT EVENTS: ***    PRESSORS: [ ] YES [ ] NO    Antimicrobial:  cefTRIAXone   IVPB 1000 milliGRAM(s) IV Intermittent every 24 hours    Cardiovascular:  phenylephrine    Infusion 0.1 MICROgram(s)/kG/Min IV Continuous <Continuous>    Pulmonary:  ALBUTerol    90 MICROgram(s) HFA Inhaler 2 Puff(s) Inhalation once  albuterol/ipratropium for Nebulization 3 milliLiter(s) Nebulizer every 6 hours  budesonide 160 MICROgram(s)/formoterol 4.5 MICROgram(s) Inhaler 2 Puff(s) Inhalation two times a day  tiotropium 18 MICROgram(s) Capsule 1 Capsule(s) Inhalation daily    Hematalogic:  enoxaparin Injectable 40 milliGRAM(s) SubCutaneous every 24 hours    Other:  chlorhexidine 2% Cloths 1 Application(s) Topical <User Schedule>  dexMEDEtomidine Infusion 0.2 MICROgram(s)/kG/Hr IV Continuous <Continuous>  insulin glargine Injectable (LANTUS) 7 Unit(s) SubCutaneous at bedtime  insulin lispro (ADMELOG) corrective regimen sliding scale   SubCutaneous Before meals and at bedtime  methylPREDNISolone sodium succinate Injectable 40 milliGRAM(s) IV Push daily  nicotine - 21 mG/24Hr(s) Patch 1 patch Transdermal daily  pantoprazole  Injectable 40 milliGRAM(s) IV Push daily  PHENobarbital Injectable 130 milliGRAM(s) IV Push every 10 minutes PRN  thiamine IVPB 500 milliGRAM(s) IV Intermittent every 8 hours    ALBUTerol    90 MICROgram(s) HFA Inhaler 2 Puff(s) Inhalation once  albuterol/ipratropium for Nebulization 3 milliLiter(s) Nebulizer every 6 hours  budesonide 160 MICROgram(s)/formoterol 4.5 MICROgram(s) Inhaler 2 Puff(s) Inhalation two times a day  cefTRIAXone   IVPB 1000 milliGRAM(s) IV Intermittent every 24 hours  chlorhexidine 2% Cloths 1 Application(s) Topical <User Schedule>  dexMEDEtomidine Infusion 0.2 MICROgram(s)/kG/Hr IV Continuous <Continuous>  enoxaparin Injectable 40 milliGRAM(s) SubCutaneous every 24 hours  insulin glargine Injectable (LANTUS) 7 Unit(s) SubCutaneous at bedtime  insulin lispro (ADMELOG) corrective regimen sliding scale   SubCutaneous Before meals and at bedtime  methylPREDNISolone sodium succinate Injectable 40 milliGRAM(s) IV Push daily  nicotine - 21 mG/24Hr(s) Patch 1 patch Transdermal daily  pantoprazole  Injectable 40 milliGRAM(s) IV Push daily  PHENobarbital Injectable 130 milliGRAM(s) IV Push every 10 minutes PRN  phenylephrine    Infusion 0.1 MICROgram(s)/kG/Min IV Continuous <Continuous>  thiamine IVPB 500 milliGRAM(s) IV Intermittent every 8 hours  tiotropium 18 MICROgram(s) Capsule 1 Capsule(s) Inhalation daily    Drug Dosing Weight  Height (cm): 167.6 (2022 05:00)  Weight (kg): 74.2 (2022 05:00)  BMI (kg/m2): 26.4 (2022 05:00)  BSA (m2): 1.84 (2022 05:00)    CENTRAL LINE: [ ] YES [ ] NO  LOCATION:   DATE INSERTED:  REMOVE: [ ] YES [ ] NO  EXPLAIN:    KINNEY: [ ] YES [ ] NO    DATE INSERTED:  REMOVE:  [ ] YES [ ] NO  EXPLAIN:    A-LINE:  [ ] YES [ ] NO  LOCATION:   DATE INSERTED:  REMOVE:  [ ] YES [ ] NO  EXPLAIN:    PMH -reviewed admission note, no change since admission      ABG - ( 2022 05:28 )  pH, Arterial: 7.35  pH, Blood: x     /  pCO2: 61    /  pO2: 104   / HCO3: 34    / Base Excess: 6.1   /  SaO2: 98                    04 @ 07:01  -  06 @ 07:00  --------------------------------------------------------  IN: 539.5 mL / OUT: 1335 mL / NET: -795.5 mL            PHYSICAL EXAM:    GENERAL: NAD  HEAD:  Atraumatic, Normocephalic  EYES: conjunctiva and sclera clear  ENMT: No tonsillar erythema, exudates, or enlargement; Moist mucous membranes, Good dentition, [ ]No lesions  NECK: Supple, normal appearance, No JVD; Normal thyroid; Trachea midline  NERVOUS SYSTEM:  Alert & Oriented X3, Good concentration; Motor Strength 5/5 B/L upper and lower extremities; DTRs 2+ intact and symmetric  CHEST/LUNG: No chest deformity; Normal percussion bilaterally; No rales, rhonchi, wheezing   HEART: Regular rate and rhythm; No murmurs, rubs, or gallops  ABDOMEN: Soft, Nontender, Nondistended;Bowel sounds present  EXTREMITIES:  2+ Peripheral Pulses, No clubbing, cyanosis, or edema  LYMPH: No lymphadenopathy noted  SKIN: No rashes or lesions; Good capillary refill      LABS:  CBC Full  -  ( 2022 05:35 )  WBC Count : 7.17 K/uL  RBC Count : 4.58 M/uL  Hemoglobin : 14.2 g/dL  Hematocrit : 44.9 %  Platelet Count - Automated : 155 K/uL  Mean Cell Volume : 98.0 fl  Mean Cell Hemoglobin : 31.0 pg  Mean Cell Hemoglobin Concentration : 31.6 gm/dL  Auto Neutrophil # : 5.01 K/uL  Auto Lymphocyte # : 1.47 K/uL  Auto Monocyte # : 0.65 K/uL  Auto Eosinophil # : 0.00 K/uL  Auto Basophil # : 0.02 K/uL  Auto Neutrophil % : 69.8 %  Auto Lymphocyte % : 20.5 %  Auto Monocyte % : 9.1 %  Auto Eosinophil % : 0.0 %  Auto Basophil % : 0.3 %    04-06    141  |  105  |  43<H>  ----------------------------<  185<H>  5.2   |  34<H>  |  0.90    Ca    8.2<L>      2022 05:35  Phos  2.5     04-06  Mg     2.1     -    TPro  7.1  /  Alb  2.4<L>  /  TBili  0.3  /  DBili  x   /  AST  55<H>  /  ALT  36  /  AlkPhos  68  04-06      Urinalysis Basic - ( 2022 10:12 )    Color: Yellow / Appearance: Clear / S.020 / pH: x  Gluc: x / Ketone: Negative  / Bili: Negative / Urobili: Negative   Blood: x / Protein: 100 / Nitrite: Negative   Leuk Esterase: Negative / RBC: >50 /HPF / WBC 3-5 /HPF   Sq Epi: x / Non Sq Epi: Few /HPF / Bacteria: Negative /HPF      Culture Results:   No growth to date. ( @ 06:15)  Culture Results:   No growth to date. ( @ 06:15)      RADIOLOGY & ADDITIONAL STUDIES REVIEWED:  ***    [ ]GOALS OF CARE DISCUSSION WITH PATIENT/FAMILY/PROXY:    CRITICAL CARE TIME SPENT: 35 minutes INTERVAL HPI/OVERNIGHT EVENTS:  Pt was agitated overnight and was thus replaced on precedex. When assessed at bedside, pt was cooperative and calm. Precedex was then weaned off. When attempting to switch BIPAP to nasal cannula patient was tachycardic to 130's, and had increased WOB, was thus given lopressor 5mg IVP.       PRESSORS: [ ] YES [X] NO    Antimicrobial:  cefTRIAXone   IVPB 1000 milliGRAM(s) IV Intermittent every 24 hours    Cardiovascular:  phenylephrine    Infusion 0.1 MICROgram(s)/kG/Min IV Continuous <Continuous>    Pulmonary:  ALBUTerol    90 MICROgram(s) HFA Inhaler 2 Puff(s) Inhalation once  albuterol/ipratropium for Nebulization 3 milliLiter(s) Nebulizer every 6 hours  budesonide 160 MICROgram(s)/formoterol 4.5 MICROgram(s) Inhaler 2 Puff(s) Inhalation two times a day  tiotropium 18 MICROgram(s) Capsule 1 Capsule(s) Inhalation daily    Hematalogic:  enoxaparin Injectable 40 milliGRAM(s) SubCutaneous every 24 hours    Other:  chlorhexidine 2% Cloths 1 Application(s) Topical <User Schedule>  dexMEDEtomidine Infusion 0.2 MICROgram(s)/kG/Hr IV Continuous <Continuous>  insulin glargine Injectable (LANTUS) 7 Unit(s) SubCutaneous at bedtime  insulin lispro (ADMELOG) corrective regimen sliding scale   SubCutaneous Before meals and at bedtime  methylPREDNISolone sodium succinate Injectable 40 milliGRAM(s) IV Push daily  nicotine - 21 mG/24Hr(s) Patch 1 patch Transdermal daily  pantoprazole  Injectable 40 milliGRAM(s) IV Push daily  PHENobarbital Injectable 130 milliGRAM(s) IV Push every 10 minutes PRN  thiamine IVPB 500 milliGRAM(s) IV Intermittent every 8 hours    ALBUTerol    90 MICROgram(s) HFA Inhaler 2 Puff(s) Inhalation once  albuterol/ipratropium for Nebulization 3 milliLiter(s) Nebulizer every 6 hours  budesonide 160 MICROgram(s)/formoterol 4.5 MICROgram(s) Inhaler 2 Puff(s) Inhalation two times a day  cefTRIAXone   IVPB 1000 milliGRAM(s) IV Intermittent every 24 hours  chlorhexidine 2% Cloths 1 Application(s) Topical <User Schedule>  dexMEDEtomidine Infusion 0.2 MICROgram(s)/kG/Hr IV Continuous <Continuous>  enoxaparin Injectable 40 milliGRAM(s) SubCutaneous every 24 hours  insulin glargine Injectable (LANTUS) 7 Unit(s) SubCutaneous at bedtime  insulin lispro (ADMELOG) corrective regimen sliding scale   SubCutaneous Before meals and at bedtime  methylPREDNISolone sodium succinate Injectable 40 milliGRAM(s) IV Push daily  nicotine - 21 mG/24Hr(s) Patch 1 patch Transdermal daily  pantoprazole  Injectable 40 milliGRAM(s) IV Push daily  PHENobarbital Injectable 130 milliGRAM(s) IV Push every 10 minutes PRN  phenylephrine    Infusion 0.1 MICROgram(s)/kG/Min IV Continuous <Continuous>  thiamine IVPB 500 milliGRAM(s) IV Intermittent every 8 hours  tiotropium 18 MICROgram(s) Capsule 1 Capsule(s) Inhalation daily    Drug Dosing Weight  Height (cm): 167.6 (2022 05:00)  Weight (kg): 74.2 (2022 05:00)  BMI (kg/m2): 26.4 (2022 05:00)  BSA (m2): 1.84 (2022 05:00)    CENTRAL LINE: [ ] YES [X] NO  LOCATION:   DATE INSERTED:  REMOVE: [ ] YES [ ] NO  EXPLAIN:    KINNEY: [ ] YES [X] NO    DATE INSERTED:  REMOVE:  [X] YES [ ] NO  EXPLAIN:    A-LINE:  [ ] YES [X] NO  LOCATION:   DATE INSERTED:  REMOVE:  [ ] YES [ ] NO  EXPLAIN:    PMH -reviewed admission note, no change since admission      ABG - ( 2022 05:28 )  pH, Arterial: 7.35  pH, Blood: x     /  pCO2: 61    /  pO2: 104   / HCO3: 34    / Base Excess: 6.1   /  SaO2: 98                     @ 07:01  -   @ 07:00  --------------------------------------------------------  IN: 539.5 mL / OUT: 1335 mL / NET: -795.5 mL            PHYSICAL EXAM:    GENERAL: Pt is calm and cooperative  HEAD:  Atraumatic, Normocephalic  EYES: EOMI, PERRLA, conjunctiva and sclera clear  NECK: Supple, No JVD, Normal thyroid, no enlarged nodes  NERVOUS SYSTEM:  Alert & Oriented X 3, no focal deficits  CHEST/LUNG: Wheezing throughout lungs bilaterally. decreased crackles in lung bases  HEART: S1S2 regular tachycardia  ABDOMEN: Soft, Nontender, Nondistended; Bowel sounds present  EXTREMITIES:  2+ Peripheral Pulses, No clubbing, cyanosis, or edema  LYMPH: No lymphadenopathy noted  SKIN: No rashes or lesions      LABS:  CBC Full  -  ( 2022 05:35 )  WBC Count : 7.17 K/uL  RBC Count : 4.58 M/uL  Hemoglobin : 14.2 g/dL  Hematocrit : 44.9 %  Platelet Count - Automated : 155 K/uL  Mean Cell Volume : 98.0 fl  Mean Cell Hemoglobin : 31.0 pg  Mean Cell Hemoglobin Concentration : 31.6 gm/dL  Auto Neutrophil # : 5.01 K/uL  Auto Lymphocyte # : 1.47 K/uL  Auto Monocyte # : 0.65 K/uL  Auto Eosinophil # : 0.00 K/uL  Auto Basophil # : 0.02 K/uL  Auto Neutrophil % : 69.8 %  Auto Lymphocyte % : 20.5 %  Auto Monocyte % : 9.1 %  Auto Eosinophil % : 0.0 %  Auto Basophil % : 0.3 %    04-    141  |  105  |  43<H>  ----------------------------<  185<H>  5.2   |  34<H>  |  0.90    Ca    8.2<L>      2022 05:35  Phos  2.5     -  Mg     2.1     -    TPro  7.1  /  Alb  2.4<L>  /  TBili  0.3  /  DBili  x   /  AST  55<H>  /  ALT  36  /  AlkPhos  68  04-06      Urinalysis Basic - ( 2022 10:12 )    Color: Yellow / Appearance: Clear / S.020 / pH: x  Gluc: x / Ketone: Negative  / Bili: Negative / Urobili: Negative   Blood: x / Protein: 100 / Nitrite: Negative   Leuk Esterase: Negative / RBC: >50 /HPF / WBC 3-5 /HPF   Sq Epi: x / Non Sq Epi: Few /HPF / Bacteria: Negative /HPF      Culture Results:   No growth to date. ( @ 06:15)  Culture Results:   No growth to date. ( @ 06:15)      RADIOLOGY & ADDITIONAL STUDIES REVIEWED:  ***    [ ]GOALS OF CARE DISCUSSION WITH PATIENT/FAMILY/PROXY:    CRITICAL CARE TIME SPENT: 35 minutes

## 2022-04-06 NOTE — DIETITIAN INITIAL EVALUATION ADULT. - PERTINENT MEDS FT
MEDICATIONS  (STANDING):  ALBUTerol    90 MICROgram(s) HFA Inhaler 2 Puff(s) Inhalation once  albuterol/ipratropium for Nebulization 3 milliLiter(s) Nebulizer every 6 hours  budesonide 160 MICROgram(s)/formoterol 4.5 MICROgram(s) Inhaler 2 Puff(s) Inhalation two times a day  chlorhexidine 2% Cloths 1 Application(s) Topical <User Schedule>  dexMEDEtomidine Infusion 0.2 MICROgram(s)/kG/Hr (3.71 mL/Hr) IV Continuous <Continuous>  enoxaparin Injectable 40 milliGRAM(s) SubCutaneous every 24 hours  insulin glargine Injectable (LANTUS) 7 Unit(s) SubCutaneous at bedtime  insulin lispro (ADMELOG) corrective regimen sliding scale   SubCutaneous Before meals and at bedtime  methylPREDNISolone sodium succinate Injectable 40 milliGRAM(s) IV Push daily  nicotine - 21 mG/24Hr(s) Patch 1 patch Transdermal daily  oseltamivir 75 milliGRAM(s) Oral two times a day  pantoprazole  Injectable 40 milliGRAM(s) IV Push daily  thiamine IVPB 500 milliGRAM(s) IV Intermittent every 8 hours    MEDICATIONS  (PRN):  PHENobarbital Injectable 130 milliGRAM(s) IV Push every 10 minutes PRN RASS 0 to -1

## 2022-04-07 NOTE — PROGRESS NOTE ADULT - ASSESSMENT
Patient is a 76 year old male with PMHx of HTN, CAD, ?COPD- smoker, who presented with generalized weakness and confusion. Workup in the ED revealed possible bilateral pneumonia, COVID-19 negative. ICU consulted due to necessity of BiPAP use.    Assessment:  - Acute hypoxic/hypercapnic respiratory failure  - ?COPD  - Bilateral community acquired pneumonia  - Sepsis  - DM  - HTN    Plan:  Neuro:  # Acute metabolic encephalopathy  - Patient able to tolerate BiPAP  - it was noted that patient became more agitated and started pulling off his mask in which he was given 0.5mg of haloperidol  - Pt received 2.5 Haldol then an additional 5mg IV haldol, pt continued to be agitated  - Started on Phenobarbital 130 q5min for a total of 12 doses  - Will dc precedex    CV:  #CHF Exacerbation  - Pt has crackles on exam, no LE edema  - Bedside POCUS revealed B-lines  -   - TTE reveals Moderate-severe mitral annular calcification. Mild mitral regurgitation. Moderate-severe mitral stenosis. Severe aortic stenosis. Mild aortic regurgitation.    # HTN  - Hold all home hypertensives  - monitor BP, suspect sepsis    #Hypotension  - Pt developed hypotension while on bipap  - started on peripheral phenylephrine  - Off vasopressors now    Pulm:  # Acute Hypoxic/ Hypercapnic Resp Failure  - Documented hx of asthma, however noted to be a smoker  - started IV steroids, bronchodilator   - Likely precipitated by a current pneumonia  - Continue with BiPAP. Repeat ABG with BiPAP with minimal improvement in pH and Co2, increased IPAP to 16 and EPAP to 6. Repeat ABG  - c/w duonebs  - RVP influenza AH3 positive  -f/u sputum Cx and legionella  - Will start Tamiflu once pt can tolerate PO  - completed 3 days of Azithromycin, will d/c ceftriaxone at this time    # Left lung nodules  nonspecific 1.5 x 1 x 1.9 cm nodular opacity of the left lung apex and an additional 1.8 x 0.6 x 1 cm nodular opacity of the left upper lobe.   Further evaluation with PET/CT, biopsy or short-term three-month CT outpatient      ID:  # Pneumonia, community acquired  - Start on ceftriaxone and azithromycin  - follow urine strept and legionella  - follow blood cultures, f/u sputum culture    Nephro:  # No acute issues.    GI:  - npo until off bipap      Heme:  - no issues    Endo:  # DM  - target CBG < 180  - Start Sliding Scale  - hold all oral agents  - a1c 10.3  - on moderate sliding scale    FEN:  - Continue IVF    Prophy:  Lovenox  PPI    Dispo:  - Continue monitoring in ICU  Patient is a 76 year old male with PMHx of HTN, CAD, ?COPD- smoker, who presented with generalized weakness and confusion. Workup in the ED revealed possible bilateral pneumonia, COVID-19 negative. ICU consulted due to necessity of BiPAP use.    Assessment:  - Acute hypoxic/hypercapnic respiratory failure  - ?COPD  - Bilateral community acquired pneumonia  - Sepsis  - DM  - HTN    Plan:  Neuro:  # Acute metabolic encephalopathy  - Patient able to tolerate BiPAP  - it was noted that patient became more agitated and started pulling off his mask in which he was given 0.5mg of haloperidol  - Pt received 2.5 Haldol then an additional 5mg IV haldol, pt continued to be agitated  - Started on Phenobarbital 130 q5min for a total of 12 doses  - Will dc precedex    CV:  # SVT  Pt with episodes of SVT on 4/7 after duoneb administration  DC all duonebs, and inhalers   S/p adenosine 6 then 12  Started on Cardizem gtt  Cardio - Dr. Bush    #CHF Exacerbation  - Pt has crackles on exam, no LE edema  - Bedside POCUS revealed B-lines  -   - TTE reveals Moderate-severe mitral annular calcification. Mild mitral regurgitation. Moderate-severe mitral stenosis. Severe aortic stenosis. Mild aortic regurgitation.    # HTN  - Hold all home hypertensives  - monitor BP, suspect sepsis    #Hypotension  - Pt developed hypotension while on bipap  - started on peripheral phenylephrine  - Off vasopressors now    Pulm:  # Acute Hypoxic/ Hypercapnic Resp Failure  - Documented hx of asthma, however noted to be a smoker  - started IV steroids, bronchodilator   - Likely precipitated by a current pneumonia  - Continue with BiPAP. Repeat ABG with BiPAP with minimal improvement in pH and Co2, increased IPAP to 16 and EPAP to 6. Repeat ABG  - D/C duonebs  - RVP influenza AH3 positive  - Started Tamiflu as pt can tolerate PO  - completed 3 days of Azithromycin, will d/c ceftriaxone at this time  - Pt now off BIPAP and on NC    # Left lung nodules  nonspecific 1.5 x 1 x 1.9 cm nodular opacity of the left lung apex and an additional 1.8 x 0.6 x 1 cm nodular opacity of the left upper lobe.   Further evaluation with PET/CT, biopsy or short-term three-month CT outpatient    ID:  # Pneumonia, community acquired  - Start on ceftriaxone and azithromycin  - blood cultures NGTD    Nephro:  # No acute issues.    GI:  - On puree diet with mildly thick liquids      Heme:  - no issues    Endo:  # DM  - target CBG < 180  - Start Sliding Scale  - hold all oral agents  - a1c 10.3  - on low sliding scale    FEN:  - Continue IVF    Prophy:  Lovenox  PPI    Dispo:  - Continue monitoring in ICU

## 2022-04-07 NOTE — PROGRESS NOTE ADULT - ATTENDING COMMENTS
IMP: This is a 76 year old man  with uncontrol DM   HTN, CAD, ?COPD- smoker, who presented with generalized weakness and confusion. AMS due to sepsis and hypoxia . Acute hypoxic hypercapnic resp failure due to acute ex of COPD and influenza A pna requiring BiPaP with supp Os support     Assessment:  - Acute hypoxic/hypercapnic respiratory failure  - Acute exacerbation of COPD  - Influenza A infection  - Sepsis  - Rapid afib  - DM uncontrol   - HTN      Plan   - this morning had run for Afib with tachycardia, responded to cardizem push  - Started on cardizem infusion  - Cardiology follow up  - Taper precedex   - Cont. phenobarb as needed  - Pat informed nursing staff that he is actively drinking alcohol daily   - Continue BiPaP alternate with Supp as needed to maintain sat >90%  - New Severe AS and moderate MS, will need work up once medically stable, does not appear to be in heart failure currently   - Dora montoya appreciated   - Droplet isolation   - Tamiflu   - D/c standing duo-nebs  - Solumedrol   - Asp precaution   - Oral diet when off bipap  - Off isolation now  - Monitor blood sugar with coverage   - Hemodynamic monitoring   - Nicotine patch   - DVT GI prophy

## 2022-04-07 NOTE — PROGRESS NOTE ADULT - SUBJECTIVE AND OBJECTIVE BOX
C A R D I O L O G Y  **********************************     DATE OF SERVICE: 04-07-22    Improved today off Bipap.    acetaminophen     Tablet .. 650 milliGRAM(s) Oral every 6 hours PRN  budesonide 160 MICROgram(s)/formoterol 4.5 MICROgram(s) Inhaler 2 Puff(s) Inhalation two times a day  chlorhexidine 2% Cloths 1 Application(s) Topical <User Schedule>  dexMEDEtomidine Infusion 0.2 MICROgram(s)/kG/Hr IV Continuous <Continuous>  diltiazem Infusion 5 mG/Hr IV Continuous <Continuous>  enoxaparin Injectable 75 milliGRAM(s) SubCutaneous every 12 hours  insulin glargine Injectable (LANTUS) 7 Unit(s) SubCutaneous at bedtime  insulin lispro (ADMELOG) corrective regimen sliding scale   SubCutaneous Before meals and at bedtime  nicotine - 21 mG/24Hr(s) Patch 1 patch Transdermal daily  oseltamivir 75 milliGRAM(s) Oral two times a day  pantoprazole  Injectable 40 milliGRAM(s) IV Push daily  PHENobarbital Injectable 130 milliGRAM(s) IV Push every 10 minutes PRN  QUEtiapine 25 milliGRAM(s) Oral at bedtime  thiamine IVPB 500 milliGRAM(s) IV Intermittent every 8 hours  tiotropium 18 MICROgram(s) Capsule 1 Capsule(s) Inhalation daily                            13.8   7.99  )-----------( 145      ( 07 Apr 2022 03:25 )             43.2       Hemoglobin: 13.8 g/dL (04-07 @ 03:25)  Hemoglobin: 14.2 g/dL (04-06 @ 05:35)  Hemoglobin: 13.3 g/dL (04-05 @ 03:55)  Hemoglobin: 13.3 g/dL (04-04 @ 09:30)  Hemoglobin: 14.6 g/dL (04-03 @ 22:44)      04-07    143  |  104  |  35<H>  ----------------------------<  57<L>  4.3   |  36<H>  |  0.82    Ca    8.9      07 Apr 2022 03:25  Phos  3.0     04-07  Mg     2.1     04-07    TPro  7.4  /  Alb  2.6<L>  /  TBili  0.3  /  DBili  x   /  AST  54<H>  /  ALT  45  /  AlkPhos  75  04-07    Creatinine Trend: 0.82<--, 0.90<--, 1.08<--, 1.08<--, 1.14<--    COAGS:     CARDIAC MARKERS ( 07 Apr 2022 09:52 )  x     / x     / 320 U/L / x     / 1.5 ng/mL        T(C): 36.5 (04-07-22 @ 08:00), Max: 36.7 (04-06-22 @ 16:00)  HR: 86 (04-07-22 @ 10:30) (55 - 163)  BP: 115/51 (04-07-22 @ 10:30) (84/49 - 150/63)  RR: 34 (04-07-22 @ 10:30) (18 - 43)  SpO2: 98% (04-07-22 @ 10:30) (87% - 100%)  Wt(kg): --    I&O's Summary    06 Apr 2022 07:01  -  07 Apr 2022 07:00  --------------------------------------------------------  IN: 663.8 mL / OUT: 1100 mL / NET: -436.2 mL    07 Apr 2022 07:01  -  07 Apr 2022 12:00  --------------------------------------------------------  IN: 220 mL / OUT: 200 mL / NET: 20 mL        HEENT:  (-)icterus (-)pallor  CV: N S1 S2 1/6 FELICITA (+)2 Pulses B/l  Resp:  Clear to ausculatation B/L, normal effort  GI: (+) BS Soft, NT, ND  Lymph:  (-)Edema, (-)obvious lymphadenopathy  Skin: Warm to touch, Normal turgor  Psych: Appropriate mood and affect      TELEMETRY: 	  sinus, PAF        ASSESSMENT/PLAN: 	76y  Male PMHx of HTN, CAD, active smoker, who was sent to hospital by family members due to altered mental status and generalized weakness found with Influenza, PNA and severe AS.    - Does not appear to be in pulmonary edema  - Keep net Even, A low BNP suggests low filling pressures   - Will need to address his valve disease once his acute illness stabilizes.  - Started on cardizem gtt and theraputic lovenox this AM  - Start PO lopressor 25 mg PO BID    Casper Bush MD, FACC  BEEPER [phone]

## 2022-04-07 NOTE — CHART NOTE - NSCHARTNOTEFT_GEN_A_CORE
Assessment:   Patient is a 76y old  Male who presents with a chief complaint of Acute hypoxic hypercapnic respiratory failure (2022 12:00). Pt off BIPAP, s/p SLP, diet advanced. Pt seen, wife and sister present. Pt eating little. Wife reports feeding pt slowly because he coughs. Last few months she thinks he may have lost about 4 #s. NKFA. has few teeth, dentures not at hospital, not planning to bring to hospital. RN present Reports pt w/ high A1c (10.3). Wife reports pt does not always take his meds, sometimes forgets. Diet ed/ copy (CHO counting for diabetes) provided. Suggested follow-up with an RD s/p D/C. Wife voiced appreciation for visit.        Diet Prescription: Diet, Pureed:   Consistent Carbohydrate {No Snacks}  Mildly Thick Liquids (MILD THICK LIQS) (22 @ 06:23)        Daily     Daily Weight in k (2022 08:00)  Weight in k.9 (2022 08:00)  Weight in k (2022 08:00)  Weight in k (2022 07:00)  Weight in k.1 (2022 07:30)    % Weight Change: O>I, 1+ generalized edema    Pertinent Medications: MEDICATIONS  (STANDING):  budesonide 160 MICROgram(s)/formoterol 4.5 MICROgram(s) Inhaler 2 Puff(s) Inhalation two times a day  chlorhexidine 2% Cloths 1 Application(s) Topical <User Schedule>  dexMEDEtomidine Infusion 0.2 MICROgram(s)/kG/Hr (3.71 mL/Hr) IV Continuous <Continuous>  diltiazem Infusion 5 mG/Hr (5 mL/Hr) IV Continuous <Continuous>  enoxaparin Injectable 75 milliGRAM(s) SubCutaneous every 12 hours  insulin glargine Injectable (LANTUS) 7 Unit(s) SubCutaneous at bedtime  insulin lispro (ADMELOG) corrective regimen sliding scale   SubCutaneous Before meals and at bedtime  metoprolol tartrate 25 milliGRAM(s) Oral two times a day  nicotine - 21 mG/24Hr(s) Patch 1 patch Transdermal daily  oseltamivir 75 milliGRAM(s) Oral two times a day  pantoprazole  Injectable 40 milliGRAM(s) IV Push daily  QUEtiapine 25 milliGRAM(s) Oral at bedtime  thiamine IVPB 500 milliGRAM(s) IV Intermittent every 8 hours  tiotropium 18 MICROgram(s) Capsule 1 Capsule(s) Inhalation daily    MEDICATIONS  (PRN):  acetaminophen     Tablet .. 650 milliGRAM(s) Oral every 6 hours PRN Temp greater or equal to 38C (100.4F), Mild Pain (1 - 3)  PHENobarbital Injectable 130 milliGRAM(s) IV Push every 10 minutes PRN RASS 0 to -1    Pertinent Labs:  Na143 mmol/L Glu 57 mg/dL<L> K+ 4.3 mmol/L Cr  0.82 mg/dL BUN 35 mg/dL<H>  Phos 3.0 mg/dL  Alb 2.6 g/dL<L>  Chol 139 mg/dL LDL --    HDL 48 mg/dL Trig 79 mg/dL     CAPILLARY BLOOD GLUCOSE      POCT Blood Glucose.: 202 mg/dL (2022 12:31)  POCT Blood Glucose.: 268 mg/dL (2022 21:43)        Estimated Needs:   [x ] no change since previous assessment  [ ] recalculated:       Previous Nutrition Diagnosis:   [ ] Altered GI function  [x ]Inadequate Oral Intake [ ] Swallowing Difficulty   [ ] Altered nutrition related labs [ ] Increased Nutrient Needs [ ] Overweight/Obesity   [ ] Unintended Weight Loss [ ] Food & Nutrition Related Knowledge Deficit [ ] Malnutrition   [ ] Other:     Nutrition Diagnosis is [x ] ongoing  [ ] resolved [ ] not applicable     New Nutrition Diagnosis: [x ]PCM (moderate) related to acute on chronic medical conditions as evidenced by <50% intake> 5 days, suspected weight loss (PTA), 1+ generalized edema.      Interventions:   Recommend  [ ] Change Diet To:  [x ] Nutrition Supplement: Consider adding 2 mohsen HN BID.   [ ] Nutrition Support  [x ] Other: MD to monitor. RD available.     Monitoring and Evaluation:   [x ] PO intake [ x ] Tolerance to diet prescription [ x ] weights [ x ] labs[ x ] follow up per protocol  [ ] other:

## 2022-04-07 NOTE — PROGRESS NOTE ADULT - CRITICAL CARE SERVICES PROVIDED
Patient is critically ill, requiring critical care services.

## 2022-04-07 NOTE — PROGRESS NOTE ADULT - SUBJECTIVE AND OBJECTIVE BOX
INTERVAL HPI/OVERNIGHT EVENTS: ***    PRESSORS: [ ] YES [ ] NO    Antimicrobial:  oseltamivir 75 milliGRAM(s) Oral two times a day    Cardiovascular:    Pulmonary:  ALBUTerol    90 MICROgram(s) HFA Inhaler 2 Puff(s) Inhalation once  albuterol/ipratropium for Nebulization 3 milliLiter(s) Nebulizer every 6 hours  budesonide 160 MICROgram(s)/formoterol 4.5 MICROgram(s) Inhaler 2 Puff(s) Inhalation two times a day    Hematalogic:  enoxaparin Injectable 40 milliGRAM(s) SubCutaneous every 24 hours    Other:  acetaminophen     Tablet .. 650 milliGRAM(s) Oral every 6 hours PRN  chlorhexidine 2% Cloths 1 Application(s) Topical <User Schedule>  dexMEDEtomidine Infusion 0.2 MICROgram(s)/kG/Hr IV Continuous <Continuous>  dextrose 50% Injectable 50 milliLiter(s) IV Push once  dextrose Oral Gel 15 Gram(s) Oral once  insulin glargine Injectable (LANTUS) 7 Unit(s) SubCutaneous at bedtime  insulin lispro (ADMELOG) corrective regimen sliding scale   SubCutaneous Before meals and at bedtime  methylPREDNISolone sodium succinate Injectable 40 milliGRAM(s) IV Push daily  nicotine - 21 mG/24Hr(s) Patch 1 patch Transdermal daily  pantoprazole  Injectable 40 milliGRAM(s) IV Push daily  PHENobarbital Injectable 130 milliGRAM(s) IV Push every 10 minutes PRN  QUEtiapine 25 milliGRAM(s) Oral at bedtime  thiamine IVPB 500 milliGRAM(s) IV Intermittent every 8 hours    acetaminophen     Tablet .. 650 milliGRAM(s) Oral every 6 hours PRN  ALBUTerol    90 MICROgram(s) HFA Inhaler 2 Puff(s) Inhalation once  albuterol/ipratropium for Nebulization 3 milliLiter(s) Nebulizer every 6 hours  budesonide 160 MICROgram(s)/formoterol 4.5 MICROgram(s) Inhaler 2 Puff(s) Inhalation two times a day  chlorhexidine 2% Cloths 1 Application(s) Topical <User Schedule>  dexMEDEtomidine Infusion 0.2 MICROgram(s)/kG/Hr IV Continuous <Continuous>  dextrose 50% Injectable 50 milliLiter(s) IV Push once  dextrose Oral Gel 15 Gram(s) Oral once  enoxaparin Injectable 40 milliGRAM(s) SubCutaneous every 24 hours  insulin glargine Injectable (LANTUS) 7 Unit(s) SubCutaneous at bedtime  insulin lispro (ADMELOG) corrective regimen sliding scale   SubCutaneous Before meals and at bedtime  methylPREDNISolone sodium succinate Injectable 40 milliGRAM(s) IV Push daily  nicotine - 21 mG/24Hr(s) Patch 1 patch Transdermal daily  oseltamivir 75 milliGRAM(s) Oral two times a day  pantoprazole  Injectable 40 milliGRAM(s) IV Push daily  PHENobarbital Injectable 130 milliGRAM(s) IV Push every 10 minutes PRN  QUEtiapine 25 milliGRAM(s) Oral at bedtime  thiamine IVPB 500 milliGRAM(s) IV Intermittent every 8 hours    Drug Dosing Weight  Height (cm): 167.6 (04 Apr 2022 05:00)  Weight (kg): 74.2 (04 Apr 2022 05:00)  BMI (kg/m2): 26.4 (04 Apr 2022 05:00)  BSA (m2): 1.84 (04 Apr 2022 05:00)    CENTRAL LINE: [ ] YES [ ] NO  LOCATION:   DATE INSERTED:  REMOVE: [ ] YES [ ] NO  EXPLAIN:    KINNEY: [ ] YES [ ] NO    DATE INSERTED:  REMOVE:  [ ] YES [ ] NO  EXPLAIN:    A-LINE:  [ ] YES [ ] NO  LOCATION:   DATE INSERTED:  REMOVE:  [ ] YES [ ] NO  EXPLAIN:    [facility] -reviewed admission note, no change since admission            04-06 @ 07:01  -  04-07 @ 07:00  --------------------------------------------------------  IN: 663.8 mL / OUT: 1100 mL / NET: -436.2 mL            PHYSICAL EXAM:    GENERAL: NAD  HEAD:  Atraumatic, Normocephalic  EYES: conjunctiva and sclera clear  ENMT: No tonsillar erythema, exudates, or enlargement; Moist mucous membranes, Good dentition, [ ]No lesions  NECK: Supple, normal appearance, No JVD; Normal thyroid; Trachea midline  NERVOUS SYSTEM:  Alert & Oriented X3, Good concentration; Motor Strength 5/5 B/L upper and lower extremities; DTRs 2+ intact and symmetric  CHEST/LUNG: No chest deformity; Normal percussion bilaterally; No rales, rhonchi, wheezing   HEART: Regular rate and rhythm; No murmurs, rubs, or gallops  ABDOMEN: Soft, Nontender, Nondistended;Bowel sounds present  EXTREMITIES:  2+ Peripheral Pulses, No clubbing, cyanosis, or edema  LYMPH: No lymphadenopathy noted  SKIN: No rashes or lesions; Good capillary refill      LABS:  CBC Full  -  ( 07 Apr 2022 03:25 )  WBC Count : 7.99 K/uL  RBC Count : 4.40 M/uL  Hemoglobin : 13.8 g/dL  Hematocrit : 43.2 %  Platelet Count - Automated : 145 K/uL  Mean Cell Volume : 98.2 fl  Mean Cell Hemoglobin : 31.4 pg  Mean Cell Hemoglobin Concentration : 31.9 gm/dL  Auto Neutrophil # : 4.59 K/uL  Auto Lymphocyte # : 2.31 K/uL  Auto Monocyte # : 0.97 K/uL  Auto Eosinophil # : 0.01 K/uL  Auto Basophil # : 0.02 K/uL  Auto Neutrophil % : 57.9 %  Auto Lymphocyte % : 29.2 %  Auto Monocyte % : 12.2 %  Auto Eosinophil % : 0.1 %  Auto Basophil % : 0.3 %    04-07    143  |  104  |  35<H>  ----------------------------<  57<L>  4.3   |  36<H>  |  0.82    Ca    8.9      07 Apr 2022 03:25  Phos  3.0     04-07  Mg     2.1     04-07    TPro  7.4  /  Alb  2.6<L>  /  TBili  0.3  /  DBili  x   /  AST  54<H>  /  ALT  45  /  AlkPhos  75  04-07            RADIOLOGY & ADDITIONAL STUDIES REVIEWED:  ***    [ ]GOALS OF CARE DISCUSSION WITH PATIENT/FAMILY/PROXY:    CRITICAL CARE TIME SPENT: 35 minutes INTERVAL HPI/OVERNIGHT EVENTS: Pt was seen and assessed at bedside, pt was hypoglycemic overnight    PRESSORS: [ ] YES [ ] NO    Antimicrobial:  oseltamivir 75 milliGRAM(s) Oral two times a day    Cardiovascular:    Pulmonary:  ALBUTerol    90 MICROgram(s) HFA Inhaler 2 Puff(s) Inhalation once  albuterol/ipratropium for Nebulization 3 milliLiter(s) Nebulizer every 6 hours  budesonide 160 MICROgram(s)/formoterol 4.5 MICROgram(s) Inhaler 2 Puff(s) Inhalation two times a day    Hematalogic:  enoxaparin Injectable 40 milliGRAM(s) SubCutaneous every 24 hours    Other:  acetaminophen     Tablet .. 650 milliGRAM(s) Oral every 6 hours PRN  chlorhexidine 2% Cloths 1 Application(s) Topical <User Schedule>  dexMEDEtomidine Infusion 0.2 MICROgram(s)/kG/Hr IV Continuous <Continuous>  dextrose 50% Injectable 50 milliLiter(s) IV Push once  dextrose Oral Gel 15 Gram(s) Oral once  insulin glargine Injectable (LANTUS) 7 Unit(s) SubCutaneous at bedtime  insulin lispro (ADMELOG) corrective regimen sliding scale   SubCutaneous Before meals and at bedtime  methylPREDNISolone sodium succinate Injectable 40 milliGRAM(s) IV Push daily  nicotine - 21 mG/24Hr(s) Patch 1 patch Transdermal daily  pantoprazole  Injectable 40 milliGRAM(s) IV Push daily  PHENobarbital Injectable 130 milliGRAM(s) IV Push every 10 minutes PRN  QUEtiapine 25 milliGRAM(s) Oral at bedtime  thiamine IVPB 500 milliGRAM(s) IV Intermittent every 8 hours    acetaminophen     Tablet .. 650 milliGRAM(s) Oral every 6 hours PRN  ALBUTerol    90 MICROgram(s) HFA Inhaler 2 Puff(s) Inhalation once  albuterol/ipratropium for Nebulization 3 milliLiter(s) Nebulizer every 6 hours  budesonide 160 MICROgram(s)/formoterol 4.5 MICROgram(s) Inhaler 2 Puff(s) Inhalation two times a day  chlorhexidine 2% Cloths 1 Application(s) Topical <User Schedule>  dexMEDEtomidine Infusion 0.2 MICROgram(s)/kG/Hr IV Continuous <Continuous>  dextrose 50% Injectable 50 milliLiter(s) IV Push once  dextrose Oral Gel 15 Gram(s) Oral once  enoxaparin Injectable 40 milliGRAM(s) SubCutaneous every 24 hours  insulin glargine Injectable (LANTUS) 7 Unit(s) SubCutaneous at bedtime  insulin lispro (ADMELOG) corrective regimen sliding scale   SubCutaneous Before meals and at bedtime  methylPREDNISolone sodium succinate Injectable 40 milliGRAM(s) IV Push daily  nicotine - 21 mG/24Hr(s) Patch 1 patch Transdermal daily  oseltamivir 75 milliGRAM(s) Oral two times a day  pantoprazole  Injectable 40 milliGRAM(s) IV Push daily  PHENobarbital Injectable 130 milliGRAM(s) IV Push every 10 minutes PRN  QUEtiapine 25 milliGRAM(s) Oral at bedtime  thiamine IVPB 500 milliGRAM(s) IV Intermittent every 8 hours    Drug Dosing Weight  Height (cm): 167.6 (04 Apr 2022 05:00)  Weight (kg): 74.2 (04 Apr 2022 05:00)  BMI (kg/m2): 26.4 (04 Apr 2022 05:00)  BSA (m2): 1.84 (04 Apr 2022 05:00)    CENTRAL LINE: [ ] YES [ ] NO  LOCATION:   DATE INSERTED:  REMOVE: [ ] YES [ ] NO  EXPLAIN:    KINNEY: [ ] YES [ ] NO    DATE INSERTED:  REMOVE:  [ ] YES [ ] NO  EXPLAIN:    A-LINE:  [ ] YES [ ] NO  LOCATION:   DATE INSERTED:  REMOVE:  [ ] YES [ ] NO  EXPLAIN:    [facility] -reviewed admission note, no change since admission            04-06 @ 07:01  -  04-07 @ 07:00  --------------------------------------------------------  IN: 663.8 mL / OUT: 1100 mL / NET: -436.2 mL            PHYSICAL EXAM:    GENERAL: NAD  HEAD:  Atraumatic, Normocephalic  EYES: conjunctiva and sclera clear  ENMT: No tonsillar erythema, exudates, or enlargement; Moist mucous membranes, Good dentition, [ ]No lesions  NECK: Supple, normal appearance, No JVD; Normal thyroid; Trachea midline  NERVOUS SYSTEM:  Alert & Oriented X3, Good concentration; Motor Strength 5/5 B/L upper and lower extremities; DTRs 2+ intact and symmetric  CHEST/LUNG: No chest deformity; Normal percussion bilaterally; No rales, rhonchi, wheezing   HEART: Regular rate and rhythm; No murmurs, rubs, or gallops  ABDOMEN: Soft, Nontender, Nondistended;Bowel sounds present  EXTREMITIES:  2+ Peripheral Pulses, No clubbing, cyanosis, or edema  LYMPH: No lymphadenopathy noted  SKIN: No rashes or lesions; Good capillary refill      LABS:  CBC Full  -  ( 07 Apr 2022 03:25 )  WBC Count : 7.99 K/uL  RBC Count : 4.40 M/uL  Hemoglobin : 13.8 g/dL  Hematocrit : 43.2 %  Platelet Count - Automated : 145 K/uL  Mean Cell Volume : 98.2 fl  Mean Cell Hemoglobin : 31.4 pg  Mean Cell Hemoglobin Concentration : 31.9 gm/dL  Auto Neutrophil # : 4.59 K/uL  Auto Lymphocyte # : 2.31 K/uL  Auto Monocyte # : 0.97 K/uL  Auto Eosinophil # : 0.01 K/uL  Auto Basophil # : 0.02 K/uL  Auto Neutrophil % : 57.9 %  Auto Lymphocyte % : 29.2 %  Auto Monocyte % : 12.2 %  Auto Eosinophil % : 0.1 %  Auto Basophil % : 0.3 %    04-07    143  |  104  |  35<H>  ----------------------------<  57<L>  4.3   |  36<H>  |  0.82    Ca    8.9      07 Apr 2022 03:25  Phos  3.0     04-07  Mg     2.1     04-07    TPro  7.4  /  Alb  2.6<L>  /  TBili  0.3  /  DBili  x   /  AST  54<H>  /  ALT  45  /  AlkPhos  75  04-07            RADIOLOGY & ADDITIONAL STUDIES REVIEWED:  ***    [ ]GOALS OF CARE DISCUSSION WITH PATIENT/FAMILY/PROXY:    CRITICAL CARE TIME SPENT: 35 minutes INTERVAL HPI/OVERNIGHT EVENTS: Pt was seen and assessed at bedside, pt was hypoglycemic overnight. Received dextrose oral gel. Pt was at 0.4 precedex in AM, was weaned off.     PRESSORS: [ ] YES [X] NO    Antimicrobial:  oseltamivir 75 milliGRAM(s) Oral two times a day    Cardiovascular:    Pulmonary:  ALBUTerol    90 MICROgram(s) HFA Inhaler 2 Puff(s) Inhalation once  albuterol/ipratropium for Nebulization 3 milliLiter(s) Nebulizer every 6 hours  budesonide 160 MICROgram(s)/formoterol 4.5 MICROgram(s) Inhaler 2 Puff(s) Inhalation two times a day    Hematalogic:  enoxaparin Injectable 40 milliGRAM(s) SubCutaneous every 24 hours    Other:  acetaminophen     Tablet .. 650 milliGRAM(s) Oral every 6 hours PRN  chlorhexidine 2% Cloths 1 Application(s) Topical <User Schedule>  dexMEDEtomidine Infusion 0.2 MICROgram(s)/kG/Hr IV Continuous <Continuous>  dextrose 50% Injectable 50 milliLiter(s) IV Push once  dextrose Oral Gel 15 Gram(s) Oral once  insulin glargine Injectable (LANTUS) 7 Unit(s) SubCutaneous at bedtime  insulin lispro (ADMELOG) corrective regimen sliding scale   SubCutaneous Before meals and at bedtime  methylPREDNISolone sodium succinate Injectable 40 milliGRAM(s) IV Push daily  nicotine - 21 mG/24Hr(s) Patch 1 patch Transdermal daily  pantoprazole  Injectable 40 milliGRAM(s) IV Push daily  PHENobarbital Injectable 130 milliGRAM(s) IV Push every 10 minutes PRN  QUEtiapine 25 milliGRAM(s) Oral at bedtime  thiamine IVPB 500 milliGRAM(s) IV Intermittent every 8 hours    acetaminophen     Tablet .. 650 milliGRAM(s) Oral every 6 hours PRN  ALBUTerol    90 MICROgram(s) HFA Inhaler 2 Puff(s) Inhalation once  albuterol/ipratropium for Nebulization 3 milliLiter(s) Nebulizer every 6 hours  budesonide 160 MICROgram(s)/formoterol 4.5 MICROgram(s) Inhaler 2 Puff(s) Inhalation two times a day  chlorhexidine 2% Cloths 1 Application(s) Topical <User Schedule>  dexMEDEtomidine Infusion 0.2 MICROgram(s)/kG/Hr IV Continuous <Continuous>  dextrose 50% Injectable 50 milliLiter(s) IV Push once  dextrose Oral Gel 15 Gram(s) Oral once  enoxaparin Injectable 40 milliGRAM(s) SubCutaneous every 24 hours  insulin glargine Injectable (LANTUS) 7 Unit(s) SubCutaneous at bedtime  insulin lispro (ADMELOG) corrective regimen sliding scale   SubCutaneous Before meals and at bedtime  methylPREDNISolone sodium succinate Injectable 40 milliGRAM(s) IV Push daily  nicotine - 21 mG/24Hr(s) Patch 1 patch Transdermal daily  oseltamivir 75 milliGRAM(s) Oral two times a day  pantoprazole  Injectable 40 milliGRAM(s) IV Push daily  PHENobarbital Injectable 130 milliGRAM(s) IV Push every 10 minutes PRN  QUEtiapine 25 milliGRAM(s) Oral at bedtime  thiamine IVPB 500 milliGRAM(s) IV Intermittent every 8 hours    Drug Dosing Weight  Height (cm): 167.6 (04 Apr 2022 05:00)  Weight (kg): 74.2 (04 Apr 2022 05:00)  BMI (kg/m2): 26.4 (04 Apr 2022 05:00)  BSA (m2): 1.84 (04 Apr 2022 05:00)    CENTRAL LINE: [ ] YES [X] NO  LOCATION:   DATE INSERTED:  REMOVE: [ ] YES [ ] NO  EXPLAIN:    KINNEY: [ ] YES [X] NO    DATE INSERTED:  REMOVE:  [ ] YES [ ] NO  EXPLAIN:    A-LINE:  [ ] YES [X] NO  LOCATION:   DATE INSERTED:  REMOVE:  [ ] YES [ ] NO  EXPLAIN:    [facility] -reviewed admission note, no change since admission            04-06 @ 07:01  -  04-07 @ 07:00  --------------------------------------------------------  IN: 663.8 mL / OUT: 1100 mL / NET: -436.2 mL            PHYSICAL EXAM:    GENERAL: NAD  HEAD:  Atraumatic, Normocephalic  EYES: EOMI, PERRLA, conjunctiva and sclera clear  NECK: Supple, No JVD, Normal thyroid, no enlarged nodes  NERVOUS SYSTEM:  Alert & Oriented X 3, no focal deficits  CHEST/LUNG: CTAB  HEART: S1S2 regular tachycardia  ABDOMEN: Soft, Nontender, Nondistended; Bowel sounds present  EXTREMITIES:  2+ Peripheral Pulses, No clubbing, cyanosis, or edema  LYMPH: No lymphadenopathy noted  SKIN: No rashes or lesions      LABS:  CBC Full  -  ( 07 Apr 2022 03:25 )  WBC Count : 7.99 K/uL  RBC Count : 4.40 M/uL  Hemoglobin : 13.8 g/dL  Hematocrit : 43.2 %  Platelet Count - Automated : 145 K/uL  Mean Cell Volume : 98.2 fl  Mean Cell Hemoglobin : 31.4 pg  Mean Cell Hemoglobin Concentration : 31.9 gm/dL  Auto Neutrophil # : 4.59 K/uL  Auto Lymphocyte # : 2.31 K/uL  Auto Monocyte # : 0.97 K/uL  Auto Eosinophil # : 0.01 K/uL  Auto Basophil # : 0.02 K/uL  Auto Neutrophil % : 57.9 %  Auto Lymphocyte % : 29.2 %  Auto Monocyte % : 12.2 %  Auto Eosinophil % : 0.1 %  Auto Basophil % : 0.3 %    04-07    143  |  104  |  35<H>  ----------------------------<  57<L>  4.3   |  36<H>  |  0.82    Ca    8.9      07 Apr 2022 03:25  Phos  3.0     04-07  Mg     2.1     04-07    TPro  7.4  /  Alb  2.6<L>  /  TBili  0.3  /  DBili  x   /  AST  54<H>  /  ALT  45  /  AlkPhos  75  04-07            RADIOLOGY & ADDITIONAL STUDIES REVIEWED:  ***    [ ]GOALS OF CARE DISCUSSION WITH PATIENT/FAMILY/PROXY:    CRITICAL CARE TIME SPENT: 35 minutes INTERVAL HPI/OVERNIGHT EVENTS: Pt was seen and assessed at bedside, pt was hypoglycemic overnight. Received dextrose oral gel. Pt was at 0.4 precedex in AM, was weaned off. Patient had episode of SVT after duoneb. Pt was given adenosine 6 then 12, rhythm converted to Afib. Cardizem 20 given, then pt was placed on Cardizem gtt. Dr. Bush notified, patient also started on FD lovenox      PRESSORS: [ ] YES [X] NO    Antimicrobial:  oseltamivir 75 milliGRAM(s) Oral two times a day    Cardiovascular:    Pulmonary:  ALBUTerol    90 MICROgram(s) HFA Inhaler 2 Puff(s) Inhalation once  albuterol/ipratropium for Nebulization 3 milliLiter(s) Nebulizer every 6 hours  budesonide 160 MICROgram(s)/formoterol 4.5 MICROgram(s) Inhaler 2 Puff(s) Inhalation two times a day    Hematalogic:  enoxaparin Injectable 40 milliGRAM(s) SubCutaneous every 24 hours    Other:  acetaminophen     Tablet .. 650 milliGRAM(s) Oral every 6 hours PRN  chlorhexidine 2% Cloths 1 Application(s) Topical <User Schedule>  dexMEDEtomidine Infusion 0.2 MICROgram(s)/kG/Hr IV Continuous <Continuous>  dextrose 50% Injectable 50 milliLiter(s) IV Push once  dextrose Oral Gel 15 Gram(s) Oral once  insulin glargine Injectable (LANTUS) 7 Unit(s) SubCutaneous at bedtime  insulin lispro (ADMELOG) corrective regimen sliding scale   SubCutaneous Before meals and at bedtime  methylPREDNISolone sodium succinate Injectable 40 milliGRAM(s) IV Push daily  nicotine - 21 mG/24Hr(s) Patch 1 patch Transdermal daily  pantoprazole  Injectable 40 milliGRAM(s) IV Push daily  PHENobarbital Injectable 130 milliGRAM(s) IV Push every 10 minutes PRN  QUEtiapine 25 milliGRAM(s) Oral at bedtime  thiamine IVPB 500 milliGRAM(s) IV Intermittent every 8 hours    acetaminophen     Tablet .. 650 milliGRAM(s) Oral every 6 hours PRN  ALBUTerol    90 MICROgram(s) HFA Inhaler 2 Puff(s) Inhalation once  albuterol/ipratropium for Nebulization 3 milliLiter(s) Nebulizer every 6 hours  budesonide 160 MICROgram(s)/formoterol 4.5 MICROgram(s) Inhaler 2 Puff(s) Inhalation two times a day  chlorhexidine 2% Cloths 1 Application(s) Topical <User Schedule>  dexMEDEtomidine Infusion 0.2 MICROgram(s)/kG/Hr IV Continuous <Continuous>  dextrose 50% Injectable 50 milliLiter(s) IV Push once  dextrose Oral Gel 15 Gram(s) Oral once  enoxaparin Injectable 40 milliGRAM(s) SubCutaneous every 24 hours  insulin glargine Injectable (LANTUS) 7 Unit(s) SubCutaneous at bedtime  insulin lispro (ADMELOG) corrective regimen sliding scale   SubCutaneous Before meals and at bedtime  methylPREDNISolone sodium succinate Injectable 40 milliGRAM(s) IV Push daily  nicotine - 21 mG/24Hr(s) Patch 1 patch Transdermal daily  oseltamivir 75 milliGRAM(s) Oral two times a day  pantoprazole  Injectable 40 milliGRAM(s) IV Push daily  PHENobarbital Injectable 130 milliGRAM(s) IV Push every 10 minutes PRN  QUEtiapine 25 milliGRAM(s) Oral at bedtime  thiamine IVPB 500 milliGRAM(s) IV Intermittent every 8 hours    Drug Dosing Weight  Height (cm): 167.6 (04 Apr 2022 05:00)  Weight (kg): 74.2 (04 Apr 2022 05:00)  BMI (kg/m2): 26.4 (04 Apr 2022 05:00)  BSA (m2): 1.84 (04 Apr 2022 05:00)    CENTRAL LINE: [ ] YES [X] NO  LOCATION:   DATE INSERTED:  REMOVE: [ ] YES [ ] NO  EXPLAIN:    KINNEY: [ ] YES [X] NO    DATE INSERTED:  REMOVE:  [ ] YES [ ] NO  EXPLAIN:    A-LINE:  [ ] YES [X] NO  LOCATION:   DATE INSERTED:  REMOVE:  [ ] YES [ ] NO  EXPLAIN:    [facility] -reviewed admission note, no change since admission            04-06 @ 07:01  -  04-07 @ 07:00  --------------------------------------------------------  IN: 663.8 mL / OUT: 1100 mL / NET: -436.2 mL            PHYSICAL EXAM:    GENERAL: NAD  HEAD:  Atraumatic, Normocephalic  EYES: EOMI, PERRLA, conjunctiva and sclera clear  NECK: Supple, No JVD, Normal thyroid, no enlarged nodes  NERVOUS SYSTEM:  Alert & Oriented X 3, no focal deficits  CHEST/LUNG: CTAB  HEART: S1S2 regular tachycardia  ABDOMEN: Soft, Nontender, Nondistended; Bowel sounds present  EXTREMITIES:  2+ Peripheral Pulses, No clubbing, cyanosis, or edema  LYMPH: No lymphadenopathy noted  SKIN: No rashes or lesions      LABS:  CBC Full  -  ( 07 Apr 2022 03:25 )  WBC Count : 7.99 K/uL  RBC Count : 4.40 M/uL  Hemoglobin : 13.8 g/dL  Hematocrit : 43.2 %  Platelet Count - Automated : 145 K/uL  Mean Cell Volume : 98.2 fl  Mean Cell Hemoglobin : 31.4 pg  Mean Cell Hemoglobin Concentration : 31.9 gm/dL  Auto Neutrophil # : 4.59 K/uL  Auto Lymphocyte # : 2.31 K/uL  Auto Monocyte # : 0.97 K/uL  Auto Eosinophil # : 0.01 K/uL  Auto Basophil # : 0.02 K/uL  Auto Neutrophil % : 57.9 %  Auto Lymphocyte % : 29.2 %  Auto Monocyte % : 12.2 %  Auto Eosinophil % : 0.1 %  Auto Basophil % : 0.3 %    04-07    143  |  104  |  35<H>  ----------------------------<  57<L>  4.3   |  36<H>  |  0.82    Ca    8.9      07 Apr 2022 03:25  Phos  3.0     04-07  Mg     2.1     04-07    TPro  7.4  /  Alb  2.6<L>  /  TBili  0.3  /  DBili  x   /  AST  54<H>  /  ALT  45  /  AlkPhos  75  04-07            RADIOLOGY & ADDITIONAL STUDIES REVIEWED:  ***    [ ]GOALS OF CARE DISCUSSION WITH PATIENT/FAMILY/PROXY:    CRITICAL CARE TIME SPENT: 35 minutes

## 2022-04-08 NOTE — PROGRESS NOTE ADULT - ASSESSMENT
Patient is a 76 year old male with PMHx of HTN, CAD, ?COPD- smoker, who presented with generalized weakness and confusion. Workup in the ED revealed possible bilateral pneumonia, COVID-19 negative. ICU consulted due to necessity of BiPAP use.    Assessment:  - Acute hypoxic/hypercapnic respiratory failure  - ?COPD  - Bilateral community acquired pneumonia  - Sepsis  - DM  - HTN    Plan:  Neuro:  # Acute metabolic encephalopathy  - Patient able to tolerate BiPAP  - it was noted that patient became more agitated and started pulling off his mask in which he was given 0.5mg of haloperidol  - Pt received 2.5 Haldol then an additional 5mg IV haldol, pt continued to be agitated  - Started on Phenobarbital 130 q5min for a total of 12 doses  - Will dc precedex    CV:  # SVT  Pt with episodes of SVT on 4/7 after duoneb administration  DC all duonebs, and inhalers   S/p adenosine 6 then 12  Started on Cardizem gtt  Cardio - Dr. Bush    #CHF Exacerbation  - Pt has crackles on exam, no LE edema  - Bedside POCUS revealed B-lines  -   - TTE reveals Moderate-severe mitral annular calcification. Mild mitral regurgitation. Moderate-severe mitral stenosis. Severe aortic stenosis. Mild aortic regurgitation.    # HTN  - Hold all home hypertensives  - monitor BP, suspect sepsis    #Hypotension  - Pt developed hypotension while on bipap  - started on peripheral phenylephrine  - Off vasopressors now    Pulm:  # Acute Hypoxic/ Hypercapnic Resp Failure  - Documented hx of asthma, however noted to be a smoker  - started IV steroids, bronchodilator   - Likely precipitated by a current pneumonia  - Continue with BiPAP. Repeat ABG with BiPAP with minimal improvement in pH and Co2, increased IPAP to 16 and EPAP to 6. Repeat ABG  - D/C duonebs  - RVP influenza AH3 positive  - Started Tamiflu as pt can tolerate PO  - completed 3 days of Azithromycin, will d/c ceftriaxone at this time  - Pt now off BIPAP and on NC    # Left lung nodules  nonspecific 1.5 x 1 x 1.9 cm nodular opacity of the left lung apex and an additional 1.8 x 0.6 x 1 cm nodular opacity of the left upper lobe.   Further evaluation with PET/CT, biopsy or short-term three-month CT outpatient    ID:  # Pneumonia, community acquired  - Start on ceftriaxone and azithromycin  - blood cultures NGTD    Nephro:  # No acute issues.    GI:  - On puree diet with mildly thick liquids      Heme:  - no issues    Endo:  # DM  - target CBG < 180  - Start Sliding Scale  - hold all oral agents  - a1c 10.3  - on low sliding scale    FEN:  - Continue IVF    Prophy:  Lovenox  PPI    Dispo:  - Continue monitoring in ICU  Patient is a 76 year old male with PMHx of HTN, CAD, ?COPD- smoker, who presented with generalized weakness and confusion. Workup in the ED revealed possible bilateral pneumonia, COVID-19 negative. ICU consulted due to necessity of BiPAP use.    Assessment:  - Acute hypoxic/hypercapnic respiratory failure  - ?COPD  - Bilateral community acquired pneumonia  - Sepsis  - DM  - HTN    Plan:  Neuro:  # Acute metabolic encephalopathy  Resolved  - Patient able to tolerate BiPAP  - it was noted that patient became more agitated and started pulling off his mask in which he was given 0.5mg of haloperidol  - Pt received 2.5 Haldol then an additional 5mg IV haldol, pt continued to be agitated  - Started on Phenobarbital 130 q5min for a total of 12 doses  - Will dc precedex    CV:  # SVT  Pt with episodes of SVT on 4/7 after duoneb administration  DC all duonebs, and inhalers   S/p adenosine 6 then 12  discontinued Cardizem gtt  Started Metoprolol 25 BID  Cardio - Dr. Bush    #CHF Exacerbation  - Pt has crackles on exam, no LE edema  - Bedside POCUS revealed B-lines  -   - TTE reveals Moderate-severe mitral annular calcification. Mild mitral regurgitation. Moderate-severe mitral stenosis. Severe aortic stenosis. Mild aortic regurgitation.    # HTN  - Hold all home hypertensives in setting of hypotension  - may resume as indicated    #Hypotension  - Pt developed hypotension while on bipap  - started on peripheral phenylephrine  - Off vasopressors now    Pulm:  # Acute Hypoxic/ Hypercapnic Resp Failure  - Documented hx of asthma, however noted to be a smoker  - started IV steroids, bronchodilator   - Likely precipitated by a current pneumonia  - Continue with BiPAP. Repeat ABG with BiPAP with minimal improvement in pH and Co2, increased IPAP to 16 and EPAP to 6. Repeat ABG  - D/C duonebs  - RVP influenza AH3 positive  - Started Tamiflu as pt can tolerate PO  - completed 3 days of Azithromycin, will d/c ceftriaxone at this time  - Pt now off BIPAP and on NC    # Left lung nodules  nonspecific 1.5 x 1 x 1.9 cm nodular opacity of the left lung apex and an additional 1.8 x 0.6 x 1 cm nodular opacity of the left upper lobe.   Further evaluation with PET/CT, biopsy or short-term three-month CT outpatient    ID:  # Pneumonia, community acquired  - Start on ceftriaxone and azithromycin  - blood cultures NGTD    Nephro:  # No acute issues.    GI:  - On puree diet with mildly thick liquids      Heme:  - no issues    Endo:  # DM  - target CBG < 180  - Start Sliding Scale  - hold all oral agents  - a1c 10.3  - on low sliding scale    FEN:  - Continue IVF    Prophy:  Lovenox  PPI    Dispo:  - Continue monitoring in ICU

## 2022-04-08 NOTE — PROGRESS NOTE ADULT - SUBJECTIVE AND OBJECTIVE BOX
C A R D I O L O G Y  **********************************     DATE OF SERVICE: 04-08-22    Confused, not answering questions     acetaminophen     Tablet .. 650 milliGRAM(s) Oral every 6 hours PRN  budesonide 160 MICROgram(s)/formoterol 4.5 MICROgram(s) Inhaler 2 Puff(s) Inhalation two times a day  chlorhexidine 2% Cloths 1 Application(s) Topical <User Schedule>  enoxaparin Injectable 75 milliGRAM(s) SubCutaneous every 12 hours  insulin glargine Injectable (LANTUS) 7 Unit(s) SubCutaneous at bedtime  insulin lispro (ADMELOG) corrective regimen sliding scale   SubCutaneous Before meals and at bedtime  metoprolol tartrate 25 milliGRAM(s) Oral two times a day  nicotine - 21 mG/24Hr(s) Patch 1 patch Transdermal daily  oseltamivir 75 milliGRAM(s) Oral two times a day  pantoprazole  Injectable 40 milliGRAM(s) IV Push daily  PHENobarbital Injectable 130 milliGRAM(s) IV Push every 10 minutes PRN  polyethylene glycol 3350 17 Gram(s) Oral daily  predniSONE   Tablet 40 milliGRAM(s) Oral once  QUEtiapine 25 milliGRAM(s) Oral at bedtime  senna 1 Tablet(s) Oral daily PRN  tiotropium 18 MICROgram(s) Capsule 1 Capsule(s) Inhalation daily                            14.1   7.10  )-----------( 156      ( 08 Apr 2022 05:20 )             44.9       Hemoglobin: 14.1 g/dL (04-08 @ 05:20)  Hemoglobin: 13.8 g/dL (04-07 @ 03:25)  Hemoglobin: 14.2 g/dL (04-06 @ 05:35)  Hemoglobin: 13.3 g/dL (04-05 @ 03:55)  Hemoglobin: 13.3 g/dL (04-04 @ 09:30)      04-08    139  |  102  |  23<H>  ----------------------------<  74  4.0   |  34<H>  |  0.68    Ca    8.8      08 Apr 2022 05:20  Phos  2.7     04-08  Mg     1.5     04-08    TPro  7.1  /  Alb  2.4<L>  /  TBili  0.4  /  DBili  x   /  AST  47<H>  /  ALT  48  /  AlkPhos  74  04-08    Creatinine Trend: 0.68<--, 0.82<--, 0.90<--, 1.08<--, 1.08<--, 1.14<--    COAGS:     CARDIAC MARKERS ( 07 Apr 2022 09:52 )  x     / x     / 320 U/L / x     / 1.5 ng/mL        T(C): 36.7 (04-08-22 @ 08:00), Max: 37.1 (04-07-22 @ 15:12)  HR: 71 (04-08-22 @ 10:00) (66 - 101)  BP: 123/55 (04-08-22 @ 10:00) (117/42 - 168/65)  RR: 28 (04-08-22 @ 10:00) (17 - 38)  SpO2: 99% (04-08-22 @ 10:00) (85% - 100%)  Wt(kg): --    I&O's Summary    07 Apr 2022 07:01  -  08 Apr 2022 07:00  --------------------------------------------------------  IN: 1849 mL / OUT: 650 mL / NET: 1199 mL    08 Apr 2022 07:01  -  08 Apr 2022 11:29  --------------------------------------------------------  IN: 100 mL / OUT: 125 mL / NET: -25 mL        HEENT:  (-)icterus (-)pallor  CV: N S1 S2 1/6 FELICITA (+)2 Pulses B/l  Resp:  Clear to ausculatation B/L, normal effort  GI: (+) BS Soft, NT, ND  Lymph:  (-)Edema, (-)obvious lymphadenopathy  Skin: Warm to touch, Normal turgor  Psych: Appropriate mood and affect      TELEMETRY: 	  sinus, PAF        ASSESSMENT/PLAN: 	76y  Male PMHx of HTN, CAD, active smoker, who was sent to hospital by family members due to altered mental status and generalized weakness found with Influenza, PNA and severe AS.    - Does not appear to be in pulmonary edema  - Keep net Even, A low BNP suggests low filling pressures   - cont AC for new PAF  - W/u of his aortic stenosis will depend on his mental staus      Casper Bush MD, [facility]  BEEPER [phone]

## 2022-04-08 NOTE — PROGRESS NOTE ADULT - SUBJECTIVE AND OBJECTIVE BOX
INTERVAL HPI/OVERNIGHT EVENTS: ***    PRESSORS: [ ] YES [ ] NO    Antimicrobial:  oseltamivir 75 milliGRAM(s) Oral two times a day    Cardiovascular:  metoprolol tartrate 25 milliGRAM(s) Oral two times a day    Pulmonary:  budesonide 160 MICROgram(s)/formoterol 4.5 MICROgram(s) Inhaler 2 Puff(s) Inhalation two times a day  tiotropium 18 MICROgram(s) Capsule 1 Capsule(s) Inhalation daily    Hematalogic:  enoxaparin Injectable 75 milliGRAM(s) SubCutaneous every 12 hours    Other:  acetaminophen     Tablet .. 650 milliGRAM(s) Oral every 6 hours PRN  chlorhexidine 2% Cloths 1 Application(s) Topical <User Schedule>  insulin glargine Injectable (LANTUS) 7 Unit(s) SubCutaneous at bedtime  insulin lispro (ADMELOG) corrective regimen sliding scale   SubCutaneous Before meals and at bedtime  nicotine - 21 mG/24Hr(s) Patch 1 patch Transdermal daily  pantoprazole  Injectable 40 milliGRAM(s) IV Push daily  PHENobarbital Injectable 130 milliGRAM(s) IV Push every 10 minutes PRN  polyethylene glycol 3350 17 Gram(s) Oral daily  predniSONE   Tablet 40 milliGRAM(s) Oral once  QUEtiapine 25 milliGRAM(s) Oral at bedtime  senna 1 Tablet(s) Oral daily PRN    acetaminophen     Tablet .. 650 milliGRAM(s) Oral every 6 hours PRN  budesonide 160 MICROgram(s)/formoterol 4.5 MICROgram(s) Inhaler 2 Puff(s) Inhalation two times a day  chlorhexidine 2% Cloths 1 Application(s) Topical <User Schedule>  enoxaparin Injectable 75 milliGRAM(s) SubCutaneous every 12 hours  insulin glargine Injectable (LANTUS) 7 Unit(s) SubCutaneous at bedtime  insulin lispro (ADMELOG) corrective regimen sliding scale   SubCutaneous Before meals and at bedtime  metoprolol tartrate 25 milliGRAM(s) Oral two times a day  nicotine - 21 mG/24Hr(s) Patch 1 patch Transdermal daily  oseltamivir 75 milliGRAM(s) Oral two times a day  pantoprazole  Injectable 40 milliGRAM(s) IV Push daily  PHENobarbital Injectable 130 milliGRAM(s) IV Push every 10 minutes PRN  polyethylene glycol 3350 17 Gram(s) Oral daily  predniSONE   Tablet 40 milliGRAM(s) Oral once  QUEtiapine 25 milliGRAM(s) Oral at bedtime  senna 1 Tablet(s) Oral daily PRN  tiotropium 18 MICROgram(s) Capsule 1 Capsule(s) Inhalation daily    Drug Dosing Weight  Height (cm): 167.6 (04 Apr 2022 05:00)  Weight (kg): 74.2 (04 Apr 2022 05:00)  BMI (kg/m2): 26.4 (04 Apr 2022 05:00)  BSA (m2): 1.84 (04 Apr 2022 05:00)    CENTRAL LINE: [ ] YES [ ] NO  LOCATION:   DATE INSERTED:  REMOVE: [ ] YES [ ] NO  EXPLAIN:    KINNEY: [ ] YES [ ] NO    DATE INSERTED:  REMOVE:  [ ] YES [ ] NO  EXPLAIN:    A-LINE:  [ ] YES [ ] NO  LOCATION:   DATE INSERTED:  REMOVE:  [ ] YES [ ] NO  EXPLAIN:    PMH -reviewed admission note, no change since admission            04-07 @ 07:01  -  04-08 @ 07:00  --------------------------------------------------------  IN: 1849 mL / OUT: 650 mL / NET: 1199 mL            PHYSICAL EXAM:    GENERAL: NAD  HEAD:  Atraumatic, Normocephalic  EYES: conjunctiva and sclera clear  ENMT: No tonsillar erythema, exudates, or enlargement; Moist mucous membranes, Good dentition, [ ]No lesions  NECK: Supple, normal appearance, No JVD; Normal thyroid; Trachea midline  NERVOUS SYSTEM:  Alert & Oriented X3, Good concentration; Motor Strength 5/5 B/L upper and lower extremities; DTRs 2+ intact and symmetric  CHEST/LUNG: No chest deformity; Normal percussion bilaterally; No rales, rhonchi, wheezing   HEART: Regular rate and rhythm; No murmurs, rubs, or gallops  ABDOMEN: Soft, Nontender, Nondistended;Bowel sounds present  EXTREMITIES:  2+ Peripheral Pulses, No clubbing, cyanosis, or edema  LYMPH: No lymphadenopathy noted  SKIN: No rashes or lesions; Good capillary refill      LABS:  CBC Full  -  ( 08 Apr 2022 05:20 )  WBC Count : 7.10 K/uL  RBC Count : 4.66 M/uL  Hemoglobin : 14.1 g/dL  Hematocrit : 44.9 %  Platelet Count - Automated : 156 K/uL  Mean Cell Volume : 96.4 fl  Mean Cell Hemoglobin : 30.3 pg  Mean Cell Hemoglobin Concentration : 31.4 gm/dL  Auto Neutrophil # : x  Auto Lymphocyte # : x  Auto Monocyte # : x  Auto Eosinophil # : x  Auto Basophil # : x  Auto Neutrophil % : x  Auto Lymphocyte % : x  Auto Monocyte % : x  Auto Eosinophil % : x  Auto Basophil % : x    04-08    139  |  102  |  23<H>  ----------------------------<  74  4.0   |  34<H>  |  0.68    Ca    8.8      08 Apr 2022 05:20  Phos  2.7     04-08  Mg     1.5     04-08    TPro  7.1  /  Alb  2.4<L>  /  TBili  0.4  /  DBili  x   /  AST  47<H>  /  ALT  48  /  AlkPhos  74  04-08            RADIOLOGY & ADDITIONAL STUDIES REVIEWED:  ***    [ ]GOALS OF CARE DISCUSSION WITH PATIENT/FAMILY/PROXY:    CRITICAL CARE TIME SPENT: 35 minutes INTERVAL HPI/OVERNIGHT EVENTS: Pt was seen and assessed at bedside, pt was complaining of pleuritic chest pain, and a non productive cough    PRESSORS: [ ] YES [X] NO    Antimicrobial:  oseltamivir 75 milliGRAM(s) Oral two times a day    Cardiovascular:  metoprolol tartrate 25 milliGRAM(s) Oral two times a day    Pulmonary:  budesonide 160 MICROgram(s)/formoterol 4.5 MICROgram(s) Inhaler 2 Puff(s) Inhalation two times a day  tiotropium 18 MICROgram(s) Capsule 1 Capsule(s) Inhalation daily    Hematalogic:  enoxaparin Injectable 75 milliGRAM(s) SubCutaneous every 12 hours    Other:  acetaminophen     Tablet .. 650 milliGRAM(s) Oral every 6 hours PRN  chlorhexidine 2% Cloths 1 Application(s) Topical <User Schedule>  insulin glargine Injectable (LANTUS) 7 Unit(s) SubCutaneous at bedtime  insulin lispro (ADMELOG) corrective regimen sliding scale   SubCutaneous Before meals and at bedtime  nicotine - 21 mG/24Hr(s) Patch 1 patch Transdermal daily  pantoprazole  Injectable 40 milliGRAM(s) IV Push daily  PHENobarbital Injectable 130 milliGRAM(s) IV Push every 10 minutes PRN  polyethylene glycol 3350 17 Gram(s) Oral daily  predniSONE   Tablet 40 milliGRAM(s) Oral once  QUEtiapine 25 milliGRAM(s) Oral at bedtime  senna 1 Tablet(s) Oral daily PRN    acetaminophen     Tablet .. 650 milliGRAM(s) Oral every 6 hours PRN  budesonide 160 MICROgram(s)/formoterol 4.5 MICROgram(s) Inhaler 2 Puff(s) Inhalation two times a day  chlorhexidine 2% Cloths 1 Application(s) Topical <User Schedule>  enoxaparin Injectable 75 milliGRAM(s) SubCutaneous every 12 hours  insulin glargine Injectable (LANTUS) 7 Unit(s) SubCutaneous at bedtime  insulin lispro (ADMELOG) corrective regimen sliding scale   SubCutaneous Before meals and at bedtime  metoprolol tartrate 25 milliGRAM(s) Oral two times a day  nicotine - 21 mG/24Hr(s) Patch 1 patch Transdermal daily  oseltamivir 75 milliGRAM(s) Oral two times a day  pantoprazole  Injectable 40 milliGRAM(s) IV Push daily  PHENobarbital Injectable 130 milliGRAM(s) IV Push every 10 minutes PRN  polyethylene glycol 3350 17 Gram(s) Oral daily  predniSONE   Tablet 40 milliGRAM(s) Oral once  QUEtiapine 25 milliGRAM(s) Oral at bedtime  senna 1 Tablet(s) Oral daily PRN  tiotropium 18 MICROgram(s) Capsule 1 Capsule(s) Inhalation daily    Drug Dosing Weight  Height (cm): 167.6 (04 Apr 2022 05:00)  Weight (kg): 74.2 (04 Apr 2022 05:00)  BMI (kg/m2): 26.4 (04 Apr 2022 05:00)  BSA (m2): 1.84 (04 Apr 2022 05:00)    CENTRAL LINE: [ ] YES [X] NO  LOCATION:   DATE INSERTED:  REMOVE: [ ] YES [ ] NO  EXPLAIN:    KINNEY: [ ] YES [X] NO    DATE INSERTED:  REMOVE:  [ ] YES [ ] NO  EXPLAIN:    A-LINE:  [ ] YES [X] NO  LOCATION:   DATE INSERTED:  REMOVE:  [ ] YES [ ] NO  EXPLAIN:    PMH -reviewed admission note, no change since admission            04-07 @ 07:01  -  04-08 @ 07:00  --------------------------------------------------------  IN: 1849 mL / OUT: 650 mL / NET: 1199 mL            PHYSICAL EXAM:    GENERAL: NAD  HEAD:  Atraumatic, Normocephalic  EYES: conjunctiva and sclera clear  ENMT: No tonsillar erythema, exudates, or enlargement; Moist mucous membranes, Good dentition, [ ]No lesions  NECK: Supple, normal appearance, No JVD; Normal thyroid; Trachea midline  NERVOUS SYSTEM:  Alert & Oriented X3, Good concentration; Motor Strength 5/5 B/L upper and lower extremities; DTRs 2+ intact and symmetric  CHEST/LUNG: b/l rhonci in upper lobes, No chest deformity; Normal percussion bilaterally;  HEART: Regular rate and rhythm; No murmurs, rubs, or gallops  ABDOMEN: Soft, Nontender, Nondistended;Bowel sounds present  EXTREMITIES:  2+ Peripheral Pulses, No clubbing, cyanosis, or edema  LYMPH: No lymphadenopathy noted  SKIN: No rashes or lesions; Good capillary refill      LABS:  CBC Full  -  ( 08 Apr 2022 05:20 )  WBC Count : 7.10 K/uL  RBC Count : 4.66 M/uL  Hemoglobin : 14.1 g/dL  Hematocrit : 44.9 %  Platelet Count - Automated : 156 K/uL  Mean Cell Volume : 96.4 fl  Mean Cell Hemoglobin : 30.3 pg  Mean Cell Hemoglobin Concentration : 31.4 gm/dL  Auto Neutrophil # : x  Auto Lymphocyte # : x  Auto Monocyte # : x  Auto Eosinophil # : x  Auto Basophil # : x  Auto Neutrophil % : x  Auto Lymphocyte % : x  Auto Monocyte % : x  Auto Eosinophil % : x  Auto Basophil % : x    04-08    139  |  102  |  23<H>  ----------------------------<  74  4.0   |  34<H>  |  0.68    Ca    8.8      08 Apr 2022 05:20  Phos  2.7     04-08  Mg     1.5     04-08    TPro  7.1  /  Alb  2.4<L>  /  TBili  0.4  /  DBili  x   /  AST  47<H>  /  ALT  48  /  AlkPhos  74  04-08            RADIOLOGY & ADDITIONAL STUDIES REVIEWED:  ***    [ ]GOALS OF CARE DISCUSSION WITH PATIENT/FAMILY/PROXY:    CRITICAL CARE TIME SPENT: 35 minutes

## 2022-04-08 NOTE — PROGRESS NOTE ADULT - NUTRITIONAL ASSESSMENT
This patient has been assessed with a concern for Malnutrition and has been determined to have a diagnosis/diagnoses of Moderate protein-calorie malnutrition.    This patient is being managed with:   Diet Pureed-  Consistent Carbohydrate {No Snacks}  Mildly Thick Liquids (MILDTHICKLIQS)  Entered: Apr 7 2022  6:23AM

## 2022-04-08 NOTE — CHART NOTE - NSCHARTNOTEFT_GEN_A_CORE
Pt was c/o chest pain  trop was 123, ekg with twave inversions   dr. Bush made aware   pt on lovenox and metoprolol   added aspirin and statin as per Dr. Bush   echo done in april

## 2022-04-08 NOTE — CHART NOTE - NSCHARTNOTEFT_GEN_A_CORE
Patient is a 76 year old male with PMHx of HTN, CAD, active smoker, who was sent to hospital by family members due to altered mental status and generalized weakness. Given the fact that patient was on BiPAP and inability to reach family members, history obtained from chart. Per son, patient noted with generalized weakness that resolved. Then today, noted to be confused and not making sense. Son noted that patient was having difficulty breathing. Has sick contacts at home with cough. Not vaccinated for COVID-19. Patient himself was able to endorse that he is feeling feverish and chills, with cough the past couple of days. Denied any pain. Patient was admitted to ICU due to Acute hypoxic/hypercapnic respiratory failure, requiring BIPAP use, most likely COPD exacerbated by influenza AH3.   Discussions with patient's wife indicated that patient requested to be DNR/DNI even if it meant imminent danger.  Patient was continously agitated throughout hospitalization trying to get out of bed and remove BIPAP machine. Patient was given haldol 5mg and placed on maximum amounts of precedex, however patient continued to be agitated. History from family indicated that patient had history of alcohol abuse 15 years ago, however no longer drinks alcohol. Patient was placed on phenobarb, due to suspicion for alcohol withdrawal, for 130mg q5min PRN for agitation for a maximum of 1500mg/day. Patient became calmer at the time.   Echo revealed Moderate-severe mitral stenosis, severe aortic stenosis.  Mild aortic regurgitation. Patient was switched from BIPAP to nasal cannula on 4/6. Diet was then advanced to Puree with mildly thickened liquids. He was taken on precedex on 4/6 and seroquel 25mg qhs was added.   On 4/7 Patient had episode of SVT after duoneb. Pt was given adenosine 6 then 12, rhythm converted to Afib. Cardizem 20 given, then pt was placed on Cardizem gtt. Dr. Bush notified, patient also started on FD lovenox. Cardizem drip was discontinued and switched to Metoprolol 25 BID.      To follow:   Avoid excessive diuresis or hypotension as patient has severe AS  Do not give any duonebs  Continue monitoring HR  Further evaluation with PET/CT, biopsy or short-term three-month CT outpatient  F/U Cardio for severe AS  Signed out to PGY1 Dr. Carpio, and Dr. Shukla

## 2022-04-08 NOTE — PROGRESS NOTE ADULT - ATTENDING COMMENTS
IMP: This is a 76 year old man  with uncontrol DM   HTN, CAD, ?COPD- smoker, who presented with generalized weakness and confusion. AMS due to sepsis and hypoxia . Acute hypoxic hypercapnic resp failure due to acute ex of COPD and influenza A pna requiring BiPaP with supp Os support     Assessment:  - Acute hypoxic/hypercapnic respiratory failure  - Acute exacerbation of COPD  - Influenza A infection  - Sepsis  - Rapid afib  - DM uncontrol   - HTN      Plan   - Rate controlled on metoprolol   - Cardiology follow up  - Cont. anticoagulation for afib   - Off precedex   - Continue BiPaP alternate with Supp as needed to maintain sat >90%  - New Severe AS and moderate MS, will need work up once medically stable, does not appear to be in heart failure currently   - Tamiflu   - D/c steroids    - Asp precaution   - Oral diet when off bipap  - Off isolation now  - Monitor blood sugar with coverage   - Hemodynamic monitoring   - Nicotine patch   - DVT GI prophy  - PT eval   - OOB to chair  - Transfer to telemetry service

## 2022-04-09 NOTE — PROGRESS NOTE ADULT - ATTENDING COMMENTS
Patient seen and examined. Discussed with PGY1 Dr. Carpio    Vital Signs Last 24 Hrs  T(C): 36.6 (09 Apr 2022 10:56), Max: 36.9 (09 Apr 2022 04:23)  T(F): 97.8 (09 Apr 2022 10:56), Max: 98.5 (09 Apr 2022 04:23)  HR: 78 (09 Apr 2022 10:56) (69 - 88)  BP: 120/66 (09 Apr 2022 10:56) (120/66 - 164/71)  BP(mean): 79 (08 Apr 2022 17:00) (79 - 106)  RR: 18 (09 Apr 2022 10:56) (18 - 31)  SpO2: 94% (09 Apr 2022 10:56) (92% - 100%)    P/E:  Psych: AAO x3  Neuro: No gross focal deficits; Power and sensation intact  CVS: S1S2 present, regular, no edema  Resp: BLAE+, mild wheeze scattered  GI: Soft, BS+, Non tender, non distended  Extr: No  calf tenderness B/L Lower extremities  Skin: Warm and moist without any rashes    Labs:                        14.7   8.61  )-----------( 177      ( 09 Apr 2022 07:02 )             45.3   04-09    138  |  96  |  19<H>  ----------------------------<  68<L>  3.7   |  35<H>  |  0.69    Ca    9.3      09 Apr 2022 07:02  Phos  3.6     04-09  Mg     1.7     04-09    TPro  7.6  /  Alb  2.5<L>  /  TBili  0.4  /  DBili  x   /  AST  37  /  ALT  47  /  AlkPhos  86  04-09 Patient seen and examined. Discussed with PGY1 Dr. Carpio    76 year old male with PMHx of HTN, CAD, Asthma vs  ?COPD- smoker, who presented with generalized weakness and confusion admitted to ICU with Hypercapnic RF and note to be influenza positive placed on Bipap with clinical improvement. Patient also agitated in ICU resolved with Phenobarb with ?? concern for alcohol use.    Patient doing well. wife at bedside. seen by PT with me. weak but able to participate. has some elevated troponin overnight on labs after he complained of chest pain. Wife denies use of alcohol for 10 yrs. She does go to work and patient is retired at home alone. Two sons also live with them. As per wife patient had a traumatic event  when his 2 sons from previous marriage . Patient does smoke daily.     Patient denies fever, chills, SOB, palpitations, chest pain, nausea, vomiting, diarrhea, constipation, dizziness    Vital Signs Last 24 Hrs  T(C): 36.6 (2022 10:56), Max: 36.9 (2022 04:23)  T(F): 97.8 (2022 10:56), Max: 98.5 (2022 04:23)  HR: 78 (2022 10:56) (69 - 88)  BP: 120/66 (2022 10:56) (120/66 - 164/71)  BP(mean): 79 (2022 17:00) (79 - 106)  RR: 18 (2022 10:56) (18 - 31)  SpO2: 94% (2022 10:56) (92% - 100%)    P/E:  Psych: AAO x3  Neuro: No gross focal deficits; Power and sensation intact  CVS: S1S2 present, regular, no edema  Resp: BLAE+, mild wheeze scattered  GI: Soft, BS+, Non tender, non distended  Extr: No  calf tenderness B/L Lower extremities  Skin: Warm and moist without any rashes    Labs:                      14.7   8.61  )-----------( 177      ( 2022 07:02 )             45.3   04-09    138  |  96  |  19<H>  ----------------------------<  68<L>  3.7   |  35<H>  |  0.69  Ca    9.3      2022 07:02  Phos  3.6     -  Mg     1.7       TPro  7.6  /  Alb  2.5<L>  /  TBili  0.4  /  DBili  x   /  AST  37  /  ALT  47  /  AlkPhos  86      Transthoracic Echocardiogram (22 @ 09:38)   CONCLUSIONS:  1. Moderate-severe mitral annular calcification, mitral valve leaflets are not well seen.   Mild mitral regurgitation.  Moderate-severe mitral stenosis; mean  gradient of 9mmHg, MVA 1.5cm^2 (by continuity) at a HR 85.  2. Calcified aortic valve with decreased opening, leaflets not well seen.  Peak transaortic valve gradient equals 46.2 mm Hg, mean transaortic valve gradient equals 29 mm Hg, estimated aortic valve area equals 0.8 sqcm (by continuity equation), DI 0.2 consistent with severe aortic stenosis.  Mild aortic regurgitation.  3. Increased relative wall thickness with normal left ventricular (LV) mass index, consistent with concentric LV remodeling.  4. Endocardium not well visualized; normal left ventricular systolic function.  5. The diastolic function is indeterminate based on current diagnostic criteria.  6. Normal right atrium.  7. Right ventricle not well seen, grossly normal size and function on limited views.  8. Tricuspid valve not well seen. Trace tricuspid regurgitation.  9. Pulmonic valve not well seen.  *** Compared with echocardiogram report of 3/3/2017, there is severe aortic stenosis and moderate mitral stenosis on current study.    D/D:  Acute Hypercapnic Respiratory failure ppt by suspected COPD Exac. vs Asthma Exacerbation due to Influenza infection  NSTEMI vs Demand ischemia with SVT due to Bronchodilator use  Severe Aortic stenosis  Tobacco use continuous  ?? Alcohol intox (wife denied)    Plan:  Continue Tele; Continue ASA, Statin, Metoprolol  Lovenox therapeutic for now  Hold off further Bipap;   Echo findings reviewed with Cardio; As per Dr. Bush will need R+L cath once stable   Continue Bronchodilators: Spiriva and Albuterol PRN; continue Tamiflu x 5 days total course  Will give Prednisone for 2-3 days more and taper off; 40 mg daily  Pulmonary consult d/w Dr. Kate  I also removed NC O2 supplement; Patient saturating 97% on ear lobe, lower on fingers but not in distress  Continue Nicotine patch  PPI/ DVT ppx on Lovenox  PT eval appreciated: SHAMAR for now but likely to improve next 48 hrs; PT F/U Monday    Discussed with PGFY1 Dr. Carpio and Cardio Dr. Bush and RN  Discussed with patient and wife at bedside at length findings and plan of care    I will be away 4/10/22 -22; Hospitalist colleague to assume care Patient seen and examined. Discussed with PGY1 Dr. Carpio    76 year old male with PMHx of HTN, CAD, Asthma vs  ?COPD- smoker, who presented with generalized weakness and confusion admitted to ICU with Hypercapnic RF and note to be influenza positive placed on Bipap with clinical improvement. Patient also agitated in ICU resolved with Phenobarb with ?? concern for alcohol use.    Patient doing well. wife at bedside. seen by PT with me. weak but able to participate. has some elevated troponin overnight on labs after he complained of chest pain. Wife denies use of alcohol for 10 yrs. She does go to work and patient is retired at home alone. Two sons also live with them. As per wife patient had a traumatic event  when his 2 sons from previous marriage . Patient does smoke daily.     Patient denies fever, chills, SOB, palpitations, chest pain, nausea, vomiting, diarrhea, constipation, dizziness    Vital Signs Last 24 Hrs  T(C): 36.6 (2022 10:56), Max: 36.9 (2022 04:23)  T(F): 97.8 (2022 10:56), Max: 98.5 (2022 04:23)  HR: 78 (2022 10:56) (69 - 88)  BP: 120/66 (2022 10:56) (120/66 - 164/71)  BP(mean): 79 (2022 17:00) (79 - 106)  RR: 18 (2022 10:56) (18 - 31)  SpO2: 94% (2022 10:56) (92% - 100%)    P/E:  Psych: AAO x3  Neuro: No gross focal deficits; Power and sensation intact  CVS: S1S2 present, regular, no edema  Resp: BLAE+, mild wheeze scattered  GI: Soft, BS+, Non tender, non distended  Extr: No  calf tenderness B/L Lower extremities  Skin: Warm and moist without any rashes    Labs:                      14.7   8.61  )-----------( 177      ( 2022 07:02 )             45.3   04-09    138  |  96  |  19<H>  ----------------------------<  68<L>  3.7   |  35<H>  |  0.69  Ca    9.3      2022 07:02  Phos  3.6     -  Mg     1.7       TPro  7.6  /  Alb  2.5<L>  /  TBili  0.4  /  DBili  x   /  AST  37  /  ALT  47  /  AlkPhos  86      Transthoracic Echocardiogram (22 @ 09:38)   CONCLUSIONS:  1. Moderate-severe mitral annular calcification, mitral valve leaflets are not well seen.   Mild mitral regurgitation.  Moderate-severe mitral stenosis; mean  gradient of 9mmHg, MVA 1.5cm^2 (by continuity) at a HR 85.  2. Calcified aortic valve with decreased opening, leaflets not well seen.  Peak transaortic valve gradient equals 46.2 mm Hg, mean transaortic valve gradient equals 29 mm Hg, estimated aortic valve area equals 0.8 sqcm (by continuity equation), DI 0.2 consistent with severe aortic stenosis.  Mild aortic regurgitation.  3. Increased relative wall thickness with normal left ventricular (LV) mass index, consistent with concentric LV remodeling.  4. Endocardium not well visualized; normal left ventricular systolic function.  5. The diastolic function is indeterminate based on current diagnostic criteria.  6. Normal right atrium.  7. Right ventricle not well seen, grossly normal size and function on limited views.  8. Tricuspid valve not well seen. Trace tricuspid regurgitation.  9. Pulmonic valve not well seen.  *** Compared with echocardiogram report of 3/3/2017, there is severe aortic stenosis and moderate mitral stenosis on current study.    D/D:  Acute Hypercapnic Respiratory failure ppt by suspected COPD Exac. vs Asthma Exacerbation due to Influenza infection  NSTEMI vs Demand ischemia with SVT due to Bronchodilator use  Severe Aortic stenosis  Tobacco use continuous  Type 2 DM   ?? Alcohol intox (wife denied)    Plan:  Continue Tele; Continue ASA, Statin, Metoprolol  Lovenox therapeutic for now  Hold off further Bipap;   Echo findings reviewed with Cardio; As per Dr. Bush will need R+L cath once stable   Continue Bronchodilators: Spiriva and Albuterol PRN; continue Tamiflu x 5 days total course  Will give Prednisone for 2-3 days more and taper off; 40 mg daily  Pulmonary consult d/w Dr. Kate  I also removed NC O2 supplement; Patient saturating 97% on ear lobe, lower on fingers but not in distress  Continue Nicotine patch  PPI/ DVT ppx on Lovenox  PT eval appreciated: SHAMAR for now but likely to improve next 48 hrs; PT F/U Monday    Discussed with PGFY1 Dr. Carpio and Cardio Dr. Bush and RN  Discussed with patient and wife at bedside at length findings and plan of care    I will be away 4/10/22 -22; Hospitalist colleague to assume care

## 2022-04-09 NOTE — PROGRESS NOTE ADULT - PROBLEM SELECTOR PLAN 3
On 4/7 Patient had episode of SVT after duoneb. Pt was given adenosine 6 then 12,   rhythm converted to Afib. Cardizem 20 given,   then pt was placed on Cardizem gtt.   Dr. Bush notified,   patient also started on FD lovenox.   Cardizem drip was discontinued and switched to Metoprolol 25 BID.  HR now contorlled

## 2022-04-09 NOTE — PROGRESS NOTE ADULT - PROBLEM SELECTOR PLAN 1
# Acute Hypoxic/ Hypercapnic Resp Failure  - Documented hx of asthma, however noted to be a smoker, wheezing and rhonchi on exam  -Likely has undiagnosed COPD with exacerbation  - Likely precipitated by a current influenza infection  - started IV steroids, bronchodilator, and placed on BIPAP- DG  from ICU with resolution of hypercapnea  - Continue abx for CAP  - Pulmonology consulted Dr. Kate  -Needs to c/w with prednisone 40mg PO daily for 5 days  -f/u OP pulm for PFTs and COPD management

## 2022-04-09 NOTE — PROGRESS NOTE ADULT - SUBJECTIVE AND OBJECTIVE BOX
PGY-1 Progress Note discussed with attending    PAGER #: [[phone]] TILL 5:00 PM  PLEASE CONTACT ON CALL TEAM:  - On Call Team (Please refer to Yahaira) FROM 5:00 PM - 8:30PM  - Nightfloat Team FROM 8:30 -7:30 AM    CHIEF COMPLAINT & BRIEF HOSPITAL COURSE:  ICU dg, hypercapenic respiratory failure s/p BIPAP   Methyl pred and predx1  SVT with duonebs: now avoided  now stable on spiriva and symbicort 2l nc NO Distress  was also on precedex and phenobarb for CIWA- now score of 1      INTERVAL HPI/OVERNIGHT EVENTS:   Chest pain  trop elevated and TWI overnight  Dr. Bush notified, trops trended down.     This AM c/o aches in the left shoulder, stomach pain, fatigue    MEDICATIONS  (STANDING):  aspirin  chewable 81 milliGRAM(s) Oral daily  atorvastatin 40 milliGRAM(s) Oral at bedtime  budesonide 160 MICROgram(s)/formoterol 4.5 MICROgram(s) Inhaler 2 Puff(s) Inhalation two times a day  enoxaparin Injectable 75 milliGRAM(s) SubCutaneous every 12 hours  insulin glargine Injectable (LANTUS) 7 Unit(s) SubCutaneous at bedtime  insulin lispro (ADMELOG) corrective regimen sliding scale   SubCutaneous Before meals and at bedtime  metoprolol tartrate 25 milliGRAM(s) Oral two times a day  nicotine - 21 mG/24Hr(s) Patch 1 patch Transdermal daily  oseltamivir 75 milliGRAM(s) Oral two times a day  pantoprazole  Injectable 40 milliGRAM(s) IV Push daily  polyethylene glycol 3350 17 Gram(s) Oral daily  predniSONE   Tablet 40 milliGRAM(s) Oral daily  QUEtiapine 25 milliGRAM(s) Oral at bedtime  tiotropium 18 MICROgram(s) Capsule 1 Capsule(s) Inhalation daily  tiotropium 18 MICROgram(s) Capsule 1 Capsule(s) Inhalation daily    MEDICATIONS  (PRN):  acetaminophen     Tablet .. 650 milliGRAM(s) Oral every 6 hours PRN Temp greater or equal to 38C (100.4F), Mild Pain (1 - 3)  senna 1 Tablet(s) Oral daily PRN Constipation      REVIEW OF SYSTEMS:  CONSTITUTIONAL: No fever, weight loss, or fatigue  RESPIRATORY: No cough, wheezing, chills or hemoptysis; No shortness of breath  CARDIOVASCULAR: + chest pain, palpitations, dizziness, or leg swelling  GASTROINTESTINAL: + abdominal pain. No nausea, vomiting, or hematemesis; No diarrhea or constipation. No melena or hematochezia.  GENITOURINARY: No dysuria or hematuria, urinary frequency  NEUROLOGICAL: No headaches, memory loss, loss of strength, numbness, or tremors  SKIN: No itching, burning, rashes, or lesions     Vital Signs Last 24 Hrs  T(C): 36.4 (09 Apr 2022 07:10), Max: 36.9 (09 Apr 2022 04:23)  T(F): 97.6 (09 Apr 2022 07:10), Max: 98.5 (09 Apr 2022 04:23)  HR: 81 (09 Apr 2022 07:10) (69 - 88)  BP: 122/64 (09 Apr 2022 07:10) (122/64 - 164/71)  BP(mean): 79 (08 Apr 2022 17:00) (74 - 106)  RR: 18 (09 Apr 2022 07:10) (18 - 31)  SpO2: 93% (09 Apr 2022 07:10) (86% - 100%)    PHYSICAL EXAMINATION:  GENERAL: NAD, appears uncomfortable  HEAD:  Atraumatic, Normocephalic  EYES:  conjunctiva and sclera clear  NECK: Supple, No JVD  CHEST/LUNG: fine crackles b/L + tender chest wall  HEART: Regular rate and rhythm; No murmurs, rubs, or gallops  ABDOMEN: Soft, + tender diffuse, Nondistended; Bowel sounds present  NERVOUS SYSTEM:  Alert & Oriented X3,    EXTREMITIES:  2+ Peripheral Pulses, No clubbing, cyanosis, or edema  SKIN: warm dry                          14.7   8.61  )-----------( 177      ( 09 Apr 2022 07:02 )             45.3     04-09    138  |  96  |  19<H>  ----------------------------<  68<L>  3.7   |  35<H>  |  0.69    Ca    9.3      09 Apr 2022 07:02  Phos  3.6     04-09  Mg     1.7     04-09    TPro  7.6  /  Alb  2.5<L>  /  TBili  0.4  /  DBili  x   /  AST  37  /  ALT  47  /  AlkPhos  86  04-09    LIVER FUNCTIONS - ( 09 Apr 2022 07:02 )  Alb: 2.5 g/dL / Pro: 7.6 g/dL / ALK PHOS: 86 U/L / ALT: 47 U/L DA / AST: 37 U/L / GGT: x                   CAPILLARY BLOOD GLUCOSE      RADIOLOGY & ADDITIONAL TESTS:                   PGY-1 Progress Note discussed with attending    PAGER #: [[phone]] TILL 5:00 PM  PLEASE CONTACT ON CALL TEAM:  - On Call Team (Please refer to Yahaira) FROM 5:00 PM - 8:30PM  - Nightfloat Team FROM 8:30 -7:30 AM    CHIEF COMPLAINT & BRIEF HOSPITAL COURSE:  ICU dg, hypercapenic respiratory failure s/p BIPAP   Methyl pred and predx1  SVT with duonebs: now avoided  now stable on spiriva and symbicort 2l nc NO Distress  was also on precedex and phenobarb for CIWA- now score of 1      INTERVAL HPI/OVERNIGHT EVENTS:   Chest pain  trop elevated and TWI overnight  Dr. Bush notified, trops trended down.     This AM c/o aches in the left shoulder, stomach pain, fatigue    MEDICATIONS  (STANDING):  aspirin  chewable 81 milliGRAM(s) Oral daily  atorvastatin 40 milliGRAM(s) Oral at bedtime  budesonide 160 MICROgram(s)/formoterol 4.5 MICROgram(s) Inhaler 2 Puff(s) Inhalation two times a day  enoxaparin Injectable 75 milliGRAM(s) SubCutaneous every 12 hours  insulin glargine Injectable (LANTUS) 7 Unit(s) SubCutaneous at bedtime  insulin lispro (ADMELOG) corrective regimen sliding scale   SubCutaneous Before meals and at bedtime  metoprolol tartrate 25 milliGRAM(s) Oral two times a day  nicotine - 21 mG/24Hr(s) Patch 1 patch Transdermal daily  oseltamivir 75 milliGRAM(s) Oral two times a day  pantoprazole  Injectable 40 milliGRAM(s) IV Push daily  polyethylene glycol 3350 17 Gram(s) Oral daily  predniSONE   Tablet 40 milliGRAM(s) Oral daily  QUEtiapine 25 milliGRAM(s) Oral at bedtime  tiotropium 18 MICROgram(s) Capsule 1 Capsule(s) Inhalation daily  tiotropium 18 MICROgram(s) Capsule 1 Capsule(s) Inhalation daily    MEDICATIONS  (PRN):  acetaminophen     Tablet .. 650 milliGRAM(s) Oral every 6 hours PRN Temp greater or equal to 38C (100.4F), Mild Pain (1 - 3)  senna 1 Tablet(s) Oral daily PRN Constipation      REVIEW OF SYSTEMS:  CONSTITUTIONAL: No fever, weight loss, or fatigue  RESPIRATORY: No cough, wheezing, chills or hemoptysis; No shortness of breath  CARDIOVASCULAR: + chest pain, palpitations, dizziness, or leg swelling  GASTROINTESTINAL: + abdominal pain. No nausea, vomiting, or hematemesis; No diarrhea or constipation. No melena or hematochezia.  GENITOURINARY: No dysuria or hematuria, urinary frequency  NEUROLOGICAL: No headaches, memory loss, loss of strength, numbness, or tremors  SKIN: No itching, burning, rashes, or lesions     Vital Signs Last 24 Hrs  T(C): 36.4 (09 Apr 2022 07:10), Max: 36.9 (09 Apr 2022 04:23)  T(F): 97.6 (09 Apr 2022 07:10), Max: 98.5 (09 Apr 2022 04:23)  HR: 81 (09 Apr 2022 07:10) (69 - 88)  BP: 122/64 (09 Apr 2022 07:10) (122/64 - 164/71)  BP(mean): 79 (08 Apr 2022 17:00) (74 - 106)  RR: 18 (09 Apr 2022 07:10) (18 - 31)  SpO2: 93% (09 Apr 2022 07:10) (86% - 100%)    PHYSICAL EXAMINATION:  GENERAL: NAD, appears uncomfortable  HEAD:  Atraumatic, Normocephalic  EYES:  conjunctiva and sclera clear  NECK: Supple, No JVD  CHEST/LUNG: fine crackles b/L + tender chest wall  HEART: Regular rate and rhythm; No murmurs, rubs, or gallops  ABDOMEN: Soft, + tender diffuse, Nondistended; Bowel sounds present  NERVOUS SYSTEM:  Alert & Oriented X3,    EXTREMITIES:  2+ Peripheral Pulses, No clubbing, cyanosis, or edema  SKIN: warm dry                          14.7   8.61  )-----------( 177      ( 09 Apr 2022 07:02 )             45.3     04-09    138  |  96  |  19<H>  ----------------------------<  68<L>  3.7   |  35<H>  |  0.69    Ca    9.3      09 Apr 2022 07:02  Phos  3.6     04-09  Mg     1.7     04-09    TPro  7.6  /  Alb  2.5<L>  /  TBili  0.4  /  DBili  x   /  AST  37  /  ALT  47  /  AlkPhos  86  04-09    LIVER FUNCTIONS - ( 09 Apr 2022 07:02 )  Alb: 2.5 g/dL / Pro: 7.6 g/dL / ALK PHOS: 86 U/L / ALT: 47 U/L DA / AST: 37 U/L / GGT: x             POCT Blood Glucose.: 152 mg/dL (10 Apr 2022 16:31)  POCT Blood Glucose.: 202 mg/dL (10 Apr 2022 11:37)  POCT Blood Glucose.: 136 mg/dL (10 Apr 2022 07:34)  POCT Blood Glucose.: 116 mg/dL (09 Apr 2022 21:19)        RADIOLOGY & ADDITIONAL TESTS:

## 2022-04-09 NOTE — PROGRESS NOTE ADULT - ASSESSMENT
Patient is a 76 year old male with PMHx of HTN, CAD, ?COPD- smoker, who presented with generalized weakness and confusion. Workup in the ED revealed possible bilateral pneumonia, COVID-19 negative. ICU consulted due to necessity of BiPAP use.    Assessment:  - Acute hypoxic/hypercapnic respiratory failure  - ?COPD  - Bilateral community acquired pneumonia  - Sepsis  - DM  - HTN    Plan:  Neuro:  # No acute issues  - Patient able to tolerate BiPAP  - it was noted that patient became more agitated and started pulling off his mask in which he was given 0.5mg of haloperidol    CV:  # HTN  - Hold all home hypertensives  - monitor BP, suspect sepsis    Pulm:  # Acute Hypoxic/ Hypercapnic Resp Failure  - Documented hx of asthma, however noted to be a smoker  - started IV steroids, bronchodilator   - Likely precipitated by a current pneumonia  - Continue abx for CAP  - Continue with BiPAP. Repeat ABG with BiPAP with minimal improvement in pH and Co2, increased IPAP to 16 and EPAP to 6. Repeat ABG  - Pulmonology consult  - Start on bronchodilators     ID:  # Pneumonia, community acquired  - Start on ceftriaxone and azithromycin  - follow urine strept and legionella  - follow blood cultures, sputum culture if possible.    Nephro:  # No acute issues.    GI:  - npo until off bipap      Heme:  - no indication for transfusion       Endo:  # DM  - target CBG < 180  - Start Sliding Scale  - hold all oral agents  - rpt a1c    FEN:  - Continue IVF    Prophy:  Lovenox    Dispo:  - monitor in the ICU until off bipap

## 2022-04-09 NOTE — PROGRESS NOTE ADULT - PROBLEM SELECTOR PLAN 2
# Respiratory failure with hypercapnia, also has muscle pains  Flu tested positive- s/p Tamiflu initiation  at risk for Pneumonia, community acquired  - Started on ceftriaxone and azithromycin   - completed 3 days

## 2022-04-09 NOTE — PROGRESS NOTE ADULT - PROBLEM SELECTOR PLAN 6
Patient is at risk of gastritis  has diffuse abdominal pain and chest pain  Has been on steroids in the ICU  was NPO on BIPAP  -c/w Protonix 40mg PO qd

## 2022-04-09 NOTE — PROGRESS NOTE ADULT - PROBLEM SELECTOR PLAN 5
- Patient able to tolerate BiPAP  - it was noted that patient became more agitated and started pulling off his mask in which he was given 0.5mg of haloperidol  family hx significant for alcohol abuse  patient was placed on CIWA protocol with precedex phenobarb in ICU  now calm and CIWA score 0  c/w to monitor

## 2022-04-09 NOTE — PROGRESS NOTE ADULT - SUBJECTIVE AND OBJECTIVE BOX
C A R D I O L O G Y  **********************************     DATE OF SERVICE: 04-09-22    Events noted, had an episode of CP, Trop are trace positive.  EKG did not reveal injury.  More alert today    acetaminophen     Tablet .. 650 milliGRAM(s) Oral every 6 hours PRN  aspirin  chewable 81 milliGRAM(s) Oral daily  atorvastatin 40 milliGRAM(s) Oral at bedtime  budesonide 160 MICROgram(s)/formoterol 4.5 MICROgram(s) Inhaler 2 Puff(s) Inhalation two times a day  enoxaparin Injectable 75 milliGRAM(s) SubCutaneous every 12 hours  insulin glargine Injectable (LANTUS) 7 Unit(s) SubCutaneous at bedtime  insulin lispro (ADMELOG) corrective regimen sliding scale   SubCutaneous Before meals and at bedtime  metoprolol tartrate 25 milliGRAM(s) Oral two times a day  nicotine - 21 mG/24Hr(s) Patch 1 patch Transdermal daily  oseltamivir 75 milliGRAM(s) Oral two times a day  pantoprazole    Tablet 40 milliGRAM(s) Oral before breakfast  polyethylene glycol 3350 17 Gram(s) Oral daily  predniSONE   Tablet 40 milliGRAM(s) Oral daily  QUEtiapine 25 milliGRAM(s) Oral at bedtime  senna 1 Tablet(s) Oral daily PRN  tiotropium 18 MICROgram(s) Capsule 1 Capsule(s) Inhalation daily  tiotropium 18 MICROgram(s) Capsule 1 Capsule(s) Inhalation daily                            14.7   8.61  )-----------( 177      ( 09 Apr 2022 07:02 )             45.3       Hemoglobin: 14.7 g/dL (04-09 @ 07:02)  Hemoglobin: 14.1 g/dL (04-08 @ 05:20)  Hemoglobin: 13.8 g/dL (04-07 @ 03:25)  Hemoglobin: 14.2 g/dL (04-06 @ 05:35)  Hemoglobin: 13.3 g/dL (04-05 @ 03:55)      04-09    138  |  96  |  19<H>  ----------------------------<  68<L>  3.7   |  35<H>  |  0.69    Ca    9.3      09 Apr 2022 07:02  Phos  3.6     04-09  Mg     1.7     04-09    TPro  7.6  /  Alb  2.5<L>  /  TBili  0.4  /  DBili  x   /  AST  37  /  ALT  47  /  AlkPhos  86  04-09    Creatinine Trend: 0.69<--, 0.68<--, 0.82<--, 0.90<--, 1.08<--, 1.08<--    COAGS:     CARDIAC MARKERS ( 07 Apr 2022 09:52 )  x     / x     / 320 U/L / x     / 1.5 ng/mL        T(C): 36.6 (04-09-22 @ 15:36), Max: 36.9 (04-09-22 @ 04:23)  HR: 88 (04-09-22 @ 15:36) (69 - 88)  BP: 139/73 (04-09-22 @ 15:36) (120/66 - 162/73)  RR: 18 (04-09-22 @ 15:36) (18 - 29)  SpO2: 90% (04-09-22 @ 15:36) (90% - 100%)  Wt(kg): --    I&O's Summary    08 Apr 2022 07:01  -  09 Apr 2022 07:00  --------------------------------------------------------  IN: 500 mL / OUT: 650 mL / NET: -150 mL    09 Apr 2022 07:01  -  09 Apr 2022 16:10  --------------------------------------------------------  IN: 0 mL / OUT: 600 mL / NET: -600 mL        HEENT:  (-)icterus (-)pallor  CV: N S1 S2 1/6 FELICITA (+)2 Pulses B/l  Resp:  Clear to ausculatation B/L, normal effort  GI: (+) BS Soft, NT, ND  Lymph:  (-)Edema, (-)obvious lymphadenopathy  Skin: Warm to touch, Normal turgor  Psych: Appropriate mood and affect      TELEMETRY: 	  sinus, PAF        ASSESSMENT/PLAN: 	76y  Male PMHx of HTN, CAD, active smoker, who was sent to hospital by family members due to altered mental status and generalized weakness found with Influenza, PNA and severe AS.    - Does not appear to be in pulmonary edema  - Keep net Even, A low BNP suggests low filling pressures   - cont AC for new PAF  - Mental status/ delirium improving.  will need R+L cath and CTS jan Bush MD, FACC  BEEPER [phone]     C A R D I O L O G Y  **********************************     DATE OF SERVICE: 04-09-22    Events noted, had an episode of CP, Trop are trace positive.  EKG did not reveal injury.  More alert today    acetaminophen     Tablet .. 650 milliGRAM(s) Oral every 6 hours PRN  aspirin  chewable 81 milliGRAM(s) Oral daily  atorvastatin 40 milliGRAM(s) Oral at bedtime  budesonide 160 MICROgram(s)/formoterol 4.5 MICROgram(s) Inhaler 2 Puff(s) Inhalation two times a day  enoxaparin Injectable 75 milliGRAM(s) SubCutaneous every 12 hours  insulin glargine Injectable (LANTUS) 7 Unit(s) SubCutaneous at bedtime  insulin lispro (ADMELOG) corrective regimen sliding scale   SubCutaneous Before meals and at bedtime  metoprolol tartrate 25 milliGRAM(s) Oral two times a day  nicotine - 21 mG/24Hr(s) Patch 1 patch Transdermal daily  oseltamivir 75 milliGRAM(s) Oral two times a day  pantoprazole    Tablet 40 milliGRAM(s) Oral before breakfast  polyethylene glycol 3350 17 Gram(s) Oral daily  predniSONE   Tablet 40 milliGRAM(s) Oral daily  QUEtiapine 25 milliGRAM(s) Oral at bedtime  senna 1 Tablet(s) Oral daily PRN  tiotropium 18 MICROgram(s) Capsule 1 Capsule(s) Inhalation daily  tiotropium 18 MICROgram(s) Capsule 1 Capsule(s) Inhalation daily                            14.7   8.61  )-----------( 177      ( 09 Apr 2022 07:02 )             45.3       Hemoglobin: 14.7 g/dL (04-09 @ 07:02)  Hemoglobin: 14.1 g/dL (04-08 @ 05:20)  Hemoglobin: 13.8 g/dL (04-07 @ 03:25)  Hemoglobin: 14.2 g/dL (04-06 @ 05:35)  Hemoglobin: 13.3 g/dL (04-05 @ 03:55)      04-09    138  |  96  |  19<H>  ----------------------------<  68<L>  3.7   |  35<H>  |  0.69    Ca    9.3      09 Apr 2022 07:02  Phos  3.6     04-09  Mg     1.7     04-09    TPro  7.6  /  Alb  2.5<L>  /  TBili  0.4  /  DBili  x   /  AST  37  /  ALT  47  /  AlkPhos  86  04-09    Creatinine Trend: 0.69<--, 0.68<--, 0.82<--, 0.90<--, 1.08<--, 1.08<--    COAGS:     CARDIAC MARKERS ( 07 Apr 2022 09:52 )  x     / x     / 320 U/L / x     / 1.5 ng/mL        T(C): 36.6 (04-09-22 @ 15:36), Max: 36.9 (04-09-22 @ 04:23)  HR: 88 (04-09-22 @ 15:36) (69 - 88)  BP: 139/73 (04-09-22 @ 15:36) (120/66 - 162/73)  RR: 18 (04-09-22 @ 15:36) (18 - 29)  SpO2: 90% (04-09-22 @ 15:36) (90% - 100%)  Wt(kg): --    I&O's Summary    08 Apr 2022 07:01  -  09 Apr 2022 07:00  --------------------------------------------------------  IN: 500 mL / OUT: 650 mL / NET: -150 mL    09 Apr 2022 07:01  -  09 Apr 2022 16:10  --------------------------------------------------------  IN: 0 mL / OUT: 600 mL / NET: -600 mL        HEENT:  (-)icterus (-)pallor  CV: N S1 S2 1/6 FELICITA (+)2 Pulses B/l  Resp:  Clear to ausculatation B/L, normal effort  GI: (+) BS Soft, NT, ND  Lymph:  (-)Edema, (-)obvious lymphadenopathy  Skin: Warm to touch, Normal turgor  Psych: Appropriate mood and affect      TELEMETRY: 	  sinus, PAF        ASSESSMENT/PLAN: 	76y  Male PMHx of HTN, CAD, active smoker, who was sent to hospital by family members due to altered mental status and generalized weakness found with Influenza, PNA and severe AS.    - Does not appear to be in pulmonary edema  - Keep net Even, A low BNP suggests low filling pressures   - cont AC for new PAF  - Mental status/ delirium improving.  will need R+L cath and CTS eval once optimized      Casper Bush MD, [facility]  BEEPER [phone]

## 2022-04-09 NOTE — PROGRESS NOTE ADULT - PROBLEM SELECTOR PLAN 4
Hx of of SVT after duoneb. in ICU s/p  adenosine 6 then 12,   rhythm converted to Afib.   now sinus s/p Cardizem 20 given, followed by drip  C/o chest pain after down grade, EKG showed new TWI and trops trended 120s   Recent echo has Moderate-severe mitral stenosis, severe aortic stenosis. normal EF no wall motion abnormalities  low suspicion for ACS, pain likely in the setting of COPD intermittent WOB,  and flu  Dr. Bush notified, Recommeded:  c/w FD lovenox.   c/w Metoprolol 25 BID.  c/w aspirin and statin and to trend trops  trops trended down

## 2022-04-09 NOTE — PROGRESS NOTE ADULT - SUBJECTIVE AND OBJECTIVE BOX
Time of visit:    CHIEF COMPLAINT: Patient is a 76y old  Male who presents with a chief complaint of Acute hypoxic hypercapnic respiratory failure (09 Apr 2022 16:09)      HPI:  Patient is a 76y old  Male who presents with a chief complaint of SOB    HPI: Patient is a 76 year old male with PMHx of HTN, CAD, active smoker, who was sent to hospital by family members due to altered mental status and generalized weakness. Given the fact that patient was on BiPAP and inability to reach family members, history obtained from chart. Per son, patient noted with generalized weakness that resolved. Then today, noted to be confused and not making sense. Son noted that patient was having difficulty breathing. Has sick contacts at home with cough. Not vaccinated for COVID-19. Patient himself was able to endorse that he is feeling feverish and chills, with cough the past couple of days. Denied any pain. Attempted to reach son for collateral information, however unsuccessful.          LABS:                        14.6   7.24  )-----------( 185      ( 03 Apr 2022 22:44 )             44.0     04-03    133<L>  |  99  |  33<H>  ----------------------------<  250<H>  5.3   |  30  |  1.14    Ca    8.3<L>      03 Apr 2022 22:44    TPro  8.3  /  Alb  3.1<L>  /  TBili  0.2  /  DBili  x   /  AST  46<H>  /  ALT  45  /  AlkPhos  93  04-03    PT/INR - ( 03 Apr 2022 22:44 )   PT: 11.9 sec;   INR: 1.00 ratio         PTT - ( 03 Apr 2022 22:44 )  PTT:34.8 sec    CAPILLARY BLOOD GLUCOSE        ABG - ( 04 Apr 2022 02:13 )  pH, Arterial: 7.28  pH, Blood: x     /  pCO2: 61    /  pO2: 84    / HCO3: 29    / Base Excess: 0.5   /  SaO2: 96                  RADIOLOGY & ADDITIONAL TESTS:    Consultant(s) Notes Reviewed:  [x ] YES  [ ] NO    MEDICATIONS  (STANDING):    MEDICATIONS  (PRN):      PHYSICAL EXAM:  GENERAL: well developed. appears stated age, laying in bed with BiPAP, shivering, in mild respiratory distress  HEAD:  Atraumatic, Normocephalic  EYES: EOMI, PERRLA, conjunctiva and sclera clear  NECK: Supple, No JVD, Normal thyroid, no enlarged nodes  NERVOUS SYSTEM:  Alert & Oriented X 3, no focal deficits  CHEST/LUNG: clear bilaterally, no obvious wheezes  HEART: S1S2 regular tachycardia  ABDOMEN: Soft, Nontender, Nondistended; Bowel sounds present  EXTREMITIES:  2+ Peripheral Pulses, No clubbing, cyanosis, or edema  LYMPH: No lymphadenopathy noted  SKIN: No rashes or lesions    Care Discussed with Consultants/Other Providers [ x] YES  [ ] NO (04 Apr 2022 03:29)   Patient seen and examined.     PAST MEDICAL & SURGICAL HISTORY:  HTN (hypertension)    Hyperlipemia    COPD (chronic obstructive pulmonary disease)    DM (diabetes mellitus)    Asthma    Status post spinal surgery  lumbar    Encounter for screening colonoscopy    H/O endoscopy        Allergies    No Known Allergies    Intolerances        MEDICATIONS  (STANDING):  aspirin  chewable 81 milliGRAM(s) Oral daily  atorvastatin 40 milliGRAM(s) Oral at bedtime  budesonide 160 MICROgram(s)/formoterol 4.5 MICROgram(s) Inhaler 2 Puff(s) Inhalation two times a day  enoxaparin Injectable 75 milliGRAM(s) SubCutaneous every 12 hours  insulin glargine Injectable (LANTUS) 7 Unit(s) SubCutaneous at bedtime  insulin lispro (ADMELOG) corrective regimen sliding scale   SubCutaneous Before meals and at bedtime  metoprolol tartrate 25 milliGRAM(s) Oral two times a day  nicotine - 21 mG/24Hr(s) Patch 1 patch Transdermal daily  oseltamivir 75 milliGRAM(s) Oral two times a day  pantoprazole    Tablet 40 milliGRAM(s) Oral before breakfast  polyethylene glycol 3350 17 Gram(s) Oral daily  predniSONE   Tablet 40 milliGRAM(s) Oral daily  QUEtiapine 25 milliGRAM(s) Oral at bedtime  tiotropium 18 MICROgram(s) Capsule 1 Capsule(s) Inhalation daily  tiotropium 18 MICROgram(s) Capsule 1 Capsule(s) Inhalation daily      MEDICATIONS  (PRN):  acetaminophen     Tablet .. 650 milliGRAM(s) Oral every 6 hours PRN Temp greater or equal to 38C (100.4F), Mild Pain (1 - 3)  metoprolol tartrate Injectable 5 milliGRAM(s) IV Push once PRN HR >140  senna 1 Tablet(s) Oral daily PRN Constipation   Medications up to date at time of exam.    Medications up to date at time of exam.    FAMILY HISTORY:  Family history of diabetes mellitus (Sibling)        SOCIAL HISTORY  Smoking History: [x   ] smoking/smoke exposure 1/2 PPD   Living Condition: [   ] apartment, [   ] private house  Work History:   Travel History: denies recent travel  Illicit Substance Use: denies  Alcohol Use: drinks alcohol daily     REVIEW OF SYSTEMS:    CONSTITUTIONAL:  denies fevers, chills, sweats, weight loss    HEENT:  denies diplopia or blurred vision, sore throat or runny nose.    CARDIOVASCULAR:  denies pressure, squeezing, tightness, or heaviness about the chest; no palpitations.    RESPIRATORY:  denies SOB, cough, RICE, wheezing.    GASTROINTESTINAL:  denies abdominal pain, nausea, vomiting or diarrhea.    GENITOURINARY: denies dysuria, frequency or urgency.    NEUROLOGIC:  denies numbness, tingling, seizures or weakness.    PSYCHIATRIC:  denies disorder of thought or mood.    MSK: denies swelling, redness      PHYSICAL EXAMINATION:    GENERAL: The patient is a well-developed, well-nourished, in no apparent distress.     Vital Signs Last 24 Hrs  T(C): 36.6 (09 Apr 2022 15:36), Max: 36.9 (09 Apr 2022 04:23)  T(F): 97.9 (09 Apr 2022 15:36), Max: 98.5 (09 Apr 2022 04:23)  HR: 88 (09 Apr 2022 15:36) (69 - 88)  BP: 139/73 (09 Apr 2022 15:36) (120/66 - 162/73)  BP(mean): --  RR: 18 (09 Apr 2022 15:36) (18 - 21)  SpO2: 90% (09 Apr 2022 15:36) (90% - 98%)   (if applicable)    Chest Tube (if applicable)    HEENT: Head is normocephalic and atraumatic. Extraocular muscles are intact. Mucous membranes are moist.     NECK: Supple, no palpable adenopathy.    LUNGS: Clear to auscultation, no wheezing, rales, or rhonchi.    HEART: Regular rate and rhythm without murmur.    ABDOMEN: Soft, nontender, and nondistended.  No hepatosplenomegaly is noted.    RENAL: No difficulty voiding, no pelvic pain    EXTREMITIES: Without any cyanosis, clubbing, rash, lesions or edema.    NEUROLOGIC: Awake, alert, oriented, grossly intact    SKIN: Warm, dry, good turgor.      LABS:                        14.7   8.61  )-----------( 177      ( 09 Apr 2022 07:02 )             45.3     04-09    138  |  96  |  19<H>  ----------------------------<  68<L>  3.7   |  35<H>  |  0.69    Ca    9.3      09 Apr 2022 07:02  Phos  3.6     04-09  Mg     1.7     04-09    TPro  7.6  /  Alb  2.5<L>  /  TBili  0.4  /  DBili  x   /  AST  37  /  ALT  47  /  AlkPhos  86  04-09                        MICROBIOLOGY: (if applicable)    RADIOLOGY & ADDITIONAL STUDIES:  EKG:   CXR:< from: Xray Chest 1 View- PORTABLE-Routine (Xray Chest 1 View- PORTABLE-Routine .) (04.06.22 @ 14:21) >    ACC: [id number] EXAM:  XR CHEST PORTABLE ROUTINE 1V                          PROCEDURE DATE:  04/06/2022          INTERPRETATION:  Short of breath.    AP chest. Prior 4/3/2022.    IMPRESSION:  Right costophrenic angle excluded. No change heart mediastinum. Mild   nonspecific bibasilar interstitial prominence probably congestive. No   focal consolidation or pleural effusion. Degenerative change right   shoulder.          --- End of Report ---            PARUL LOGAN MD; Attending Radiologist  This document has been electronically signed. Apr 7 2022  1:40PM    < end of copied text >    ECHO:    IMPRESSION: 76y Male PAST MEDICAL & SURGICAL HISTORY:  HTN (hypertension)    Hyperlipemia    COPD (chronic obstructive pulmonary disease)    DM (diabetes mellitus)    Asthma    Status post spinal surgery  lumbar    Encounter for screening colonoscopy    H/O endoscopy     p/w           IMP: This is a  76 year old [place] man active smoker , drinks alcohol daily  with  HTN, CAD, who was sent to hospital by family members due to altered mental status and generalized weakness. . Patient was admitted to ICU due to Acute hypoxic/hypercapnic respiratory failure, requiring BIPAP use, most likely COPD exacerbated by influenza AH3. .   Echo revealed Moderate-severe mitral stenosis, severe aortic stenosis.  On 4/7 Patient had episode of SVT after duoneb. Pt was given adenosine 6 then 12, rhythm converted to Afib. Cardizem 20 given, then pt was placed on Cardizem gtt. Dr. Buhs notified, patient also started on therapeutic  lovenox. Cardizem drip was discontinued and switched to Metoprolol 25 BID.  Pat probable has undiagnosed COPD from active smoking       Sugg  - Continue symbicort   - Continue Tamiflu for total 10 doses ( [facility] 4/9)   - Spiriva daily   - Advise to stop smoking and alcohol use  - Thiamine / folate   - Therapeutic anticoag for afib   - Out pat pulmo f/u   - D/C BiPaP  - Taper off o2 supp   - Nicotine patch   - Out pat pulmonary f/u with my office     spoke with wife at bedside

## 2022-04-10 NOTE — PROGRESS NOTE ADULT - SUBJECTIVE AND OBJECTIVE BOX
C A R D I O L O G Y  **********************************     DATE OF SERVICE: 04-10-22    Patient denies chest pain or shortness of breath.  more alert  Review of symptoms otherwise negative.    acetaminophen     Tablet .. 650 milliGRAM(s) Oral every 6 hours PRN  aspirin  chewable 81 milliGRAM(s) Oral daily  atorvastatin 40 milliGRAM(s) Oral at bedtime  budesonide 160 MICROgram(s)/formoterol 4.5 MICROgram(s) Inhaler 2 Puff(s) Inhalation two times a day  enoxaparin Injectable 75 milliGRAM(s) SubCutaneous every 12 hours  insulin glargine Injectable (LANTUS) 7 Unit(s) SubCutaneous at bedtime  insulin lispro (ADMELOG) corrective regimen sliding scale   SubCutaneous Before meals and at bedtime  melatonin 3 milliGRAM(s) Oral at bedtime PRN  metoprolol tartrate 25 milliGRAM(s) Oral two times a day  nicotine - 21 mG/24Hr(s) Patch 1 patch Transdermal daily  oseltamivir 75 milliGRAM(s) Oral two times a day  pantoprazole    Tablet 40 milliGRAM(s) Oral before breakfast  polyethylene glycol 3350 17 Gram(s) Oral daily  predniSONE   Tablet 40 milliGRAM(s) Oral daily  QUEtiapine 25 milliGRAM(s) Oral at bedtime  senna 1 Tablet(s) Oral daily PRN  tiotropium 18 MICROgram(s) Capsule 1 Capsule(s) Inhalation daily  tiotropium 18 MICROgram(s) Capsule 1 Capsule(s) Inhalation daily                            15.2   8.58  )-----------( 226      ( 10 Apr 2022 07:18 )             45.2       Hemoglobin: 15.2 g/dL (04-10 @ 07:18)  Hemoglobin: 14.7 g/dL (04-09 @ 07:02)  Hemoglobin: 14.1 g/dL (04-08 @ 05:20)  Hemoglobin: 13.8 g/dL (04-07 @ 03:25)  Hemoglobin: 14.2 g/dL (04-06 @ 05:35)      04-10    137  |  98  |  25<H>  ----------------------------<  92  3.3<L>   |  32<H>  |  0.76    Ca    9.2      10 Apr 2022 07:18  Phos  2.7     04-10  Mg     1.7     04-10    TPro  8.0  /  Alb  2.5<L>  /  TBili  0.5  /  DBili  x   /  AST  45<H>  /  ALT  57  /  AlkPhos  88  04-10    Creatinine Trend: 0.76<--, 0.69<--, 0.68<--, 0.82<--, 0.90<--, 1.08<--    COAGS:           T(C): 36.8 (04-10-22 @ 11:01), Max: 37.1 (04-09-22 @ 23:20)  HR: 94 (04-10-22 @ 11:01) (77 - 94)  BP: 97/79 (04-10-22 @ 11:01) (97/79 - 145/71)  RR: 17 (04-10-22 @ 11:01) (17 - 18)  SpO2: 95% (04-10-22 @ 11:01) (90% - 96%)  Wt(kg): --    I&O's Summary    09 Apr 2022 07:01  -  10 Apr 2022 07:00  --------------------------------------------------------  IN: 0 mL / OUT: 600 mL / NET: -600 mL          HEENT:  (-)icterus (-)pallor  CV: N S1 S2 1/6 FELICITA (+)2 Pulses B/l  Resp:  Clear to ausculatation B/L, normal effort  GI: (+) BS Soft, NT, ND  Lymph:  (-)Edema, (-)obvious lymphadenopathy  Skin: Warm to touch, Normal turgor  Psych: Appropriate mood and affect      TELEMETRY: 	  sinus, PAF        ASSESSMENT/PLAN: 	76y  Male PMHx of HTN, CAD, active smoker, who was sent to hospital by family members due to altered mental status and generalized weakness found with Influenza, PNA and severe AS.    - Does not appear to be in pulmonary edema  - Keep net Even, A low BNP suggests low filling pressures   - cont AC for new PAF  - Mental status/ delirium improving.  will need R+L cath and CTS eval once optimized      Casper Bush MD, [facility]  BEEPER [phone]

## 2022-04-10 NOTE — PROGRESS NOTE ADULT - NUTRITIONAL ASSESSMENT
This patient has been assessed with a concern for Malnutrition and has been determined to have a diagnosis/diagnoses of Moderate protein-calorie malnutrition.    This patient is being managed with:   Diet Minced and Moist-  Consistent Carbohydrate {Evening Snacks}  Entered: Apr 9 2022 12:40PM

## 2022-04-10 NOTE — PROGRESS NOTE ADULT - ASSESSMENT
76 year old male with PMHx of HTN, CAD, ?COPD- smoker, who presented with generalized weakness and confusion. Admitted to ICU for acute on chronic COPD exacerbation with hypercapnea due to Influenza. Downgraded to medicine floor.    #Acute hypoxic/hypercapnic respiratory failure  #COPD Exacerbation  #Influenza  #Pneumonia - completed abx course  #NSTEMI vs demand ischemia.  #New onset Afib  #HTN, CAD  - Continue Tamiflu.   - Prednisone taper.   - c/w full dose lovenox and metoprolol  - c/w ASA/statin  - continue metoprolol  - Not requiring BIPAP now  - f/u Pulmonary Dr. Kate. Needs Pulm f/u outpatient.   - f/u  Cardio Dr. Bush.  Possible ischemic work up.

## 2022-04-10 NOTE — PROGRESS NOTE ADULT - SUBJECTIVE AND OBJECTIVE BOX
[facility] Medicine  Patient is a 76y old  Male who presents with a chief complaint of Acute hypoxic hypercapnic respiratory failure (09 Apr 2022 17:00)      SUBJECTIVE / OVERNIGHT EVENTS:  No acute events over night. Denies any fevers/chills, headache, CP, SOB, abd pain, N/V/D, constipation, or leg swelling.       MEDICATIONS  (STANDING):  aspirin  chewable 81 milliGRAM(s) Oral daily  atorvastatin 40 milliGRAM(s) Oral at bedtime  budesonide 160 MICROgram(s)/formoterol 4.5 MICROgram(s) Inhaler 2 Puff(s) Inhalation two times a day  enoxaparin Injectable 75 milliGRAM(s) SubCutaneous every 12 hours  insulin glargine Injectable (LANTUS) 7 Unit(s) SubCutaneous at bedtime  insulin lispro (ADMELOG) corrective regimen sliding scale   SubCutaneous Before meals and at bedtime  metoprolol tartrate 25 milliGRAM(s) Oral two times a day  nicotine - 21 mG/24Hr(s) Patch 1 patch Transdermal daily  oseltamivir 75 milliGRAM(s) Oral two times a day  pantoprazole    Tablet 40 milliGRAM(s) Oral before breakfast  polyethylene glycol 3350 17 Gram(s) Oral daily  predniSONE   Tablet 40 milliGRAM(s) Oral daily  QUEtiapine 25 milliGRAM(s) Oral at bedtime  tiotropium 18 MICROgram(s) Capsule 1 Capsule(s) Inhalation daily  tiotropium 18 MICROgram(s) Capsule 1 Capsule(s) Inhalation daily    MEDICATIONS  (PRN):  acetaminophen     Tablet .. 650 milliGRAM(s) Oral every 6 hours PRN Temp greater or equal to 38C (100.4F), Mild Pain (1 - 3)  melatonin 3 milliGRAM(s) Oral at bedtime PRN Insomnia  senna 1 Tablet(s) Oral daily PRN Constipation          OBJECTIVE:  Vital Signs Last 24 Hrs  T(C): 36.8 (10 Apr 2022 11:01), Max: 37.1 (09 Apr 2022 23:20)  T(F): 98.3 (10 Apr 2022 11:01), Max: 98.8 (09 Apr 2022 23:20)  HR: 94 (10 Apr 2022 11:01) (77 - 94)  BP: 97/79 (10 Apr 2022 11:01) (97/79 - 145/71)  BP(mean): --  RR: 17 (10 Apr 2022 11:01) (17 - 18)  SpO2: 95% (10 Apr 2022 11:01) (90% - 96%)    PHYSICAL EXAMINATION:  GENERAL: NAD, appears uncomfortable  HEAD:  Atraumatic, Normocephalic  EYES:  conjunctiva and sclera clear  NECK: Supple, No JVD  CHEST/LUNG: fine crackles b/L + tender chest wall  HEART: Regular rate and rhythm; No murmurs, rubs, or gallops  ABDOMEN: Soft, + tender diffuse, Nondistended; Bowel sounds present  NERVOUS SYSTEM:  Alert & Oriented X3,    EXTREMITIES:  2+ Peripheral Pulses, No clubbing, cyanosis, or edema  SKIN: warm dry    CAPILLARY BLOOD GLUCOSE      POCT Blood Glucose.: 202 mg/dL (10 Apr 2022 11:37)  POCT Blood Glucose.: 136 mg/dL (10 Apr 2022 07:34)  POCT Blood Glucose.: 116 mg/dL (09 Apr 2022 21:19)  POCT Blood Glucose.: 118 mg/dL (09 Apr 2022 16:59)    I&O's Summary    09 Apr 2022 07:01  -  10 Apr 2022 07:00  --------------------------------------------------------  IN: 0 mL / OUT: 600 mL / NET: -600 mL              LABS:                        15.2   8.58  )-----------( 226      ( 10 Apr 2022 07:18 )             45.2     04-10    137  |  98  |  25<H>  ----------------------------<  92  3.3<L>   |  32<H>  |  0.76    Ca    9.2      10 Apr 2022 07:18  Phos  2.7     04-10  Mg     1.7     04-10    TPro  8.0  /  Alb  2.5<L>  /  TBili  0.5  /  DBili  x   /  AST  45<H>  /  ALT  57  /  AlkPhos  88  04-10                  RADIOLOGY & ADDITIONAL TESTS:       normal...

## 2022-04-10 NOTE — PROGRESS NOTE ADULT - SUBJECTIVE AND OBJECTIVE BOX
Time of Visit:  Patient seen and examined. pat is sitting in chair , comfortable . stated " its time to quit drinking "    MEDICATIONS  (STANDING):  aspirin  chewable 81 milliGRAM(s) Oral daily  atorvastatin 40 milliGRAM(s) Oral at bedtime  budesonide 160 MICROgram(s)/formoterol 4.5 MICROgram(s) Inhaler 2 Puff(s) Inhalation two times a day  enoxaparin Injectable 75 milliGRAM(s) SubCutaneous every 12 hours  insulin glargine Injectable (LANTUS) 7 Unit(s) SubCutaneous at bedtime  insulin lispro (ADMELOG) corrective regimen sliding scale   SubCutaneous Before meals and at bedtime  metoprolol tartrate 25 milliGRAM(s) Oral two times a day  nicotine - 21 mG/24Hr(s) Patch 1 patch Transdermal daily  oseltamivir 75 milliGRAM(s) Oral two times a day  pantoprazole    Tablet 40 milliGRAM(s) Oral before breakfast  polyethylene glycol 3350 17 Gram(s) Oral daily  predniSONE   Tablet 40 milliGRAM(s) Oral daily  QUEtiapine 25 milliGRAM(s) Oral at bedtime  tiotropium 18 MICROgram(s) Capsule 1 Capsule(s) Inhalation daily  tiotropium 18 MICROgram(s) Capsule 1 Capsule(s) Inhalation daily      MEDICATIONS  (PRN):  acetaminophen     Tablet .. 650 milliGRAM(s) Oral every 6 hours PRN Temp greater or equal to 38C (100.4F), Mild Pain (1 - 3)  melatonin 3 milliGRAM(s) Oral at bedtime PRN Insomnia  senna 1 Tablet(s) Oral daily PRN Constipation       Medications up to date at time of exam.      PHYSICAL EXAMINATION:  Patient has no new complaints.  GENERAL: The patient is a well-developed, well-nourished, in no apparent distress.     Vital Signs Last 24 Hrs  T(C): 36.8 (10 Apr 2022 15:08), Max: 37.1 (09 Apr 2022 23:20)  T(F): 98.3 (10 Apr 2022 15:08), Max: 98.8 (09 Apr 2022 23:20)  HR: 87 (10 Apr 2022 15:08) (77 - 94)  BP: 136/76 (10 Apr 2022 15:08) (97/79 - 145/71)  BP(mean): --  RR: 18 (10 Apr 2022 15:08) (17 - 18)  SpO2: 93% (10 Apr 2022 15:08) (93% - 96%)   (if applicable)    Chest Tube (if applicable)    HEENT: Head is normocephalic and atraumatic. Extraocular muscles are intact. Mucous membranes are moist.     NECK: Supple, no palpable adenopathy.    LUNGS: Clear to auscultation, no wheezing, rales, or rhonchi.    HEART: Regular rate and rhythm without murmur.    ABDOMEN: Soft, nontender, and nondistended.  No hepatosplenomegaly is noted.    EXTREMITIES: Without any cyanosis, clubbing, rash, lesions or edema.    NEUROLOGIC: Awake, alert, oriented, grossly intact    SKIN: Warm, dry, good turgor.      LABS:                        15.2   8.58  )-----------( 226      ( 10 Apr 2022 07:18 )             45.2     04-10    137  |  98  |  25<H>  ----------------------------<  92  3.3<L>   |  32<H>  |  0.76    Ca    9.2      10 Apr 2022 07:18  Phos  2.7     04-10  Mg     1.7     04-10    TPro  8.0  /  Alb  2.5<L>  /  TBili  0.5  /  DBili  x   /  AST  45<H>  /  ALT  57  /  AlkPhos  88  04-10                        MICROBIOLOGY: (if applicable)    RADIOLOGY & ADDITIONAL STUDIES:  EKG:   CXR:  ECHO:    IMPRESSION: 76y Male PAST MEDICAL & SURGICAL HISTORY:  HTN (hypertension)    Hyperlipemia    COPD (chronic obstructive pulmonary disease)    DM (diabetes mellitus)    Asthma    Status post spinal surgery  lumbar    Encounter for screening colonoscopy    H/O endoscopy     p/w           IMP: This is a  76 year old [place] man active smoker , drinks alcohol daily  with  HTN, CAD, who was sent to hospital by family members due to altered mental status and generalized weakness. . Patient was admitted to ICU due to Acute hypoxic/hypercapnic respiratory failure, requiring BIPAP use, most likely COPD exacerbated by influenza AH3. .   Echo revealed Moderate-severe mitral stenosis, severe aortic stenosis.  On 4/7 Patient had episode of SVT after duoneb. Pt was given adenosine 6 then 12, rhythm converted to Afib. Cardizem 20 given, then pt was placed on Cardizem gtt. Dr. Bush notified, patient also started on therapeutic  lovenox. Cardizem drip was discontinued and switched to Metoprolol 25 BID.  Pat probable has undiagnosed COPD from active smoking       Sugg  - Continue symbicort   - Continue Tamiflu for total 10 doses ( ending 4/9)   - Spiriva daily   - Advise to stop smoking and alcohol use  - Thiamine / folate   - Therapeutic anticoag for afib   - Out pat pulmo f/u   - D/C BiPaP  - Taper off o2 supp   - Nicotine patch   - Pat admitted to drinking alcohol and stated " its time to quit drinking "  - Out pat pulmonary f/u with my office

## 2022-04-11 NOTE — PROGRESS NOTE ADULT - SUBJECTIVE AND OBJECTIVE BOX
Time of Visit:  Patient seen and examined.     MEDICATIONS  (STANDING):  aspirin  chewable 81 milliGRAM(s) Oral daily  atorvastatin 40 milliGRAM(s) Oral at bedtime  budesonide 160 MICROgram(s)/formoterol 4.5 MICROgram(s) Inhaler 2 Puff(s) Inhalation two times a day  enoxaparin Injectable 75 milliGRAM(s) SubCutaneous every 12 hours  insulin glargine Injectable (LANTUS) 7 Unit(s) SubCutaneous at bedtime  insulin lispro (ADMELOG) corrective regimen sliding scale   SubCutaneous Before meals and at bedtime  metoprolol tartrate 25 milliGRAM(s) Oral two times a day  nicotine - 21 mG/24Hr(s) Patch 1 patch Transdermal daily  oseltamivir 75 milliGRAM(s) Oral two times a day  pantoprazole    Tablet 40 milliGRAM(s) Oral before breakfast  polyethylene glycol 3350 17 Gram(s) Oral daily  predniSONE   Tablet 40 milliGRAM(s) Oral daily  QUEtiapine 25 milliGRAM(s) Oral at bedtime  tiotropium 18 MICROgram(s) Capsule 1 Capsule(s) Inhalation daily  tiotropium 18 MICROgram(s) Capsule 1 Capsule(s) Inhalation daily      MEDICATIONS  (PRN):  acetaminophen     Tablet .. 650 milliGRAM(s) Oral every 6 hours PRN Temp greater or equal to 38C (100.4F), Mild Pain (1 - 3)  melatonin 3 milliGRAM(s) Oral at bedtime PRN Insomnia  senna 1 Tablet(s) Oral daily PRN Constipation       Medications up to date at time of exam.      PHYSICAL EXAMINATION:  Patient has no new complaints.  GENERAL: The patient is a well-developed, well-nourished, in no apparent distress.     Vital Signs Last 24 Hrs  T(C): 36.8 (11 Apr 2022 19:59), Max: 36.8 (11 Apr 2022 15:40)  T(F): 98.2 (11 Apr 2022 19:59), Max: 98.2 (11 Apr 2022 15:40)  HR: 84 (11 Apr 2022 19:59) (67 - 88)  BP: 105/60 (11 Apr 2022 19:59) (101/55 - 152/55)  BP(mean): --  RR: 19 (11 Apr 2022 19:59) (18 - 19)  SpO2: 92% (11 Apr 2022 19:59) (92% - 99%)   (if applicable)    Chest Tube (if applicable)    HEENT: Head is normocephalic and atraumatic. Extraocular muscles are intact. Mucous membranes are moist.     NECK: Supple, no palpable adenopathy.    LUNGS: Clear to auscultation, no wheezing, rales, or rhonchi.    HEART: Regular rate and rhythm without murmur.    ABDOMEN: Soft, nontender, and nondistended.  No hepatosplenomegaly is noted.    : No painful voiding, no pelvic pain    EXTREMITIES: Without any cyanosis, clubbing, rash, lesions or edema.    NEUROLOGIC: Awake, alert, oriented, grossly intact    SKIN: Warm, dry, good turgor.      LABS:                        15.8   8.35  )-----------( 280      ( 11 Apr 2022 06:44 )             48.0     04-11    140  |  99  |  33<H>  ----------------------------<  112<H>  3.5   |  33<H>  |  0.94    Ca    9.4      11 Apr 2022 06:44  Phos  3.0     04-11  Mg     2.0     04-11    TPro  7.9  /  Alb  2.7<L>  /  TBili  0.5  /  DBili  x   /  AST  48<H>  /  ALT  75<H>  /  AlkPhos  90  04-11                        MICROBIOLOGY: (if applicable)    RADIOLOGY & ADDITIONAL STUDIES:  EKG:   CXR:  ECHO:    IMPRESSION: 76y Male PAST MEDICAL & SURGICAL HISTORY:  HTN (hypertension)    Hyperlipemia    COPD (chronic obstructive pulmonary disease)    DM (diabetes mellitus)    Asthma    Status post spinal surgery  lumbar    Encounter for screening colonoscopy    H/O endoscopy     p/w         IMP: This is a  76 year old Indo-[place]ong[place]ian man active smoker , drinks alcohol daily  with  HTN, CAD, who was sent to hospital by family members due to altered mental status and generalized weakness. . Patient was admitted to ICU due to Acute hypoxic/hypercapnic respiratory failure, requiring BIPAP use, most likely COPD exacerbated by influenza AH3. .   Echo revealed Moderate-severe mitral stenosis, severe aortic stenosis.  On 4/7 Patient had episode of SVT after duoneb. Pt was given adenosine 6 then 12, rhythm converted to Afib. Cardizem 20 given, then pt was placed on Cardizem gtt. Dr. Bush notified, patient also started on therapeutic  lovenox. Cardizem drip was discontinued and switched to Metoprolol 25 BID.  Pat probable has undiagnosed COPD from active smoking       Sugg  - Continue symbicort   - Continue Tamiflu for total 10 doses ( ending 4/9)   - Spiriva daily   - Advise to stop smoking and alcohol use  - Thiamine / folate   - Therapeutic anticoag for afib   - Out pat pulmo f/u   - D/C BiPaP  - Taper off o2 supp   - Nicotine patch   - Pat admitted to drinking alcohol and stated " its time to quit drinking "  - Continue enhanced supervision   - Out pat pulmonary f/u with my office

## 2022-04-11 NOTE — PROGRESS NOTE ADULT - SUBJECTIVE AND OBJECTIVE BOX
C A R D I O L O G Y  **********************************     DATE OF SERVICE: 04-11-22    Patient denies chest pain or shortness of breath.   Agitated at times Review of symptoms otherwise negative.    acetaminophen     Tablet .. 650 milliGRAM(s) Oral every 6 hours PRN  aspirin  chewable 81 milliGRAM(s) Oral daily  atorvastatin 40 milliGRAM(s) Oral at bedtime  budesonide 160 MICROgram(s)/formoterol 4.5 MICROgram(s) Inhaler 2 Puff(s) Inhalation two times a day  enoxaparin Injectable 75 milliGRAM(s) SubCutaneous every 12 hours  insulin glargine Injectable (LANTUS) 7 Unit(s) SubCutaneous at bedtime  insulin lispro (ADMELOG) corrective regimen sliding scale   SubCutaneous Before meals and at bedtime  melatonin 3 milliGRAM(s) Oral at bedtime PRN  metoprolol tartrate 25 milliGRAM(s) Oral two times a day  nicotine - 21 mG/24Hr(s) Patch 1 patch Transdermal daily  oseltamivir 75 milliGRAM(s) Oral two times a day  pantoprazole    Tablet 40 milliGRAM(s) Oral before breakfast  polyethylene glycol 3350 17 Gram(s) Oral daily  predniSONE   Tablet 40 milliGRAM(s) Oral daily  QUEtiapine 25 milliGRAM(s) Oral at bedtime  senna 1 Tablet(s) Oral daily PRN  tiotropium 18 MICROgram(s) Capsule 1 Capsule(s) Inhalation daily  tiotropium 18 MICROgram(s) Capsule 1 Capsule(s) Inhalation daily                            15.8   8.35  )-----------( 280      ( 11 Apr 2022 06:44 )             48.0       Hemoglobin: 15.8 g/dL (04-11 @ 06:44)  Hemoglobin: 15.2 g/dL (04-10 @ 07:18)  Hemoglobin: 14.7 g/dL (04-09 @ 07:02)  Hemoglobin: 14.1 g/dL (04-08 @ 05:20)  Hemoglobin: 13.8 g/dL (04-07 @ 03:25)      04-11    140  |  99  |  33<H>  ----------------------------<  112<H>  3.5   |  33<H>  |  0.94    Ca    9.4      11 Apr 2022 06:44  Phos  3.0     04-11  Mg     2.0     04-11    TPro  7.9  /  Alb  2.7<L>  /  TBili  0.5  /  DBili  x   /  AST  48<H>  /  ALT  75<H>  /  AlkPhos  90  04-11    Creatinine Trend: 0.94<--, 0.76<--, 0.69<--, 0.68<--, 0.82<--, 0.90<--    COAGS:           T(C): 36.8 (04-11-22 @ 15:40), Max: 36.9 (04-10-22 @ 19:14)  HR: 88 (04-11-22 @ 15:40) (67 - 88)  BP: 122/57 (04-11-22 @ 15:40) (101/55 - 152/55)  RR: 19 (04-11-22 @ 15:40) (18 - 19)  SpO2: 99% (04-11-22 @ 15:40) (93% - 99%)  Wt(kg): --    I&O's Summary        HEENT:  (-)icterus (-)pallor  CV: N S1 S2 1/6 FELICITA (+)2 Pulses B/l  Resp:  Clear to ausculatation B/L, normal effort  GI: (+) BS Soft, NT, ND  Lymph:  (-)Edema, (-)obvious lymphadenopathy  Skin: Warm to touch, Normal turgor  Psych: Appropriate mood and affect      TELEMETRY: 	  sinus, PAT        ASSESSMENT/PLAN: 	76y  Male PMHx of HTN, CAD, active smoker, who was sent to hospital by family members due to altered mental status and generalized weakness found with Influenza, PNA and severe AS.    - Does not appear to be in pulmonary edema  - Keep net Even, A low BNP suggests low filling pressures   - cont AC for new PAF  - Monitor mental status  will need R+L cath and CTS eval once optimized      Casper Bush MD, [facility]  BEEPER [phone]

## 2022-04-11 NOTE — PROGRESS NOTE ADULT - ATTENDING COMMENTS
76 year old male with PMHx of HTN, CAD, ?COPD- smoker, who presented with generalized weakness and confusion. Admitted to ICU for acute on chronic COPD exacerbation with hypercapnea due to Influenza. No previous diagnosis of COPD. Downgraded to medicine floor.    #Acute hypoxic/hypercapnic respiratory failure  #COPD Exacerbation  #Influenza  #Pneumonia - completed abx course  #Toxic metabolic encephalopathy - delerium  #NSTEMI vs demand ischemia.  #New onset Afib  #HTN, CAD  - No wheezing/SOB at this time - Not requiring BIPAP or supplemental O2  - Continue Tamiflu.   - Prednisone taper - 1 more day  - c/w full dose lovenox and metoprolol  - c/w ASA/statin  - continue metoprolol  - f/u Pulmonary Dr. Kate. Needs Pulm f/u outpatient.   - f/u  Cardio Dr. Bush - potential cath

## 2022-04-11 NOTE — PROGRESS NOTE ADULT - PROBLEM SELECTOR PROBLEM 3
[facility] -  Case Progress Note    7/23/18  Client Name: Edgard Lundberg she/her/hers  YOB: 1995  MRN:  [id number]  Treating Provider: Yvette Myrick PsyD  Type of Session: Individual  Present in Session: client  Number of Minutes:  55  Treatment Plan Due: 3/14/2019    Current Symptoms/Status:  Distress in reaction to incongruence between birth assigned sex and gender identity.     Moderate anxiety and depression. Thoughts of death and dying, passive suicidal ideation (no plan or intention)    long-standing social struggles, hyperfocused interests beginning in childhood, accelerated reading ability and comprehension (splitter skills), and preference for fantasy based social interaction to face-to-face, and history of ADHD    Progress Toward Treatment Goals:   Reported increased social engagement. Began spironolactone June 2018.     Intervention & Response:  Last seen 4/24/18    Expressive and CBT approach to exploring gender and goals around gender identity. Explored client's current state using SUDS (0-10). Client noted tendency of base lining at a 3-5. Client rated current state at 7-8 with a range of 4-8 over the past week; where an 8 is reaction to medical bills inquired from a work injury. Discussed work dissatisfaction and potential change. Client appeared mildly motivated for change. Explored adjustment to spironolactone. Client noted positive reaction; noting decreased hair growth. Explored client's social support. Client recognized increase in socialization due to increased self assurance. Client noted feeling content in her social support. Further themes emerged of parents beginning to use affirmed name and pronouns; noting feeling hopeful. Discussed name change process. Researched fee waiver qualifications. Discussed need for plan of saving for name change. Themes emerged of client's struggles with executive function getting in the way as well as her mood.  Validated, reflected, and normalized client's thoughts and feelings throughout session. Client was open, responsive, and engaged throughout session.    Next session:     Important supports: Pilar Ulloa [place]    Assignment:  Explore jo ann group at [facility]    Interactive Complexity:  None.    Diagnosis:  302.85 Gender Dysphoria in Adult  296.32 Major Depressive Disorder, Recurrent, moderate  300.02 Generalized Anxiety Disorder  R/O Autism    Plan / Need for Future Services:  Return for therapy in 2 weeks.      Yvette Myrick PsyD   Paroxysmal SVT (supraventricular tachycardia)

## 2022-04-11 NOTE — PROGRESS NOTE ADULT - SUBJECTIVE AND OBJECTIVE BOX
PGY-1 Progress Note discussed with attending    PAGER #: [[phone]] TILL 5:00 PM  PLEASE CONTACT ON CALL TEAM:  - On Call Team (Please refer to Yahaira) FROM 5:00 PM - 8:30PM  - Nightfloat Team FROM 8:30 -7:30 AM    CHIEF COMPLAINT & BRIEF HOSPITAL COURSE:  ICU dg, hypercapenic respiratory failure s/p BIPAP   Methyl pred and predx1  SVT with duonebs: now avoided  now stable on spiriva and symbicort 2l nc NO Distress  was also on precedex and phenobarb for CIWA- now score of 1      INTERVAL HPI/OVERNIGHT EVENTS:   Chest pain  trop elevated and TWI overnight  Dr. Bush notified, trops trended down.     This AM c/o aches in the left shoulder, stomach pain, fatigue    MEDICATIONS  (STANDING):  aspirin  chewable 81 milliGRAM(s) Oral daily  atorvastatin 40 milliGRAM(s) Oral at bedtime  budesonide 160 MICROgram(s)/formoterol 4.5 MICROgram(s) Inhaler 2 Puff(s) Inhalation two times a day  enoxaparin Injectable 75 milliGRAM(s) SubCutaneous every 12 hours  insulin glargine Injectable (LANTUS) 7 Unit(s) SubCutaneous at bedtime  insulin lispro (ADMELOG) corrective regimen sliding scale   SubCutaneous Before meals and at bedtime  metoprolol tartrate 25 milliGRAM(s) Oral two times a day  nicotine - 21 mG/24Hr(s) Patch 1 patch Transdermal daily  oseltamivir 75 milliGRAM(s) Oral two times a day  pantoprazole    Tablet 40 milliGRAM(s) Oral before breakfast  polyethylene glycol 3350 17 Gram(s) Oral daily  predniSONE   Tablet 40 milliGRAM(s) Oral daily  QUEtiapine 25 milliGRAM(s) Oral at bedtime  tiotropium 18 MICROgram(s) Capsule 1 Capsule(s) Inhalation daily  tiotropium 18 MICROgram(s) Capsule 1 Capsule(s) Inhalation daily    MEDICATIONS  (PRN):  acetaminophen     Tablet .. 650 milliGRAM(s) Oral every 6 hours PRN Temp greater or equal to 38C (100.4F), Mild Pain (1 - 3)  melatonin 3 milliGRAM(s) Oral at bedtime PRN Insomnia  senna 1 Tablet(s) Oral daily PRN Constipation      REVIEW OF SYSTEMS:  CONSTITUTIONAL: No fever, weight loss, or fatigue  RESPIRATORY: No cough, wheezing, chills or hemoptysis; No shortness of breath  CARDIOVASCULAR: No chest pain, palpitations, dizziness, or leg swelling  GASTROINTESTINAL: No abdominal pain. No nausea, vomiting, or hematemesis; No diarrhea or constipation. No melena or hematochezia.  GENITOURINARY: No dysuria or hematuria, urinary frequency  NEUROLOGICAL: No headaches, memory loss, loss of strength, numbness, or tremors  SKIN: No itching, burning, rashes, or lesions     Vital Signs Last 24 Hrs  T(C): 36.8 (11 Apr 2022 15:40), Max: 36.9 (10 Apr 2022 19:14)  T(F): 98.2 (11 Apr 2022 15:40), Max: 98.4 (10 Apr 2022 19:14)  HR: 88 (11 Apr 2022 15:40) (67 - 88)  BP: 122/57 (11 Apr 2022 15:40) (101/55 - 152/55)  BP(mean): --  RR: 19 (11 Apr 2022 15:40) (18 - 19)  SpO2: 99% (11 Apr 2022 15:40) (93% - 99%)    PHYSICAL EXAMINATION:  GENERAL: NAD, confused, mild agitation  HEAD:  Atraumatic, Normocephalic  EYES:  conjunctiva and sclera clear  NECK: Supple, No JVD  CHEST/LUNG: fine crackles B/L  HEART: Regular rate and rhythm; No murmurs, rubs, or gallops  ABDOMEN: Soft, Nontender, Nondistended; Bowel sounds present  NERVOUS SYSTEM:  Alert & Oriented X2    EXTREMITIES:  2+ Peripheral Pulses, No clubbing, cyanosis, or edema  SKIN: warm dry                          15.8   8.35  )-----------( 280      ( 11 Apr 2022 06:44 )             48.0     04-11    140  |  99  |  33<H>  ----------------------------<  112<H>  3.5   |  33<H>  |  0.94    Ca    9.4      11 Apr 2022 06:44  Phos  3.0     04-11  Mg     2.0     04-11    TPro  7.9  /  Alb  2.7<L>  /  TBili  0.5  /  DBili  x   /  AST  48<H>  /  ALT  75<H>  /  AlkPhos  90  04-11    LIVER FUNCTIONS - ( 11 Apr 2022 06:44 )  Alb: 2.7 g/dL / Pro: 7.9 g/dL / ALK PHOS: 90 U/L / ALT: 75 U/L DA / AST: 48 U/L / GGT: x                   CAPILLARY BLOOD GLUCOSE      RADIOLOGY & ADDITIONAL TESTS:

## 2022-04-11 NOTE — PROGRESS NOTE ADULT - PROBLEM SELECTOR PLAN 4
Hx of of SVT after duoneb. in ICU s/p  adenosine 6 then 12,   rhythm converted to Afib.   now sinus s/p Cardizem 20 given, followed by drip  C/o chest pain after down grade, EKG showed new TWI and trops trended 120s   Recent echo has Moderate-severe mitral stenosis, severe aortic stenosis. normal EF no wall motion abnormalities  low suspicion for ACS, pain likely in the setting of COPD intermittent WOB,  and flu  Dr. Bush notified, Recommeded:  c/w FD lovenox.   c/w Metoprolol 25 BID.  c/w aspirin and statin and to trend trops  trops trended down  Will need a cath once stable either this admission or OP per Dr. Bush

## 2022-04-11 NOTE — PROGRESS NOTE ADULT - PROBLEM SELECTOR PLAN 5
- Patient able to tolerate BiPAP  - it was noted that patient became more agitated and started pulling off his mask in which he was given 0.5mg of haloperidol  family hx significant for alcohol abuse  patient was placed on CIWA protocol with precedex phenobarb in ICU  now calm and CIWA score 0  c/w to monitor  mild agitation, on 1:1   will dc and monitor

## 2022-04-11 NOTE — PROGRESS NOTE ADULT - PROBLEM SELECTOR PLAN 2
# Respiratory failure with hypercapnia, also has muscle pains  Flu tested positive- s/p Tamiflu initiation  at risk for Pneumonia, community acquired  - Started on ceftriaxone and azithromycin   - completed 3 days  -restarte Tamiflu for 10days total

## 2022-04-11 NOTE — PROGRESS NOTE ADULT - PROBLEM SELECTOR PLAN 3
On 4/7 Patient had episode of SVT after duoneb. Pt was given adenosine 6 then 12,   rhythm converted to Afib. Cardizem 20 given,   then pt was placed on Cardizem gtt.   Dr. Bush notified,   patient also started on FD lovenox.   Cardizem drip was discontinued and switched to Metoprolol 25 BID.  HR now controlled

## 2022-04-11 NOTE — PROGRESS NOTE ADULT - ASSESSMENT
Patient is a 76 year old male with PMHx of HTN, CAD, ?COPD- smoker, who presented with generalized weakness and confusion. Workup in the ED revealed possible bilateral pneumonia, COVID-19 negative. ICU consulted due to necessity of BiPAP use, s/p BIPAP and CIWA protocol with phenobarb, now dg stable on, currently on RA, with mild agitation, CIWA score of 0, aches and pains due to flu, on tamiflu, and no ACS so far ruled out

## 2022-04-12 NOTE — PROGRESS NOTE ADULT - SUBJECTIVE AND OBJECTIVE BOX
C A R D I O L O G Y  **********************************     DATE OF SERVICE: 04-12-22    Sleeping, not answering questions today,  confused AAOx1-2     acetaminophen     Tablet .. 650 milliGRAM(s) Oral every 6 hours PRN  aspirin  chewable 81 milliGRAM(s) Oral daily  atorvastatin 40 milliGRAM(s) Oral at bedtime  budesonide 160 MICROgram(s)/formoterol 4.5 MICROgram(s) Inhaler 2 Puff(s) Inhalation two times a day  enoxaparin Injectable 75 milliGRAM(s) SubCutaneous every 12 hours  folic acid 1 milliGRAM(s) Oral daily  insulin glargine Injectable (LANTUS) 7 Unit(s) SubCutaneous at bedtime  insulin lispro (ADMELOG) corrective regimen sliding scale   SubCutaneous Before meals and at bedtime  melatonin 3 milliGRAM(s) Oral at bedtime PRN  metoprolol tartrate 25 milliGRAM(s) Oral two times a day  nicotine - 21 mG/24Hr(s) Patch 1 patch Transdermal daily  oseltamivir 75 milliGRAM(s) Oral two times a day  pantoprazole    Tablet 40 milliGRAM(s) Oral before breakfast  polyethylene glycol 3350 17 Gram(s) Oral daily  potassium chloride    Tablet ER 40 milliEquivalent(s) Oral once  QUEtiapine 25 milliGRAM(s) Oral at bedtime  senna 1 Tablet(s) Oral daily PRN  thiamine 100 milliGRAM(s) Oral daily  tiotropium 18 MICROgram(s) Capsule 1 Capsule(s) Inhalation daily  tiotropium 18 MICROgram(s) Capsule 1 Capsule(s) Inhalation daily                            14.4   10.82 )-----------( 318      ( 12 Apr 2022 07:02 )             43.9       Hemoglobin: 14.4 g/dL (04-12 @ 07:02)  Hemoglobin: 15.8 g/dL (04-11 @ 06:44)  Hemoglobin: 15.2 g/dL (04-10 @ 07:18)  Hemoglobin: 14.7 g/dL (04-09 @ 07:02)  Hemoglobin: 14.1 g/dL (04-08 @ 05:20)      04-12    142  |  101  |  34<H>  ----------------------------<  110<H>  3.3<L>   |  34<H>  |  0.80    Ca    9.2      12 Apr 2022 07:02  Phos  3.2     04-12  Mg     2.0     04-12    TPro  8.0  /  Alb  2.8<L>  /  TBili  0.5  /  DBili  x   /  AST  59<H>  /  ALT  96<H>  /  AlkPhos  90  04-12    Creatinine Trend: 0.80<--, 0.94<--, 0.76<--, 0.69<--, 0.68<--, 0.82<--    COAGS:           T(C): 36.6 (04-12-22 @ 11:24), Max: 36.8 (04-11-22 @ 15:40)  HR: 73 (04-12-22 @ 11:24) (69 - 88)  BP: 116/51 (04-12-22 @ 11:24) (105/60 - 136/95)  RR: 18 (04-12-22 @ 11:24) (18 - 19)  SpO2: 100% (04-12-22 @ 11:24) (92% - 100%)  Wt(kg): --    I&O's Summary    11 Apr 2022 07:01  -  12 Apr 2022 07:00  --------------------------------------------------------  IN: 0 mL / OUT: 150 mL / NET: -150 mL      HEENT:  (-)icterus (-)pallor  CV: N S1 S2 1/6 FELICITA (+)2 Pulses B/l  Resp:  Clear to ausculatation B/L, normal effort  GI: (+) BS Soft, NT, ND  Lymph:  (-)Edema, (-)obvious lymphadenopathy  Skin: Warm to touch, Normal turgor  Psych: Appropriate mood and affect      TELEMETRY: 	  sinus, PAT        ASSESSMENT/PLAN: 	76y  Male PMHx of HTN, CAD, active smoker, who was sent to hospital by family members due to altered mental status and generalized weakness found with Influenza, PNA and severe AS.    - Does not appear to be in pulmonary edema  - Keep net Even, A low BNP suggests low filling pressures   - Pt with moderate to severe MS and severe AS dose coumadin INR 2-3  - Monitor mental status  will need R+L cath and CTS eval once optimized but it appears that he has dementia   - Currently not a candidate for ischemic eval, revascularization or TAVR  - Outpt f/u if his mental status improves  - COmplete TAMIFLU per medical team      Casper Bush MD, [facility]  BEEPER [phone]

## 2022-04-12 NOTE — PROGRESS NOTE ADULT - PROBLEM SELECTOR PLAN 4
On 4/7 Patient had episode of SVT after duoneb. Pt was given adenosine 6 then 12,   rhythm converted to Afib. Cardizem 20 given,   then pt was placed on Cardizem gtt.   Dr. Buhs notified,   patient also started on FD lovenox.   Cardizem drip was discontinued and switched to Metoprolol 25 BID.  HR now controlled

## 2022-04-12 NOTE — CHART NOTE - NSCHARTNOTEFT_GEN_A_CORE
Assessment:     Factors impacting intake: [ ] none [ ] nausea  [ ] vomiting [ ] diarrhea [ ] constipation  [ ]chewing problems [ ] swallowing issues  [ ] other:     Diet Presciption:   Intake:     Daily Weight in k (2022 04:32)  Weight in k (2022 04:15)  Weight in k (10 Apr 2022 04:58)  Weight in k.1 (2022 04:23)  Weight in k (2022 08:00)  Weight in k (2022 08:00)  Weight in k.9 (2022 08:00)    % Weight Change    Pertinent Medications: MEDICATIONS  (STANDING):  aspirin  chewable 81 milliGRAM(s) Oral daily  atorvastatin 40 milliGRAM(s) Oral at bedtime  budesonide 160 MICROgram(s)/formoterol 4.5 MICROgram(s) Inhaler 2 Puff(s) Inhalation two times a day  enoxaparin Injectable 75 milliGRAM(s) SubCutaneous every 12 hours  folic acid 1 milliGRAM(s) Oral daily  insulin glargine Injectable (LANTUS) 7 Unit(s) SubCutaneous at bedtime  insulin lispro (ADMELOG) corrective regimen sliding scale   SubCutaneous Before meals and at bedtime  metoprolol tartrate 25 milliGRAM(s) Oral two times a day  nicotine - 21 mG/24Hr(s) Patch 1 patch Transdermal daily  oseltamivir 75 milliGRAM(s) Oral two times a day  pantoprazole    Tablet 40 milliGRAM(s) Oral before breakfast  polyethylene glycol 3350 17 Gram(s) Oral daily  potassium chloride    Tablet ER 40 milliEquivalent(s) Oral once  QUEtiapine 25 milliGRAM(s) Oral at bedtime  thiamine 100 milliGRAM(s) Oral daily  tiotropium 18 MICROgram(s) Capsule 1 Capsule(s) Inhalation daily  tiotropium 18 MICROgram(s) Capsule 1 Capsule(s) Inhalation daily    MEDICATIONS  (PRN):  acetaminophen     Tablet .. 650 milliGRAM(s) Oral every 6 hours PRN Temp greater or equal to 38C (100.4F), Mild Pain (1 - 3)  melatonin 3 milliGRAM(s) Oral at bedtime PRN Insomnia  senna 1 Tablet(s) Oral daily PRN Constipation    Pertinent Labs:  Na142 mmol/L Glu 110 mg/dL<H> K+ 3.3 mmol/L<L> Cr  0.80 mg/dL BUN 34 mg/dL<H>  Phos 3.2 mg/dL  Alb 2.8 g/dL<L>  Chol 139 mg/dL LDL --    HDL 48 mg/dL Trig 79 mg/dL     CAPILLARY BLOOD GLUCOSE      POCT Blood Glucose.: 138 mg/dL (2022 11:52)  POCT Blood Glucose.: 137 mg/dL (2022 07:56)  POCT Blood Glucose.: 170 mg/dL (2022 20:54)  POCT Blood Glucose.: 115 mg/dL (2022 16:18)      Skin:     Estimated Needs:   [ ] no change since previous assessment  [ ] recalculated:     Previous Nutrition Diagnosis:   [ ] Inadequate Energy Intake [ ]Inadequate Oral Intake [ ] Excessive Energy Intake   [ ] Underweight [ ] Increased Nutrient Needs [ ] Overweight/Obesity  [ ] Swallowing Difficult   [ ] Altered GI Function [ ] Unintended Weight Loss [ ] Food & Nutrition Related Knowledge Deficit [ ] Malnutrition   [ ] Not Ready for Diet/Life Style Changes     Nutrition Diagnosis is [ ] ongoing  [ ] Improving   [ ] resolved [ ] not applicable     New Nutrition Diagnosis: [ ] not applicable       Interventions:   Recommend  [ ] Change Diet To:  [ ] Nutrition Supplement  [ ] Nutrition Support  [ ] Other:     Monitoring and Evaluation:   [ ] PO intake [ x ] Tolerance to diet prescription [ x ] weights [ x ] labs[ x ] follow up per protocol  [ ] other: Assessment:      Nutrition reassessment for follow-up. Chart reviewed, downgraded from ICU, pt visited, confused;     Factors impacting intake: [ X ]chewing problems [ X ] self-feeding difficulty  [ X ] other: acute on chronic comorbidities including AHRF, acute encephalopathy;    Diet Prescription: Minced and Moist, Consistent CHO    Intake: poor oral intake, 0 to 25% intake at times per flow sheet, observed lunch--<10% intake today     Daily Weight in k (2022 04:32)  Weight in k (2022 04:15)  Weight in k (10 Apr 2022 04:58)  Weight in k.1 (2022 04:23)  Weight in k (2022 08:00)  Weight in k (2022 08:00)  Weight in k.9 (2022 08:00)    % Weight Change: downward trend in house, may partly due to fluid shift, 1+ Generalized edema-->none per chart     Pertinent Medications: MEDICATIONS  (STANDING):  aspirin  chewable 81 milliGRAM(s) Oral daily  atorvastatin 40 milliGRAM(s) Oral at bedtime  budesonide 160 MICROgram(s)/formoterol 4.5 MICROgram(s) Inhaler 2 Puff(s) Inhalation two times a day  enoxaparin Injectable 75 milliGRAM(s) SubCutaneous every 12 hours  folic acid 1 milliGRAM(s) Oral daily  insulin glargine Injectable (LANTUS) 7 Unit(s) SubCutaneous at bedtime  insulin lispro (ADMELOG) corrective regimen sliding scale   SubCutaneous Before meals and at bedtime  metoprolol tartrate 25 milliGRAM(s) Oral two times a day  nicotine - 21 mG/24Hr(s) Patch 1 patch Transdermal daily  oseltamivir 75 milliGRAM(s) Oral two times a day  pantoprazole    Tablet 40 milliGRAM(s) Oral before breakfast  polyethylene glycol 3350 17 Gram(s) Oral daily  potassium chloride    Tablet ER 40 milliEquivalent(s) Oral once  QUEtiapine 25 milliGRAM(s) Oral at bedtime  thiamine 100 milliGRAM(s) Oral daily  tiotropium 18 MICROgram(s) Capsule 1 Capsule(s) Inhalation daily  tiotropium 18 MICROgram(s) Capsule 1 Capsule(s) Inhalation daily    MEDICATIONS  (PRN):  acetaminophen     Tablet .. 650 milliGRAM(s) Oral every 6 hours PRN Temp greater or equal to 38C (100.4F), Mild Pain (1 - 3)  melatonin 3 milliGRAM(s) Oral at bedtime PRN Insomnia  senna 1 Tablet(s) Oral daily PRN Constipation    Pertinent Labs:  Na142 mmol/L Glu 110 mg/dL<H> K+ 3.3 mmol/L<L> Cr  0.80 mg/dL BUN 34 mg/dL<H>  Phos 3.2 mg/dL  Alb 2.8 g/dL<L>  Chol 139 mg/dL LDL --    HDL 48 mg/dL Trig 79 mg/dL     CAPILLARY BLOOD GLUCOSE    POCT Blood Glucose.: 138 mg/dL (2022 11:52)  POCT Blood Glucose.: 137 mg/dL (2022 07:56)  POCT Blood Glucose.: 170 mg/dL (2022 20:54)  POCT Blood Glucose.: 115 mg/dL (2022 16:18)    Skin: skin intact     Estimated Needs:   [ X ] no change since previous assessment  [ ] recalculated:     Previous Nutrition Diagnosis:   [ X ] Malnutrition ( Moderate)     Nutrition Diagnosis is [ X  ongoing  [ ] Improving   [ ] resolved [ ] not applicable     New Nutrition Diagnosis: [ X ] not applicable       Interventions:   Recommend  [ X ] Change Diet To: Minced and Moist, DASH, Consistent CHO, Add Glucerna Shake 1can tid as medically feasible (660kcal, 30g protein)   [ ] Nutrition Supplement  [ ] Nutrition Support  [ X ] Other: May consider Swallow evaluation as medically feasible                    Nursing to continue feeding assistance and encouragement, aspiration precaution                    Discussed with RN     Monitoring and Evaluation:   [ X ] PO intake [ x ] Tolerance to diet prescription [ x ] weights [ x ] labs[ x ] follow up per protocol  [ ] other: Assessment:      Nutrition reassessment for follow-up. Chart reviewed, downgraded from ICU, pt visited, confused; medical conditions noted; Pending discharge planning to skilled nursing facility for rehab when medically ready per team     Factors impacting intake: [ X ]chewing problems [ X ] self-feeding difficulty  [ X ] other: acute on chronic comorbidities including AHRF, acute encephalopathy;    Diet Prescription: Minced and Moist, Consistent CHO    Intake: poor oral intake, 0 to 25% intake at times per flow sheet, observed lunch--<10% intake today     Daily Weight in k (2022 04:32)  Weight in k (2022 04:15)  Weight in k (10 Apr 2022 04:58)  Weight in k.1 (2022 04:23)  Weight in k (2022 08:00)  Weight in k (2022 08:00)  Weight in k.9 (2022 08:00)    % Weight Change: downward trend in house, may partly due to fluid shift, 1+ Generalized edema-->none per chart     Pertinent Medications: MEDICATIONS  (STANDING):  aspirin  chewable 81 milliGRAM(s) Oral daily  atorvastatin 40 milliGRAM(s) Oral at bedtime  budesonide 160 MICROgram(s)/formoterol 4.5 MICROgram(s) Inhaler 2 Puff(s) Inhalation two times a day  enoxaparin Injectable 75 milliGRAM(s) SubCutaneous every 12 hours  folic acid 1 milliGRAM(s) Oral daily  insulin glargine Injectable (LANTUS) 7 Unit(s) SubCutaneous at bedtime  insulin lispro (ADMELOG) corrective regimen sliding scale   SubCutaneous Before meals and at bedtime  metoprolol tartrate 25 milliGRAM(s) Oral two times a day  nicotine - 21 mG/24Hr(s) Patch 1 patch Transdermal daily  oseltamivir 75 milliGRAM(s) Oral two times a day  pantoprazole    Tablet 40 milliGRAM(s) Oral before breakfast  polyethylene glycol 3350 17 Gram(s) Oral daily  potassium chloride    Tablet ER 40 milliEquivalent(s) Oral once  QUEtiapine 25 milliGRAM(s) Oral at bedtime  thiamine 100 milliGRAM(s) Oral daily  tiotropium 18 MICROgram(s) Capsule 1 Capsule(s) Inhalation daily  tiotropium 18 MICROgram(s) Capsule 1 Capsule(s) Inhalation daily    MEDICATIONS  (PRN):  acetaminophen     Tablet .. 650 milliGRAM(s) Oral every 6 hours PRN Temp greater or equal to 38C (100.4F), Mild Pain (1 - 3)  melatonin 3 milliGRAM(s) Oral at bedtime PRN Insomnia  senna 1 Tablet(s) Oral daily PRN Constipation    Pertinent Labs:  Na142 mmol/L Glu 110 mg/dL<H> K+ 3.3 mmol/L<L> Cr  0.80 mg/dL BUN 34 mg/dL<H>  Phos 3.2 mg/dL  Alb 2.8 g/dL<L>  Chol 139 mg/dL LDL --    HDL 48 mg/dL Trig 79 mg/dL     CAPILLARY BLOOD GLUCOSE    POCT Blood Glucose.: 138 mg/dL (2022 11:52)  POCT Blood Glucose.: 137 mg/dL (2022 07:56)  POCT Blood Glucose.: 170 mg/dL (2022 20:54)  POCT Blood Glucose.: 115 mg/dL (2022 16:18)    Skin: skin intact     Estimated Needs:   [ X ] no change since previous assessment  [ ] recalculated:     Previous Nutrition Diagnosis:   [ X ] Malnutrition ( Moderate)     Nutrition Diagnosis is [ X  ongoing  [ ] Improving   [ ] resolved [ ] not applicable     New Nutrition Diagnosis: [ X ] not applicable       Interventions:   Recommend  [ X ] Change Diet To: Minced and Moist, DASH, Consistent CHO, Add Glucerna Shake 1can tid as medically feasible (660kcal, 30g protein)   [ ] Nutrition Supplement  [ ] Nutrition Support  [ X ] Other: May consider Swallow evaluation as medically feasible                    Nursing to continue feeding assistance and encouragement, aspiration precaution                    Discussed with RN     Monitoring and Evaluation:   [ X ] PO intake [ x ] Tolerance to diet prescription [ x ] weights [ x ] labs[ x ] follow up per protocol  [ ] other:

## 2022-04-12 NOTE — PROGRESS NOTE ADULT - ATTENDING COMMENTS
Patient was seen and examined at bedside   Denies any new complains     Vitals noted     P/E:  In mild respiratory distress   AAOx1-2, unable to follow commands   BL crepitations with wheezing   S1S2 WNL, +murmur   Abd soft, non tender, BS present   BL LE no edema or calf tenderness     Labs noted     A/P:  -Acute hypoxic respiratory resp failure: Cont NC as needed, CXR noted, does not look volume overloaded, will cont current COPD management   -COPD: cont spiriva, albuterol and symbicort, pulm consult appreciated  -Influenza: Tamiflu cont   -Valvular Afib: Lovenox bridging for Coumarin   -Severe AS: will need right and left heart cath with CTS consult when more stabilized   -Acute metabolic encephalopathy: possibly multifactorial due to dementia, alcohol induced, Avoid anticholinergics, benzodiazepines, limit lines/tubes/tethers/restraints, encourage frequent reorientation/reassurance by staff, encourage good sleep hygiene, adequate lighting  - Rest of the management as above

## 2022-04-12 NOTE — PROGRESS NOTE ADULT - PROBLEM SELECTOR PLAN 3
On 4/7 Patient had episode of SVT after duoneb. Pt was given adenosine 6 then 12,   rhythm converted to Afib. new onset  s/p cardizem now sinus  patient also started on FD lovenox.   Cardizem drip was discontinued and switched to Metoprolol 25 BID.  HR now controlled  for AC: Echo showed AS and MS (moderate to severe) needs coumadin  starting heparin and coumadin today  with goal of coumadin INR 2-3

## 2022-04-12 NOTE — PROGRESS NOTE ADULT - SUBJECTIVE AND OBJECTIVE BOX
Time of Visit:  Patient seen and examined.     MEDICATIONS  (STANDING):  aspirin  chewable 81 milliGRAM(s) Oral daily  atorvastatin 40 milliGRAM(s) Oral at bedtime  budesonide 160 MICROgram(s)/formoterol 4.5 MICROgram(s) Inhaler 2 Puff(s) Inhalation two times a day  enoxaparin Injectable 75 milliGRAM(s) SubCutaneous every 12 hours  folic acid 1 milliGRAM(s) Oral daily  insulin glargine Injectable (LANTUS) 7 Unit(s) SubCutaneous at bedtime  insulin lispro (ADMELOG) corrective regimen sliding scale   SubCutaneous Before meals and at bedtime  metoprolol tartrate 25 milliGRAM(s) Oral two times a day  nicotine - 21 mG/24Hr(s) Patch 1 patch Transdermal daily  oseltamivir 75 milliGRAM(s) Oral two times a day  pantoprazole    Tablet 40 milliGRAM(s) Oral before breakfast  polyethylene glycol 3350 17 Gram(s) Oral daily  QUEtiapine 25 milliGRAM(s) Oral at bedtime  thiamine 100 milliGRAM(s) Oral daily  tiotropium 18 MICROgram(s) Capsule 1 Capsule(s) Inhalation daily  tiotropium 18 MICROgram(s) Capsule 1 Capsule(s) Inhalation daily  warfarin 2 milliGRAM(s) Oral at bedtime      MEDICATIONS  (PRN):  acetaminophen     Tablet .. 650 milliGRAM(s) Oral every 6 hours PRN Temp greater or equal to 38C (100.4F), Mild Pain (1 - 3)  melatonin 3 milliGRAM(s) Oral at bedtime PRN Insomnia  senna 1 Tablet(s) Oral daily PRN Constipation       Medications up to date at time of exam.      PHYSICAL EXAMINATION:  Patient has no new complaints.  GENERAL: The patient is a well-developed, well-nourished, in no apparent distress.     Vital Signs Last 24 Hrs  T(C): 36.3 (12 Apr 2022 16:17), Max: 36.8 (11 Apr 2022 23:21)  T(F): 97.3 (12 Apr 2022 16:17), Max: 98.2 (11 Apr 2022 23:21)  HR: 86 (12 Apr 2022 16:17) (69 - 87)  BP: 120/49 (12 Apr 2022 16:17) (114/53 - 136/95)  BP(mean): --  RR: 18 (12 Apr 2022 16:17) (18 - 19)  SpO2: 86% (12 Apr 2022 16:17) (86% - 100%)   (if applicable)    Chest Tube (if applicable)    HEENT: Head is normocephalic and atraumatic. Extraocular muscles are intact. Mucous membranes are moist.     NECK: Supple, no palpable adenopathy.    LUNGS: Clear to auscultation, no wheezing, rales, or rhonchi.    HEART: Regular rate and rhythm without murmur.    ABDOMEN: Soft, nontender, and nondistended.  No hepatosplenomegaly is noted.    : No painful voiding, no pelvic pain    EXTREMITIES: Without any cyanosis, clubbing, rash, lesions or edema.    NEUROLOGIC: Awake, alert, oriented, grossly intact    SKIN: Warm, dry, good turgor.      LABS:                        14.4   10.82 )-----------( 318      ( 12 Apr 2022 07:02 )             43.9     04-12    142  |  101  |  34<H>  ----------------------------<  110<H>  3.3<L>   |  34<H>  |  0.80    Ca    9.2      12 Apr 2022 07:02  Phos  3.2     04-12  Mg     2.0     04-12    TPro  8.0  /  Alb  2.8<L>  /  TBili  0.5  /  DBili  x   /  AST  59<H>  /  ALT  96<H>  /  AlkPhos  90  04-12    PT/INR - ( 12 Apr 2022 15:25 )   PT: 11.9 sec;   INR: 1.00 ratio         PTT - ( 12 Apr 2022 15:25 )  PTT:39.6 sec                    MICROBIOLOGY: (if applicable)    RADIOLOGY & ADDITIONAL STUDIES:  EKG:   CXR:  ECHO:    IMPRESSION: 76y Male PAST MEDICAL & SURGICAL HISTORY:  HTN (hypertension)    Hyperlipemia    COPD (chronic obstructive pulmonary disease)    DM (diabetes mellitus)    Asthma    Status post spinal surgery  lumbar    Encounter for screening colonoscopy    H/O endoscopy     p/w           RECOMMENDATIONS:   Time of Visit:  Patient seen and examined.     MEDICATIONS  (STANDING):  aspirin  chewable 81 milliGRAM(s) Oral daily  atorvastatin 40 milliGRAM(s) Oral at bedtime  budesonide 160 MICROgram(s)/formoterol 4.5 MICROgram(s) Inhaler 2 Puff(s) Inhalation two times a day  enoxaparin Injectable 75 milliGRAM(s) SubCutaneous every 12 hours  folic acid 1 milliGRAM(s) Oral daily  insulin glargine Injectable (LANTUS) 7 Unit(s) SubCutaneous at bedtime  insulin lispro (ADMELOG) corrective regimen sliding scale   SubCutaneous Before meals and at bedtime  metoprolol tartrate 25 milliGRAM(s) Oral two times a day  nicotine - 21 mG/24Hr(s) Patch 1 patch Transdermal daily  oseltamivir 75 milliGRAM(s) Oral two times a day  pantoprazole    Tablet 40 milliGRAM(s) Oral before breakfast  polyethylene glycol 3350 17 Gram(s) Oral daily  QUEtiapine 25 milliGRAM(s) Oral at bedtime  thiamine 100 milliGRAM(s) Oral daily  tiotropium 18 MICROgram(s) Capsule 1 Capsule(s) Inhalation daily  tiotropium 18 MICROgram(s) Capsule 1 Capsule(s) Inhalation daily  warfarin 2 milliGRAM(s) Oral at bedtime      MEDICATIONS  (PRN):  acetaminophen     Tablet .. 650 milliGRAM(s) Oral every 6 hours PRN Temp greater or equal to 38C (100.4F), Mild Pain (1 - 3)  melatonin 3 milliGRAM(s) Oral at bedtime PRN Insomnia  senna 1 Tablet(s) Oral daily PRN Constipation       Medications up to date at time of exam.      PHYSICAL EXAMINATION:  Patient has no new complaints.  GENERAL: The patient is a well-developed, well-nourished, in no apparent distress.     Vital Signs Last 24 Hrs  T(C): 36.3 (12 Apr 2022 16:17), Max: 36.8 (11 Apr 2022 23:21)  T(F): 97.3 (12 Apr 2022 16:17), Max: 98.2 (11 Apr 2022 23:21)  HR: 86 (12 Apr 2022 16:17) (69 - 87)  BP: 120/49 (12 Apr 2022 16:17) (114/53 - 136/95)  BP(mean): --  RR: 18 (12 Apr 2022 16:17) (18 - 19)  SpO2: 86% (12 Apr 2022 16:17) (86% - 100%)   (if applicable)    Chest Tube (if applicable)    HEENT: Head is normocephalic and atraumatic. Extraocular muscles are intact. Mucous membranes are moist.     NECK: Supple, no palpable adenopathy.    LUNGS: Clear to auscultation, no wheezing, rales, or rhonchi.    HEART: Regular rate and rhythm without murmur.    ABDOMEN: Soft, nontender, and nondistended.  No hepatosplenomegaly is noted.    : No painful voiding, no pelvic pain    EXTREMITIES: Without any cyanosis, clubbing, rash, lesions or edema.    NEUROLOGIC: Awake, alert, oriented, grossly intact    SKIN: Warm, dry, good turgor.      LABS:                        14.4   10.82 )-----------( 318      ( 12 Apr 2022 07:02 )             43.9     04-12    142  |  101  |  34<H>  ----------------------------<  110<H>  3.3<L>   |  34<H>  |  0.80    Ca    9.2      12 Apr 2022 07:02  Phos  3.2     04-12  Mg     2.0     04-12    TPro  8.0  /  Alb  2.8<L>  /  TBili  0.5  /  DBili  x   /  AST  59<H>  /  ALT  96<H>  /  AlkPhos  90  04-12    PT/INR - ( 12 Apr 2022 15:25 )   PT: 11.9 sec;   INR: 1.00 ratio         PTT - ( 12 Apr 2022 15:25 )  PTT:39.6 sec                    MICROBIOLOGY: (if applicable)    RADIOLOGY & ADDITIONAL STUDIES:  EKG:   CXR:  ECHO:    IMPRESSION: 76y Male PAST MEDICAL & SURGICAL HISTORY:  HTN (hypertension)    Hyperlipemia    COPD (chronic obstructive pulmonary disease)    DM (diabetes mellitus)    Asthma    Status post spinal surgery  lumbar    Encounter for screening colonoscopy    H/O endoscopy     p/w           IMP: This is a  76 year old [place] man active smoker , drinks alcohol daily  with  HTN, CAD, who was sent to hospital by family members due to altered mental status and generalized weakness. . Patient was admitted to ICU due to Acute hypoxic/hypercapnic respiratory failure, requiring BIPAP use, most likely COPD exacerbated by influenza AH3. .   Echo revealed Moderate-severe mitral stenosis, severe aortic stenosis.  On 4/7 Patient had episode of SVT after duoneb. Pt was given adenosine 6 then 12, rhythm converted to Afib. Cardizem 20 given, then pt was placed on Cardizem gtt. Dr. Bush notified, patient also started on therapeutic  lovenox. Cardizem drip was discontinued and switched to Metoprolol 25 BID.  Pat probable has undiagnosed COPD from active smoking       Sugg  - Continue symbicort   - Continue Tamiflu for total 10 doses ( ending 4/9)   - Spiriva daily   - Advise to stop smoking and alcohol use  - Thiamine / folate   - Therapeutic anticoag for afib   - Out pat pulmo f/u   - D/C BiPaP  - Taper off o2 supp   - Nicotine patch   - Pat admitted to drinking alcohol and stated " its time to quit drinking "  - Continue enhanced supervision   - Out pat pulmonary f/u with my office

## 2022-04-12 NOTE — PROGRESS NOTE ADULT - NUTRITIONAL ASSESSMENT
This patient has been assessed with a concern for Malnutrition and has been determined to have a diagnosis/diagnoses of Moderate protein-calorie malnutrition.    This patient is being managed with:   Diet Minced and Moist-  Consistent Carbohydrate {No Snacks}  DASH/TLC {Sodium & Cholesterol Restricted}  Supplement Feeding Modality:  Oral  Glucerna Shake Cans or Servings Per Day:  1       Frequency:  Three Times a day  Entered: Apr 12 2022  1:38PM    Diet Minced and Moist-  Consistent Carbohydrate {Evening Snacks}  Entered: Apr 9 2022 12:40PM    The following pending diet order is being considered for treatment of Moderate protein-calorie malnutrition:null

## 2022-04-12 NOTE — PROGRESS NOTE ADULT - SUBJECTIVE AND OBJECTIVE BOX
PGY-1 Progress Note discussed with attending    PAGER #: [--------] TILL 5:00 PM  PLEASE CONTACT ON CALL TEAM:  - On Call Team (Please refer to Yahaira) FROM 5:00 PM - 8:30PM  - Nightfloat Team FROM 8:30 -7:30 AM    CHIEF COMPLAINT & BRIEF HOSPITAL COURSE:    INTERVAL HPI/OVERNIGHT EVENTS:   MEDICATIONS  (STANDING):  aspirin  chewable 81 milliGRAM(s) Oral daily  atorvastatin 40 milliGRAM(s) Oral at bedtime  budesonide 160 MICROgram(s)/formoterol 4.5 MICROgram(s) Inhaler 2 Puff(s) Inhalation two times a day  folic acid 1 milliGRAM(s) Oral daily  heparin  Infusion.  Unit(s)/Hr (13 mL/Hr) IV Continuous <Continuous>  insulin glargine Injectable (LANTUS) 7 Unit(s) SubCutaneous at bedtime  insulin lispro (ADMELOG) corrective regimen sliding scale   SubCutaneous Before meals and at bedtime  metoprolol tartrate 25 milliGRAM(s) Oral two times a day  nicotine - 21 mG/24Hr(s) Patch 1 patch Transdermal daily  oseltamivir 75 milliGRAM(s) Oral two times a day  pantoprazole    Tablet 40 milliGRAM(s) Oral before breakfast  polyethylene glycol 3350 17 Gram(s) Oral daily  QUEtiapine 25 milliGRAM(s) Oral at bedtime  thiamine 100 milliGRAM(s) Oral daily  tiotropium 18 MICROgram(s) Capsule 1 Capsule(s) Inhalation daily  tiotropium 18 MICROgram(s) Capsule 1 Capsule(s) Inhalation daily    MEDICATIONS  (PRN):  acetaminophen     Tablet .. 650 milliGRAM(s) Oral every 6 hours PRN Temp greater or equal to 38C (100.4F), Mild Pain (1 - 3)  melatonin 3 milliGRAM(s) Oral at bedtime PRN Insomnia  senna 1 Tablet(s) Oral daily PRN Constipation      REVIEW OF SYSTEMS:  CONSTITUTIONAL: No fever, weight loss, or fatigue  RESPIRATORY: No cough, wheezing, chills or hemoptysis; No shortness of breath  CARDIOVASCULAR: No chest pain, palpitations, dizziness, or leg swelling  GASTROINTESTINAL: No abdominal pain. No nausea, vomiting, or hematemesis; No diarrhea or constipation. No melena or hematochezia.  GENITOURINARY: No dysuria or hematuria, urinary frequency  NEUROLOGICAL: No headaches, memory loss, loss of strength, numbness, or tremors  SKIN: No itching, burning, rashes, or lesions     Vital Signs Last 24 Hrs  T(C): 36.6 (12 Apr 2022 11:24), Max: 36.8 (11 Apr 2022 15:40)  T(F): 97.8 (12 Apr 2022 11:24), Max: 98.2 (11 Apr 2022 15:40)  HR: 73 (12 Apr 2022 11:24) (69 - 88)  BP: 116/51 (12 Apr 2022 11:24) (105/60 - 136/95)  BP(mean): --  RR: 18 (12 Apr 2022 11:24) (18 - 19)  SpO2: 100% (12 Apr 2022 11:24) (92% - 100%)    PHYSICAL EXAMINATION:  GENERAL: NAD, well built  HEAD:  Atraumatic, Normocephalic  EYES:  conjunctiva and sclera clear  NECK: Supple, No JVD, Normal thyroid  CHEST/LUNG: Clear to auscultation. Clear to percussion bilaterally; No rales, rhonchi, wheezing, or rubs  HEART: Regular rate and rhythm; No murmurs, rubs, or gallops  ABDOMEN: Soft, Nontender, Nondistended; Bowel sounds present  NERVOUS SYSTEM:  Alert & Oriented X3,    EXTREMITIES:  2+ Peripheral Pulses, No clubbing, cyanosis, or edema  SKIN: warm dry                          14.4   10.82 )-----------( 318      ( 12 Apr 2022 07:02 )             43.9     04-12    142  |  101  |  34<H>  ----------------------------<  110<H>  3.3<L>   |  34<H>  |  0.80    Ca    9.2      12 Apr 2022 07:02  Phos  3.2     04-12  Mg     2.0     04-12    TPro  8.0  /  Alb  2.8<L>  /  TBili  0.5  /  DBili  x   /  AST  59<H>  /  ALT  96<H>  /  AlkPhos  90  04-12    LIVER FUNCTIONS - ( 12 Apr 2022 07:02 )  Alb: 2.8 g/dL / Pro: 8.0 g/dL / ALK PHOS: 90 U/L / ALT: 96 U/L DA / AST: 59 U/L / GGT: x                   CAPILLARY BLOOD GLUCOSE      RADIOLOGY & ADDITIONAL TESTS:                   PGY-1 Progress Note discussed with attending    PAGER #: [[phone]] TILL 5:00 PM  PLEASE CONTACT ON CALL TEAM:  - On Call Team (Please refer to Yahaira) FROM 5:00 PM - 8:30PM  - Nightfloat Team FROM 8:30 -7:30 AM    CHIEF COMPLAINT & BRIEF HOSPITAL COURSE:  ICU dg, hypercapenic respiratory failure s/p BIPAP   Methyl pred and predx1  SVT with duonebs: now avoided  now stable on spiriva and symbicort 2l nc NO Distress  was also on precedex and phenobarb for CIWA- now score of 1      INTERVAL HPI/OVERNIGHT EVENTS:   Chest pain  trop elevated and TWI overnight  Dr. Bush notified, trops trended down.     This AM c/o aches in the left shoulder, stomach pain, fatigue  c/w tamiflu  pending ischemic eval    MEDICATIONS  (STANDING):  aspirin  chewable 81 milliGRAM(s) Oral daily  atorvastatin 40 milliGRAM(s) Oral at bedtime  budesonide 160 MICROgram(s)/formoterol 4.5 MICROgram(s) Inhaler 2 Puff(s) Inhalation two times a day  folic acid 1 milliGRAM(s) Oral daily  heparin  Infusion.  Unit(s)/Hr (13 mL/Hr) IV Continuous <Continuous>  insulin glargine Injectable (LANTUS) 7 Unit(s) SubCutaneous at bedtime  insulin lispro (ADMELOG) corrective regimen sliding scale   SubCutaneous Before meals and at bedtime  metoprolol tartrate 25 milliGRAM(s) Oral two times a day  nicotine - 21 mG/24Hr(s) Patch 1 patch Transdermal daily  oseltamivir 75 milliGRAM(s) Oral two times a day  pantoprazole    Tablet 40 milliGRAM(s) Oral before breakfast  polyethylene glycol 3350 17 Gram(s) Oral daily  QUEtiapine 25 milliGRAM(s) Oral at bedtime  thiamine 100 milliGRAM(s) Oral daily  tiotropium 18 MICROgram(s) Capsule 1 Capsule(s) Inhalation daily  tiotropium 18 MICROgram(s) Capsule 1 Capsule(s) Inhalation daily    MEDICATIONS  (PRN):  acetaminophen     Tablet .. 650 milliGRAM(s) Oral every 6 hours PRN Temp greater or equal to 38C (100.4F), Mild Pain (1 - 3)  melatonin 3 milliGRAM(s) Oral at bedtime PRN Insomnia  senna 1 Tablet(s) Oral daily PRN Constipation      REVIEW OF SYSTEMS:  stomach pain and fatigue    Vital Signs Last 24 Hrs  T(C): 36.6 (12 Apr 2022 11:24), Max: 36.8 (11 Apr 2022 15:40)  T(F): 97.8 (12 Apr 2022 11:24), Max: 98.2 (11 Apr 2022 15:40)  HR: 73 (12 Apr 2022 11:24) (69 - 88)  BP: 116/51 (12 Apr 2022 11:24) (105/60 - 136/95)  BP(mean): --  RR: 18 (12 Apr 2022 11:24) (18 - 19)  SpO2: 100% (12 Apr 2022 11:24) (92% - 100%)    PHYSICAL EXAMINATION:  GENERAL: NAD, confused, mild agitation  HEAD:  Atraumatic, Normocephalic  EYES:  conjunctiva and sclera clear  NECK: Supple, No JVD  CHEST/LUNG: fine crackles B/L  HEART: Regular rate and rhythm; No murmurs, rubs, or gallops  ABDOMEN: Soft, Nontender, Nondistended; Bowel sounds present  NERVOUS SYSTEM:  Alert & Oriented X2    EXTREMITIES:  2+ Peripheral Pulses, No clubbing, cyanosis, or edema  SKIN: warm dry                            14.4   10.82 )-----------( 318      ( 12 Apr 2022 07:02 )             43.9     04-12    142  |  101  |  34<H>  ----------------------------<  110<H>  3.3<L>   |  34<H>  |  0.80    Ca    9.2      12 Apr 2022 07:02  Phos  3.2     04-12  Mg     2.0     04-12    TPro  8.0  /  Alb  2.8<L>  /  TBili  0.5  /  DBili  x   /  AST  59<H>  /  ALT  96<H>  /  AlkPhos  90  04-12    LIVER FUNCTIONS - ( 12 Apr 2022 07:02 )  Alb: 2.8 g/dL / Pro: 8.0 g/dL / ALK PHOS: 90 U/L / ALT: 96 U/L DA / AST: 59 U/L / GGT: x                   CAPILLARY BLOOD GLUCOSE      RADIOLOGY & ADDITIONAL TESTS:

## 2022-04-13 NOTE — PROGRESS NOTE ADULT - SUBJECTIVE AND OBJECTIVE BOX
Time of Visit:  Patient seen and examined. awake . 1;1 supervision     MEDICATIONS  (STANDING):  acetaminophen     Tablet .. 325 milliGRAM(s) Oral every 4 hours  aspirin  chewable 81 milliGRAM(s) Oral daily  atorvastatin 40 milliGRAM(s) Oral at bedtime  budesonide 160 MICROgram(s)/formoterol 4.5 MICROgram(s) Inhaler 2 Puff(s) Inhalation two times a day  enoxaparin Injectable 75 milliGRAM(s) SubCutaneous every 12 hours  folic acid 1 milliGRAM(s) Oral daily  insulin glargine Injectable (LANTUS) 7 Unit(s) SubCutaneous at bedtime  insulin lispro (ADMELOG) corrective regimen sliding scale   SubCutaneous Before meals and at bedtime  metoprolol tartrate 25 milliGRAM(s) Oral two times a day  nicotine - 21 mG/24Hr(s) Patch 1 patch Transdermal daily  oseltamivir 75 milliGRAM(s) Oral two times a day  pantoprazole    Tablet 40 milliGRAM(s) Oral before breakfast  polyethylene glycol 3350 17 Gram(s) Oral daily  QUEtiapine 25 milliGRAM(s) Oral at bedtime  thiamine 100 milliGRAM(s) Oral daily  tiotropium 18 MICROgram(s) Capsule 1 Capsule(s) Inhalation daily  tiotropium 18 MICROgram(s) Capsule 1 Capsule(s) Inhalation daily  warfarin 5 milliGRAM(s) Oral once      MEDICATIONS  (PRN):  melatonin 3 milliGRAM(s) Oral at bedtime PRN Insomnia  senna 1 Tablet(s) Oral daily PRN Constipation       Medications up to date at time of exam.      PHYSICAL EXAMINATION:  Patient has no new complaints.  GENERAL: The patient is a well-developed, well-nourished, in no apparent distress.     Vital Signs Last 24 Hrs  T(C): 36.8 (13 Apr 2022 20:07), Max: 36.8 (13 Apr 2022 15:53)  T(F): 98.2 (13 Apr 2022 20:07), Max: 98.2 (13 Apr 2022 15:53)  HR: 89 (13 Apr 2022 20:07) (76 - 89)  BP: 147/65 (13 Apr 2022 20:07) (116/48 - 147/65)  BP(mean): --  RR: 19 (13 Apr 2022 20:07) (18 - 19)  SpO2: 98% (13 Apr 2022 20:07) (87% - 98%)   (if applicable)    Chest Tube (if applicable)    HEENT: Head is normocephalic and atraumatic. Extraocular muscles are intact. Mucous membranes are moist.     NECK: Supple, no palpable adenopathy.    LUNGS: Clear to auscultation, no wheezing, rales, or rhonchi.    HEART: Regular rate and rhythm without murmur.    ABDOMEN: Soft, nontender, and nondistended.  No hepatosplenomegaly is noted.    : No painful voiding, no pelvic pain    EXTREMITIES: Without any cyanosis, clubbing, rash, lesions or edema.    NEUROLOGIC: Awake, alert, oriented, grossly intact    SKIN: Warm, dry, good turgor.      LABS:                        13.4   11.34 )-----------( 373      ( 13 Apr 2022 07:28 )             41.2     04-13    141  |  101  |  31<H>  ----------------------------<  105<H>  3.6   |  35<H>  |  0.89    Ca    9.4      13 Apr 2022 07:28  Phos  3.2     04-13  Mg     2.1     04-13    TPro  8.0  /  Alb  2.8<L>  /  TBili  0.4  /  DBili  x   /  AST  42<H>  /  ALT  87<H>  /  AlkPhos  90  04-13    PT/INR - ( 13 Apr 2022 07:28 )   PT: 12.9 sec;   INR: 1.08 ratio         PTT - ( 13 Apr 2022 07:28 )  PTT:37.4 sec                    MICROBIOLOGY: (if applicable)    RADIOLOGY & ADDITIONAL STUDIES:  EKG:   CXR: 4/12,, no pul edema or effusion or infiltrate   ECHO:    IMPRESSION: 76y Male PAST MEDICAL & SURGICAL HISTORY:  HTN (hypertension)    Hyperlipemia    COPD (chronic obstructive pulmonary disease)    DM (diabetes mellitus)    Asthma    Status post spinal surgery  lumbar    Encounter for screening colonoscopy    H/O endoscopy     p/w         IMP: IMP: This is a  76 year old [place] man active smoker , drinks alcohol daily  with  HTN, CAD, who was sent to hospital by family members due to altered mental status and generalized weakness. . Patient was admitted to ICU due to Acute hypoxic/hypercapnic respiratory failure, requiring BIPAP use, most likely COPD exacerbated by influenza AH3. .   Echo revealed Moderate-severe mitral stenosis, severe aortic stenosis.  On 4/7 Patient had episode of SVT after duoneb. Pt was given adenosine 6 then 12, rhythm converted to Afib. Cardizem 20 given, then pt was placed on Cardizem gtt. Dr. Bush notified, patient also started on therapeutic  lovenox. Cardizem drip was discontinued and switched to Metoprolol 25 BID.  Pat probable has undiagnosed COPD from active smoking       Sugg  - Cards f/u noted .. not a candidate for ischemic work up at this time   - Continue symbicort   - S/P course of  Tamiflu   - Spiriva daily   - Advise to stop smoking and alcohol use  - Thiamine / folate   - Therapeutic anticoag for afib   - Out pat pulmo f/u   - Nicotine patch   - Pat admitted to drinking alcohol and stated " its time to quit drinking "  - Continue enhanced supervision   - Out pat pulmonary f/u with my office

## 2022-04-13 NOTE — PROGRESS NOTE ADULT - PROBLEM SELECTOR PLAN 3
# Respiratory failure with hypercapnia, also has muscle pains  Flu tested positive- s/p Tamiflu initiation  at risk for Pneumonia, community acquired  - Started on ceftriaxone and azithromycin   - completed 3 days  -restarted Tamiflu for 10days total  Acetaminophen for body aches

## 2022-04-13 NOTE — PROGRESS NOTE ADULT - SUBJECTIVE AND OBJECTIVE BOX
C A R D I O L O G Y  **********************************     DATE OF SERVICE: 04-13-22    Awake, confused, removing tele leads offers no complaints     acetaminophen     Tablet .. 325 milliGRAM(s) Oral every 4 hours  aspirin  chewable 81 milliGRAM(s) Oral daily  atorvastatin 40 milliGRAM(s) Oral at bedtime  budesonide 160 MICROgram(s)/formoterol 4.5 MICROgram(s) Inhaler 2 Puff(s) Inhalation two times a day  enoxaparin Injectable 75 milliGRAM(s) SubCutaneous every 12 hours  folic acid 1 milliGRAM(s) Oral daily  insulin glargine Injectable (LANTUS) 7 Unit(s) SubCutaneous at bedtime  insulin lispro (ADMELOG) corrective regimen sliding scale   SubCutaneous Before meals and at bedtime  melatonin 3 milliGRAM(s) Oral at bedtime PRN  metoprolol tartrate 25 milliGRAM(s) Oral two times a day  nicotine - 21 mG/24Hr(s) Patch 1 patch Transdermal daily  oseltamivir 75 milliGRAM(s) Oral two times a day  pantoprazole    Tablet 40 milliGRAM(s) Oral before breakfast  polyethylene glycol 3350 17 Gram(s) Oral daily  QUEtiapine 25 milliGRAM(s) Oral at bedtime  senna 1 Tablet(s) Oral daily PRN  thiamine 100 milliGRAM(s) Oral daily  tiotropium 18 MICROgram(s) Capsule 1 Capsule(s) Inhalation daily  tiotropium 18 MICROgram(s) Capsule 1 Capsule(s) Inhalation daily  warfarin 5 milliGRAM(s) Oral once                            13.4   11.34 )-----------( 373      ( 13 Apr 2022 07:28 )             41.2       Hemoglobin: 13.4 g/dL (04-13 @ 07:28)  Hemoglobin: 14.4 g/dL (04-12 @ 07:02)  Hemoglobin: 15.8 g/dL (04-11 @ 06:44)  Hemoglobin: 15.2 g/dL (04-10 @ 07:18)  Hemoglobin: 14.7 g/dL (04-09 @ 07:02)      04-13    141  |  101  |  31<H>  ----------------------------<  105<H>  3.6   |  35<H>  |  0.89    Ca    9.4      13 Apr 2022 07:28  Phos  3.2     04-13  Mg     2.1     04-13    TPro  8.0  /  Alb  2.8<L>  /  TBili  0.4  /  DBili  x   /  AST  42<H>  /  ALT  87<H>  /  AlkPhos  90  04-13    Creatinine Trend: 0.89<--, 0.80<--, 0.94<--, 0.76<--, 0.69<--, 0.68<--    COAGS: PT/INR - ( 13 Apr 2022 07:28 )   PT: 12.9 sec;   INR: 1.08 ratio         PTT - ( 13 Apr 2022 07:28 )  PTT:37.4 sec          T(C): 36.4 (04-13-22 @ 11:30), Max: 36.5 (04-13-22 @ 04:54)  HR: 83 (04-13-22 @ 11:30) (76 - 87)  BP: 121/52 (04-13-22 @ 11:30) (116/48 - 132/48)  RR: 18 (04-13-22 @ 11:30) (18 - 19)  SpO2: 91% (04-13-22 @ 11:30) (86% - 97%)  Wt(kg): --    I&O's Summary    12 Apr 2022 07:01  -  13 Apr 2022 07:00  --------------------------------------------------------  IN: 200 mL / OUT: 0 mL / NET: 200 mL        HEENT:  (-)icterus (-)pallor  CV: N S1 S2 1/6 FELICITA (+)2 Pulses B/l  Resp:  Clear to ausculatation B/L, normal effort  GI: (+) BS Soft, NT, ND  Lymph:  (-)Edema, (-)obvious lymphadenopathy  Skin: Warm to touch, Normal turgor  Psych: Appropriate mood and affect      TELEMETRY: 	  sinus, APC        ASSESSMENT/PLAN: 	76y  Male PMHx of HTN, CAD, active smoker, who was sent to hospital by family members due to altered mental status and generalized weakness found with Influenza, PNA and severe AS.    - Does not appear to be in pulmonary edema and has not been in pulmomary edema  - Keep net Even, A low BNP suggests low filling pressures   - Pt with moderate to severe MS and severe AS dose coumadin INR 2-3  - Monitor mental status  will need R+L cath and CTS eval once optimized but it appears that he has dementia.  If his mental status improves and he is cooperative will plan for outpt CTS referral   - Currently not a candidate for ischemic eval, revascularization or TAVR  - Complete TAMIFLU per medical team      Casper Bush MD, [facility]  BEEPER [phone]

## 2022-04-13 NOTE — PROGRESS NOTE ADULT - NUTRITIONAL ASSESSMENT
This patient has been assessed with a concern for Malnutrition and has been determined to have a diagnosis/diagnoses of Moderate protein-calorie malnutrition.    This patient is being managed with:   Diet Minced and Moist-  Consistent Carbohydrate {No Snacks}  DASH/TLC {Sodium & Cholesterol Restricted}  Supplement Feeding Modality:  Oral  Glucerna Shake Cans or Servings Per Day:  1       Frequency:  Three Times a day  Entered: Apr 12 2022  1:38PM

## 2022-04-13 NOTE — PROGRESS NOTE ADULT - SUBJECTIVE AND OBJECTIVE BOX
PGY-1 Progress Note discussed with attending    PAGER #: [--------] TILL 5:00 PM  PLEASE CONTACT ON CALL TEAM:  - On Call Team (Please refer to Yahaira) FROM 5:00 PM - 8:30PM  - Nightfloat Team FROM 8:30 -7:30 AM    CHIEF COMPLAINT & BRIEF HOSPITAL COURSE:  ICU dg, hypercapenic respiratory failure s/p BIPAP   Methyl pred and predx1  SVT with duonebs: now avoided  now stable on spiriva and symbicort 2l nc NO Distress  was also on precedex and phenobarb for CIWA- now score of 1      INTERVAL HPI/OVERNIGHT EVENTS:   Chest pain  trop elevated and TWI weekend  Dr. Bush notified, trops trended down.   Moderate to severe MS, and severe AS on echo    This AM c/o aches in the left shoulder, stomach pain, fatigue  c/w tamiflu  pending ischemic eval, starting coumadin for valv A-fib  now AMS not fully resolving    MEDICATIONS  (STANDING):  acetaminophen     Tablet .. 325 milliGRAM(s) Oral every 4 hours  aspirin  chewable 81 milliGRAM(s) Oral daily  atorvastatin 40 milliGRAM(s) Oral at bedtime  budesonide 160 MICROgram(s)/formoterol 4.5 MICROgram(s) Inhaler 2 Puff(s) Inhalation two times a day  enoxaparin Injectable 75 milliGRAM(s) SubCutaneous every 12 hours  folic acid 1 milliGRAM(s) Oral daily  insulin glargine Injectable (LANTUS) 7 Unit(s) SubCutaneous at bedtime  insulin lispro (ADMELOG) corrective regimen sliding scale   SubCutaneous Before meals and at bedtime  metoprolol tartrate 25 milliGRAM(s) Oral two times a day  nicotine - 21 mG/24Hr(s) Patch 1 patch Transdermal daily  oseltamivir 75 milliGRAM(s) Oral two times a day  pantoprazole    Tablet 40 milliGRAM(s) Oral before breakfast  polyethylene glycol 3350 17 Gram(s) Oral daily  QUEtiapine 25 milliGRAM(s) Oral at bedtime  thiamine 100 milliGRAM(s) Oral daily  tiotropium 18 MICROgram(s) Capsule 1 Capsule(s) Inhalation daily  tiotropium 18 MICROgram(s) Capsule 1 Capsule(s) Inhalation daily  warfarin 5 milliGRAM(s) Oral once    MEDICATIONS  (PRN):  melatonin 3 milliGRAM(s) Oral at bedtime PRN Insomnia  senna 1 Tablet(s) Oral daily PRN Constipation      REVIEW OF SYSTEMS:  CONSTITUTIONAL: No fever, weight loss, or fatigue  RESPIRATORY: No cough, wheezing, chills or hemoptysis; No shortness of breath  CARDIOVASCULAR: No chest pain, palpitations, dizziness, or leg swelling  GASTROINTESTINAL: No abdominal pain. No nausea, vomiting, or hematemesis; No diarrhea or constipation. No melena or hematochezia.  GENITOURINARY: No dysuria or hematuria, urinary frequency  NEUROLOGICAL: No headaches, memory loss, loss of strength, numbness, or tremors  SKIN: No itching, burning, rashes, or lesions     Vital Signs Last 24 Hrs  T(C): 36.4 (13 Apr 2022 11:30), Max: 36.5 (13 Apr 2022 04:54)  T(F): 97.6 (13 Apr 2022 11:30), Max: 97.7 (13 Apr 2022 04:54)  HR: 83 (13 Apr 2022 11:30) (76 - 87)  BP: 121/52 (13 Apr 2022 11:30) (116/48 - 132/48)  BP(mean): --  RR: 18 (13 Apr 2022 11:30) (18 - 19)  SpO2: 91% (13 Apr 2022 11:30) (86% - 97%)    PHYSICAL EXAMINATION:  GENERAL: NAD, confused, mild agitation  HEAD:  Atraumatic, Normocephalic  EYES:  conjunctiva and sclera clear  NECK: Supple, No JVD  CHEST/LUNG: fine crackles B/L  HEART: Regular rate and rhythm; No murmurs, rubs, or gallops  ABDOMEN: Soft, Nontender, Nondistended; Bowel sounds present  NERVOUS SYSTEM:  Alert & Oriented X2    EXTREMITIES:  2+ Peripheral Pulses, No clubbing, cyanosis, or edema  SKIN: warm dry                          13.4   11.34 )-----------( 373      ( 13 Apr 2022 07:28 )             41.2     04-13    141  |  101  |  31<H>  ----------------------------<  105<H>  3.6   |  35<H>  |  0.89    Ca    9.4      13 Apr 2022 07:28  Phos  3.2     04-13  Mg     2.1     04-13    TPro  8.0  /  Alb  2.8<L>  /  TBili  0.4  /  DBili  x   /  AST  42<H>  /  ALT  87<H>  /  AlkPhos  90  04-13    LIVER FUNCTIONS - ( 13 Apr 2022 07:28 )  Alb: 2.8 g/dL / Pro: 8.0 g/dL / ALK PHOS: 90 U/L / ALT: 87 U/L DA / AST: 42 U/L / GGT: x               PT/INR - ( 13 Apr 2022 07:28 )   PT: 12.9 sec;   INR: 1.08 ratio         PTT - ( 13 Apr 2022 07:28 )  PTT:37.4 sec    CAPILLARY BLOOD GLUCOSE      RADIOLOGY & ADDITIONAL TESTS:

## 2022-04-13 NOTE — PROGRESS NOTE ADULT - ATTENDING COMMENTS
Patient was seen and examined at bedside   Mildly agitated this morning, later when family member was at bedside was more oriented, following commands even participating in conversation about his care   Complaining of generalized body ache   Not in distress     Vitals noted     P/E:  In NO respiratory distress   AAOx1-2, follows commands, no focal deficit   BL crepitations with wheezing improved from yesterday  S1S2 WNL, +murmur   Abd soft, non tender, BS present   BL LE no edema or calf tenderness     Labs noted     A/P:  -Acute hypoxic respiratory resp failure: Cont NC as needed, titrate down as tolerated, will cont current COPD management   -COPD: cont Spiriva, albuterol and Symbicort, pulm consult appreciated  -Influenza: Tamiflu cont for total 10days  -Valvular Afib: Lovenox bridging for Coumarin, 5mg tonight, cont Metoprolol  -Severe AS: will need right and left heart cath, when mental status more stabilized   -Acute metabolic encephalopathy: possibly multifactorial due to dementia, alcohol induced encephalopathy and hospital acquired delirium due to hospital stay, improved today. Avoid anticholinergics, benzodiazepines, limit lines/tubes/tethers/restraints, encourage frequent reorientation/reassurance by staff, encourage good sleep hygiene, adequate lighting. Cont Seroquel at night. Cont Thiamine and folic acid, cont Melatonin  - Start Tylenol standing for body ache   - Rest of the management as above

## 2022-04-14 NOTE — PROGRESS NOTE ADULT - ATTENDING COMMENTS
Patient was seen and examined at bedside   Not in distress. wants to go home     Vitals noted     P/E:  In NO respiratory distress   AAOx1-2, follows commands, no focal deficit   BL crepitations with wheezing improved from yesterday  S1S2 WNL, +murmur   Abd soft, non tender, BS present   BL LE no edema or calf tenderness     Labs noted     A/P:  -Acute hypoxic respiratory resp failure: Cont NC as needed, titrate down as tolerated, will cont current COPD management   -COPD: cont Spiriva, albuterol and Symbicort, pulm consult appreciated  -Influenza: Tamiflu cont for total 10days  -Valvular Afib: Lovenox bridging for Coumarin, 5mg tonight, cont Metoprolol  -Severe AS: will need right and left heart cath, when mental status more stabilized   -Acute metabolic encephalopathy: possibly multifactorial due to dementia, alcohol induced encephalopathy and hospital acquired delirium due to hospital stay, improved today. Avoid anticholinergics, benzodiazepines, limit lines/tubes/tethers/restraints, encourage frequent reorientation/reassurance by staff, encourage good sleep hygiene, adequate lighting. Cont Seroquel at night. Cont Thiamine and folic acid, cont Melatonin  - Tylenol standing for body ache   - Rest of the management as above

## 2022-04-14 NOTE — PROGRESS NOTE ADULT - PROBLEM SELECTOR PLAN 1
On 4/7 Patient had episode of SVT after duoneb. Pt was given adenosine 6 then 12,   rhythm converted to Afib. new onset  s/p cardizem now sinus  patient also started on FD lovenox.   Cardizem drip was discontinued and switched to Metoprolol 25 BID.  HR now controlled  for AC: Echo showed AS and MS (moderate to severe) needs coumadin  started lovenox and coumadin   with goal of coumadin INR 2-3

## 2022-04-14 NOTE — PROGRESS NOTE ADULT - SUBJECTIVE AND OBJECTIVE BOX
C A R D I O L O G Y  **********************************     DATE OF SERVICE: 04-14-22    Confused, agitated, not answering questions     acetaminophen     Tablet .. 325 milliGRAM(s) Oral every 4 hours  aspirin  chewable 81 milliGRAM(s) Oral daily  atorvastatin 40 milliGRAM(s) Oral at bedtime  budesonide 160 MICROgram(s)/formoterol 4.5 MICROgram(s) Inhaler 2 Puff(s) Inhalation two times a day  enoxaparin Injectable 75 milliGRAM(s) SubCutaneous every 12 hours  folic acid 1 milliGRAM(s) Oral daily  insulin glargine Injectable (LANTUS) 7 Unit(s) SubCutaneous at bedtime  insulin lispro (ADMELOG) corrective regimen sliding scale   SubCutaneous Before meals and at bedtime  melatonin 3 milliGRAM(s) Oral at bedtime PRN  metoprolol tartrate 25 milliGRAM(s) Oral two times a day  nicotine - 21 mG/24Hr(s) Patch 1 patch Transdermal daily  oseltamivir 75 milliGRAM(s) Oral two times a day  pantoprazole    Tablet 40 milliGRAM(s) Oral before breakfast  polyethylene glycol 3350 17 Gram(s) Oral daily  QUEtiapine 25 milliGRAM(s) Oral at bedtime  senna 1 Tablet(s) Oral daily PRN  thiamine 100 milliGRAM(s) Oral daily  tiotropium 18 MICROgram(s) Capsule 1 Capsule(s) Inhalation daily  tiotropium 18 MICROgram(s) Capsule 1 Capsule(s) Inhalation daily                            14.0   13.36 )-----------( 408      ( 14 Apr 2022 07:41 )             42.9       Hemoglobin: 14.0 g/dL (04-14 @ 07:41)  Hemoglobin: 13.4 g/dL (04-13 @ 07:28)  Hemoglobin: 14.4 g/dL (04-12 @ 07:02)  Hemoglobin: 15.8 g/dL (04-11 @ 06:44)  Hemoglobin: 15.2 g/dL (04-10 @ 07:18)      04-14    140  |  103  |  26<H>  ----------------------------<  85  3.5   |  33<H>  |  0.73    Ca    9.1      14 Apr 2022 07:41  Phos  3.2     04-14  Mg     1.9     04-14    TPro  8.2  /  Alb  2.5<L>  /  TBili  0.4  /  DBili  x   /  AST  35  /  ALT  79<H>  /  AlkPhos  93  04-14    Creatinine Trend: 0.73<--, 0.89<--, 0.80<--, 0.94<--, 0.76<--, 0.69<--    COAGS: PT/INR - ( 14 Apr 2022 07:41 )   PT: 14.2 sec;   INR: 1.19 ratio         PTT - ( 14 Apr 2022 07:41 )  PTT:43.8 sec          T(C): 36.7 (04-14-22 @ 10:52), Max: 36.8 (04-13-22 @ 15:53)  HR: 80 (04-14-22 @ 10:52) (79 - 91)  BP: 126/99 (04-14-22 @ 10:52) (107/72 - 147/65)  RR: 18 (04-14-22 @ 10:52) (18 - 19)  SpO2: 97% (04-14-22 @ 10:52) (87% - 98%)  Wt(kg): --    I&O's Summary        HEENT:  (-)icterus (-)pallor  CV: N S1 S2 1/6 FELICITA (+)2 Pulses B/l  Resp:  Clear to ausculatation B/L, normal effort  GI: (+) BS Soft, NT, ND  Lymph:  (-)Edema, (-)obvious lymphadenopathy  Skin: Warm to touch, Normal turgor  Psych: Appropriate mood and affect      TELEMETRY: 	  sinus, APC        ASSESSMENT/PLAN: 	76y  Male PMHx of HTN, CAD, active smoker, who was sent to hospital by family members due to altered mental status and generalized weakness found with Influenza, PNA and severe AS.    - Does not appear to be in pulmonary edema and has not been in pulmomary edema  - Keep net Even, A low BNP suggests low filling pressures   - Pt with moderate to severe MS and severe AS dose coumadin INR 2-3  - Monitor mental status  will need R+L cath and CTS eval once optimized but it appears that he has dementia.  If his mental status improves and he is cooperative will plan for outpt CTS referral   - Currently not a candidate for ischemic eval, revascularization or TAVR  - Complete TAMIFLU per medical team      Casper Bush MD, [facility]  BEEPER [phone]

## 2022-04-14 NOTE — PROGRESS NOTE ADULT - SUBJECTIVE AND OBJECTIVE BOX
PGY-1 Progress Note discussed with attending    PAGER #: [[phone]] TILL 5:00 PM  PLEASE CONTACT ON CALL TEAM:  - On Call Team (Please refer to Yahaira) FROM 5:00 PM - 8:30PM  - Nightfloat Team FROM 8:30 -7:30 AM    CHIEF COMPLAINT & BRIEF HOSPITAL COURSE:  ICU dg, hypercapenic respiratory failure s/p BIPAP   Methyl pred and predx1  SVT with duonebs: now avoided  now stable on spiriva and symbicort 2l nc NO Distress  was also on precedex and phenobarb for CIWA- now score of 1      INTERVAL HPI/OVERNIGHT EVENTS:   Chest pain  trop elevated and TWI weekend  Dr. Bush notified, trops trended down.   Moderate to severe MS, and severe AS on echo    This AM c/o aches in the left shoulder, stomach pain, fatigue  c/w tamiflu  pending ischemic eval, starting coumadin for valv A-fib  now AMS slightly improving    MEDICATIONS  (STANDING):  acetaminophen     Tablet .. 325 milliGRAM(s) Oral every 4 hours  aspirin  chewable 81 milliGRAM(s) Oral daily  atorvastatin 40 milliGRAM(s) Oral at bedtime  budesonide 160 MICROgram(s)/formoterol 4.5 MICROgram(s) Inhaler 2 Puff(s) Inhalation two times a day  enoxaparin Injectable 75 milliGRAM(s) SubCutaneous every 12 hours  folic acid 1 milliGRAM(s) Oral daily  insulin glargine Injectable (LANTUS) 7 Unit(s) SubCutaneous at bedtime  insulin lispro (ADMELOG) corrective regimen sliding scale   SubCutaneous Before meals and at bedtime  metoprolol tartrate 25 milliGRAM(s) Oral two times a day  nicotine - 21 mG/24Hr(s) Patch 1 patch Transdermal daily  oseltamivir 75 milliGRAM(s) Oral two times a day  pantoprazole    Tablet 40 milliGRAM(s) Oral before breakfast  polyethylene glycol 3350 17 Gram(s) Oral daily  QUEtiapine 25 milliGRAM(s) Oral at bedtime  thiamine 100 milliGRAM(s) Oral daily  tiotropium 18 MICROgram(s) Capsule 1 Capsule(s) Inhalation daily  tiotropium 18 MICROgram(s) Capsule 1 Capsule(s) Inhalation daily    MEDICATIONS  (PRN):  melatonin 3 milliGRAM(s) Oral at bedtime PRN Insomnia  senna 1 Tablet(s) Oral daily PRN Constipation      REVIEW OF SYSTEMS:  CONSTITUTIONAL: No fever, weight loss, or fatigue  RESPIRATORY: No cough, wheezing, chills or hemoptysis; No shortness of breath  CARDIOVASCULAR: No chest pain, palpitations, dizziness, or leg swelling  GASTROINTESTINAL: No abdominal pain. No nausea, vomiting, or hematemesis; No diarrhea or constipation. No melena or hematochezia.  GENITOURINARY: No dysuria or hematuria, urinary frequency  NEUROLOGICAL: No headaches, memory loss, loss of strength, numbness, or tremors  SKIN: No itching, burning, rashes, or lesions     Vital Signs Last 24 Hrs  T(C): 36.7 (14 Apr 2022 10:52), Max: 36.8 (13 Apr 2022 15:53)  T(F): 98.1 (14 Apr 2022 10:52), Max: 98.2 (13 Apr 2022 15:53)  HR: 80 (14 Apr 2022 10:52) (79 - 91)  BP: 126/99 (14 Apr 2022 10:52) (107/72 - 147/65)  BP(mean): --  RR: 18 (14 Apr 2022 10:52) (18 - 19)  SpO2: 97% (14 Apr 2022 10:52) (87% - 98%)    PHYSICAL EXAMINATION:  GENERAL: NAD, well built  HEAD:  Atraumatic, Normocephalic  EYES:  conjunctiva and sclera clear  NECK: Supple, No JVD  CHEST/LUNG: Clear to auscultation. Clear to percussion bilaterally; No rales, rhonchi, wheezing, or rubs  HEART: Regular rate and rhythm; No murmurs, rubs, or gallops  ABDOMEN: Soft, Nontender, Nondistended; Bowel sounds present  NERVOUS SYSTEM:  Alert & Oriented X2    EXTREMITIES:  2+ Peripheral Pulses, No clubbing, cyanosis, or edema  SKIN: warm dry                          14.0   13.36 )-----------( 408      ( 14 Apr 2022 07:41 )             42.9     04-14    140  |  103  |  26<H>  ----------------------------<  85  3.5   |  33<H>  |  0.73    Ca    9.1      14 Apr 2022 07:41  Phos  3.2     04-14  Mg     1.9     04-14    TPro  8.2  /  Alb  2.5<L>  /  TBili  0.4  /  DBili  x   /  AST  35  /  ALT  79<H>  /  AlkPhos  93  04-14    LIVER FUNCTIONS - ( 14 Apr 2022 07:41 )  Alb: 2.5 g/dL / Pro: 8.2 g/dL / ALK PHOS: 93 U/L / ALT: 79 U/L DA / AST: 35 U/L / GGT: x               PT/INR - ( 14 Apr 2022 07:41 )   PT: 14.2 sec;   INR: 1.19 ratio         PTT - ( 14 Apr 2022 07:41 )  PTT:43.8 sec    CAPILLARY BLOOD GLUCOSE      RADIOLOGY & ADDITIONAL TESTS:

## 2022-04-15 NOTE — PROGRESS NOTE ADULT - ASSESSMENT
6 year old male with PMHx of HTN, CAD, ?COPD- smoker, who presented with generalized weakness and confusion. Workup in the ED revealed possible bilateral pneumonia, COVID-19 negative.

## 2022-04-15 NOTE — PROGRESS NOTE ADULT - SUBJECTIVE AND OBJECTIVE BOX
C A R D I O L O G Y  **********************************     DATE OF SERVICE: 04-15-22    Patient denies chest pain or shortness of breath. More alert today, less agitated Review of symptoms otherwise negative.    acetaminophen     Tablet .. 325 milliGRAM(s) Oral every 4 hours  aspirin  chewable 81 milliGRAM(s) Oral daily  atorvastatin 40 milliGRAM(s) Oral at bedtime  budesonide 160 MICROgram(s)/formoterol 4.5 MICROgram(s) Inhaler 2 Puff(s) Inhalation two times a day  enoxaparin Injectable 75 milliGRAM(s) SubCutaneous every 12 hours  folic acid 1 milliGRAM(s) Oral daily  insulin glargine Injectable (LANTUS) 7 Unit(s) SubCutaneous at bedtime  insulin lispro (ADMELOG) corrective regimen sliding scale   SubCutaneous Before meals and at bedtime  melatonin 3 milliGRAM(s) Oral at bedtime PRN  metoprolol tartrate 25 milliGRAM(s) Oral two times a day  nicotine - 21 mG/24Hr(s) Patch 1 patch Transdermal daily  oseltamivir 75 milliGRAM(s) Oral two times a day  pantoprazole    Tablet 40 milliGRAM(s) Oral before breakfast  polyethylene glycol 3350 17 Gram(s) Oral daily  QUEtiapine 25 milliGRAM(s) Oral at bedtime  senna 1 Tablet(s) Oral daily PRN  thiamine 100 milliGRAM(s) Oral daily  tiotropium 18 MICROgram(s) Capsule 1 Capsule(s) Inhalation daily  tiotropium 18 MICROgram(s) Capsule 1 Capsule(s) Inhalation daily                            13.6   13.16 )-----------( 456      ( 15 Apr 2022 09:20 )             41.1       Hemoglobin: 13.6 g/dL (04-15 @ 09:20)  Hemoglobin: 14.0 g/dL (04-14 @ 07:41)  Hemoglobin: 13.4 g/dL (04-13 @ 07:28)  Hemoglobin: 14.4 g/dL (04-12 @ 07:02)  Hemoglobin: 15.8 g/dL (04-11 @ 06:44)      04-15    140  |  101  |  22<H>  ----------------------------<  118<H>  3.8   |  32<H>  |  0.72    Ca    9.2      15 Apr 2022 09:20  Phos  2.7     04-15  Mg     1.9     04-15    TPro  8.4<H>  /  Alb  2.5<L>  /  TBili  0.4  /  DBili  x   /  AST  21  /  ALT  59  /  AlkPhos  95  04-15    Creatinine Trend: 0.72<--, 0.73<--, 0.89<--, 0.80<--, 0.94<--, 0.76<--    COAGS: PT/INR - ( 15 Apr 2022 09:20 )   PT: 20.3 sec;   INR: 1.70 ratio         PTT - ( 15 Apr 2022 09:20 )  PTT:43.0 sec          T(C): 36.9 (04-15-22 @ 12:06), Max: 36.9 (04-15-22 @ 12:06)  HR: 94 (04-15-22 @ 12:06) (83 - 98)  BP: 101/45 (04-15-22 @ 12:06) (101/45 - 140/58)  RR: 94 (04-15-22 @ 12:06) (18 - 94)  SpO2: 98% (04-15-22 @ 12:06) (93% - 98%)  Wt(kg): --    I&O's Summary    14 Apr 2022 07:01  -  15 Apr 2022 07:00  --------------------------------------------------------  IN: 150 mL / OUT: 0 mL / NET: 150 mL          HEENT:  (-)icterus (-)pallor  CV: N S1 S2 1/6 FELICITA (+)2 Pulses B/l  Resp:  Clear to ausculatation B/L, normal effort  GI: (+) BS Soft, NT, ND  Lymph:  (-)Edema, (-)obvious lymphadenopathy  Skin: Warm to touch, Normal turgor  Psych: Appropriate mood and affect      TELEMETRY: 	  sinus, APC        ASSESSMENT/PLAN: 	76y  Male PMHx of HTN, CAD, active smoker, who was sent to hospital by family members due to altered mental status and generalized weakness found with Influenza, PNA and severe AS.    - Does not appear to be in pulmonary edema and has not been in pulmomary edema  - Keep net Even, A low BNP suggests low filling pressures   - Pt with moderate to severe MS and severe AS dose coumadin INR 2-3  - Monitor mental status  will need R+L cath and CTS eval once optimized but it appears that he has dementia.  If his mental status improves and he is cooperative will plan for outpt CTS referral   - Currently not a candidate for ischemic eval, revascularization or TAVR  - Complete TAMIFLU per medical team      Casper Bush MD, [facility]  BEEPER [phone]

## 2022-04-15 NOTE — PROGRESS NOTE ADULT - PROBLEM SELECTOR PLAN 4
c/w aspirin and statin   trops trended down  Will need a cath once stable either this admission or OP per Dr. Bush  s/p TTE showing severe AS/MS  started on coumadin -  INR 1.7 this am

## 2022-04-15 NOTE — PROGRESS NOTE ADULT - SUBJECTIVE AND OBJECTIVE BOX
NP Note discussed with  Primary Attending    Patient is a 76y old  Male who presents with a chief complaint of Acute hypoxic hypercapnic respiratory failure (15 Apr 2022 13:26)      INTERVAL HPI/OVERNIGHT EVENTS: no new complaints    MEDICATIONS  (STANDING):  acetaminophen     Tablet .. 325 milliGRAM(s) Oral every 4 hours  aspirin  chewable 81 milliGRAM(s) Oral daily  atorvastatin 40 milliGRAM(s) Oral at bedtime  budesonide 160 MICROgram(s)/formoterol 4.5 MICROgram(s) Inhaler 2 Puff(s) Inhalation two times a day  enoxaparin Injectable 75 milliGRAM(s) SubCutaneous every 12 hours  folic acid 1 milliGRAM(s) Oral daily  insulin glargine Injectable (LANTUS) 7 Unit(s) SubCutaneous at bedtime  insulin lispro (ADMELOG) corrective regimen sliding scale   SubCutaneous Before meals and at bedtime  metoprolol tartrate 25 milliGRAM(s) Oral two times a day  nicotine - 21 mG/24Hr(s) Patch 1 patch Transdermal daily  oseltamivir 75 milliGRAM(s) Oral two times a day  pantoprazole    Tablet 40 milliGRAM(s) Oral before breakfast  polyethylene glycol 3350 17 Gram(s) Oral daily  QUEtiapine 25 milliGRAM(s) Oral at bedtime  thiamine 100 milliGRAM(s) Oral daily  tiotropium 18 MICROgram(s) Capsule 1 Capsule(s) Inhalation daily  tiotropium 18 MICROgram(s) Capsule 1 Capsule(s) Inhalation daily    MEDICATIONS  (PRN):  melatonin 3 milliGRAM(s) Oral at bedtime PRN Insomnia  senna 1 Tablet(s) Oral daily PRN Constipation      __________________________________________________  REVIEW OF SYSTEMS:    CONSTITUTIONAL: No fever,   EYES: no acute visual disturbances  NECK: No pain or stiffness  RESPIRATORY: No cough; No shortness of breath  CARDIOVASCULAR: No chest pain, no palpitations  GASTROINTESTINAL: No pain. No nausea or vomiting; No diarrhea   NEUROLOGICAL: No headache or numbness, no tremors  MUSCULOSKELETAL: No joint pain, no muscle pain  GENITOURINARY: no dysuria, no frequency, no hesitancy  PSYCHIATRY: no depression , no anxiety  ALL OTHER  ROS negative        Vital Signs Last 24 Hrs  T(C): 36.9 (15 Apr 2022 12:06), Max: 36.9 (15 Apr 2022 12:06)  T(F): 98.4 (15 Apr 2022 12:06), Max: 98.4 (15 Apr 2022 12:06)  HR: 94 (15 Apr 2022 12:06) (83 - 97)  BP: 101/45 (15 Apr 2022 12:06) (101/45 - 140/58)  BP(mean): 20 (15 Apr 2022 12:06) (20 - 20)  RR: 94 (15 Apr 2022 12:06) (18 - 94)  SpO2: 98% (15 Apr 2022 12:06) (93% - 98%)    ________________________________________________  PHYSICAL EXAM:  GENERAL: NAD  HEENT: Normocephalic;  conjunctivae and sclerae clear; moist mucous membranes;   NECK : supple  CHEST/LUNG: Clear to auscultation bilaterally with good air entry   HEART: S1 S2  regular; no murmurs, gallops or rubs  ABDOMEN: Soft, Nontender, Nondistended; Bowel sounds present  EXTREMITIES: no cyanosis; no edema; no calf tenderness  SKIN: warm and dry; no rash  NERVOUS SYSTEM:  Awake and alert; Oriented  to place, person and time ; no new deficits    _________________________________________________  LABS:                        13.6   13.16 )-----------( 456      ( 15 Apr 2022 09:20 )             41.1     04-15    140  |  101  |  22<H>  ----------------------------<  118<H>  3.8   |  32<H>  |  0.72    Ca    9.2      15 Apr 2022 09:20  Phos  2.7     04-15  Mg     1.9     04-15    TPro  8.4<H>  /  Alb  2.5<L>  /  TBili  0.4  /  DBili  x   /  AST  21  /  ALT  59  /  AlkPhos  95  04-15    PT/INR - ( 15 Apr 2022 09:20 )   PT: 20.3 sec;   INR: 1.70 ratio         PTT - ( 15 Apr 2022 09:20 )  PTT:43.0 sec    CAPILLARY BLOOD GLUCOSE      POCT Blood Glucose.: 130 mg/dL (15 Apr 2022 11:39)  POCT Blood Glucose.: 95 mg/dL (15 Apr 2022 08:40)  POCT Blood Glucose.: 121 mg/dL (14 Apr 2022 20:59)  POCT Blood Glucose.: 187 mg/dL (14 Apr 2022 16:51)        RADIOLOGY & ADDITIONAL TESTS:    Imaging Personally Reviewed:  YES/NO    Consultant(s) Notes Reviewed:   YES/ No    Care Discussed with Consultants :     Plan of care was discussed with patient and /or primary care giver; all questions and concerns were addressed and care was aligned with patient's wishes.

## 2022-04-15 NOTE — PROGRESS NOTE ADULT - ATTENDING COMMENTS
Patient was seen and examined at bedside   Not in distress. sitting on bedside chair, complains of dyspepsia    Vitals noted     P/E:  In NO respiratory distress   AAOx1-2, follows commands, no focal deficit   BL crepitations with wheezing improved from yesterday  S1S2 WNL, +murmur   Abd soft, non tender, BS present   BL LE no edema or calf tenderness     Labs noted     A/P:  -Acute hypoxic respiratory resp failure: Cont NC as needed, titrate down as tolerated, will cont current COPD management   -COPD: cont Spiriva, albuterol and Symbicort, pulm consult appreciated  -Influenza: Tamiflu cont for total 10days  -Valvular Afib: Lovenox bridging for Coumarin, 5mg tonight, cont Metoprolol  -Severe AS: will need right and left heart cath, when mental status more stabilized   -Acute metabolic encephalopathy: improving, possibly multifactorial due to dementia, alcohol induced encephalopathy and hospital acquired delirium due to hospital stay, improved today. Avoid anticholinergics, benzodiazepines, limit lines/tubes/tethers/restraints, encourage frequent reorientation/reassurance by staff, encourage good sleep hygiene, adequate lighting. Cont Seroquel at night. Cont Thiamine and folic acid, cont Melatonin  - Tylenol standing for body ache   - PT eval SHAMAR  - Rest of the management as above Patient was seen and examined at bedside   Not in distress. sitting on bedside chair, complains of dyspepsia    Vitals noted     P/E:  In NO respiratory distress   AAOx1-2, follows commands, no focal deficit   BL crepitations with wheezing improved from yesterday  S1S2 WNL, +murmur   Abd soft, non tender, BS present   BL LE no edema or calf tenderness     Labs noted     A/P:  -Acute hypoxic respiratory resp failure: Cont NC as needed, titrate down as tolerated, will cont current COPD management   -COPD: cont Spiriva, albuterol and Symbicort, pulm consult appreciated  -Influenza: Tamiflu cont for total 10days  -Valvular Afib: Lovenox bridging for Coumarin, 5mg tonight, cont Metoprolol  -Severe AS: will need right and left heart cath, when mental status more stabilized   -Acute metabolic encephalopathy: improving, possibly multifactorial due to dementia, alcohol induced encephalopathy and hospital acquired delirium due to hospital stay, improved today. Avoid anticholinergics, benzodiazepines, limit lines/tubes/tethers/restraints, encourage frequent reorientation/reassurance by staff, encourage good sleep hygiene, adequate lighting. Cont Seroquel at night. Cont Thiamine and folic acid, cont Melatonin  - Tylenol standing for body ache   - Maalox for dyspepsia  - PT eval SHAMAR  - Rest of the management as above

## 2022-04-15 NOTE — PROGRESS NOTE ADULT - SUBJECTIVE AND OBJECTIVE BOX
Time of Visit:  Patient seen and examined.     MEDICATIONS  (STANDING):  acetaminophen     Tablet .. 325 milliGRAM(s) Oral every 4 hours  aluminum hydroxide/magnesium hydroxide/simethicone Suspension 30 milliLiter(s) Oral every 6 hours  aspirin  chewable 81 milliGRAM(s) Oral daily  atorvastatin 40 milliGRAM(s) Oral at bedtime  budesonide 160 MICROgram(s)/formoterol 4.5 MICROgram(s) Inhaler 2 Puff(s) Inhalation two times a day  enoxaparin Injectable 75 milliGRAM(s) SubCutaneous every 12 hours  folic acid 1 milliGRAM(s) Oral daily  insulin glargine Injectable (LANTUS) 7 Unit(s) SubCutaneous at bedtime  insulin lispro (ADMELOG) corrective regimen sliding scale   SubCutaneous Before meals and at bedtime  metoprolol tartrate 25 milliGRAM(s) Oral two times a day  nicotine - 21 mG/24Hr(s) Patch 1 patch Transdermal daily  pantoprazole    Tablet 40 milliGRAM(s) Oral before breakfast  polyethylene glycol 3350 17 Gram(s) Oral daily  QUEtiapine 25 milliGRAM(s) Oral at bedtime  thiamine 100 milliGRAM(s) Oral daily  tiotropium 18 MICROgram(s) Capsule 1 Capsule(s) Inhalation daily  tiotropium 18 MICROgram(s) Capsule 1 Capsule(s) Inhalation daily  warfarin 5 milliGRAM(s) Oral once      MEDICATIONS  (PRN):  melatonin 3 milliGRAM(s) Oral at bedtime PRN Insomnia  senna 1 Tablet(s) Oral daily PRN Constipation       Medications up to date at time of exam.      PHYSICAL EXAMINATION:  Patient has no new complaints.  GENERAL: The patient is a well-developed, well-nourished, in no apparent distress.     Vital Signs Last 24 Hrs  T(C): 36.9 (15 Apr 2022 12:06), Max: 36.9 (15 Apr 2022 12:06)  T(F): 98.4 (15 Apr 2022 12:06), Max: 98.4 (15 Apr 2022 12:06)  HR: 92 (15 Apr 2022 18:00) (83 - 105)  BP: 108/59 (15 Apr 2022 17:08) (101/45 - 140/58)  BP(mean): 20 (15 Apr 2022 12:06) (20 - 20)  RR: 18 (15 Apr 2022 18:00) (18 - 94)  SpO2: 93% (15 Apr 2022 18:00) (93% - 98%)   (if applicable)    Chest Tube (if applicable)    HEENT: Head is normocephalic and atraumatic. Extraocular muscles are intact. Mucous membranes are moist.     NECK: Supple, no palpable adenopathy.    LUNGS: Clear to auscultation, no wheezing, rales, or rhonchi.    HEART: Regular rate and rhythm without murmur.    ABDOMEN: Soft, nontender, and nondistended.  No hepatosplenomegaly is noted.    : No painful voiding, no pelvic pain    EXTREMITIES: Without any cyanosis, clubbing, rash, lesions or edema.    NEUROLOGIC: Awake, alert, oriented, grossly intact    SKIN: Warm, dry, good turgor.      LABS:                        13.6   13.16 )-----------( 456      ( 15 Apr 2022 09:20 )             41.1     04-15    140  |  101  |  22<H>  ----------------------------<  118<H>  3.8   |  32<H>  |  0.72    Ca    9.2      15 Apr 2022 09:20  Phos  2.7     04-15  Mg     1.9     04-15    TPro  8.4<H>  /  Alb  2.5<L>  /  TBili  0.4  /  DBili  x   /  AST  21  /  ALT  59  /  AlkPhos  95  04-15    PT/INR - ( 15 Apr 2022 09:20 )   PT: 20.3 sec;   INR: 1.70 ratio         PTT - ( 15 Apr 2022 09:20 )  PTT:43.0 sec                    MICROBIOLOGY: (if applicable)    RADIOLOGY & ADDITIONAL STUDIES:  EKG:   CXR:  ECHO:    IMPRESSION: 76y Male PAST MEDICAL & SURGICAL HISTORY:  HTN (hypertension)    Hyperlipemia    COPD (chronic obstructive pulmonary disease)    DM (diabetes mellitus)    Asthma    Status post spinal surgery  lumbar    Encounter for screening colonoscopy    H/O endoscopy     p/w           RECOMMENDATIONS:

## 2022-04-15 NOTE — PROGRESS NOTE ADULT - SUBJECTIVE AND OBJECTIVE BOX
Time of Visit:  Patient seen and examined.     MEDICATIONS  (STANDING):  acetaminophen     Tablet .. 325 milliGRAM(s) Oral every 4 hours  aluminum hydroxide/magnesium hydroxide/simethicone Suspension 30 milliLiter(s) Oral every 6 hours  aspirin  chewable 81 milliGRAM(s) Oral daily  atorvastatin 40 milliGRAM(s) Oral at bedtime  budesonide 160 MICROgram(s)/formoterol 4.5 MICROgram(s) Inhaler 2 Puff(s) Inhalation two times a day  enoxaparin Injectable 75 milliGRAM(s) SubCutaneous every 12 hours  folic acid 1 milliGRAM(s) Oral daily  insulin glargine Injectable (LANTUS) 7 Unit(s) SubCutaneous at bedtime  insulin lispro (ADMELOG) corrective regimen sliding scale   SubCutaneous Before meals and at bedtime  metoprolol tartrate 25 milliGRAM(s) Oral two times a day  nicotine - 21 mG/24Hr(s) Patch 1 patch Transdermal daily  pantoprazole    Tablet 40 milliGRAM(s) Oral before breakfast  polyethylene glycol 3350 17 Gram(s) Oral daily  QUEtiapine 25 milliGRAM(s) Oral at bedtime  thiamine 100 milliGRAM(s) Oral daily  tiotropium 18 MICROgram(s) Capsule 1 Capsule(s) Inhalation daily  tiotropium 18 MICROgram(s) Capsule 1 Capsule(s) Inhalation daily  warfarin 5 milliGRAM(s) Oral once      MEDICATIONS  (PRN):  melatonin 3 milliGRAM(s) Oral at bedtime PRN Insomnia  senna 1 Tablet(s) Oral daily PRN Constipation       Medications up to date at time of exam.      PHYSICAL EXAMINATION:  Patient has no new complaints.  GENERAL: The patient is a well-developed, well-nourished, in no apparent distress.     Vital Signs Last 24 Hrs  T(C): 36.9 (15 Apr 2022 12:06), Max: 36.9 (15 Apr 2022 12:06)  T(F): 98.4 (15 Apr 2022 12:06), Max: 98.4 (15 Apr 2022 12:06)  HR: 92 (15 Apr 2022 18:00) (83 - 105)  BP: 108/59 (15 Apr 2022 17:08) (101/45 - 140/58)  BP(mean): 20 (15 Apr 2022 12:06) (20 - 20)  RR: 18 (15 Apr 2022 18:00) (18 - 94)  SpO2: 93% (15 Apr 2022 18:00) (93% - 98%)   (if applicable)    Chest Tube (if applicable)    HEENT: Head is normocephalic and atraumatic. Extraocular muscles are intact. Mucous membranes are moist.     NECK: Supple, no palpable adenopathy.    LUNGS: Clear to auscultation, no wheezing, rales, or rhonchi.    HEART: Regular rate and rhythm without murmur.    ABDOMEN: Soft, nontender, and nondistended.  No hepatosplenomegaly is noted.    : No painful voiding, no pelvic pain    EXTREMITIES: Without any cyanosis, clubbing, rash, lesions or edema.    NEUROLOGIC: Awake, alert, oriented, grossly intact    SKIN: Warm, dry, good turgor.      LABS:                        13.6   13.16 )-----------( 456      ( 15 Apr 2022 09:20 )             41.1     04-15    140  |  101  |  22<H>  ----------------------------<  118<H>  3.8   |  32<H>  |  0.72    Ca    9.2      15 Apr 2022 09:20  Phos  2.7     04-15  Mg     1.9     04-15    TPro  8.4<H>  /  Alb  2.5<L>  /  TBili  0.4  /  DBili  x   /  AST  21  /  ALT  59  /  AlkPhos  95  04-15    PT/INR - ( 15 Apr 2022 09:20 )   PT: 20.3 sec;   INR: 1.70 ratio         PTT - ( 15 Apr 2022 09:20 )  PTT:43.0 sec                    MICROBIOLOGY: (if applicable)    RADIOLOGY & ADDITIONAL STUDIES:  EKG:   CXR:  ECHO:    IMPRESSION: 76y Male PAST MEDICAL & SURGICAL HISTORY:  HTN (hypertension)    Hyperlipemia    COPD (chronic obstructive pulmonary disease)    DM (diabetes mellitus)    Asthma    Status post spinal surgery  lumbar    Encounter for screening colonoscopy    H/O endoscopy     p/w           RECOMMENDATIONS:   Time of Visit:  Patient seen and examined. pat is confused . 1:1 observation in progress     MEDICATIONS  (STANDING):  acetaminophen     Tablet .. 325 milliGRAM(s) Oral every 4 hours  aluminum hydroxide/magnesium hydroxide/simethicone Suspension 30 milliLiter(s) Oral every 6 hours  aspirin  chewable 81 milliGRAM(s) Oral daily  atorvastatin 40 milliGRAM(s) Oral at bedtime  budesonide 160 MICROgram(s)/formoterol 4.5 MICROgram(s) Inhaler 2 Puff(s) Inhalation two times a day  enoxaparin Injectable 75 milliGRAM(s) SubCutaneous every 12 hours  folic acid 1 milliGRAM(s) Oral daily  insulin glargine Injectable (LANTUS) 7 Unit(s) SubCutaneous at bedtime  insulin lispro (ADMELOG) corrective regimen sliding scale   SubCutaneous Before meals and at bedtime  metoprolol tartrate 25 milliGRAM(s) Oral two times a day  nicotine - 21 mG/24Hr(s) Patch 1 patch Transdermal daily  pantoprazole    Tablet 40 milliGRAM(s) Oral before breakfast  polyethylene glycol 3350 17 Gram(s) Oral daily  QUEtiapine 25 milliGRAM(s) Oral at bedtime  thiamine 100 milliGRAM(s) Oral daily  tiotropium 18 MICROgram(s) Capsule 1 Capsule(s) Inhalation daily  tiotropium 18 MICROgram(s) Capsule 1 Capsule(s) Inhalation daily  warfarin 5 milliGRAM(s) Oral once      MEDICATIONS  (PRN):  melatonin 3 milliGRAM(s) Oral at bedtime PRN Insomnia  senna 1 Tablet(s) Oral daily PRN Constipation       Medications up to date at time of exam.      PHYSICAL EXAMINATION:  Patient has no new complaints.  GENERAL: The patient is a well-developed, well-nourished, in no apparent distress.     Vital Signs Last 24 Hrs  T(C): 36.9 (15 Apr 2022 12:06), Max: 36.9 (15 Apr 2022 12:06)  T(F): 98.4 (15 Apr 2022 12:06), Max: 98.4 (15 Apr 2022 12:06)  HR: 92 (15 Apr 2022 18:00) (83 - 105)  BP: 108/59 (15 Apr 2022 17:08) (101/45 - 140/58)  BP(mean): 20 (15 Apr 2022 12:06) (20 - 20)  RR: 18 (15 Apr 2022 18:00) (18 - 94)  SpO2: 93% (15 Apr 2022 18:00) (93% - 98%)   (if applicable)    Chest Tube (if applicable)    HEENT: Head is normocephalic and atraumatic. Extraocular muscles are intact. Mucous membranes are moist.     NECK: Supple, no palpable adenopathy.    LUNGS: Clear to auscultation, no wheezing, rales, or rhonchi.    HEART: Regular rate and rhythm without murmur.    ABDOMEN: Soft, nontender, and nondistended.  No hepatosplenomegaly is noted.    : No painful voiding, no pelvic pain    EXTREMITIES: Without any cyanosis, clubbing, rash, lesions or edema.    NEUROLOGIC: Awake, confused     SKIN: Warm, dry, good turgor.      LABS:                        13.6   13.16 )-----------( 456      ( 15 Apr 2022 09:20 )             41.1     04-15    140  |  101  |  22<H>  ----------------------------<  118<H>  3.8   |  32<H>  |  0.72    Ca    9.2      15 Apr 2022 09:20  Phos  2.7     04-15  Mg     1.9     04-15    TPro  8.4<H>  /  Alb  2.5<L>  /  TBili  0.4  /  DBili  x   /  AST  21  /  ALT  59  /  AlkPhos  95  04-15    PT/INR - ( 15 Apr 2022 09:20 )   PT: 20.3 sec;   INR: 1.70 ratio         PTT - ( 15 Apr 2022 09:20 )  PTT:43.0 sec                    MICROBIOLOGY: (if applicable)    RADIOLOGY & ADDITIONAL STUDIES:  EKG:   CXR:  ECHO:    IMPRESSION: 76y Male PAST MEDICAL & SURGICAL HISTORY:  HTN (hypertension)    Hyperlipemia    COPD (chronic obstructive pulmonary disease)    DM (diabetes mellitus)    Asthma    Status post spinal surgery  lumbar    Encounter for screening colonoscopy    H/O endoscopy     p/w         IMP: IMP: This is a  76 year old [place] man active smoker , drinks alcohol daily  with  HTN, CAD, who was sent to hospital by family members due to altered mental status and generalized weakness. . Patient was admitted to ICU due to Acute hypoxic/hypercapnic respiratory failure, requiring BIPAP use, most likely COPD exacerbated by influenza AH3. .   Echo revealed Moderate-severe mitral stenosis, severe aortic stenosis.  On 4/7 Patient had episode of SVT after duoneb. Pt was given adenosine 6 then 12, rhythm converted to Afib. Cardizem 20 given, then pt was placed on Cardizem gtt. Dr. Bush notified, patient also started on therapeutic  lovenox. Cardizem drip was discontinued and switched to Metoprolol 25 BID.  Pat probable has undiagnosed COPD from active smoking       Sugg  - Cards f/u noted .. not a candidate for ischemic work up at this time   - Continue symbicort   - S/P course of  Tamiflu   - Spiriva daily   - Advise to stop smoking and alcohol use  - Thiamine / folate   - Therapeutic anticoag for afib   - Out pat pulmo f/u   - Nicotine patch   - Pat admitted to drinking alcohol and stated " its time to quit drinking "  - Continue enhanced supervision   - Out pat pulmonary f/u with my office

## 2022-04-15 NOTE — PROGRESS NOTE ADULT - ATTENDING SUPERVISION STATEMENT
Resident/Fellow
Resident

## 2022-04-15 NOTE — PROGRESS NOTE ADULT - PROBLEM SELECTOR PLAN 3
episode of SVT after duoneb  converted to AFib after treatment with Adenosine  patient started on  Cardizem gtt - HR controlled   now maintained on Metoprolol 25 BID and FD lovenox  now on coumadin episode of SVT after duoneb  converted to AFib after treatment with Adenosine  patient started on  Cardizem gtt - HR controlled   now maintained on Metoprolol 25 BID and FD lovenox  with bridge to coumadin

## 2022-04-16 NOTE — PROGRESS NOTE ADULT - SUBJECTIVE AND OBJECTIVE BOX
C A R D I O L O G Y  **********************************     DATE OF SERVICE: 04-16-22     pt seen and examined, no complaints, ROS - .     acetaminophen     Tablet .. 325 milliGRAM(s) Oral every 4 hours  aluminum hydroxide/magnesium hydroxide/simethicone Suspension 30 milliLiter(s) Oral every 6 hours  aspirin  chewable 81 milliGRAM(s) Oral daily  atorvastatin 40 milliGRAM(s) Oral at bedtime  budesonide 160 MICROgram(s)/formoterol 4.5 MICROgram(s) Inhaler 2 Puff(s) Inhalation two times a day  enoxaparin Injectable 75 milliGRAM(s) SubCutaneous every 12 hours  folic acid 1 milliGRAM(s) Oral daily  insulin glargine Injectable (LANTUS) 7 Unit(s) SubCutaneous at bedtime  insulin lispro (ADMELOG) corrective regimen sliding scale   SubCutaneous Before meals and at bedtime  melatonin 3 milliGRAM(s) Oral at bedtime PRN  metoprolol tartrate 25 milliGRAM(s) Oral two times a day  nicotine - 21 mG/24Hr(s) Patch 1 patch Transdermal daily  pantoprazole    Tablet 40 milliGRAM(s) Oral before breakfast  polyethylene glycol 3350 17 Gram(s) Oral daily  QUEtiapine 25 milliGRAM(s) Oral at bedtime  senna 1 Tablet(s) Oral daily PRN  thiamine 100 milliGRAM(s) Oral daily  tiotropium 18 MICROgram(s) Capsule 1 Capsule(s) Inhalation daily  tiotropium 18 MICROgram(s) Capsule 1 Capsule(s) Inhalation daily                            13.1   13.33 )-----------( 482      ( 16 Apr 2022 07:54 )             40.3       Hemoglobin: 13.1 g/dL (04-16 @ 07:54)  Hemoglobin: 13.6 g/dL (04-15 @ 09:20)  Hemoglobin: 14.0 g/dL (04-14 @ 07:41)  Hemoglobin: 13.4 g/dL (04-13 @ 07:28)  Hemoglobin: 14.4 g/dL (04-12 @ 07:02)      04-16    140  |  104  |  24<H>  ----------------------------<  120<H>  3.7   |  34<H>  |  0.77    Ca    9.2      16 Apr 2022 07:54  Phos  2.7     04-15  Mg     1.9     04-15    TPro  8.4<H>  /  Alb  2.5<L>  /  TBili  0.4  /  DBili  x   /  AST  21  /  ALT  59  /  AlkPhos  95  04-15    Creatinine Trend: 0.77<--, 0.72<--, 0.73<--, 0.89<--, 0.80<--, 0.94<--    COAGS: PT/INR - ( 16 Apr 2022 07:54 )   PT: 23.0 sec;   INR: 1.92 ratio       T(C): 36.8 (04-16-22 @ 05:25), Max: 36.9 (04-15-22 @ 12:06)  HR: 97 (04-16-22 @ 05:25) (91 - 105)  BP: 106/65 (04-16-22 @ 05:25) (101/45 - 118/67)  RR: 18 (04-16-22 @ 05:25) (18 - 94)  SpO2: 93% (04-16-22 @ 05:25) (93% - 98%)  Wt(kg): --    I&O's Summary    HEENT:  (-)icterus (-)pallor  CV: N S1 S2 1/6 FELICITA (+)2 Pulses B/l  Resp:  Clear to ausculatation B/L, normal effort  GI: (+) BS Soft, NT, ND  Lymph:  (-)Edema, (-)obvious lymphadenopathy  Skin: Warm to touch, Normal turgor  Psych: Appropriate mood and affect      TELEMETRY: 	  sinus         ASSESSMENT/PLAN: 	76y  Male PMHx of HTN, CAD, active smoker, who was sent to hospital by family members due to altered mental status and generalized weakness found with Influenza, PNA and severe AS.    - Does not appear to be in pulmonary edema and has not been in pulmomary edema  - Keep net Even, A low BNP suggests low filling pressures   - Pt with moderate to severe MS and severe AS dose coumadin INR 2-3  - tolerating BB   - Monitor mental status  will need R+L cath and CTS eval once optimized but it appears that he has dementia.  If his mental status improves and he is cooperative will plan for outpt CTS referral   - Currently not a candidate for ischemic eval, revascularization or TAVR  - Complete TAMIFLU per medical team

## 2022-04-16 NOTE — PROGRESS NOTE ADULT - ASSESSMENT
-Acute hypoxic respiratory resp failure: Cont NC as needed, titrate down as tolerated, will cont current COPD management   -COPD: cont Spiriva, albuterol and Symbicort  -Influenza: Tamiflu completed  -Valvular Afib: Lovenox bridging for Coumarin, 5mg tonight, cont Metoprolol  -Severe AS: will need right and left heart cath, when mental status more stabilized   -Acute metabolic encephalopathy: improving, possibly multifactorial due to dementia, alcohol induced encephalopathy and hospital acquired delirium due to hospital stay, improved. Avoid anticholinergics, benzodiazepines, limit lines/tubes/tethers/restraints, encourage frequent reorientation/reassurance by staff, encourage good sleep hygiene, adequate lighting. Cont Seroquel at night. Cont Thiamine and folic acid, cont Melatonin  - Tylenol standing for body ache   - Maalox for dyspepsia  - PT eval SHAMAR  - CM and SW for safe discharge

## 2022-04-16 NOTE — PROGRESS NOTE ADULT - SUBJECTIVE AND OBJECTIVE BOX
Time of Visit:  Patient seen and examined.     MEDICATIONS  (STANDING):  acetaminophen     Tablet .. 325 milliGRAM(s) Oral every 4 hours  aluminum hydroxide/magnesium hydroxide/simethicone Suspension 30 milliLiter(s) Oral every 6 hours  aspirin  chewable 81 milliGRAM(s) Oral daily  atorvastatin 40 milliGRAM(s) Oral at bedtime  budesonide 160 MICROgram(s)/formoterol 4.5 MICROgram(s) Inhaler 2 Puff(s) Inhalation two times a day  enoxaparin Injectable 75 milliGRAM(s) SubCutaneous every 12 hours  folic acid 1 milliGRAM(s) Oral daily  insulin glargine Injectable (LANTUS) 7 Unit(s) SubCutaneous at bedtime  insulin lispro (ADMELOG) corrective regimen sliding scale   SubCutaneous Before meals and at bedtime  metoprolol tartrate 25 milliGRAM(s) Oral two times a day  nicotine - 21 mG/24Hr(s) Patch 1 patch Transdermal daily  pantoprazole    Tablet 40 milliGRAM(s) Oral before breakfast  polyethylene glycol 3350 17 Gram(s) Oral daily  QUEtiapine 25 milliGRAM(s) Oral at bedtime  thiamine 100 milliGRAM(s) Oral daily  tiotropium 18 MICROgram(s) Capsule 1 Capsule(s) Inhalation daily  tiotropium 18 MICROgram(s) Capsule 1 Capsule(s) Inhalation daily  warfarin 5 milliGRAM(s) Oral once      MEDICATIONS  (PRN):  melatonin 3 milliGRAM(s) Oral at bedtime PRN Insomnia  senna 1 Tablet(s) Oral daily PRN Constipation       Medications up to date at time of exam.      PHYSICAL EXAMINATION:  Patient has no new complaints.  GENERAL: The patient is a well-developed, well-nourished, in no apparent distress.     Vital Signs Last 24 Hrs  T(C): 36.6 (16 Apr 2022 14:00), Max: 36.8 (15 Apr 2022 20:33)  T(F): 97.9 (16 Apr 2022 14:00), Max: 98.3 (16 Apr 2022 05:25)  HR: 88 (16 Apr 2022 17:20) (78 - 97)  BP: 127/69 (16 Apr 2022 17:20) (106/65 - 129/61)  BP(mean): --  RR: 19 (16 Apr 2022 14:00) (18 - 19)  SpO2: 96% (16 Apr 2022 14:00) (93% - 98%)   (if applicable)    Chest Tube (if applicable)    HEENT: Head is normocephalic and atraumatic. Extraocular muscles are intact. Mucous membranes are moist.     NECK: Supple, no palpable adenopathy.    LUNGS: Clear to auscultation, no wheezing, rales, or rhonchi.    HEART: Regular rate and rhythm without murmur.    ABDOMEN: Soft, nontender, and nondistended.  No hepatosplenomegaly is noted.    : No painful voiding, no pelvic pain    EXTREMITIES: Without any cyanosis, clubbing, rash, lesions or edema.    NEUROLOGIC: Awake, alert, oriented, grossly intact    SKIN: Warm, dry, good turgor.      LABS:                        13.1   13.33 )-----------( 482      ( 16 Apr 2022 07:54 )             40.3     04-16    140  |  104  |  24<H>  ----------------------------<  120<H>  3.7   |  34<H>  |  0.77    Ca    9.2      16 Apr 2022 07:54  Phos  2.7     04-15  Mg     1.9     04-15    TPro  8.4<H>  /  Alb  2.5<L>  /  TBili  0.4  /  DBili  x   /  AST  21  /  ALT  59  /  AlkPhos  95  04-15    PT/INR - ( 16 Apr 2022 07:54 )   PT: 23.0 sec;   INR: 1.92 ratio         PTT - ( 15 Apr 2022 09:20 )  PTT:43.0 sec                    MICROBIOLOGY: (if applicable)    RADIOLOGY & ADDITIONAL STUDIES:  EKG:   CXR:  ECHO:    IMPRESSION: 76y Male PAST MEDICAL & SURGICAL HISTORY:  HTN (hypertension)    Hyperlipemia    COPD (chronic obstructive pulmonary disease)    DM (diabetes mellitus)    Asthma    Status post spinal surgery  lumbar    Encounter for screening colonoscopy    H/O endoscopy     p/w           RECOMMENDATIONS:   Time of Visit:  Patient seen and examined.     MEDICATIONS  (STANDING):  acetaminophen     Tablet .. 325 milliGRAM(s) Oral every 4 hours  aluminum hydroxide/magnesium hydroxide/simethicone Suspension 30 milliLiter(s) Oral every 6 hours  aspirin  chewable 81 milliGRAM(s) Oral daily  atorvastatin 40 milliGRAM(s) Oral at bedtime  budesonide 160 MICROgram(s)/formoterol 4.5 MICROgram(s) Inhaler 2 Puff(s) Inhalation two times a day  enoxaparin Injectable 75 milliGRAM(s) SubCutaneous every 12 hours  folic acid 1 milliGRAM(s) Oral daily  insulin glargine Injectable (LANTUS) 7 Unit(s) SubCutaneous at bedtime  insulin lispro (ADMELOG) corrective regimen sliding scale   SubCutaneous Before meals and at bedtime  metoprolol tartrate 25 milliGRAM(s) Oral two times a day  nicotine - 21 mG/24Hr(s) Patch 1 patch Transdermal daily  pantoprazole    Tablet 40 milliGRAM(s) Oral before breakfast  polyethylene glycol 3350 17 Gram(s) Oral daily  QUEtiapine 25 milliGRAM(s) Oral at bedtime  thiamine 100 milliGRAM(s) Oral daily  tiotropium 18 MICROgram(s) Capsule 1 Capsule(s) Inhalation daily  tiotropium 18 MICROgram(s) Capsule 1 Capsule(s) Inhalation daily  warfarin 5 milliGRAM(s) Oral once      MEDICATIONS  (PRN):  melatonin 3 milliGRAM(s) Oral at bedtime PRN Insomnia  senna 1 Tablet(s) Oral daily PRN Constipation       Medications up to date at time of exam.      PHYSICAL EXAMINATION:  Patient has no new complaints.  GENERAL: The patient is a well-developed, well-nourished, in no apparent distress.     Vital Signs Last 24 Hrs  T(C): 36.6 (16 Apr 2022 14:00), Max: 36.8 (15 Apr 2022 20:33)  T(F): 97.9 (16 Apr 2022 14:00), Max: 98.3 (16 Apr 2022 05:25)  HR: 88 (16 Apr 2022 17:20) (78 - 97)  BP: 127/69 (16 Apr 2022 17:20) (106/65 - 129/61)  BP(mean): --  RR: 19 (16 Apr 2022 14:00) (18 - 19)  SpO2: 96% (16 Apr 2022 14:00) (93% - 98%)   (if applicable)    Chest Tube (if applicable)    HEENT: Head is normocephalic and atraumatic. Extraocular muscles are intact. Mucous membranes are moist.     NECK: Supple, no palpable adenopathy.    LUNGS: Clear to auscultation, no wheezing, rales, or rhonchi.    HEART: Regular rate and rhythm without murmur.    ABDOMEN: Soft, nontender, and nondistended.  No hepatosplenomegaly is noted.    : No painful voiding, no pelvic pain    EXTREMITIES: Without any cyanosis, clubbing, rash, lesions or edema.    NEUROLOGIC: Awake, confused     SKIN: Warm, dry, good turgor.      LABS:                        13.1   13.33 )-----------( 482      ( 16 Apr 2022 07:54 )             40.3     04-16    140  |  104  |  24<H>  ----------------------------<  120<H>  3.7   |  34<H>  |  0.77    Ca    9.2      16 Apr 2022 07:54  Phos  2.7     04-15  Mg     1.9     04-15    TPro  8.4<H>  /  Alb  2.5<L>  /  TBili  0.4  /  DBili  x   /  AST  21  /  ALT  59  /  AlkPhos  95  04-15    PT/INR - ( 16 Apr 2022 07:54 )   PT: 23.0 sec;   INR: 1.92 ratio         PTT - ( 15 Apr 2022 09:20 )  PTT:43.0 sec                    MICROBIOLOGY: (if applicable)    RADIOLOGY & ADDITIONAL STUDIES:  EKG:   CXR:  ECHO:    IMPRESSION: 76y Male PAST MEDICAL & SURGICAL HISTORY:  HTN (hypertension)    Hyperlipemia    COPD (chronic obstructive pulmonary disease)    DM (diabetes mellitus)    Asthma    Status post spinal surgery  lumbar    Encounter for screening colonoscopy    H/O endoscopy     p/w           IMP: IMP: This is a  76 year old [place] man active smoker , drinks alcohol daily  with  HTN, CAD, who was sent to hospital by family members due to altered mental status and generalized weakness. . Patient was admitted to ICU due to Acute hypoxic/hypercapnic respiratory failure, requiring BIPAP use, most likely COPD exacerbated by influenza AH3. .   Echo revealed Moderate-severe mitral stenosis, severe aortic stenosis.  On 4/7 Patient had episode of SVT after duoneb. Pt was given adenosine 6 then 12, rhythm converted to Afib. Cardizem 20 given, then pt was placed on Cardizem gtt. Dr. Bsuh notified, patient also started on therapeutic  lovenox. Cardizem drip was discontinued and switched to Metoprolol 25 BID.  Pat probable has undiagnosed COPD from active smoking       Sugg  - Cards f/u noted .. not a candidate for ischemic work up at this time   - Continue symbicort   - S/P course of  Tamiflu   - Spiriva daily   - Advise to stop smoking and alcohol use  - Thiamine / folate   - Therapeutic anticoag for afib   - Out pat pulmo f/u   - Nicotine patch   - Pat admitted to drinking alcohol and stated " its time to quit drinking "  - Continue enhanced supervision   - Out pat pulmonary f/u with my office

## 2022-04-16 NOTE — PROGRESS NOTE ADULT - SUBJECTIVE AND OBJECTIVE BOX
Patient is a 76y old  Male who presents with a chief complaint of Acute hypoxic hypercapnic respiratory failure (16 Apr 2022 09:53)    Patient was seen and examined at bedside  Mental status has significantly improved, denies any abd pain   Body ache has improved, denies any SOB, complains of occasional cough     INTERVAL HPI/OVERNIGHT EVENTS:  T(C): 36.6 (04-16-22 @ 14:00), Max: 36.8 (04-15-22 @ 20:33)  HR: 88 (04-16-22 @ 14:00) (78 - 105)  BP: 112/60 (04-16-22 @ 14:00) (106/65 - 129/61)  RR: 19 (04-16-22 @ 14:00) (18 - 20)  SpO2: 96% (04-16-22 @ 14:00) (93% - 98%)  Wt(kg): --  I&O's Summary      REVIEW OF SYSTEMS: denies fever, chills, SOB, palpitations, chest pain, abdominal pain, nausea, vomiting, diarrhea, constipation, dizziness    MEDICATIONS  (STANDING):  acetaminophen     Tablet .. 325 milliGRAM(s) Oral every 4 hours  aluminum hydroxide/magnesium hydroxide/simethicone Suspension 30 milliLiter(s) Oral every 6 hours  aspirin  chewable 81 milliGRAM(s) Oral daily  atorvastatin 40 milliGRAM(s) Oral at bedtime  budesonide 160 MICROgram(s)/formoterol 4.5 MICROgram(s) Inhaler 2 Puff(s) Inhalation two times a day  enoxaparin Injectable 75 milliGRAM(s) SubCutaneous every 12 hours  folic acid 1 milliGRAM(s) Oral daily  insulin glargine Injectable (LANTUS) 7 Unit(s) SubCutaneous at bedtime  insulin lispro (ADMELOG) corrective regimen sliding scale   SubCutaneous Before meals and at bedtime  metoprolol tartrate 25 milliGRAM(s) Oral two times a day  nicotine - 21 mG/24Hr(s) Patch 1 patch Transdermal daily  pantoprazole    Tablet 40 milliGRAM(s) Oral before breakfast  polyethylene glycol 3350 17 Gram(s) Oral daily  QUEtiapine 25 milliGRAM(s) Oral at bedtime  thiamine 100 milliGRAM(s) Oral daily  tiotropium 18 MICROgram(s) Capsule 1 Capsule(s) Inhalation daily  tiotropium 18 MICROgram(s) Capsule 1 Capsule(s) Inhalation daily    MEDICATIONS  (PRN):  melatonin 3 milliGRAM(s) Oral at bedtime PRN Insomnia  senna 1 Tablet(s) Oral daily PRN Constipation      PHYSICAL EXAM:  GENERAL: NAD, laying comfortably on bed  HEAD:  Atraumatic, Normocephalic  NERVOUS SYSTEM:  Alert & Oriented X2, Good concentration; Motor Strength 4/5 B/L upper and lower extremities due to deconditioning from ICU stay  CHEST/LUNG: Few crepitations BL, no wheezing   HEART: Regular rate and rhythm; + murmur  ABDOMEN: Soft, Nontender, Nondistended; Bowel sounds present  EXTREMITIES:  2+ Peripheral Pulses, No clubbing, cyanosis, or edema  SKIN: No rashes or lesions    LABS:                        13.1   13.33 )-----------( 482      ( 16 Apr 2022 07:54 )             40.3     04-16    140  |  104  |  24<H>  ----------------------------<  120<H>  3.7   |  34<H>  |  0.77    Ca    9.2      16 Apr 2022 07:54  Phos  2.7     04-15  Mg     1.9     04-15    TPro  8.4<H>  /  Alb  2.5<L>  /  TBili  0.4  /  DBili  x   /  AST  21  /  ALT  59  /  AlkPhos  95  04-15    PT/INR - ( 16 Apr 2022 07:54 )   PT: 23.0 sec;   INR: 1.92 ratio         PTT - ( 15 Apr 2022 09:20 )  PTT:43.0 sec    CAPILLARY BLOOD GLUCOSE      POCT Blood Glucose.: 161 mg/dL (16 Apr 2022 11:36)  POCT Blood Glucose.: 120 mg/dL (16 Apr 2022 07:56)  POCT Blood Glucose.: 152 mg/dL (15 Apr 2022 20:44)  POCT Blood Glucose.: 207 mg/dL (15 Apr 2022 17:15)

## 2022-04-17 NOTE — CHART NOTE - NSCHARTNOTEFT_GEN_A_CORE
- c/o "burning in hands and legs" started on gabapentin which showed relieve. Pt stated, "feeling much better" according to family, pt apparently takes gabapentin at home, unsure of dosing. started today with gabapentin 100 mg TID. discussed with attending.

## 2022-04-17 NOTE — PROGRESS NOTE ADULT - SUBJECTIVE AND OBJECTIVE BOX
Time of Visit:  Patient seen and examined.     MEDICATIONS  (STANDING):  acetaminophen     Tablet .. 325 milliGRAM(s) Oral every 4 hours  aluminum hydroxide/magnesium hydroxide/simethicone Suspension 30 milliLiter(s) Oral every 6 hours  aspirin  chewable 81 milliGRAM(s) Oral daily  atorvastatin 40 milliGRAM(s) Oral at bedtime  budesonide 160 MICROgram(s)/formoterol 4.5 MICROgram(s) Inhaler 2 Puff(s) Inhalation two times a day  enoxaparin Injectable 75 milliGRAM(s) SubCutaneous every 12 hours  folic acid 1 milliGRAM(s) Oral daily  gabapentin 200 milliGRAM(s) Oral two times a day  insulin glargine Injectable (LANTUS) 7 Unit(s) SubCutaneous at bedtime  insulin lispro (ADMELOG) corrective regimen sliding scale   SubCutaneous Before meals and at bedtime  metoprolol tartrate 25 milliGRAM(s) Oral two times a day  nicotine - 21 mG/24Hr(s) Patch 1 patch Transdermal daily  pantoprazole    Tablet 40 milliGRAM(s) Oral before breakfast  polyethylene glycol 3350 17 Gram(s) Oral daily  QUEtiapine 25 milliGRAM(s) Oral at bedtime  thiamine 100 milliGRAM(s) Oral daily  tiotropium 18 MICROgram(s) Capsule 1 Capsule(s) Inhalation daily  tiotropium 18 MICROgram(s) Capsule 1 Capsule(s) Inhalation daily      MEDICATIONS  (PRN):  melatonin 3 milliGRAM(s) Oral at bedtime PRN Insomnia  senna 1 Tablet(s) Oral daily PRN Constipation       Medications up to date at time of exam.      PHYSICAL EXAMINATION:  Patient has no new complaints.  GENERAL: The patient is a well-developed, well-nourished, in no apparent distress.     Vital Signs Last 24 Hrs  T(C): 36.4 (17 Apr 2022 13:07), Max: 36.5 (16 Apr 2022 20:43)  T(F): 97.6 (17 Apr 2022 13:07), Max: 97.7 (16 Apr 2022 20:43)  HR: 100 (17 Apr 2022 13:07) (87 - 100)  BP: 134/52 (17 Apr 2022 13:07) (129/67 - 135/67)  BP(mean): --  RR: 18 (17 Apr 2022 13:07) (18 - 20)  SpO2: 94% (17 Apr 2022 13:07) (94% - 97%)   (if applicable)    Chest Tube (if applicable)    HEENT: Head is normocephalic and atraumatic. Extraocular muscles are intact. Mucous membranes are moist.     NECK: Supple, no palpable adenopathy.    LUNGS: Clear to auscultation, no wheezing, rales, or rhonchi.    HEART: Regular rate and rhythm without murmur.    ABDOMEN: Soft, nontender, and nondistended.  No hepatosplenomegaly is noted.    : No painful voiding, no pelvic pain    EXTREMITIES: Without any cyanosis, clubbing, rash, lesions or edema.    NEUROLOGIC: Awake, confused     SKIN: Warm, dry, good turgor.      LABS:                        12.4   10.50 )-----------( 464      ( 17 Apr 2022 07:26 )             38.2     04-17    140  |  103  |  24<H>  ----------------------------<  137<H>  4.2   |  30  |  0.74    Ca    9.0      17 Apr 2022 07:26  Phos  2.9     04-17  Mg     2.2     04-17    TPro  8.1  /  Alb  2.3<L>  /  TBili  0.3  /  DBili  x   /  AST  31  /  ALT  53  /  AlkPhos  88  04-17    PT/INR - ( 17 Apr 2022 17:04 )   PT: 25.2 sec;   INR: 2.10 ratio         PTT - ( 17 Apr 2022 10:54 )  PTT:50.6 sec                    MICROBIOLOGY: (if applicable)    RADIOLOGY & ADDITIONAL STUDIES:  EKG:   CXR:  ECHO:    IMPRESSION: 76y Male PAST MEDICAL & SURGICAL HISTORY:  HTN (hypertension)    Hyperlipemia    COPD (chronic obstructive pulmonary disease)    DM (diabetes mellitus)    Asthma    Status post spinal surgery  lumbar    Encounter for screening colonoscopy    H/O endoscopy     p/w         IMP: IMP: This is a  76 year old [place] man active smoker , drinks alcohol daily  with  HTN, CAD, who was sent to hospital by family members due to altered mental status and generalized weakness. . Patient was admitted to ICU due to Acute hypoxic/hypercapnic respiratory failure, requiring BIPAP use, most likely COPD exacerbated by influenza AH3. .   Echo revealed Moderate-severe mitral stenosis, severe aortic stenosis.  On 4/7 Patient had episode of SVT after duoneb. Pt was given adenosine 6 then 12, rhythm converted to Afib. Cardizem 20 given, then pt was placed on Cardizem gtt. Dr. Bush notified, patient also started on therapeutic  lovenox. Cardizem drip was discontinued and switched to Metoprolol 25 BID.  Pat probable has undiagnosed COPD from active smoking       Sugg  - Cards f/u noted .. not a candidate for ischemic work up at this time   - Continue symbicort   - S/P course of  Tamiflu   - Spiriva daily   - Advise to stop smoking and alcohol use  - Thiamine / folate   - Therapeutic anticoag for afib   - Out pat pulmo f/u   - Nicotine patch   - Pat admitted to drinking alcohol and stated " its time to quit drinking "  - Continue enhanced supervision   - Out pat pulmonary f/u with my office

## 2022-04-17 NOTE — PROGRESS NOTE ADULT - SUBJECTIVE AND OBJECTIVE BOX
Patient is a 76y old  Male who presents with a chief complaint of Acute hypoxic hypercapnic respiratory failure (17 Apr 2022 08:31)    Patient was seen and examined at bedside   Denies any new complains   Saturates to low 80s on RA  Moved bowel yesterday    INTERVAL HPI/OVERNIGHT EVENTS:  T(C): 36.4 (04-17-22 @ 13:07), Max: 36.5 (04-16-22 @ 20:43)  HR: 100 (04-17-22 @ 13:07) (87 - 100)  BP: 134/52 (04-17-22 @ 13:07) (127/69 - 135/67)  RR: 18 (04-17-22 @ 13:07) (18 - 20)  SpO2: 94% (04-17-22 @ 13:07) (94% - 97%)  Wt(kg): --  I&O's Summary      REVIEW OF SYSTEMS: denies fever, chills, SOB, palpitations, chest pain, abdominal pain, nausea, vomiting, diarrhea, constipation, dizziness    MEDICATIONS  (STANDING):  acetaminophen     Tablet .. 325 milliGRAM(s) Oral every 4 hours  aluminum hydroxide/magnesium hydroxide/simethicone Suspension 30 milliLiter(s) Oral every 6 hours  aspirin  chewable 81 milliGRAM(s) Oral daily  atorvastatin 40 milliGRAM(s) Oral at bedtime  budesonide 160 MICROgram(s)/formoterol 4.5 MICROgram(s) Inhaler 2 Puff(s) Inhalation two times a day  enoxaparin Injectable 75 milliGRAM(s) SubCutaneous every 12 hours  folic acid 1 milliGRAM(s) Oral daily  gabapentin 200 milliGRAM(s) Oral two times a day  insulin glargine Injectable (LANTUS) 7 Unit(s) SubCutaneous at bedtime  insulin lispro (ADMELOG) corrective regimen sliding scale   SubCutaneous Before meals and at bedtime  metoprolol tartrate 25 milliGRAM(s) Oral two times a day  nicotine - 21 mG/24Hr(s) Patch 1 patch Transdermal daily  pantoprazole    Tablet 40 milliGRAM(s) Oral before breakfast  polyethylene glycol 3350 17 Gram(s) Oral daily  QUEtiapine 25 milliGRAM(s) Oral at bedtime  thiamine 100 milliGRAM(s) Oral daily  tiotropium 18 MICROgram(s) Capsule 1 Capsule(s) Inhalation daily  tiotropium 18 MICROgram(s) Capsule 1 Capsule(s) Inhalation daily    MEDICATIONS  (PRN):  melatonin 3 milliGRAM(s) Oral at bedtime PRN Insomnia  senna 1 Tablet(s) Oral daily PRN Constipation      PHYSICAL EXAM:  GENERAL: NAD, laying comfortably on bed  HEAD:  Atraumatic, Normocephalic  NERVOUS SYSTEM:  Alert & Oriented X2, Good concentration; Motor Strength 4/5 B/L upper and lower extremities due to deconditioning from ICU stay  CHEST/LUNG: Few crepitations BL, no wheezing   HEART: Regular rate and rhythm; + murmur  ABDOMEN: Soft, Nontender, distended; Bowel sounds present  EXTREMITIES:  2+ Peripheral Pulses, No clubbing, cyanosis, or edema  SKIN: No rashes or lesions    LABS:                        12.4   10.50 )-----------( 464      ( 17 Apr 2022 07:26 )             38.2     04-17    140  |  103  |  24<H>  ----------------------------<  137<H>  4.2   |  30  |  0.74    Ca    9.0      17 Apr 2022 07:26  Phos  2.9     04-17  Mg     2.2     04-17    TPro  8.1  /  Alb  2.3<L>  /  TBili  0.3  /  DBili  x   /  AST  31  /  ALT  53  /  AlkPhos  88  04-17    PT/INR - ( 16 Apr 2022 07:54 )   PT: 23.0 sec;   INR: 1.92 ratio         PTT - ( 17 Apr 2022 10:54 )  PTT:50.6 sec    CAPILLARY BLOOD GLUCOSE      POCT Blood Glucose.: 211 mg/dL (17 Apr 2022 11:40)  POCT Blood Glucose.: 136 mg/dL (17 Apr 2022 07:51)  POCT Blood Glucose.: 164 mg/dL (16 Apr 2022 21:36)  POCT Blood Glucose.: 238 mg/dL (16 Apr 2022 17:00)

## 2022-04-17 NOTE — PROGRESS NOTE ADULT - SUBJECTIVE AND OBJECTIVE BOX
C A R D I O L O G Y  **********************************     DATE OF SERVICE: 04-17-22     pt seen and examined, no complaints, ROS - .            acetaminophen     Tablet .. 325 milliGRAM(s) Oral every 4 hours  aluminum hydroxide/magnesium hydroxide/simethicone Suspension 30 milliLiter(s) Oral every 6 hours  aspirin  chewable 81 milliGRAM(s) Oral daily  atorvastatin 40 milliGRAM(s) Oral at bedtime  budesonide 160 MICROgram(s)/formoterol 4.5 MICROgram(s) Inhaler 2 Puff(s) Inhalation two times a day  enoxaparin Injectable 75 milliGRAM(s) SubCutaneous every 12 hours  folic acid 1 milliGRAM(s) Oral daily  insulin glargine Injectable (LANTUS) 7 Unit(s) SubCutaneous at bedtime  insulin lispro (ADMELOG) corrective regimen sliding scale   SubCutaneous Before meals and at bedtime  melatonin 3 milliGRAM(s) Oral at bedtime PRN  metoprolol tartrate 25 milliGRAM(s) Oral two times a day  nicotine - 21 mG/24Hr(s) Patch 1 patch Transdermal daily  pantoprazole    Tablet 40 milliGRAM(s) Oral before breakfast  polyethylene glycol 3350 17 Gram(s) Oral daily  QUEtiapine 25 milliGRAM(s) Oral at bedtime  senna 1 Tablet(s) Oral daily PRN  thiamine 100 milliGRAM(s) Oral daily  tiotropium 18 MICROgram(s) Capsule 1 Capsule(s) Inhalation daily  tiotropium 18 MICROgram(s) Capsule 1 Capsule(s) Inhalation daily                            12.4   10.50 )-----------( 464      ( 17 Apr 2022 07:26 )             38.2       Hemoglobin: 12.4 g/dL (04-17 @ 07:26)  Hemoglobin: 13.1 g/dL (04-16 @ 07:54)  Hemoglobin: 13.6 g/dL (04-15 @ 09:20)  Hemoglobin: 14.0 g/dL (04-14 @ 07:41)  Hemoglobin: 13.4 g/dL (04-13 @ 07:28)      04-17    140  |  103  |  24<H>  ----------------------------<  137<H>  4.2   |  30  |  0.74    Ca    9.0      17 Apr 2022 07:26  Phos  2.9     04-17  Mg     2.2     04-17    TPro  8.1  /  Alb  2.3<L>  /  TBili  0.3  /  DBili  x   /  AST  31  /  ALT  53  /  AlkPhos  88  04-17    Creatinine Trend: 0.74<--, 0.77<--, 0.72<--, 0.73<--, 0.89<--, 0.80<--    COAGS:           T(C): 36.1 (04-17-22 @ 04:51), Max: 36.7 (04-16-22 @ 10:00)  HR: 92 (04-17-22 @ 04:51) (78 - 92)  BP: 135/67 (04-17-22 @ 04:51) (112/60 - 135/67)  RR: 20 (04-17-22 @ 04:51) (18 - 20)  SpO2: 97% (04-17-22 @ 04:51) (96% - 97%)  Wt(kg): --    I&O's Summary      HEENT:  (-)icterus (-)pallor  CV: N S1 S2 1/6 FELICITA (+)2 Pulses B/l  Resp:  Clear to ausculatation B/L, normal effort  GI: (+) BS Soft, NT, ND  Lymph:  (-)Edema, (-)obvious lymphadenopathy  Skin: Warm to touch, Normal turgor  Psych: Appropriate mood and affect      TELEMETRY: 	  sinus         ASSESSMENT/PLAN: 	76y  Male PMHx of HTN, CAD, active smoker, who was sent to hospital by family members due to altered mental status and generalized weakness found with Influenza, PNA and severe AS.    - Does not appear to be in pulmonary edema and has not been in pulmomary edema  - Keep net Even, A low BNP suggests low filling pressures   - Pt with moderate to severe MS and severe AS dose coumadin INR 2-3  - tolerating BB   - Monitor mental status  will need R+L cath and CTS eval once optimized but it appears that he has dementia.  If his mental status improves and he is cooperative will plan for outpt CTS referral   - Currently not a candidate for ischemic eval, revascularization or TAVR  - Complete TAMIFLU per medical team

## 2022-04-17 NOTE — PROGRESS NOTE ADULT - ASSESSMENT
-Acute hypoxic respiratory failure: Cont NC as needed, titrate down as tolerated, will cont current COPD management   -COPD: cont Spiriva, albuterol and Symbicort  -Influenza: Tamiflu completed  -Valvular Afib: Lovenox bridging for Coumarin, 5mg tonight, cont Metoprolol  -Severe AS: will need right and left heart cath  -Acute metabolic encephalopathy: improving, possibly multifactorial due to dementia, alcohol induced encephalopathy and hospital acquired delirium due to hospital stay, improved. Avoid anticholinergics, benzodiazepines, limit lines/tubes/tethers/restraints, encourage frequent reorientation/reassurance by staff, encourage good sleep hygiene, adequate lighting. Cont Seroquel at night. Cont Thiamine and folic acid, cont Melatonin  - Tylenol standing for body ache   - Maalox for dyspepsia  - PT eval SHAMAR  - CM and SW for safe discharge -Acute hypoxic respiratory failure: Cont NC as needed, titrate down as tolerated, will cont current COPD management   -COPD: cont Spiriva, albuterol and Symbicort  -Influenza: Tamiflu completed  -Valvular Afib: Lovenox bridging for Coumarin, 5mg tonight after INR obtained, cont Metoprolol  -Severe AS: will need right and left heart cath  -Acute metabolic encephalopathy: improving, possibly multifactorial due to dementia, alcohol induced encephalopathy and hospital acquired delirium due to hospital stay, improved. Avoid anticholinergics, benzodiazepines, limit lines/tubes/tethers/restraints, encourage frequent reorientation/reassurance by staff, encourage good sleep hygiene, adequate lighting. Cont Seroquel at night. Cont Thiamine and folic acid, cont Melatonin  - Tylenol standing for body ache   - Maalox for dyspepsia  - PT eval SHAMAR  - CM and SW for safe discharge -Acute hypoxic respiratory failure: Cont NC as needed, titrate down as tolerated, will cont current COPD management   -COPD: cont Spiriva, albuterol and Symbicort  -Influenza: Tamiflu completed  -Valvular Afib: INR 2.10, DC Lovenox, 5mg tonight, cont Metoprolol  -Severe AS: will need right and left heart cath  -Acute metabolic encephalopathy: improving, possibly multifactorial due to dementia, alcohol induced encephalopathy and hospital acquired delirium due to hospital stay, improved. Avoid anticholinergics, benzodiazepines, limit lines/tubes/tethers/restraints, encourage frequent reorientation/reassurance by staff, encourage good sleep hygiene, adequate lighting. Cont Seroquel at night. Cont Thiamine and folic acid, cont Melatonin  - Tylenol standing for body ache   - Maalox for dyspepsia  - PT eval SHAMAR  - CM and SW for safe discharge

## 2022-04-18 NOTE — PROGRESS NOTE ADULT - NS ATTEND AMEND GEN_ALL_CORE FT
Patient was seen and examined with wife at bedside. Reports feeling better everyday, wants to go to rehab. RN reports patient is confused and trying to get out of bed, but patient and his wife reports he likes to hang his legs down due to neuropathy. Patient was counselled about risk of fall. Understands.     Vitals noted     P/E:  NAD  AAOx2, no focal deficit, overall muscle strength 4/5 due to deconditioning   CTABL, no crepitations or wheezing appreciated today  S1S2 WNL, + murmur  Abd soft, non distended, BS present   BL LE no edema or calf tenderness     Labs noted     A/P:  -Acute hypoxic respiratory failure: Cont NC as needed, titrate down as tolerated, will cont current COPD management   -COPD: cont Spiriva, albuterol and Symbicort  -Influenza: Tamiflu completed  -Valvular Afib: INR 1.99, Warfarin 5mg tonight, cont Metoprolol  -Severe AS: will need right and left heart cath, outpatient follow up with cardiology, discussed above with Dr. Bush  -Acute metabolic encephalopathy: improving, possibly multifactorial due to dementia, alcohol induced encephalopathy and hospital acquired delirium due to hospital stay, improved. Avoid anticholinergics, benzodiazepines, limit lines/tubes/tethers/restraints, encourage frequent reorientation/reassurance by staff, encourage good sleep hygiene, adequate lighting. Cont Seroquel at night. Cont Thiamine and folic acid, cont Melatonin  - Moderate Protein calorie malnutrition: PO intake improving as per wife, Maalox for dyspepsia  - PT evedilia IBANEZ  - FMLA form filled out and given to patient's wife, physical copy kept in chart  - CM and SW for safe discharge

## 2022-04-18 NOTE — PROGRESS NOTE ADULT - PROBLEM SELECTOR PLAN 3
episode of SVT after duoneb  converted to AFib after treatment with Adenosine  patient started on  Cardizem gtt - HR controlled   now maintained on Metoprolol 25 BID and FD lovenox  with bridge to coumadin completed Tamiflu

## 2022-04-18 NOTE — PROGRESS NOTE ADULT - SUBJECTIVE AND OBJECTIVE BOX
Time of Visit:  Patient seen and examined.     MEDICATIONS  (STANDING):  aluminum hydroxide/magnesium hydroxide/simethicone Suspension 30 milliLiter(s) Oral every 6 hours  aspirin  chewable 81 milliGRAM(s) Oral daily  atorvastatin 40 milliGRAM(s) Oral at bedtime  budesonide 160 MICROgram(s)/formoterol 4.5 MICROgram(s) Inhaler 2 Puff(s) Inhalation two times a day  folic acid 1 milliGRAM(s) Oral daily  insulin glargine Injectable (LANTUS) 7 Unit(s) SubCutaneous at bedtime  insulin lispro (ADMELOG) corrective regimen sliding scale   SubCutaneous Before meals and at bedtime  metoprolol tartrate 25 milliGRAM(s) Oral two times a day  nicotine - 21 mG/24Hr(s) Patch 1 patch Transdermal daily  pantoprazole    Tablet 40 milliGRAM(s) Oral before breakfast  polyethylene glycol 3350 17 Gram(s) Oral daily  QUEtiapine 25 milliGRAM(s) Oral at bedtime  thiamine 100 milliGRAM(s) Oral daily  tiotropium 18 MICROgram(s) Capsule 1 Capsule(s) Inhalation daily  tiotropium 18 MICROgram(s) Capsule 1 Capsule(s) Inhalation daily  warfarin 5 milliGRAM(s) Oral once      MEDICATIONS  (PRN):  melatonin 3 milliGRAM(s) Oral at bedtime PRN Insomnia  senna 1 Tablet(s) Oral daily PRN Constipation       Medications up to date at time of exam.      PHYSICAL EXAMINATION:  Patient has no new complaints.  GENERAL: The patient is a well-developed, well-nourished, in no apparent distress.     Vital Signs Last 24 Hrs  T(C): 36.1 (18 Apr 2022 12:19), Max: 36.4 (17 Apr 2022 20:31)  T(F): 97 (18 Apr 2022 12:19), Max: 97.6 (17 Apr 2022 20:31)  HR: 89 (18 Apr 2022 12:19) (88 - 93)  BP: 119/55 (18 Apr 2022 17:14) (81/46 - 136/70)  BP(mean): 54 (18 Apr 2022 09:24) (54 - 92)  RR: 18 (18 Apr 2022 17:14) (18 - 20)  SpO2: 99% (18 Apr 2022 17:14) (98% - 100%)   (if applicable)    Chest Tube (if applicable)    HEENT: Head is normocephalic and atraumatic. Extraocular muscles are intact. Mucous membranes are moist.     NECK: Supple, no palpable adenopathy.    LUNGS: Clear to auscultation, no wheezing, rales, or rhonchi.    HEART: Regular rate and rhythm without murmur.    ABDOMEN: Soft, nontender, and nondistended.  No hepatosplenomegaly is noted.    : No painful voiding, no pelvic pain    EXTREMITIES: Without any cyanosis, clubbing, rash, lesions or edema.    NEUROLOGIC: Awake, alert, oriented, grossly intact    SKIN: Warm, dry, good turgor.      LABS:                        13.0   9.65  )-----------( 483      ( 18 Apr 2022 08:21 )             41.3     04-18    140  |  103  |  21<H>  ----------------------------<  134<H>  4.3   |  34<H>  |  0.80    Ca    10.0      18 Apr 2022 08:21  Phos  2.9     04-17  Mg     2.2     04-17    TPro  8.1  /  Alb  2.3<L>  /  TBili  0.3  /  DBili  x   /  AST  31  /  ALT  53  /  AlkPhos  88  04-17    PT/INR - ( 18 Apr 2022 08:21 )   PT: 23.8 sec;   INR: 1.99 ratio         PTT - ( 17 Apr 2022 10:54 )  PTT:50.6 sec                    MICROBIOLOGY: (if applicable)    RADIOLOGY & ADDITIONAL STUDIES:  EKG:   CXR:  ECHO:    IMPRESSION: 76y Male PAST MEDICAL & SURGICAL HISTORY:  HTN (hypertension)    Hyperlipemia    COPD (chronic obstructive pulmonary disease)    DM (diabetes mellitus)    Asthma    Status post spinal surgery  lumbar    Encounter for screening colonoscopy    H/O endoscopy     p/w       IMP: IMP: This is a  76 year old [place] man active smoker , drinks alcohol daily  with  HTN, CAD, who was sent to hospital by family members due to altered mental status and generalized weakness. . Patient was admitted to ICU due to Acute hypoxic/hypercapnic respiratory failure, requiring BIPAP use, most likely COPD exacerbated by influenza AH3. .   Echo revealed Moderate-severe mitral stenosis, severe aortic stenosis.  On 4/7 Patient had episode of SVT after duoneb. Pt was given adenosine 6 then 12, rhythm converted to Afib. Cardizem 20 given, then pt was placed on Cardizem gtt. Dr. Bush notified, patient also started on therapeutic  lovenox. Cardizem drip was discontinued and switched to Metoprolol 25 BID.  Pat probable has undiagnosed COPD from active smoking       Sugg  - Cards f/u noted .. not a candidate for ischemic work up at this time   - Continue symbicort   - Spiriva daily   - Advise to stop smoking and alcohol use  - Thiamine / folate   - Therapeutic anticoag for afib   - Coumadin : goal INR 2-3   - Out pat pulmo f/u   - Nicotine patch   - Out pat pulmonary f/u with my office  - For SHAMAR  - Wife at bedside

## 2022-04-18 NOTE — PROGRESS NOTE ADULT - SUBJECTIVE AND OBJECTIVE BOX
C A R D I O L O G Y  **********************************     DATE OF SERVICE: 04-18-22    Patient denies chest pain or shortness of breath.  Comfortable remains on enhanced supervision  Review of symptoms otherwise negative.    aluminum hydroxide/magnesium hydroxide/simethicone Suspension 30 milliLiter(s) Oral every 6 hours  aspirin  chewable 81 milliGRAM(s) Oral daily  atorvastatin 40 milliGRAM(s) Oral at bedtime  budesonide 160 MICROgram(s)/formoterol 4.5 MICROgram(s) Inhaler 2 Puff(s) Inhalation two times a day  folic acid 1 milliGRAM(s) Oral daily  insulin glargine Injectable (LANTUS) 7 Unit(s) SubCutaneous at bedtime  insulin lispro (ADMELOG) corrective regimen sliding scale   SubCutaneous Before meals and at bedtime  melatonin 3 milliGRAM(s) Oral at bedtime PRN  metoprolol tartrate 25 milliGRAM(s) Oral two times a day  nicotine - 21 mG/24Hr(s) Patch 1 patch Transdermal daily  pantoprazole    Tablet 40 milliGRAM(s) Oral before breakfast  polyethylene glycol 3350 17 Gram(s) Oral daily  QUEtiapine 25 milliGRAM(s) Oral at bedtime  senna 1 Tablet(s) Oral daily PRN  thiamine 100 milliGRAM(s) Oral daily  tiotropium 18 MICROgram(s) Capsule 1 Capsule(s) Inhalation daily  tiotropium 18 MICROgram(s) Capsule 1 Capsule(s) Inhalation daily  warfarin 5 milliGRAM(s) Oral once                            13.0   9.65  )-----------( 483      ( 18 Apr 2022 08:21 )             41.3       Hemoglobin: 13.0 g/dL (04-18 @ 08:21)  Hemoglobin: 12.4 g/dL (04-17 @ 07:26)  Hemoglobin: 13.1 g/dL (04-16 @ 07:54)  Hemoglobin: 13.6 g/dL (04-15 @ 09:20)  Hemoglobin: 14.0 g/dL (04-14 @ 07:41)      04-18    140  |  103  |  21<H>  ----------------------------<  134<H>  4.3   |  34<H>  |  0.80    Ca    10.0      18 Apr 2022 08:21  Phos  2.9     04-17  Mg     2.2     04-17    TPro  8.1  /  Alb  2.3<L>  /  TBili  0.3  /  DBili  x   /  AST  31  /  ALT  53  /  AlkPhos  88  04-17    Creatinine Trend: 0.80<--, 0.74<--, 0.77<--, 0.72<--, 0.73<--, 0.89<--    COAGS: PT/INR - ( 18 Apr 2022 08:21 )   PT: 23.8 sec;   INR: 1.99 ratio                   T(C): 36.2 (04-18-22 @ 06:25), Max: 36.4 (04-17-22 @ 13:07)  HR: 93 (04-18-22 @ 09:24) (88 - 107)  BP: 81/46 (04-18-22 @ 09:24) (81/46 - 136/70)  RR: 18 (04-18-22 @ 06:25) (18 - 20)  SpO2: 99% (04-18-22 @ 09:24) (94% - 100%)  Wt(kg): --    I&O's Summary        HEENT:  (-)icterus (-)pallor  CV: N S1 S2 1/6 FELICITA (+)2 Pulses B/l  Resp:  Clear to ausculatation B/L, normal effort  GI: (+) BS Soft, NT, ND  Lymph:  (-)Edema, (-)obvious lymphadenopathy  Skin: Warm to touch, Normal turgor  Psych: Appropriate mood and affect      TELEMETRY: 	off        ASSESSMENT/PLAN: 	76y  Male PMHx of HTN, CAD, active smoker, who was sent to hospital by family members due to altered mental status and generalized weakness found with Influenza, PNA and severe AS.    - Does not appear to be in pulmonary edema and has not been in pulmomary edema  - Keep net Even, A low BNP suggests low filling pressures   - Pt with moderate to severe MS and severe AS dose coumadin INR 2-3  - Monitor mental status  will need R+L cath and CTS eval once optimized but it appears that he has dementia.  If his mental status improves and he is cooperative will plan for outpt CTS referral   - Currently not a candidate for ischemic eval, revascularization or TAVR  - Complete TAMIFLU per medical team      Casper Bush MD, [facility]  BEEPER [phone]

## 2022-04-18 NOTE — PROGRESS NOTE ADULT - ASSESSMENT
Report given to KOBE AQUINO   Patient is a 76 year old male with PMHx of HTN, CAD, active smoker, who was sent to hospital by family members due to altered mental status and generalized weakness. Given the fact that patient was on BiPAP and inability to reach family members, history obtained from chart. Per son, patient noted with generalized weakness that resolved. Then today, noted to be confused and not making sense. Son noted that patient was having difficulty breathing. Has sick contacts at home with cough. Not vaccinated for COVID-19. Patient himself was able to endorse that he is feeling feverish and chills, with cough the past couple of days. Denied any pain. Patient was admitted to ICU due to Acute hypoxic/hypercapnic respiratory failure, requiring BIPAP use, most likely COPD exacerbated by influenza AH3.   Discussions with patient's wife indicated that patient requested to be DNR/DNI even if it meant imminent danger.  Patient was continously agitated throughout hospitalization trying to get out of bed and remove BIPAP machine. Patient was given haldol 5mg and placed on maximum amounts of precedex, however patient continued to be agitated. History from family indicated that patient had history of alcohol abuse 15 years ago, however no longer drinks alcohol. Patient was placed on phenobarb, due to suspicion for alcohol withdrawal, for 130mg q5min PRN for agitation for a maximum of 1500mg/day. Patient became calmer at the time.   Echo revealed Moderate-severe mitral stenosis, severe aortic stenosis.  Mild aortic regurgitation. Patient was switched from BIPAP to nasal cannula on 4/6. Diet was then advanced to Puree with mildly thickened liquids. He was taken on precedex on 4/6 and seroquel 25mg qhs was added.   On 4/7 Patient had episode of SVT after duoneb. Pt was given adenosine 6 then 12, rhythm converted to Afib. Cardizem 20 given, then pt was placed on Cardizem gtt. Dr. Bush notified, patient also started on FD lovenox. Cardizem drip was discontinued and switched to Metoprolol 25 BID.    4/8 Medicine downgrade Patient is a 76 year old male with PMHx of HTN, CAD, active smoker, who was sent to hospital by family members due to altered mental status and generalized weakness. Given the fact that patient was on BiPAP and inability to reach family members, history obtained from chart. Per son, patient noted with generalized weakness that resolved. Then today, noted to be confused and not making sense. Son noted that patient was having difficulty breathing. Has sick contacts at home with cough. Not vaccinated for COVID-19. Patient himself was able to endorse that he is feeling feverish and chills, with cough the past couple of days. Denied any pain. Patient was admitted to ICU due to Acute hypoxic/hypercapnic respiratory failure, requiring BIPAP use, most likely COPD exacerbated by influenza AH3.   Discussions with patient's wife indicated that patient requested to be DNR/DNI even if it meant imminent danger.  Patient was continously agitated throughout hospitalization trying to get out of bed and remove BIPAP machine. Patient was given haldol 5mg and placed on maximum amounts of precedex, however patient continued to be agitated. History from family indicated that patient had history of alcohol abuse 15 years ago, however no longer drinks alcohol. Patient was placed on phenobarb, due to suspicion for alcohol withdrawal, for 130mg q5min PRN for agitation for a maximum of 1500mg/day. Patient became calmer at the time.   Echo revealed Moderate-severe mitral stenosis, severe aortic stenosis.  Mild aortic regurgitation. Patient was switched from BIPAP to nasal cannula on 4/6. Diet was then advanced to Puree with mildly thickened liquids. He was taken on precedex on 4/6 and seroquel 25mg qhs was added.   On 4/7 Patient had episode of SVT after duoneb. Pt was given adenosine 6 then 12, rhythm converted to Afib. Cardizem 20 given, then pt was placed on Cardizem gtt. Dr. Bush notified, patient also started on FD lovenox. Cardizem drip was discontinued and switched to Metoprolol 25 BID.    4/8 Medicine downgrade 76 year old male with PMHx of HTN, CAD, active smoker, who was sent to hospital by family members due to altered mental, generalized weakness, fever, chills, and difficulty breathing. Patient was admitted to ICU due to Acute hypoxic/hypercapnic respiratory failure, requiring BIPAP, likely due to COPD exacerbated by influenza AH3. s/p Tamiflu. Found to have PNA, was given IV abx. Hospital course complicated by metabolic encephalopathy with continuos agitation throughout hospitalization trying to get out of bed and remove BIPAP machine, likely due to suspicious for alcohol withdrawal and infectious etiology. s/p haldol 5mg and Precedex.    Echo revealed Moderate-severe mitral stenosis, severe aortic stenosis. Card consulted , was placed on full dose Lovenox transitioned to coumadin dosing. Pt will need OP cardiac cath, however not candidate for ischemia work up or TAVR at this time.     4/7 Patient had episode of SVT after Duoneb in ICU. s/p adenosine 6 then 12, rhythm converted to Afib. Cardizem 20 given, and was placed on Cardizem gtt after. patient also started on FD lovenox. Cardizem drip was discontinued and switched to Metoprolol 25 BID with rated controlled.  4/8 Pt downgrade to Medicine as pt tolerated 4L nc. Not requiring bipap anymore

## 2022-04-18 NOTE — PROGRESS NOTE ADULT - PROBLEM SELECTOR PROBLEM 2
Influenza due to other identified influenza virus with other respiratory manifestations COPD exacerbation

## 2022-04-18 NOTE — PROGRESS NOTE ADULT - PROBLEM SELECTOR PLAN 5
c/w PPI S/p episode of SVT after duoneb in ICU  Converted to AFib after treatment with Adenosine  S/p Cardizem gtt - HR controlled   now on Metoprolol 25 BID and was on full dose Lovenox, transitioned to coumadin  Card Dr Bush

## 2022-04-18 NOTE — PROGRESS NOTE ADULT - PROBLEM SELECTOR PLAN 4
c/w aspirin and statin   trops trended down  Will need a cath once stable either this admission or OP per Dr. Bush  s/p TTE showing severe AS/MS  started on coumadin -  INR 1.7 this am likely combination of alcohol induced encephalopathy and hospital acquired delirium   Improving, need frequent reorientation, and safety check.   s/p precedex in OCU  Avoid anticholinergics and benzodiazepines,  C/w with reorientation and enhanced supervision per nursing judgement  C/w Seroquel at night.   C/wThiamine and folic acid  c/w Melatonin likely combination of alcohol induced encephalopathy and hospital acquired delirium   Improving, need frequent reorientation, and safety check.   s/p precedex in ICU  Avoid anticholinergics and benzodiazepines,  C/w with reorientation and enhanced supervision per nursing judgement  C/w Seroquel at night.   C/w Thiamine and folic acid  c/w Melatonin

## 2022-04-18 NOTE — PROGRESS NOTE ADULT - PROBLEM SELECTOR PLAN 1
saturating 98% on 2L  c/w bronchodilators  f/u OP pulm for PFTs and COPD management. 2/2 COPD exacerbated by influenza and PNa  S/p ICU admission requiring bipap   now saturating well on 2 L NC  s/p abx and Tamiflu  c/w bronchodilators  f/u OP pulm for PFTs and COPD management.  pulm Dr Kate

## 2022-04-18 NOTE — PROGRESS NOTE ADULT - PROBLEM SELECTOR PROBLEM 3
Paroxysmal SVT (supraventricular tachycardia) Influenza due to other identified influenza virus with other respiratory manifestations

## 2022-04-18 NOTE — PROGRESS NOTE ADULT - SUBJECTIVE AND OBJECTIVE BOX
Patient is a 76y old  Male who presents with a chief complaint of Acute hypoxic hypercapnic respiratory failure (18 Apr 2022 11:55)    INTERVAL HPI/OVERNIGHT EVENTS: pt mildly confused and climbing out of bed- per nursing. pt converses well, able to have needs met. answers questions.    REVIEW OF SYSTEMS:   CONSTITUTIONAL: No fever, chills  ENMT:  No difficulty hearing, no change in vision  NECK: No pain or stiffness  RESPIRATORY: No cough, SOB  CARDIOVASCULAR: No chest pain, palpitations  GASTROINTESTINAL: No abdominal pain. No nausea, vomiting, or diarrhea  GENITOURINARY: No dysuria  NEUROLOGICAL: No HA  SKIN: No itching, burning, rashes, or lesions   LYMPH NODES: No enlarged glands  ENDOCRINE: No heat or cold intolerance; No hair loss  MUSCULOSKELETAL: No joint pain or swelling; No muscle, back, or extremity pain  PSYCHIATRIC: No depression, anxiety  HEME/LYMPH: No easy bruising, or bleeding gums    T(C): 36.2 (04-18-22 @ 06:25), Max: 36.4 (04-17-22 @ 13:07)  HR: 93 (04-18-22 @ 09:24) (88 - 107)  BP: 81/46 (04-18-22 @ 09:24) (81/46 - 136/70)  RR: 18 (04-18-22 @ 06:25) (18 - 20)  SpO2: 99% (04-18-22 @ 09:24) (94% - 100%)  Wt(kg): --Vital Signs Last 24 Hrs  T(C): 36.2 (18 Apr 2022 06:25), Max: 36.4 (17 Apr 2022 13:07)  T(F): 97.1 (18 Apr 2022 06:25), Max: 97.6 (17 Apr 2022 13:07)  HR: 93 (18 Apr 2022 09:24) (88 - 107)  BP: 81/46 (18 Apr 2022 09:24) (81/46 - 136/70)  BP(mean): 54 (18 Apr 2022 09:24) (54 - 92)  RR: 18 (18 Apr 2022 06:25) (18 - 20)  SpO2: 99% (18 Apr 2022 09:24) (94% - 100%)    PHYSICAL EXAM:  GENERAL: NAD  EYES: clear conjunctiva; EOMI  ENMT: Moist mucous membranes  NECK: Supple, No JVD, Normal thyroid  CHEST/LUNG: Clear to auscultation bilaterally; No rales, rhonchi, wheezing, or rubs  HEART: S1, S2, Regular rate and rhythm  ABDOMEN: Soft, Nontender, Nondistended; Bowel sounds present  NEURO: Alert & awake X1-2  EXTREMITIES: No LE edema, no calf tenderness  SKIN: No rashes or lesions    Consultant(s) Notes Reviewed:  [x ] YES  [ ] NO  Care Discussed with Consultants/Other Providers [ x] YES  [ ] NO    LABS:                        13.0   9.65  )-----------( 483      ( 18 Apr 2022 08:21 )             41.3     04-18    140  |  103  |  21<H>  ----------------------------<  134<H>  4.3   |  34<H>  |  0.80    Ca    10.0      18 Apr 2022 08:21  Phos  2.9     04-17  Mg     2.2     04-17    TPro  8.1  /  Alb  2.3<L>  /  TBili  0.3  /  DBili  x   /  AST  31  /  ALT  53  /  AlkPhos  88  04-17    PT/INR - ( 18 Apr 2022 08:21 )   PT: 23.8 sec;   INR: 1.99 ratio         PTT - ( 17 Apr 2022 10:54 )  PTT:50.6 sec  CAPILLARY BLOOD GLUCOSE      POCT Blood Glucose.: 225 mg/dL (18 Apr 2022 11:28)  POCT Blood Glucose.: 130 mg/dL (18 Apr 2022 07:31)  POCT Blood Glucose.: 223 mg/dL (17 Apr 2022 21:23)  POCT Blood Glucose.: 126 mg/dL (17 Apr 2022 16:57)      RADIOLOGY & ADDITIONAL TESTS:    Imaging Personally Reviewed:  [ x] YES  [ ] NO  < from: Xray Chest 1 View-PORTABLE IMMEDIATE (Xray Chest 1 View-PORTABLE IMMEDIATE .) (04.12.22 @ 14:37) >    ACC: [id number] EXAM:  XR CHEST PORTABLE IMMED 1V                          PROCEDURE DATE:  04/12/2022          INTERPRETATION:  Portable chest radiograph    CLINICAL INFORMATION: Dyspnea, shortness of breath.    TECHNIQUE:  Portable  AP chest radiograph.    COMPARISON: 4/6/2022 chest .    FINDINGS: Battery device obscures RIGHT upper zone. CATHETERS AND TUBES:   None    PULMONARY: The visualized lungs are clear of airspace consolidations or   effusions.   No pneumothorax.    HEART/VASCULAR: The heart and mediastinum size and configuration are   within normal limits.    BONES: Visualized osseous structures are intact.    IMPRESSION:   No radiographic evidence of active chest disease.    --- End of Report ---            MAYA DING MD; Attending Radiologist  This document has been electronically signed. Apr 15 2022  2:03PM    < end of copied text >  < from: Xray Chest 1 View- PORTABLE-Routine (Xray Chest 1 View- PORTABLE-Routine .) (04.06.22 @ 14:21) >  ACC: [id number] EXAM:  XR CHEST PORTABLE ROUTINE 1V                          PROCEDURE DATE:  04/06/2022          INTERPRETATION:  Short of breath.    AP chest. Prior 4/3/2022.    IMPRESSION:  Right costophrenic angle excluded. No change heart mediastinum. Mild   nonspecific bibasilar interstitial prominence probably congestive. No   focal consolidation or pleural effusion. Degenerative change right   shoulder.          --- End of Report ---            PARUL LOGAN MD; Attending Radiologist  This document has been electronically signed. Apr 7 2022  1:40PM    < end of copied text >

## 2022-04-18 NOTE — PROGRESS NOTE ADULT - NUTRITIONAL ASSESSMENT
This patient has been assessed with a concern for Malnutrition and has been determined to have a diagnosis/diagnoses of Moderate protein-calorie malnutrition.    This patient is being managed with:   Diet Minced and Moist-  Consistent Carbohydrate {No Snacks}  DASH/TLC {Sodium & Cholesterol Restricted}  Supplement Feeding Modality:  Oral  Glucerna Shake Cans or Servings Per Day:  1       Frequency:  Three Times a day  Entered: Apr 12 2022  1:38PM     This patient has been assessed with a concern for Malnutrition and has been determined to have a diagnosis/diagnoses of Moderate protein-calorie malnutrition.    This patient is being managed with:   Diet Minced and Moist-  Consistent Carbohydrate {No Snacks}  DASH/TLC {Sodium & Cholesterol Restricted}  Supplement Feeding Modality:  Oral  Glucerna Shake Cans or Servings Per Day:  1       Frequency:  Three Times a day  Entered: Apr 12 2022  1:38PM

## 2022-04-19 NOTE — PROGRESS NOTE ADULT - ASSESSMENT
76 year old male with PMHx of HTN, CAD, active smoker, who was sent to hospital by family members due to altered mental, generalized weakness, fever, chills, and difficulty breathing. Patient was admitted to ICU due to Acute hypoxic/hypercapnic respiratory failure, requiring BIPAP, likely due to COPD exacerbated by influenza AH3. s/p Tamiflu. Found to have PNA, was given IV abx. Hospital course complicated by metabolic encephalopathy with continuos agitation throughout hospitalization trying to get out of bed and remove BIPAP machine, likely due to suspicious for alcohol withdrawal and infectious etiology. s/p haldol 5mg and Precedex.    Echo revealed Moderate-severe mitral stenosis, severe aortic stenosis. Card consulted , was placed on full dose Lovenox transitioned to coumadin dosing. Pt will need OP cardiac cath, however not candidate for ischemia work up or TAVR at this time.     4/7 Patient had episode of SVT after Duoneb in ICU. s/p adenosine 6 then 12, rhythm converted to Afib. Cardizem 20 given, and was placed on Cardizem gtt after. patient also started on FD lovenox. Cardizem drip was discontinued and switched to Metoprolol 25 BID with rated controlled.  4/8 Pt downgrade to Medicine as pt tolerated 4L nc. Not requiring bipap anymore

## 2022-04-19 NOTE — PROGRESS NOTE ADULT - PROBLEM SELECTOR PLAN 4
likely combination of alcohol induced encephalopathy and hospital acquired delirium   Improving, need frequent reorientation, and safety check.   s/p precedex in ICU  Avoid anticholinergics and benzodiazepines,  C/w with reorientation and enhanced supervision per nursing judgement  C/w Seroquel at night.   C/w Thiamine and folic acid  c/w Melatonin

## 2022-04-19 NOTE — PROGRESS NOTE ADULT - SUBJECTIVE AND OBJECTIVE BOX
Time of Visit:  Patient seen and examined.     MEDICATIONS  (STANDING):  aluminum hydroxide/magnesium hydroxide/simethicone Suspension 30 milliLiter(s) Oral every 6 hours  aspirin  chewable 81 milliGRAM(s) Oral daily  atorvastatin 40 milliGRAM(s) Oral at bedtime  budesonide 160 MICROgram(s)/formoterol 4.5 MICROgram(s) Inhaler 2 Puff(s) Inhalation two times a day  folic acid 1 milliGRAM(s) Oral daily  gabapentin 100 milliGRAM(s) Oral three times a day  insulin glargine Injectable (LANTUS) 7 Unit(s) SubCutaneous at bedtime  insulin lispro (ADMELOG) corrective regimen sliding scale   SubCutaneous Before meals and at bedtime  metoprolol tartrate 25 milliGRAM(s) Oral two times a day  nicotine - 21 mG/24Hr(s) Patch 1 patch Transdermal daily  pantoprazole    Tablet 40 milliGRAM(s) Oral before breakfast  polyethylene glycol 3350 17 Gram(s) Oral daily  QUEtiapine 25 milliGRAM(s) Oral at bedtime  thiamine 100 milliGRAM(s) Oral daily  tiotropium 18 MICROgram(s) Capsule 1 Capsule(s) Inhalation daily  tiotropium 18 MICROgram(s) Capsule 1 Capsule(s) Inhalation daily  warfarin 7.5 milliGRAM(s) Oral once      MEDICATIONS  (PRN):  melatonin 3 milliGRAM(s) Oral at bedtime PRN Insomnia  senna 1 Tablet(s) Oral daily PRN Constipation       Medications up to date at time of exam.      PHYSICAL EXAMINATION:  Patient has no new complaints.  GENERAL: The patient is a well-developed, well-nourished, in no apparent distress.     Vital Signs Last 24 Hrs  T(C): 36.2 (19 Apr 2022 12:05), Max: 37.1 (19 Apr 2022 05:40)  T(F): 97.2 (19 Apr 2022 12:05), Max: 98.7 (19 Apr 2022 05:40)  HR: 95 (19 Apr 2022 17:05) (66 - 112)  BP: 138/60 (19 Apr 2022 17:05) (116/83 - 138/60)  BP(mean): 79 (19 Apr 2022 17:05) (79 - 79)  RR: 18 (19 Apr 2022 17:05) (18 - 20)  SpO2: 97% (19 Apr 2022 17:05) (97% - 100%)   (if applicable)    Chest Tube (if applicable)    HEENT: Head is normocephalic and atraumatic. Extraocular muscles are intact. Mucous membranes are moist.     NECK: Supple, no palpable adenopathy.    LUNGS: Clear to auscultation, no wheezing, rales, or rhonchi.    HEART: Regular rate and rhythm without murmur.    ABDOMEN: Soft, nontender, and nondistended.  No hepatosplenomegaly is noted.    : No painful voiding, no pelvic pain    EXTREMITIES: Without any cyanosis, clubbing, rash, lesions or edema.    NEUROLOGIC: Awake, alert, oriented, grossly intact    SKIN: Warm, dry, good turgor.      LABS:                        12.3   9.41  )-----------( 491      ( 19 Apr 2022 08:04 )             38.8     04-19    138  |  101  |  20<H>  ----------------------------<  148<H>  4.4   |  30  |  0.85    Ca    9.7      19 Apr 2022 08:04      PT/INR - ( 19 Apr 2022 08:04 )   PT: 20.0 sec;   INR: 1.67 ratio                             MICROBIOLOGY: (if applicable)    RADIOLOGY & ADDITIONAL STUDIES:  EKG:   CXR:  ECHO:    IMPRESSION: 76y Male PAST MEDICAL & SURGICAL HISTORY:  HTN (hypertension)    Hyperlipemia    COPD (chronic obstructive pulmonary disease)    DM (diabetes mellitus)    Asthma    Status post spinal surgery  lumbar    Encounter for screening colonoscopy    H/O endoscopy     p/w           IMP: IMP: This is a  76 year old [place] citizen man active smoker , drinks alcohol daily  with  HTN, CAD, who was sent to hospital by family members due to altered mental status and generalized weakness. . Patient was admitted to ICU due to Acute hypoxic/hypercapnic respiratory failure, requiring BIPAP use, most likely COPD exacerbated by influenza AH3. .   Echo revealed Moderate-severe mitral stenosis, severe aortic stenosis.  On 4/7 Patient had episode of SVT after duoneb. Pt was given adenosine 6 then 12, rhythm converted to Afib. Cardizem 20 given, then pt was placed on Cardizem gtt. Dr. Bush notified, patient also started on therapeutic  lovenox. Cardizem drip was discontinued and switched to Metoprolol 25 BID.  Pat probable has undiagnosed COPD from active smoking       Sugg  - Cards f/u noted .. not a candidate for ischemic work up at this time   - Continue symbicort   - Spiriva daily   - Advise to stop smoking and alcohol use  - Thiamine / folate   - Therapeutic anticoag for afib   - Coumadin : goal INR 2-3   - Out pat pulmo f/u   - Nicotine patch   - Out pat pulmonary f/u with my office  - For SHAMAR  - Wife at bedside

## 2022-04-19 NOTE — CHART NOTE - NSCHARTNOTESELECT_GEN_ALL_CORE
Nutrition Services
Nutrition Services
Event Note
Nutrition Services
Transfer Note

## 2022-04-19 NOTE — CHART NOTE - NSCHARTNOTEFT_GEN_A_CORE
Assessment:   76yMalePatient is a 76y old  Male who presents with a chief complaint of Acute hypoxic hypercapnic respiratory failure (19 Apr 2022 10:49) Pt Visited. Pt is alert but confused. Wife at bedside. D/W Wife at bedside. Wife brings ethenic food from Home. Pt is  on Glucerna shakes TID. Food choices updated. Appetite is fair. Pt DG from 5 N to 4 N on 4/15.      Factors impacting intake: [ ] none [ ] nausea  [ ] vomiting [ ] diarrhea [ ] constipation  [ ]chewing problems [ ] swallowing issues  [ ] other:     Diet Prescription:  Minced and Moist DASH / TLC Diabetic  Intake:     Current Weight:   % Weight Change Bed scale 144 lb    Pertinent Medications: MEDICATIONS  (STANDING):  aluminum hydroxide/magnesium hydroxide/simethicone Suspension 30 milliLiter(s) Oral every 6 hours  aspirin  chewable 81 milliGRAM(s) Oral daily  atorvastatin 40 milliGRAM(s) Oral at bedtime  budesonide 160 MICROgram(s)/formoterol 4.5 MICROgram(s) Inhaler 2 Puff(s) Inhalation two times a day  folic acid 1 milliGRAM(s) Oral daily  gabapentin 100 milliGRAM(s) Oral three times a day  insulin glargine Injectable (LANTUS) 7 Unit(s) SubCutaneous at bedtime  insulin lispro (ADMELOG) corrective regimen sliding scale   SubCutaneous Before meals and at bedtime  metoprolol tartrate 25 milliGRAM(s) Oral two times a day  nicotine - 21 mG/24Hr(s) Patch 1 patch Transdermal daily  pantoprazole    Tablet 40 milliGRAM(s) Oral before breakfast  polyethylene glycol 3350 17 Gram(s) Oral daily  QUEtiapine 25 milliGRAM(s) Oral at bedtime  thiamine 100 milliGRAM(s) Oral daily  tiotropium 18 MICROgram(s) Capsule 1 Capsule(s) Inhalation daily  tiotropium 18 MICROgram(s) Capsule 1 Capsule(s) Inhalation daily  warfarin 7.5 milliGRAM(s) Oral once    MEDICATIONS  (PRN):  melatonin 3 milliGRAM(s) Oral at bedtime PRN Insomnia  senna 1 Tablet(s) Oral daily PRN Constipation    Pertinent Labs: 04-19 Na138 mmol/L Glu 148 mg/dL<H> K+ 4.4 mmol/L Cr  0.85 mg/dL BUN 20 mg/dL<H> 04-17 Phos 2.9 mg/dL 04-17 Alb 2.3 g/dL<L> 04-05 Chol 139 mg/dL LDL --    HDL 48 mg/dL Trig 79 mg/dL     CAPILLARY BLOOD GLUCOSE      POCT Blood Glucose.: 153 mg/dL (19 Apr 2022 11:43)  POCT Blood Glucose.: 157 mg/dL (19 Apr 2022 07:54)  POCT Blood Glucose.: 167 mg/dL (18 Apr 2022 21:19)  POCT Blood Glucose.: 243 mg/dL (18 Apr 2022 16:47)    Skin:     Estimated Needs:   [ ] no change since previous assessment  [ ] recalculated:     Previous Nutrition Diagnosis:   [ ] Inadequate Energy Intake [ ]Inadequate Oral Intake [ ] Excessive Energy Intake   [ ] Underweight [ ] Increased Nutrient Needs [ ] Overweight/Obesity   [ ] Altered GI Function [ ] Unintended Weight Loss [ ] Food & Nutrition Related Knowledge Deficit [ x] Malnutrition Moderate     Nutrition Diagnosis is [x] ongoing  [ ] resolved [ ] not applicable     New Nutrition Diagnosis: [ ] not applicable       Interventions:   Recommend  [ ] Change Diet To:  [x ] Nutrition Supplement   Continue with Glucerguy deleon TILUISA  [ ] Nutrition Support  [ ] Other:     Monitoring and Evaluation:   [ ] PO intake [ x ] Tolerance to diet prescription [ x ] weights [ x ] labs[ x ] follow up per protocol  [ ] other:

## 2022-04-19 NOTE — PROGRESS NOTE ADULT - PROBLEM SELECTOR PLAN 1
2/2 COPD exacerbated by influenza and PNa  S/p ICU admission requiring bipap   now saturating well on 2 L NC  s/p abx and Tamiflu  c/w bronchodilators  f/u OP pulm for PFTs and COPD management.  pulm Dr Kate

## 2022-04-19 NOTE — PROGRESS NOTE ADULT - NS ATTEND AMEND GEN_ALL_CORE FT
Patient was seen and examined with wife at bedside. Reports feeling better everyday, wants to go to rehab. RN reports patient is confused and trying to get out of bed, but patient and his wife reports he likes to hang his legs down due to neuropathy. Patient was counselled about risk of fall. Understands.     Vital Signs Last 24 Hrs  T(C): 36.2 (19 Apr 2022 12:05), Max: 37.1 (19 Apr 2022 05:40)  T(F): 97.2 (19 Apr 2022 12:05), Max: 98.7 (19 Apr 2022 05:40)  HR: 66 (19 Apr 2022 12:05) (66 - 112)  BP: 116/83 (19 Apr 2022 12:05) (116/83 - 124/66)  RR: 18 (19 Apr 2022 12:05) (18 - 20)  SpO2: 100% (19 Apr 2022 12:05) (98% - 100%)    P/E:  NAD  AAOx2, no focal deficit, overall muscle strength 4/5 due to deconditioning   CTABL, no crepitations or wheezing appreciated today  S1S2 WNL, + murmur  Abd soft, non distended, BS present; right lower abdomen hard lump likely injection Lovenox related  BL LE no edema or calf tenderness     Labs:                        12.3   9.41  )-----------( 491      ( 19 Apr 2022 08:04 )             38.8   04-19    138  |  101  |  20<H>  ----------------------------<  148<H>  4.4   |  30  |  0.85    Ca    9.7      19 Apr 2022 08:04      A/P:  -Acute hypoxic respiratory failure: Cont NC as needed, titrate down as tolerated, will cont current COPD management   -COPD: cont Spiriva, albuterol and Symbicort  -Influenza: Tamiflu completed  -Valvular Afib: INR 1.99, Warfarin 5mg tonight, cont Metoprolol  -Severe AS: will need right and left heart cath, outpatient follow up with cardiology, discussed above with Dr. Bush  -Acute metabolic encephalopathy: improving, possibly multifactorial due to dementia, alcohol induced encephalopathy and hospital acquired delirium due to hospital stay, improved. Avoid anticholinergics, benzodiazepines, limit lines/tubes/tethers/restraints, encourage frequent reorientation/reassurance by staff, encourage good sleep hygiene, adequate lighting. Cont Seroquel at night. Cont Thiamine and folic acid, cont Melatonin  - Moderate Protein calorie malnutrition: PO intake improving as per wife, Maalox for dyspepsia  - PT eval SHAMAR  - CM and SW for safe discharge. Patient was seen and examined with wife at bedside. Reports feeling better everyday, wants to go to rehab. RN reports patient is confused and trying to get out of bed, but patient and his wife reports he likes to hang his legs down due to neuropathy. Patient was counselled about risk of fall. Understands.     Vital Signs Last 24 Hrs  T(C): 36.2 (19 Apr 2022 12:05), Max: 37.1 (19 Apr 2022 05:40)  T(F): 97.2 (19 Apr 2022 12:05), Max: 98.7 (19 Apr 2022 05:40)  HR: 66 (19 Apr 2022 12:05) (66 - 112)  BP: 116/83 (19 Apr 2022 12:05) (116/83 - 124/66)  RR: 18 (19 Apr 2022 12:05) (18 - 20)  SpO2: 100% (19 Apr 2022 12:05) (98% - 100%)    P/E:  NAD  AAOx2, no focal deficit, overall muscle strength 4/5 due to deconditioning   CTABL, no crepitations or wheezing appreciated today  S1S2 WNL, + murmur  Abd soft, non distended, BS present; right lower abdomen hard lump likely injection Lovenox related  BL LE no edema or calf tenderness     Labs:                        12.3   9.41  )-----------( 491      ( 19 Apr 2022 08:04 )             38.8   04-19    138  |  101  |  20<H>  ----------------------------<  148<H>  4.4   |  30  |  0.85    Ca    9.7      19 Apr 2022 08:04      A/P:  -Acute hypoxic respiratory failure: Cont NC as needed, titrate down as tolerated, will cont current COPD management   -COPD: cont Spiriva, albuterol and Symbicort  -Influenza: Tamiflu completed  -Valvular Afib: INR 1.7; subtherapeutic; increased Warfarin 7.5 mg tonight, cont Metoprolol for rate control  -Severe AS: will need right and left heart cath, outpatient follow up with cardiology, discussed above with Dr. Bush  -Acute metabolic encephalopathy: improving, possibly multifactorial due to dementia, alcohol induced encephalopathy and hospital acquired delirium due to hospital stay, improved. Avoid anticholinergics, benzodiazepines, limit lines/tubes/tethers/restraints, encourage frequent reorientation/reassurance by staff, encourage good sleep hygiene, adequate lighting. Cont Seroquel at night. Cont Thiamine and folic acid, cont Melatonin  - Moderate Protein calorie malnutrition: PO intake improving as per wife, Maalox for dyspepsia  - PT eval SHAMAR  - CM and SW for safe discharge. Patient was seen and examined this afternoon.     Patient is comfortable in bed; not agitated; not in acute distress. denies any complaints. able to move extremities. appears weak    Vital Signs Last 24 Hrs  T(C): 36.2 (19 Apr 2022 12:05), Max: 37.1 (19 Apr 2022 05:40)  T(F): 97.2 (19 Apr 2022 12:05), Max: 98.7 (19 Apr 2022 05:40)  HR: 66 (19 Apr 2022 12:05) (66 - 112)  BP: 116/83 (19 Apr 2022 12:05) (116/83 - 124/66)  RR: 18 (19 Apr 2022 12:05) (18 - 20)  SpO2: 100% (19 Apr 2022 12:05) (98% - 100%)    P/E:  NAD  AAOx2, no focal deficit, overall muscle strength 4/5 due to deconditioning   Resp: CTABL, no crepitations or wheezing noted  CVS: S1S2 WNL, + murmur  Abd soft, non distended, BS present; right lower abdomen hard lump likely injection Lovenox related  BL LE no edema or calf tenderness     Labs:                      12.3   9.41  )-----------( 491      ( 19 Apr 2022 08:04 )             38.8   04-19  138  |  101  |  20<H>  ----------------------------<  148<H>  4.4   |  30  |  0.85  Ca    9.7      19 Apr 2022 08:04      A/P:  -Acute hypoxic respiratory failure: Cont NC as needed, titrate down as tolerated, will cont current COPD management   -COPD: cont Spiriva, albuterol and Symbicort  -Influenza: Tamiflu completed  -Valvular Afib: INR 1.7; subtherapeutic today; increased Warfarin 7.5 mg tonight, cont Metoprolol for rate control  -Severe AS: will need right and left heart cath, outpatient follow up with cardiology, [place] per Dr. Bush  -Acute metabolic encephalopathy: improving, possibly multifactorial due to dementia, alcohol induced encephalopathy and hospital acquired delirium due to hospital stay, improved. Avoid anticholinergics, benzodiazepines, limit lines/tubes/tethers/restraints, encourage frequent reorientation/reassurance by staff, encourage good sleep hygiene, adequate lighting. Cont Seroquel at night.   Cont Thiamine and folic acid, cont Melatonin  - Moderate Protein calorie malnutrition: PO intake improving as per wife, Maalox for dyspepsia  -Tobacco use disorder  - PT eval SHAMAR  - CM and  for safe discharge.    Patient is medically stable for discharge to [facility] at this time.   Discussed with ESTHER Nassar and CLAUDY Akins

## 2022-04-19 NOTE — DISCHARGE NOTE PROVIDER - CARE PROVIDER_API CALL
PCP, PCP  Phone: (   )    -  Fax: (   )    -  Follow Up Time: 1 week   PCP, PCP  Phone: (   )    -  Fax: (   )    -  Follow Up Time: 1 week    Casper Bush)  Cardiovascular Disease  [facility] [address]  Phone: [phone]  Fax: [phone]  Follow Up Time: 2 weeks   Casper Bush)  Cardiovascular Disease  [address] [facility], [address]  [place] [address]  Phone: [phone]  Fax: [phone]  Follow Up Time: 2 weeks    PCP, PCP  Phone: (   )    -  Fax: (   )    -  Follow Up Time: 1 week    Rupesh Ridley  INTERNAL MEDICINE  [address] [facility], [address]  [place] [address]  Phone: [phone]  Fax: [phone]  Follow Up Time: 1 week   Casper Bush)  Cardiovascular Disease  [address] [facility], [address]  [place] [address]  Phone: [phone]  Fax: [phone]  Follow Up Time: 2 weeks    Rupesh Ridley  INTERNAL MEDICINE  [address] [facility], [address]  [place] [address]  Phone: [phone]  Fax: [phone]  Follow Up Time: 1 week    PCP, PCP  Phone: (   )    -  Fax: (   )    -  Follow Up Time: 1 week    Brayan Kate)  Internal Medicine  [address] [facility], [address]  [place] [address]  Phone: [phone]  Fax: [phone]  Follow Up Time: 2 weeks

## 2022-04-19 NOTE — DISCHARGE NOTE PROVIDER - NSDCFUADDINST_GEN_ALL_CORE_FT
YOU WILL BE FOLLOWED BY DR. NOLAN IN FACILITY;  MONITOR LABS TWICE A WEEK  TITRATE UP GABAPENTIN TO THREE TIMES A DAY IF NOT TOO SEDATED  COUMADIN 7.5 MG TONIGHT (4/21/22); STARTING TOMORROW 6 MG; FOLLOW UP INR [facility] TO 2.0 -2.5

## 2022-04-19 NOTE — DISCHARGE NOTE PROVIDER - NPI NUMBER (FOR SYSADMIN USE ONLY) :
[UNKNOWN] [UNKNOWN],[[id number]] [[id number]],[UNKNOWN],[[id number]] [[id number]],[[id number]],[UNKNOWN],[[id number]]

## 2022-04-19 NOTE — DISCHARGE NOTE PROVIDER - PROVIDER TOKENS
FREE:[LAST:[PCP],FIRST:[PCP],PHONE:[(   )    -],FAX:[(   )    -],FOLLOWUP:[1 week]] FREE:[LAST:[PCP],FIRST:[PCP],PHONE:[(   )    -],FAX:[(   )    -],FOLLOWUP:[1 week]],PROVIDER:[TOKEN:[[id number]],FOLLOWUP:[2 weeks]] PROVIDER:[TOKEN:[[id number]],FOLLOWUP:[2 weeks]],FREE:[LAST:[PCP],FIRST:[PCP],PHONE:[(   )    -],FAX:[(   )    -],FOLLOWUP:[1 week]],PROVIDER:[TOKEN:[[id number]],FOLLOWUP:[1 week]] PROVIDER:[TOKEN:[[id number]],FOLLOWUP:[2 weeks]],PROVIDER:[TOKEN:[[id number]],FOLLOWUP:[1 week]],FREE:[LAST:[PCP],FIRST:[PCP],PHONE:[(   )    -],FAX:[(   )    -],FOLLOWUP:[1 week]],PROVIDER:[TOKEN:[[id number]],FOLLOWUP:[2 weeks]]

## 2022-04-19 NOTE — CHART NOTE - NSCHARTNOTEFT_GEN_A_CORE
EVENT:  4/19/22, 12;20am, patient c/o generalized body pain likely due to prolong bed rest and degenerative joints disease. Requested Tylenol.     HPI: Patient is a 76 year old male with PMHx of HTN, CAD, active smoker, who was sent to hospital by family members due to altered mental status and generalized weakness. Given the fact that patient was on BiPAP and inability to reach family members, history obtained from chart. Per son, patient noted with generalized weakness that resolved. Then today, noted to be confused and not making sense. Son noted that patient was having difficulty breathing. Has sick contacts at home with cough. Not vaccinated for COVID-19. Patient himself was able to endorse that he is feeling feverish and chills, with cough the past couple of days. Denied any pain. Attempted to reach son for collateral information, however unsuccessful.  OBJECTIVE:  Vital Signs Last 24 Hrs  T(C): 36.7 (18 Apr 2022 21:26), Max: 36.7 (18 Apr 2022 21:26)  T(F): 98 (18 Apr 2022 21:26), Max: 98 (18 Apr 2022 21:26)  HR: 112 (18 Apr 2022 21:26) (88 - 112)  BP: 123/61 (18 Apr 2022 21:26) (81/46 - 136/70)  BP(mean): 54 (18 Apr 2022 09:24) (54 - 92)  RR: 20 (18 Apr 2022 21:26) (18 - 20)  SpO2: 98% (18 Apr 2022 21:26) (98% - 100%)    FOCUSED PHYSICAL EXAM:    LABS:                        13.0   9.65  )-----------( 483      ( 18 Apr 2022 08:21 )             41.3     04-18    140  |  103  |  21<H>  ----------------------------<  134<H>  4.3   |  34<H>  |  0.80    Ca    10.0      18 Apr 2022 08:21  Phos  2.9     04-17  Mg     2.2     04-17    TPro  8.1  /  Alb  2.3<L>  /  TBili  0.3  /  DBili  x   /  AST  31  /  ALT  53  /  AlkPhos  88  04-17      EKG:   IMGAGING:    ASSESSMENT:  HPI:  Patient is a 76y old  Male who presents with a chief complaint of SOB    HPI: Patient is a 76 year old male with PMHx of HTN, CAD, active smoker, who was sent to hospital by family members due to altered mental status and generalized weakness. Given the fact that patient was on BiPAP and inability to reach family members, history obtained from chart. Per son, patient noted with generalized weakness that resolved. Then today, noted to be confused and not making sense. Son noted that patient was having difficulty breathing. Has sick contacts at home with cough. Not vaccinated for COVID-19. Patient himself was able to endorse that he is feeling feverish and chills, with cough the past couple of days. Denied any pain. Attempted to reach son for collateral information, however unsuccessful.    ICU Vital Signs Last 24 Hrs  T(C): 37.2 (04 Apr 2022 00:21), Max: 38.3 (03 Apr 2022 22:11)  T(F): 99 (04 Apr 2022 00:21), Max: 100.9 (03 Apr 2022 22:11)  HR: 106 (04 Apr 2022 02:52) (72 - 137)  BP: 102/86 (04 Apr 2022 01:42) (102/86 - 162/74)  BP(mean): --  ABP: --  ABP(mean): --  RR: 21 (04 Apr 2022 02:52) (20 - 28)  SpO2: 97% (04 Apr 2022 02:52) (84% - 100%)    I&O's Summary        LABS:                        14.6   7.24  )-----------( 185      ( 03 Apr 2022 22:44 )             44.0     04-03    133<L>  |  99  |  33<H>  ----------------------------<  250<H>  5.3   |  30  |  1.14    Ca    8.3<L>      03 Apr 2022 22:44    TPro  8.3  /  Alb  3.1<L>  /  TBili  0.2  /  DBili  x   /  AST  46<H>  /  ALT  45  /  AlkPhos  93  04-03    PT/INR - ( 03 Apr 2022 22:44 )   PT: 11.9 sec;   INR: 1.00 ratio         PTT - ( 03 Apr 2022 22:44 )  PTT:34.8 sec    CAPILLARY BLOOD GLUCOSE        ABG - ( 04 Apr 2022 02:13 )  pH, Arterial: 7.28  pH, Blood: x     /  pCO2: 61    /  pO2: 84    / HCO3: 29    / Base Excess: 0.5   /  SaO2    PLAN:   - Acetaminophen (Tylenol) 650 mg po x 1 dose for pain.     FOLLOW UP / RESULT:   - reassess patient pain level,   - Continue supportive care,   - Maintain patient safety and comfort.

## 2022-04-19 NOTE — PROGRESS NOTE ADULT - PROBLEM SELECTOR PLAN 5
S/p episode of SVT after duoneb in ICU  Converted to AFib after treatment with Adenosine  S/p Cardizem gtt - HR controlled   now on Metoprolol 25 BID and was on full dose Lovenox, transitioned to coumadin  Card Dr Buhs

## 2022-04-19 NOTE — DISCHARGE NOTE PROVIDER - NSDCMRMEDTOKEN_GEN_ALL_CORE_FT
albuterol 90 mcg/inh inhalation aerosol: 2 puff(s) inhaled every 4 hours, As Needed prn sob  aspirin 81 mg oral tablet, chewable: 2 tab(s) orally once a day  budesonide-formoterol 80 mcg-4.5 mcg/inh inhalation aerosol: 1 application inhaled 2 times a day,  gabapentin 600 mg oral tablet: 1 tab(s) orally 3 times a day MDD:3/day  glimepiride 4 mg oral tablet: 1 tab(s) orally 2 times a day  losartan 25 mg oral tablet: 1 tab(s) orally once a day   metFORMIN 1000 mg oral tablet: 1 tab(s) orally 2 times a day  nebulizer machine: 1 application 4 times a day 1 Nebulizer machine. Use as directed.  nicotine 14 mg/24 hr transdermal film, extended release: 1 application transdermal once a day  simvastatin 20 mg oral tablet: 1 tab(s) orally once a day (at bedtime)   albuterol 90 mcg/inh inhalation aerosol: 2 puff(s) inhaled every 4 hours, As Needed prn sob  aluminum hydroxide-magnesium hydroxide 200 mg-200 mg/5 mL oral suspension: 30 milliliter(s) orally every 6 hours  aspirin 81 mg oral tablet, chewable: 2 tab(s) orally once a day  atorvastatin 40 mg oral tablet: 1 tab(s) orally once a day (at bedtime)  budesonide-formoterol 80 mcg-4.5 mcg/inh inhalation aerosol: 1 application inhaled 2 times a day,  cholecalciferol oral tablet: 1000 unit(s) orally once a day  folic acid 1 mg oral tablet: 1 tab(s) orally once a day  gabapentin 600 mg oral tablet: 1 tab(s) orally 3 times a day MDD:3/day  glimepiride 4 mg oral tablet: 1 tab(s) orally 2 times a day  losartan 25 mg oral tablet: 1 tab(s) orally once a day   melatonin 3 mg oral tablet: 1 tab(s) orally once a day (at bedtime), As needed, Insomnia  metFORMIN 1000 mg oral tablet: 1 tab(s) orally 2 times a day  metoprolol tartrate 25 mg oral tablet: 1 tab(s) orally 2 times a day  nicotine 14 mg/24 hr transdermal film, extended release: 1 application transdermal once a day  pantoprazole 40 mg oral delayed release tablet: 1 tab(s) orally once a day (before a meal)  polyethylene glycol 3350 oral powder for reconstitution: 17 gram(s) orally once a day  QUEtiapine 25 mg oral tablet: 1 tab(s) orally once a day (at bedtime)  senna oral tablet: 1 tab(s) orally once a day, As needed, Constipation  thiamine 100 mg oral tablet: 1 tab(s) orally once a day  tiotropium 18 mcg inhalation capsule: 1 cap(s) inhaled once a day  warfarin 7.5 mg oral tablet: 1 tab(s) orally once   albuterol 90 mcg/inh inhalation aerosol: 2 puff(s) inhaled every 4 hours, As Needed prn sob  aluminum hydroxide-magnesium hydroxide 200 mg-200 mg/5 mL oral suspension: 30 milliliter(s) orally every 6 hours  aspirin 81 mg oral tablet, chewable: 2 tab(s) orally once a day  atorvastatin 40 mg oral tablet: 1 tab(s) orally once a day (at bedtime)  budesonide-formoterol 80 mcg-4.5 mcg/inh inhalation aerosol: 1 application inhaled 2 times a day,  cholecalciferol oral tablet: 1000 unit(s) orally once a day  folic acid 1 mg oral tablet: 1 tab(s) orally once a day  gabapentin 200 m tab(s) orally 2 times a day   glimepiride 4 mg oral tablet: 1 tab(s) orally 2 times a day  losartan 25 mg oral tablet: 1 tab(s) orally once a day   melatonin 3 mg oral tablet: 1 tab(s) orally once a day (at bedtime), As needed, Insomnia  metFORMIN 1000 mg oral tablet: 1 tab(s) orally 2 times a day  metoprolol tartrate 25 mg oral tablet: 1 tab(s) orally 2 times a day  nicotine 14 mg/24 hr transdermal film, extended release: 1 application transdermal once a day  pantoprazole 40 mg oral delayed release tablet: 1 tab(s) orally once a day (before a meal)  polyethylene glycol 3350 oral powder for reconstitution: 17 gram(s) orally once a day  QUEtiapine 25 mg oral tablet: 1 tab(s) orally once a day (at bedtime)  senna oral tablet: 2 tab(s) orally once a day (at bedtime)  thiamine 100 mg oral tablet: 1 tab(s) orally once a day  tiotropium 18 mcg inhalation capsule: 1 cap(s) inhaled once a day  warfarin 7.5 mg oral tablet: 1 tab(s) orally once

## 2022-04-19 NOTE — PROGRESS NOTE ADULT - SUBJECTIVE AND OBJECTIVE BOX
C A R D I O L O G Y  **********************************     DATE OF SERVICE: 04-19-22    Patient denies chest pain or shortness of breath.   Review of symptoms otherwise negative.    aluminum hydroxide/magnesium hydroxide/simethicone Suspension 30 milliLiter(s) Oral every 6 hours  aspirin  chewable 81 milliGRAM(s) Oral daily  atorvastatin 40 milliGRAM(s) Oral at bedtime  budesonide 160 MICROgram(s)/formoterol 4.5 MICROgram(s) Inhaler 2 Puff(s) Inhalation two times a day  folic acid 1 milliGRAM(s) Oral daily  gabapentin 100 milliGRAM(s) Oral three times a day  insulin glargine Injectable (LANTUS) 7 Unit(s) SubCutaneous at bedtime  insulin lispro (ADMELOG) corrective regimen sliding scale   SubCutaneous Before meals and at bedtime  melatonin 3 milliGRAM(s) Oral at bedtime PRN  metoprolol tartrate 25 milliGRAM(s) Oral two times a day  nicotine - 21 mG/24Hr(s) Patch 1 patch Transdermal daily  pantoprazole    Tablet 40 milliGRAM(s) Oral before breakfast  polyethylene glycol 3350 17 Gram(s) Oral daily  QUEtiapine 25 milliGRAM(s) Oral at bedtime  senna 1 Tablet(s) Oral daily PRN  thiamine 100 milliGRAM(s) Oral daily  tiotropium 18 MICROgram(s) Capsule 1 Capsule(s) Inhalation daily  tiotropium 18 MICROgram(s) Capsule 1 Capsule(s) Inhalation daily  warfarin 7.5 milliGRAM(s) Oral once                            12.3   9.41  )-----------( 491      ( 19 Apr 2022 08:04 )             38.8       Hemoglobin: 12.3 g/dL (04-19 @ 08:04)  Hemoglobin: 13.0 g/dL (04-18 @ 08:21)  Hemoglobin: 12.4 g/dL (04-17 @ 07:26)  Hemoglobin: 13.1 g/dL (04-16 @ 07:54)  Hemoglobin: 13.6 g/dL (04-15 @ 09:20)      04-19    138  |  101  |  20<H>  ----------------------------<  148<H>  4.4   |  30  |  0.85    Ca    9.7      19 Apr 2022 08:04      Creatinine Trend: 0.85<--, 0.80<--, 0.74<--, 0.77<--, 0.72<--, 0.73<--    COAGS: PT/INR - ( 19 Apr 2022 08:04 )   PT: 20.0 sec;   INR: 1.67 ratio      T(C): 37.1 (04-19-22 @ 05:40), Max: 37.1 (04-19-22 @ 05:40)  HR: 100 (04-19-22 @ 05:40) (89 - 112)  BP: 124/66 (04-19-22 @ 05:40) (119/55 - 124/66)  RR: 20 (04-19-22 @ 05:40) (18 - 20)  SpO2: 98% (04-19-22 @ 05:40) (98% - 99%)  Wt(kg): --    I&O's Summary        HEENT:  (-)icterus (-)pallor  CV: N S1 S2 1/6 FELICITA (+)2 Pulses B/l  Resp:  Clear to ausculatation B/L, normal effort  GI: (+) BS Soft, NT, ND  Lymph:  (-)Edema, (-)obvious lymphadenopathy  Skin: Warm to touch, Normal turgor  Psych: Appropriate mood and affect      TELEMETRY: 	off        ASSESSMENT/PLAN: 	76y  Male PMHx of HTN, CAD, active smoker, who was sent to hospital by family members due to altered mental status and generalized weakness found with Influenza, PNA and severe AS.    - Does not appear to be in pulmonary edema and has not been in pulmomary edema  - Keep net Even, A low BNP suggests low filling pressures   - Pt with moderate to severe MS and severe AS dose coumadin INR 2-3  - Monitor mental status  will need R+L cath and CTS eval once optimized but it appears that he has dementia.  If his mental status improves and he is cooperative will plan for outpt CTS referral   - Currently not a candidate for ischemic eval, revascularization or TAVR  - Complete TAMIFLU per medical team      Casper Bush MD, [facility]  BEEPER [phone]

## 2022-04-19 NOTE — DISCHARGE NOTE PROVIDER - DETAILS OF MALNUTRITION DIAGNOSIS/DIAGNOSES
This patient has been assessed with a concern for Malnutrition and was treated during this hospitalization for the following Nutrition diagnosis/diagnoses:     -  04/07/2022: Moderate protein-calorie malnutrition

## 2022-04-19 NOTE — PROGRESS NOTE ADULT - PROBLEM SELECTOR PLAN 6
rated controlled , on BB  Echo with sever MS/AS  Continue with coumadin. 735 today, INR 1.67  INR daily rated controlled , on BB  Echo with sever MS/AS  Continue with coumadin. 7.5mg today, INR 1.67  INR daily

## 2022-04-19 NOTE — DISCHARGE NOTE PROVIDER - HOSPITAL COURSE
76 year old male with PMHx of HTN, CAD, active smoker, who was sent to hospital by family members due to altered mental, generalized weakness, fever, chills, and difficulty breathing. Patient was admitted to ICU due to Acute hypoxic/hypercapnic respiratory failure, requiring BIPAP, likely due to COPD exacerbated by influenza AH3. s/p Tamiflu. Found to have PNA, was given IV abx. Hospital course complicated by metabolic encephalopathy with continuos agitation throughout hospitalization trying to get out of bed and remove BIPAP machine, likely due to suspicious for alcohol withdrawal and infectious etiology. s/p haldol 5mg and Precedex.    Echo revealed Moderate-severe mitral stenosis, severe aortic stenosis. Card consulted , was placed on full dose Lovenox transitioned to coumadin dosing. Pt will need OP cardiac cath, however not candidate for ischemia work up or TAVR at this time.     4/7 Patient had episode of SVT after Duoneb in ICU. s/p adenosine 6 then 12, rhythm converted to Afib. Cardizem 20 given, and was placed on Cardizem gtt after. patient also started on FD lovenox. Cardizem drip was discontinued and switched to Metoprolol 25 BID with rated controlled.  4/8 Pt downgrade to Medicine as pt tolerated 4L nc. Not requiring bipap anymore   76 year old male with PMHx of HTN, CAD, active smoker, who was sent to hospital by family members due to altered mental, generalized weakness, fever, chills, and difficulty breathing. Patient was admitted to ICU due to Acute hypoxic/hypercapnic respiratory failure, requiring BIPAP, likely due to COPD exacerbated by influenza AH3. s/p Tamiflu. Found to have PNA, was given IV abx. Hospital course complicated by metabolic encephalopathy with continuos agitation throughout hospitalization trying to get out of bed and remove BIPAP machine, likely due to suspicious for alcohol withdrawal and infectious etiology. s/p haldol 5mg and Precedex.    Echo revealed Moderate-severe mitral stenosis, severe aortic stenosis. Card consulted , was placed on full dose Lovenox transitioned to coumadin dosing. Pt will need OP cardiac cath, however not candidate for ischemia work up or TAVR at this time.     4/7 Patient had episode of SVT after Duoneb in ICU. s/p adenosine 6 then 12, rhythm converted to Afib. Cardizem 20 given, and was placed on Cardizem gtt after. patient also started on FD lovenox. Cardizem drip was discontinued and switched to Metoprolol 25 BID with rated controlled.  4/8 Pt downgrade to Medicine as pt tolerated 4L nc. Not requiring bipap anymore    Pt is medically optimized for discharge. Pt can use oxygen 2L PRN for respiratory distress,   Please note that this a brief summary of hospital course please refer to daily progress notes and consult notes for full course and events

## 2022-04-19 NOTE — PROGRESS NOTE ADULT - SUBJECTIVE AND OBJECTIVE BOX
Patient is a 76y old  Male who presents with a chief complaint of Acute hypoxic hypercapnic respiratory failure (18 Apr 2022 17:56)    INTERVAL HPI/OVERNIGHT EVENTS: no acute events overnight. pleasantly confused today      T(C): 37.1 (04-19-22 @ 05:40), Max: 37.1 (04-19-22 @ 05:40)  HR: 100 (04-19-22 @ 05:40) (89 - 112)  BP: 124/66 (04-19-22 @ 05:40) (118/60 - 124/66)  RR: 20 (04-19-22 @ 05:40) (18 - 20)  SpO2: 98% (04-19-22 @ 05:40) (98% - 99%)  Wt(kg): --Vital Signs Last 24 Hrs  T(C): 37.1 (19 Apr 2022 05:40), Max: 37.1 (19 Apr 2022 05:40)  T(F): 98.7 (19 Apr 2022 05:40), Max: 98.7 (19 Apr 2022 05:40)  HR: 100 (19 Apr 2022 05:40) (89 - 112)  BP: 124/66 (19 Apr 2022 05:40) (118/60 - 124/66)  BP(mean): --  RR: 20 (19 Apr 2022 05:40) (18 - 20)  SpO2: 98% (19 Apr 2022 05:40) (98% - 99%)    MEDICATIONS  (STANDING):  aluminum hydroxide/magnesium hydroxide/simethicone Suspension 30 milliLiter(s) Oral every 6 hours  aspirin  chewable 81 milliGRAM(s) Oral daily  atorvastatin 40 milliGRAM(s) Oral at bedtime  budesonide 160 MICROgram(s)/formoterol 4.5 MICROgram(s) Inhaler 2 Puff(s) Inhalation two times a day  folic acid 1 milliGRAM(s) Oral daily  gabapentin 100 milliGRAM(s) Oral three times a day  insulin glargine Injectable (LANTUS) 7 Unit(s) SubCutaneous at bedtime  insulin lispro (ADMELOG) corrective regimen sliding scale   SubCutaneous Before meals and at bedtime  metoprolol tartrate 25 milliGRAM(s) Oral two times a day  nicotine - 21 mG/24Hr(s) Patch 1 patch Transdermal daily  pantoprazole    Tablet 40 milliGRAM(s) Oral before breakfast  polyethylene glycol 3350 17 Gram(s) Oral daily  QUEtiapine 25 milliGRAM(s) Oral at bedtime  thiamine 100 milliGRAM(s) Oral daily  tiotropium 18 MICROgram(s) Capsule 1 Capsule(s) Inhalation daily  tiotropium 18 MICROgram(s) Capsule 1 Capsule(s) Inhalation daily  warfarin 7.5 milliGRAM(s) Oral once    MEDICATIONS  (PRN):  melatonin 3 milliGRAM(s) Oral at bedtime PRN Insomnia  senna 1 Tablet(s) Oral daily PRN Constipation      PHYSICAL EXAM:  GENERAL: NAD  EYES: clear conjunctiva; EOMI  ENMT: Moist mucous membranes  NECK: Supple, No JVD, Normal thyroid  CHEST/LUNG: Clear to auscultation bilaterally; No rales, rhonchi, wheezing, or rubs  HEART: S1, S2, Regular rate and rhythm  ABDOMEN: Soft, Nontender, Nondistended; Bowel sounds present  NEURO: Alert & awake  EXTREMITIES: No LE edema, no calf tenderness  SKIN: No rashes or lesions    Consultant(s) Notes Reviewed:  [x ] YES  [ ] NO  Care Discussed with Consultants/Other Providers [ x] YES  [ ] NO    LABS:                        12.3   9.41  )-----------( 491      ( 19 Apr 2022 08:04 )             38.8     04-19    138  |  101  |  20<H>  ----------------------------<  148<H>  4.4   |  30  |  0.85    Ca    9.7      19 Apr 2022 08:04      PT/INR - ( 19 Apr 2022 08:04 )   PT: 20.0 sec;   INR: 1.67 ratio         PTT - ( 17 Apr 2022 10:54 )  PTT:50.6 sec  CAPILLARY BLOOD GLUCOSE      POCT Blood Glucose.: 157 mg/dL (19 Apr 2022 07:54)  POCT Blood Glucose.: 167 mg/dL (18 Apr 2022 21:19)  POCT Blood Glucose.: 243 mg/dL (18 Apr 2022 16:47)  POCT Blood Glucose.: 225 mg/dL (18 Apr 2022 11:28)      RADIOLOGY & ADDITIONAL TESTS:    Imaging Personally Reviewed:  [x ] YES  [ ] NO  < from: Xray Chest 1 View-PORTABLE IMMEDIATE (Xray Chest 1 View-PORTABLE IMMEDIATE .) (04.12.22 @ 14:37) >    ACC: [id number] EXAM:  XR CHEST PORTABLE IMMED 1V                          PROCEDURE DATE:  04/12/2022          INTERPRETATION:  Portable chest radiograph    CLINICAL INFORMATION: Dyspnea, shortness of breath.    TECHNIQUE:  Portable  AP chest radiograph.    COMPARISON: 4/6/2022 chest .    FINDINGS: Battery device obscures RIGHT upper zone. CATHETERS AND TUBES:   None    PULMONARY: The visualized lungs are clear of airspace consolidations or   effusions.   No pneumothorax.    HEART/VASCULAR: The heart and mediastinum size and configuration are   within normal limits.    BONES: Visualized osseous structures are intact.    IMPRESSION:   No radiographic evidence of active chest disease.    --- End of Report ---      MAYA DING MD; Attending Radiologist  This document has been electronically signed. Apr 15 2022  2:03PM    < end of copied text >

## 2022-04-19 NOTE — DISCHARGE NOTE PROVIDER - NSDCDCMDCOMP_GEN_ALL_CORE
Constitutional: In NAD, appears well developed. Happy, playful, alert. Cries, but consolable  Head atraumatic, normocephalic  HENMT: Airway patent. MMM. 1 cm laceration to R upper lateral lip w/ minimal active bleeding. R lateral upper incisor impacted, stable. no gould signs or raccoons' eyes. no facial swelling/ deformity  Eyes: Eyes are clear b/l. PERRL, EOMI  Cardiac: Regular rate and rhythm.   Resp: No nasal flaring or retracting. Breathes easily.   MSK: FROM neck w/o midline pain. no midline neck ttp  Neuro: Alert and interactive. Normal tone. Moves all extremities.   Skin: warm and dry. No rashes. No jaundice
This document is complete and the patient is ready for discharge.

## 2022-04-19 NOTE — DISCHARGE NOTE PROVIDER - NSDCCPCAREPLAN_GEN_ALL_CORE_FT
PRINCIPAL DISCHARGE DIAGNOSIS  Diagnosis: Acute respiratory failure with hypoxia and hypercapnia  Assessment and Plan of Treatment: You presented with shortness of breath requiring bipap, likley due to COPD exacerbated by influenza and PNa. You were treated with IV antibiotics and intiviral. You required ICU admission due to bipap. YOu were downgraded to medicine when you started satuating well on 2L and was eventually down graded to medicine. Your were followed by pulmonalogist while inpatient. Please follow up with pilmonologist for out patient Pulmonary function test.         SECONDARY DISCHARGE DIAGNOSES  Diagnosis: Afib  Assessment and Plan of Treatment: Continue taking your beta blocker and coumadin  Atrial fibrillation is the most common heart rhythm problem & has the risk of stroke & heart attack  It helps if you control your blood pressure, not drink more than 1-2 alcohol drinks per day, cut down on caffeine, getting treatment for over active thyroid gland, & getting exercise  Call your doctor if you feel your heart racing or beating unusually, chest tightness or pain, lightheaded, faint, shortness of breath especially with exercise  It is important to take your heart medication as prescribed  You may be on anticoagulation which is very important to take as directed - you may need blood work to monitor drug levels      Diagnosis: COPD exacerbation  Assessment and Plan of Treatment: Call your Health Care provider upon arrival home to make a follow up appointment within one week.  Take all inhalers as prescribed by your Health Care Provider.  Take steroids as prescribed by your Health Care Provider.  If your cough increases infrequency and severity and/or you have shortness of breath or increased shortness of breath call your Health Care Provider.  If you develop fever, chills, night sweats, malaise, and/or change in mental status call your Health care Provider.  Nutrition is very important.  Eat small frequent meals.  Increase your activity as tolerated.  Do not stay in bed all day      Diagnosis: Severe aortic stenosis  Assessment and Plan of Treatment: Echo showed that you have severe aortic stenosis. You were seen by Cardiorologist. YOu wer sarted on blood thiner coumadin, You will need R and L heart cath once optimized . pleae follow up with cardiorologist.    Diagnosis: Metabolic encephalopathy  Assessment and Plan of Treatment: Metabolic encephalopathy.   ·  Plan: likely combination of alcohol induced encephalopathy and hospital acquired delirium   Improving, need frequent reorientation, and safety check.   s/p precedex in ICU  Avoid anticholinergics and benzodiazepines,  C/w with reorientation and enhanced supervision per nursing judgement  C/w Seroquel at night.   C/w Thiamine and folic acid  c/w Melatonin.      Diagnosis: Influenza due to other identified influenza virus with other respiratory manifestations  Assessment and Plan of Treatment: YOu completed Tamiflu.       PRINCIPAL DISCHARGE DIAGNOSIS  Diagnosis: Acute respiratory failure with hypoxia and hypercapnia  Assessment and Plan of Treatment: You presented with shortness of breath requiring bipap, likley due to COPD exacerbated by influenza and PNa. You were treated with IV antibiotics and intiviral. You required ICU admission due to bipap. YOu were downgraded to medicine when you started satuating well on 2L and was eventually down graded to medicine. Your were followed by pulmonalogist while inpatient. Please follow up with pilmonologist for out patient Pulmonary function test.         SECONDARY DISCHARGE DIAGNOSES  Diagnosis: DM (diabetes mellitus)  Assessment and Plan of Treatment: Your a1c 10.3 on this admission,  which is poorly controlled. You were placed on insulin to better control your blood sugar. Pleae follow up with your PCP for better managemt of your sugar.  Make sure you get your HgA1c checked every three months.  If you take oral diabetes medications, check your blood glucose two times a day.  If you take insulin, check your blood glucose before meals and at bedtime.  It's important not to skip any meals.  Keep a log of your blood glucose results and always take it with you to your doctor appointments.  Keep a list of your current medications including injectables and over the counter medications and bring this medication list with you to all your doctor appointments.  If you have not seen your ophthalmologist this year call for appointment.  Check your feet daily for redness, sores, or openings. Do not self treat. If no improvement in two days call your primary care physician for an appointment.  Low blood sugar (hypoglycemia) is a blood sugar below 70mg/dl. Check your blood sugar if you feel signs/symptoms of hypoglycemia. If your blood sugar is below 70 take 15 grams of carbohydrates (ex 4 oz of apple juice, 3-4 glucose tablets, or 4-6 oz of regular soda) wait 15 minutes and repeat blood sugar to make sure it comes up above 70.  If your blood sugar is above 70 and you are due for a meal, have a meal.  If you are not due for a meal have a snack.  This snack helps keeps your blood sugar at a safe range.      Diagnosis: Afib  Assessment and Plan of Treatment: Continue taking your beta blocker and coumadin  Atrial fibrillation is the most common heart rhythm problem & has the risk of stroke & heart attack  It helps if you control your blood pressure, not drink more than 1-2 alcohol drinks per day, cut down on caffeine, getting treatment for over active thyroid gland, & getting exercise  Call your doctor if you feel your heart racing or beating unusually, chest tightness or pain, lightheaded, faint, shortness of breath especially with exercise  It is important to take your heart medication as prescribed  You may be on anticoagulation which is very important to take as directed - you may need blood work to monitor drug levels      Diagnosis: COPD exacerbation  Assessment and Plan of Treatment: Call your Health Care provider upon arrival home to make a follow up appointment within one week.  Take all inhalers as prescribed by your Health Care Provider.  Take steroids as prescribed by your Health Care Provider.  If your cough increases infrequency and severity and/or you have shortness of breath or increased shortness of breath call your Health Care Provider.  If you develop fever, chills, night sweats, malaise, and/or change in mental status call your Health care Provider.  Nutrition is very important.  Eat small frequent meals.  Increase your activity as tolerated.  Do not stay in bed all day      Diagnosis: Severe aortic stenosis  Assessment and Plan of Treatment: Echo showed that you have severe aortic stenosis. You were seen by Cardiorologist. YOu wer sarted on blood thiner coumadin, You will need R and L heart cath once optimized . pleae follow up with cardiorologist.    Diagnosis: Metabolic encephalopathy  Assessment and Plan of Treatment: You had peroids of altered mental status likely due to combination of alcohol induced encephalopathy and hospital acquired delirium. which has improved. YOu were placed on medication  to help you stabilize your mood and help you sleep at night to ensure healing and optimization.   Avoid anticholinergics and benzodiazepines,  Contunue taking your medicaiton Seroquel at night, Thiamine,  folic acid, and Melatonin.      Diagnosis: Paroxysmal SVT (supraventricular tachycardia)  Assessment and Plan of Treatment: You had a episode of  incrased heart rated after duoneb treatment in ICU, You were given medicaiotion to help your convert to normal rate You were followed by cardioologist for management of your cardiac related care.   Continue taking your hert medication metoprolol as prescribed and yoour blood thinner,    Diagnosis: Influenza due to other identified influenza virus with other respiratory manifestations  Assessment and Plan of Treatment: YOu completed Tamiflu.       PRINCIPAL DISCHARGE DIAGNOSIS  Diagnosis: Acute respiratory failure with hypoxia and hypercapnia  Assessment and Plan of Treatment: You presented with worsening shortness of breath and was noted to have elevated CO2 and low oxygenation in ED. You needed non invasive ventilation with Bipap machine to blow off excess CO2, You was noted to have Influenza positive. Your shortnesss of breath was likley due to COPD exacerbated by underlying  influenza and possible Pneumonia. You were treated with IV antibiotics and antiviral medication Tamiflu. You required ICU admission due to bipap. You was downgraded to medicine when you started satuating well on 2L. You were followed by pulmonologist Dr. escobedo while inpatient. Please follow up with pilmonologist for out patient Pulmonary function test.   You have been weaned off oxygen and does not require supplemental oxygen at this time. Please note that your oxygen saturation may be low on fingers with exertion without any respiratory distress likely due to         SECONDARY DISCHARGE DIAGNOSES  Diagnosis: Influenza due to other identified influenza virus with other respiratory manifestations  Assessment and Plan of Treatment: You was noted to have Influenza A infection causing your respiratory symptoms. You was treated with Oseltamavir (Tamiflu) and completed treatment.       Diagnosis: Metabolic encephalopathy  Assessment and Plan of Treatment: You had peroids of altered mental status likely due to combination of alcohol induced encephalopathy and hospital acquired delirium. which has improved. YOu were placed on medication  to help you stabilize your mood and help you sleep at night to ensure healing and optimization.   Avoid anticholinergics and benzodiazepines,  Contunue taking your medicaiton Seroquel at night, Thiamine,  folic acid, and Melatonin.      Diagnosis: COPD exacerbation  Assessment and Plan of Treatment: Call your Health Care provider upon arrival home to make a follow up appointment within one week.  Take all inhalers as prescribed by your Health Care Provider.  Take steroids as prescribed by your Health Care Provider.  If your cough increases infrequency and severity and/or you have shortness of breath or increased shortness of breath call your Health Care Provider.  If you develop fever, chills, night sweats, malaise, and/or change in mental status call your Health care Provider.  Nutrition is very important.  Eat small frequent meals.  Increase your activity as tolerated.  Do not stay in bed all day      Diagnosis: Severe aortic stenosis  Assessment and Plan of Treatment: Transthoracic Echo showed that you have moderate to severe aortic stenosis. You were seen by Cardiorologist Dr. Casper Bush who recommended Right and Left heart cathetrization to assess severity of Aortic stenosis and potential evaluation for TAVR which is currently recommended to follow up as outpatient .  Sommer poe on blood thiner coumadin, You will need R and L heart cath once optimized . pleae follow up with cardiorologist.    Diagnosis: Afib  Assessment and Plan of Treatment: Continue taking your beta blocker and coumadin  Atrial fibrillation is the most common heart rhythm problem & has the risk of stroke & heart attack  It helps if you control your blood pressure, not drink more than 1-2 alcohol drinks per day, cut down on caffeine, getting treatment for over active thyroid gland, & getting exercise  Call your doctor if you feel your heart racing or beating unusually, chest tightness or pain, lightheaded, faint, shortness of breath especially with exercise  It is important to take your heart medication as prescribed  You may be on anticoagulation which is very important to take as directed - you may need blood work to monitor drug levels      Diagnosis: Paroxysmal SVT (supraventricular tachycardia)  Assessment and Plan of Treatment: You had a episode of  incrased heart rated after duoneb treatment in ICU, You were given medicaiotion to help your convert to normal rate You were followed by cardioologist for management of your cardiac related care.   Continue taking your hert medication metoprolol as prescribed and yoour blood thinner,    Diagnosis: DM (diabetes mellitus)  Assessment and Plan of Treatment: Your a1c 10.3 on this admission,  which is poorly controlled. You were placed on insulin to better control your blood sugar. Pleae follow up with your PCP for better managemt of your sugar.  Make sure you get your HgA1c checked every three months.  If you take oral diabetes medications, check your blood glucose two times a day.  If you take insulin, check your blood glucose before meals and at bedtime.  It's important not to skip any meals.  Keep a log of your blood glucose results and always take it with you to your doctor appointments.  Keep a list of your current medications including injectables and over the counter medications and bring this medication list with you to all your doctor appointments.  If you have not seen your ophthalmologist this year call for appointment.  Check your feet daily for redness, sores, or openings. Do not self treat. If no improvement in two days call your primary care physician for an appointment.  Low blood sugar (hypoglycemia) is a blood sugar below 70mg/dl. Check your blood sugar if you feel signs/symptoms of hypoglycemia. If your blood sugar is below 70 take 15 grams of carbohydrates (ex 4 oz of apple juice, 3-4 glucose tablets, or 4-6 oz of regular soda) wait 15 minutes and repeat blood sugar to make sure it comes up above 70.  If your blood sugar is above 70 and you are due for a meal, have a meal.  If you are not due for a meal have a snack.  This snack helps keeps your blood sugar at a safe range.       PRINCIPAL DISCHARGE DIAGNOSIS  Diagnosis: Acute respiratory failure with hypoxia and hypercapnia  Assessment and Plan of Treatment: You presented with worsening shortness of breath and was noted to have elevated CO2 and low oxygenation in ED. You needed non invasive ventilation with Bipap machine to blow off excess CO2, You was noted to have Influenza positive. Your shortnesss of breath was likley due to COPD exacerbated by underlying  influenza and possible Pneumonia. You were treated with IV antibiotics and antiviral medication Tamiflu. You required ICU admission due to bipap. You was downgraded to medicine when you started satuating well on 2L. You were followed by pulmonologist Dr. escobedo while inpatient. Please follow up with pilmonologist for out patient Pulmonary function test.   You have been weaned off oxygen and does not require supplemental oxygen at this time. Please note that your oxygen saturation may be low on fingers with exertion without any respiratory distress likely due to         SECONDARY DISCHARGE DIAGNOSES  Diagnosis: DM (diabetes mellitus)  Assessment and Plan of Treatment: Your a1c 10.3 on this admission,  which is poorly controlled. You were placed on insulin to better control your blood sugar. Pleae follow up with your PCP for better managemt of your sugar.  Make sure you get your HgA1c checked every three months.  If you take oral diabetes medications, check your blood glucose two times a day.  If you take insulin, check your blood glucose before meals and at bedtime.  It's important not to skip any meals.  Keep a log of your blood glucose results and always take it with you to your doctor appointments.  Keep a list of your current medications including injectables and over the counter medications and bring this medication list with you to all your doctor appointments.  If you have not seen your ophthalmologist this year call for appointment.  Check your feet daily for redness, sores, or openings. Do not self treat. If no improvement in two days call your primary care physician for an appointment.  Low blood sugar (hypoglycemia) is a blood sugar below 70mg/dl. Check your blood sugar if you feel signs/symptoms of hypoglycemia. If your blood sugar is below 70 take 15 grams of carbohydrates (ex 4 oz of apple juice, 3-4 glucose tablets, or 4-6 oz of regular soda) wait 15 minutes and repeat blood sugar to make sure it comes up above 70.  If your blood sugar is above 70 and you are due for a meal, have a meal.  If you are not due for a meal have a snack.  This snack helps keeps your blood sugar at a safe range.      Diagnosis: Afib  Assessment and Plan of Treatment: Continue taking your beta blocker and coumadin  Atrial fibrillation is the most common heart rhythm problem & has the risk of stroke & heart attack  It helps if you control your blood pressure, not drink more than 1-2 alcohol drinks per day, cut down on caffeine, getting treatment for over active thyroid gland, & getting exercise  Call your doctor if you feel your heart racing or beating unusually, chest tightness or pain, lightheaded, faint, shortness of breath especially with exercise  It is important to take your heart medication as prescribed  You may be on anticoagulation which is very important to take as directed - you may need blood work to monitor drug levels  - Please take 7.5mg tonight and 6mg from tomorow  - YOur INR today was 2.05      Diagnosis: COPD exacerbation  Assessment and Plan of Treatment: Call your Health Care provider upon arrival home to make a follow up appointment within one week.  Take all inhalers as prescribed by your Health Care Provider.  Take steroids as prescribed by your Health Care Provider.  If your cough increases infrequency and severity and/or you have shortness of breath or increased shortness of breath call your Health Care Provider.  If you develop fever, chills, night sweats, malaise, and/or change in mental status call your Health care Provider.  Nutrition is very important.  Eat small frequent meals.  Increase your activity as tolerated.  Do not stay in bed all day      Diagnosis: Severe aortic stenosis  Assessment and Plan of Treatment: Transthoracic Echo showed that you have moderate to severe aortic stenosis. You were seen by Cardiorologist Dr. Casper Bush who recommended Right and Left heart cathetrization to assess severity of Aortic stenosis and potential evaluation for TAVR which is currently recommended to follow up as outpatient .  Sommer carter sarted on blood thiner coumadin, You will need R and L heart cath once optimized . pleae follow up with cardiorologist.    Diagnosis: Metabolic encephalopathy  Assessment and Plan of Treatment: You had peroids of altered mental status likely due to combination of alcohol induced encephalopathy and hospital acquired delirium. which has improved. YOu were placed on medication  to help you stabilize your mood and help you sleep at night to ensure healing and optimization.   Avoid anticholinergics and benzodiazepines,  Contunue taking your medicaiton Seroquel at night, Thiamine,  folic acid, and Melatonin.      Diagnosis: Paroxysmal SVT (supraventricular tachycardia)  Assessment and Plan of Treatment: You had a episode of  incrased heart rated after duoneb treatment in ICU, You were given medicaiotion to help your convert to normal rate You were followed by cardioologist for management of your cardiac related care.   Continue taking your hert medication metoprolol as prescribed and yoour blood thinner,    Diagnosis: Influenza due to other identified influenza virus with other respiratory manifestations  Assessment and Plan of Treatment: You was noted to have Influenza A infection causing your respiratory symptoms. You was treated with Oseltamavir (Tamiflu) and completed treatment.        PRINCIPAL DISCHARGE DIAGNOSIS  Diagnosis: Acute respiratory failure with hypoxia and hypercapnia  Assessment and Plan of Treatment: You presented with worsening shortness of breath and was noted to have elevated CO2 and low oxygenation in ED. You needed non invasive ventilation with Bipap machine to blow off excess CO2, You was noted to have Influenza positive. Your shortnesss of breath was likley due to COPD exacerbated by underlying  influenza and possible Pneumonia. You were treated with IV antibiotics and antiviral medication Tamiflu. You required ICU admission due to bipap. You was downgraded to medicine when you started satuating well on 2L. You were followed by pulmonologist Dr. escobedo while inpatient. Please follow up with pilmonologist for out patient Pulmonary function test.   You have been weaned off oxygen and does not require supplemental oxygen at this time. Please note that your oxygen saturation may be low on fingers with exertion without any respiratory distress likely due to         SECONDARY DISCHARGE DIAGNOSES  Diagnosis: Influenza due to other identified influenza virus with other respiratory manifestations  Assessment and Plan of Treatment: You was noted to have Influenza A infection causing your respiratory symptoms. You was treated with Oseltamavir (Tamiflu) and completed treatment.       Diagnosis: Metabolic encephalopathy  Assessment and Plan of Treatment: You had peroids of altered mental status likely due to combination of alcohol induced encephalopathy and hospital acquired delirium. which has significantly improved. You were placed on medication  to help you stabilize your mood and help you sleep at night to ensure healing and optimization.   Avoid anticholinergics and benzodiazepines,  Contunue taking your medicaiton Seroquel at night, Thiamine,  folic acid, and Melatonin.      Diagnosis: COPD exacerbation  Assessment and Plan of Treatment: You may have underlying undiagnosed COPD given extensive smoking history; You was treated with Bronchodilators and steroids with clinical improvement  PLEASE FOLLOW UP WITH DR. BENJAMÍN ESCOBEDO OFFICE TO HAVE FULL PULMONARY FUNCTION TEST AND CONTINUED FOLLOW UP  YOUR SATURATION ON FINGERS IS LOW DUE TO PVD FROM SIGNIFICANT SMOKING; YOU WAS NOTED TO AMBULATE WITHOUT OXYGEN WITH NO RESPIRATORY DISTRESS. SATURATION GOAL IS AROUND 88 TO 92% GIVEN SUSPECTED COPD.   USE SUPPLEMENTAL OXYGEN ONLY IF PATIENT SYMPTOMATIC.   If your cough increases infrequency and severity and/or you have shortness of breath or increased shortness of breath call your Health Care Provider.  If you develop fever, chills, night sweats, malaise, and/or change in mental status call your Health care Provider.  Nutrition is very important.  Eat small frequent meals.  Increase your activity as tolerated.  Do not stay in bed all day      Diagnosis: Severe aortic stenosis  Assessment and Plan of Treatment: Transthoracic Echo showed that you have moderate to severe aortic stenosis. You were seen by Cardiorologist Dr. Casper Ennis who recommended Right and Left heart cathetrization to assess severity of Aortic stenosis and potential evaluation for TAVR which is currently recommended to follow up as outpatient due to transient alteration in mentation .  Sommer carter sarted on blood thiner coumadin and follow up as prescribed  PLEASE FOLLOW UP WITH DR. CASPER ENNIS CARDIOLOGIST AS ADVISED IN 2-3 WEEKS FOR OUTPATIENT FOLLOW UP FOR CATH.        Diagnosis: Afib  Assessment and Plan of Treatment: Continue taking your beta blocker and coumadin; INR target 2.0-2.5; coumadin 7.5 mg 4/21/22; From 4/22 can place on 6 mg and f/u INR 2 to 3 times a week  Atrial fibrillation is the most common heart rhythm problem & has the risk of stroke & heart attack  It helps if you control your blood pressure, not drink more than 1-2 alcohol drinks per day, cut down on caffeine, getting treatment for over active thyroid gland, & getting exercise  Call your doctor if you feel your heart racing or beating unusually, chest tightness or pain, lightheaded, faint, shortness of breath especially with exercise  It is important to take your heart medication as prescribed  You may be on anticoagulation which is very important to take as directed - you may need blood work to monitor drug levels  - Please take 7.5mg tonight and 6mg from tomorow  - YOur INR today was 2.05      Diagnosis: Paroxysmal SVT (supraventricular tachycardia)  Assessment and Plan of Treatment: You had a episode of  incrased heart rated after duoneb treatment in ICU, You were given medicaiotion to help your convert to normal rate You were followed by cardioologist for management of your cardiac related care.   Continue taking your hert medication metoprolol as prescribed and yoour blood thinner,    Diagnosis: DM (diabetes mellitus)  Assessment and Plan of Treatment: Your a1c 10.3 on this admission,  which is poorly controlled. You were placed on insulin to better control your blood sugar. Pleae follow up with your PCP for better managemt of your sugar.  Make sure you get your HgA1c checked every three months.  If you take oral diabetes medications, check your blood glucose two times a day.  If you take insulin, check your blood glucose before meals and at bedtime.  It's important not to skip any meals.  Keep a log of your blood glucose results and always take it with you to your doctor appointments.  Keep a list of your current medications including injectables and over the counter medications and bring this medication list with you to all your doctor appointments.  If you have not seen your ophthalmologist this year call for appointment.  Check your feet daily for redness, sores, or openings. Do not self treat. If no improvement in two days call your primary care physician for an appointment.  Low blood sugar (hypoglycemia) is a blood sugar below 70mg/dl. Check your blood sugar if you feel signs/symptoms of hypoglycemia. If your blood sugar is below 70 take 15 grams of carbohydrates (ex 4 oz of apple juice, 3-4 glucose tablets, or 4-6 oz of regular soda) wait 15 minutes and repeat blood sugar to make sure it comes up above 70.  If your blood sugar is above 70 and you are due for a meal, have a meal.  If you are not due for a meal have a snack.  This snack helps keeps your blood sugar at a safe range.      Diagnosis: Tobacco use disorder, continuous  Assessment and Plan of Treatment: You have significant smoking history and was placed on Nicotine patch. You have been counseled at length on smoking cessation and you promised not to smoke.    Diagnosis: Alcohol abuse  Assessment and Plan of Treatment: You have a histyory of alcohol abuse but not currently; You have been counseled at length to stay away form alcohol to prevent liver complications.    Diagnosis: Neuropathic pain  Assessment and Plan of Treatment: You have peripheral neuropathy. You was placed on gabapentin with significant control; We will discharge you on 200 mg BID which could be increased to TID if tolerated well and not sedated.

## 2022-04-20 NOTE — PROGRESS NOTE ADULT - PROBLEM SELECTOR PLAN 10
a1c 10.3  poorly controlled  c/w ss, hold oral meds  inpatient.    resumed gabapentin for neuropathy pain, ( pt takes at home)

## 2022-04-20 NOTE — PROGRESS NOTE ADULT - NS ATTEND AMEND GEN_ALL_CORE FT
Patient was seen and examined this afternoon with wife at bedside    Patient is comfortable in bed; not agitated; not in acute distress. denies any complaints. able to move extremities. appears weak    Vital Signs Last 24 Hrs  T(C): 36.6 (20 Apr 2022 17:40), Max: 36.6 (20 Apr 2022 17:40)  T(F): 97.8 (20 Apr 2022 17:40), Max: 97.8 (20 Apr 2022 17:40)  HR: 102 (20 Apr 2022 17:40) (74 - 102)  BP: 127/54 (20 Apr 2022 17:40) (111/72 - 133/61)  RR: 17 (20 Apr 2022 17:40) (17 - 18)  SpO2: 97% (20 Apr 2022 17:40) (96% - 100%)    P/E:  NAD  AAOx2, no focal deficit, overall muscle strength 4/5 due to deconditioning   Resp: CTABL, no crepitations or wheezing noted  CVS: S1S2 WNL, + murmur  Abd soft, non distended, BS present; right lower abdomen hard lump likely injection Lovenox related  BL LE no edema or calf tenderness     Labs:           PT/INR - ( 20 Apr 2022 09:22 )   PT: 20.6 sec;   INR: 1.72 ratio    PTT - ( 20 Apr 2022 09:22 )  PTT:35.9 sec    A/P:  -Acute hypoxic respiratory failure: Cont NC as needed, titrate down as tolerated, will cont current COPD management   -COPD: cont Spiriva, albuterol and Symbicort  -Influenza: Tamiflu completed  -Valvular Afib: INR 1.7; subtherapeutic today; increased Warfarin 7.5 mg tonight, cont Metoprolol for rate control  -Severe AS: will need right and left heart cath, outpatient follow up with cardiology, [place] per Dr. Bush  -Acute metabolic encephalopathy: improving, possibly multifactorial due to dementia, alcohol induced encephalopathy and hospital acquired delirium due to hospital stay, improved. Avoid anticholinergics, benzodiazepines, limit lines/tubes/tethers/restraints, encourage frequent reorientation/reassurance by staff, encourage good sleep hygiene, adequate lighting. Cont Seroquel at night.   Cont Thiamine and folic acid, cont Melatonin  - Moderate Protein calorie malnutrition: PO intake improving as per wife, Maalox for dyspepsia  -Tobacco use disorder  - PT eval [facility]  - CM and BETSY for safe discharge.    Patient is medically stable for discharge to [facility] at this time.   Discussed with ESTHER Nassar and CLAUDY Akins Patient was seen and examined this afternoon with wife at bedside    Patient is comfortable in bed; not agitated; not in acute distress. denies any complaints. Patient was placed on 2 liter NC by PT this morning as saturation reported low on ambulation. PT followed up with me this afternoon. Patient was saturating 100% RA. Patient ambulated with PT with RW and was noted to be saturating 93 to 94% with no respiratory distress or complaints; was placed OOB to chair. Patient denies fever, chills, SOB, palpitations, chest pain, nausea, vomiting, diarrhea, constipation, dizziness; had BM this morning as per PCA.     Vital Signs Last 24 Hrs  T(C): 36.6 (20 Apr 2022 17:40), Max: 36.6 (20 Apr 2022 17:40)  T(F): 97.8 (20 Apr 2022 17:40), Max: 97.8 (20 Apr 2022 17:40)  HR: 102 (20 Apr 2022 17:40) (74 - 102)  BP: 127/54 (20 Apr 2022 17:40) (111/72 - 133/61)  RR: 17 (20 Apr 2022 17:40) (17 - 18)  SpO2: 97% (20 Apr 2022 17:40) (96% - 100%)    P/E:  NAD, no agitation noted  AAOx2.5, no focal deficit, overall muscle strength 4/5 due to deconditioning   Resp: BLAE+, No active wheeze or Rhonchi  CVS: S1S2 WNL, + murmur  Abd soft, non distended, BS present; right lower abdomen hard lump likely injection Lovenox related  Extr: B/L LE no edema or calf tenderness     Labs:   CBC/BMP stable 4/19/22        PT/INR - ( 20 Apr 2022 09:22 )   PT: 20.6 sec;   INR: 1.72 ratio    PTT - ( 20 Apr 2022 09:22 )  PTT:35.9 sec    A/P:  -Acute hypoxic respiratory failure: resolved; saturating well will cont current COPD management   -COPD: cont Spiriva, albuterol and Symbicort  -Influenza: symptoms resolved; Tamiflu completed  -Valvular Afib: INR 1.7; subtherapeutic still; continue Warfarin 7.5 mg tonight, increase tomorrow if <2;  cont Metoprolol for rate control  -Severe AS: will need right and left heart cath, outpatient follow up with cardiology, as per Dr. Bush  -Acute metabolic encephalopathy: improving, possibly multifactorial due to dementia, alcohol induced encephalopathy and hospital acquired delirium due to hospital stay, improved. Avoid anticholinergics, benzodiazepines, limit lines/tubes/tethers/restraints, encourage frequent reorientation/reassurance by staff, encourage good sleep hygiene, adequate lighting. Cont Seroquel at night.   - Also send Vitamin D level, TSH, B12 and Folate/ Homocysteine and MMA   -Cont Thiamine and folic acid, cont Melatonin  - Moderate Protein calorie malnutrition: PO intake improved, Maalox for dyspepsia  -Tobacco use disorder with possible PVD contributing low O2 saturation on fingers  - PT eval [facility]; accepted to [facility]; SUSAN send for Auth d/w  Jackelyn; informed wife at bedside    Patient is medically stable for discharge to [facility] at this time.   Discussed with ESTHER Nassar and CLAUDY Medina

## 2022-04-20 NOTE — PROGRESS NOTE ADULT - SUBJECTIVE AND OBJECTIVE BOX
C A R D I O L O G Y  **********************************     DATE OF SERVICE: 04-20-22    Patient denies chest pain or shortness of breath.   Review of symptoms otherwise negative.    aluminum hydroxide/magnesium hydroxide/simethicone Suspension 30 milliLiter(s) Oral every 6 hours  aspirin  chewable 81 milliGRAM(s) Oral daily  atorvastatin 40 milliGRAM(s) Oral at bedtime  budesonide 160 MICROgram(s)/formoterol 4.5 MICROgram(s) Inhaler 2 Puff(s) Inhalation two times a day  folic acid 1 milliGRAM(s) Oral daily  gabapentin 100 milliGRAM(s) Oral three times a day  insulin glargine Injectable (LANTUS) 7 Unit(s) SubCutaneous at bedtime  insulin lispro (ADMELOG) corrective regimen sliding scale   SubCutaneous Before meals and at bedtime  melatonin 3 milliGRAM(s) Oral at bedtime PRN  metoprolol tartrate 25 milliGRAM(s) Oral two times a day  nicotine - 21 mG/24Hr(s) Patch 1 patch Transdermal daily  pantoprazole    Tablet 40 milliGRAM(s) Oral before breakfast  polyethylene glycol 3350 17 Gram(s) Oral daily  QUEtiapine 25 milliGRAM(s) Oral at bedtime  senna 1 Tablet(s) Oral daily PRN  thiamine 100 milliGRAM(s) Oral daily  tiotropium 18 MICROgram(s) Capsule 1 Capsule(s) Inhalation daily  tiotropium 18 MICROgram(s) Capsule 1 Capsule(s) Inhalation daily  warfarin 7.5 milliGRAM(s) Oral once                            12.3   9.41  )-----------( 491      ( 19 Apr 2022 08:04 )             38.8       Hemoglobin: 12.3 g/dL (04-19 @ 08:04)  Hemoglobin: 13.0 g/dL (04-18 @ 08:21)  Hemoglobin: 12.4 g/dL (04-17 @ 07:26)  Hemoglobin: 13.1 g/dL (04-16 @ 07:54)      04-19    138  |  101  |  20<H>  ----------------------------<  148<H>  4.4   |  30  |  0.85    Ca    9.7      19 Apr 2022 08:04      Creatinine Trend: 0.85<--, 0.80<--, 0.74<--, 0.77<--, 0.72<--, 0.73<--    COAGS: PT/INR - ( 20 Apr 2022 09:22 )   PT: 20.6 sec;   INR: 1.72 ratio         PTT - ( 20 Apr 2022 09:22 )  PTT:35.9 sec          T(C): 36.2 (04-20-22 @ 12:01), Max: 36.4 (04-19-22 @ 20:29)  HR: 76 (04-20-22 @ 12:01) (74 - 95)  BP: 120/58 (04-20-22 @ 12:01) (111/72 - 138/60)  RR: 17 (04-20-22 @ 12:01) (17 - 18)  SpO2: 100% (04-20-22 @ 12:01) (96% - 100%)  Wt(kg): --    I&O's Summary      HEENT:  (-)icterus (-)pallor  CV: N S1 S2 1/6 FELICITA (+)2 Pulses B/l  Resp:  Clear to ausculatation B/L, normal effort  GI: (+) BS Soft, NT, ND  Lymph:  (-)Edema, (-)obvious lymphadenopathy  Skin: Warm to touch, Normal turgor  Psych: Appropriate mood and affect      TELEMETRY: 	off        ASSESSMENT/PLAN: 	76y  Male PMHx of HTN, CAD, active smoker, who was sent to hospital by family members due to altered mental status and generalized weakness found with Influenza, PNA and severe AS.    - Does not appear to be in pulmonary edema and has not been in pulmomary edema  - Keep net Even, A low BNP suggests low filling pressures   - Pt with moderate to severe MS and severe AS dose coumadin INR 2-3  - Monitor mental status  will need R+L cath and CTS eval once optimized but it appears that he has dementia.  If his mental status improves and he is cooperative will plan for outpt CTS referral   - Currently not a candidate for ischemic eval, revascularization or TAVR  - Completed TAMIFLU       Casper Bush MD, [facility]  BEEPER [phone]

## 2022-04-20 NOTE — PROGRESS NOTE ADULT - SUBJECTIVE AND OBJECTIVE BOX
Time of Visit:  Patient seen and examined. seen earlier today comfortable    MEDICATIONS  (STANDING):  aluminum hydroxide/magnesium hydroxide/simethicone Suspension 30 milliLiter(s) Oral every 6 hours  aspirin  chewable 81 milliGRAM(s) Oral daily  atorvastatin 40 milliGRAM(s) Oral at bedtime  budesonide 160 MICROgram(s)/formoterol 4.5 MICROgram(s) Inhaler 2 Puff(s) Inhalation two times a day  folic acid 1 milliGRAM(s) Oral daily  gabapentin 200 milliGRAM(s) Oral three times a day  insulin glargine Injectable (LANTUS) 7 Unit(s) SubCutaneous at bedtime  insulin lispro (ADMELOG) corrective regimen sliding scale   SubCutaneous Before meals and at bedtime  metoprolol tartrate 25 milliGRAM(s) Oral two times a day  nicotine - 21 mG/24Hr(s) Patch 1 patch Transdermal daily  pantoprazole    Tablet 40 milliGRAM(s) Oral before breakfast  polyethylene glycol 3350 17 Gram(s) Oral daily  QUEtiapine 25 milliGRAM(s) Oral at bedtime  thiamine 100 milliGRAM(s) Oral daily  tiotropium 18 MICROgram(s) Capsule 1 Capsule(s) Inhalation daily  tiotropium 18 MICROgram(s) Capsule 1 Capsule(s) Inhalation daily  warfarin 7.5 milliGRAM(s) Oral once      MEDICATIONS  (PRN):  melatonin 3 milliGRAM(s) Oral at bedtime PRN Insomnia  senna 1 Tablet(s) Oral daily PRN Constipation       Medications up to date at time of exam.      PHYSICAL EXAMINATION:  Patient has no new complaints.  GENERAL: The patient is a well-developed, well-nourished, in no apparent distress.     Vital Signs Last 24 Hrs  T(C): 36.6 (20 Apr 2022 17:40), Max: 36.6 (20 Apr 2022 17:40)  T(F): 97.8 (20 Apr 2022 17:40), Max: 97.8 (20 Apr 2022 17:40)  HR: 102 (20 Apr 2022 17:40) (74 - 102)  BP: 127/54 (20 Apr 2022 17:40) (111/72 - 133/61)  BP(mean): --  RR: 17 (20 Apr 2022 17:40) (17 - 18)  SpO2: 97% (20 Apr 2022 17:40) (96% - 100%)   (if applicable)    Chest Tube (if applicable)    HEENT: Head is normocephalic and atraumatic. Extraocular muscles are intact. Mucous membranes are moist.     NECK: Supple, no palpable adenopathy.    LUNGS: Clear to auscultation, no wheezing, rales, or rhonchi.    HEART: Regular rate and rhythm without murmur.    ABDOMEN: Soft, nontender, and nondistended.  No hepatosplenomegaly is noted.    : No painful voiding, no pelvic pain    EXTREMITIES: Without any cyanosis, clubbing, rash, lesions or edema.    NEUROLOGIC: Awake, alert    SKIN: Warm, dry, good turgor.      LABS:                        12.3   9.41  )-----------( 491      ( 19 Apr 2022 08:04 )             38.8     04-19    138  |  101  |  20<H>  ----------------------------<  148<H>  4.4   |  30  |  0.85    Ca    9.7      19 Apr 2022 08:04      PT/INR - ( 20 Apr 2022 09:22 )   PT: 20.6 sec;   INR: 1.72 ratio         PTT - ( 20 Apr 2022 09:22 )  PTT:35.9 sec                    MICROBIOLOGY: (if applicable)    RADIOLOGY & ADDITIONAL STUDIES:  EKG:   CXR:  ECHO:    IMPRESSION: 77y Male PAST MEDICAL & SURGICAL HISTORY:  HTN (hypertension)    Hyperlipemia    COPD (chronic obstructive pulmonary disease)    DM (diabetes mellitus)    Asthma    Status post spinal surgery  lumbar    Encounter for screening colonoscopy    H/O endoscopy     p/w           IMP: IMP: This is a  76 year old [place] man active smoker , drinks alcohol daily  with  HTN, CAD, who was sent to hospital by family members due to altered mental status and generalized weakness. . Patient was admitted to ICU due to Acute hypoxic/hypercapnic respiratory failure, requiring BIPAP use, most likely COPD exacerbated by influenza AH3. .   Echo revealed Moderate-severe mitral stenosis, severe aortic stenosis.  On 4/7 Patient had episode of SVT after duoneb. Pt was given adenosine 6 then 12, rhythm converted to Afib. Cardizem 20 given, then pt was placed on Cardizem gtt. Dr. Bush notified, patient also started on therapeutic  lovenox. Cardizem drip was discontinued and switched to Metoprolol 25 BID.  Pat probable has undiagnosed COPD from active smoking       Sugg  - Cards f/u noted .. not a candidate for ischemic work up at this time   - Continue symbicort   - Spiriva daily   - Advise to stop smoking and alcohol use  - Thiamine / folate   - Therapeutic anticoag for afib   - Coumadin : goal INR 2-3   - Out pat pulmo f/u   - Nicotine patch   - Out pat pulmonary f/u with my office  - For [facility]AMAR d/c pending auth

## 2022-04-20 NOTE — PROGRESS NOTE ADULT - SUBJECTIVE AND OBJECTIVE BOX
Patient is a 77y old  Male who presents with a chief complaint of Acute hypoxic hypercapnic respiratory failure (20 Apr 2022 14:26)    INTERVAL HPI/OVERNIGHT EVENTS: No acute events overnight      REVIEW OF SYSTEMS:    T(C): 36.2 (04-20-22 @ 12:01), Max: 36.4 (04-19-22 @ 20:29)  HR: 76 (04-20-22 @ 12:01) (74 - 95)  BP: 120/58 (04-20-22 @ 12:01) (111/72 - 138/60)  RR: 17 (04-20-22 @ 12:01) (17 - 18)  SpO2: 100% (04-20-22 @ 12:01) (96% - 100%)  Wt(kg): --Vital Signs Last 24 Hrs  T(C): 36.2 (20 Apr 2022 12:01), Max: 36.4 (19 Apr 2022 20:29)  T(F): 97.2 (20 Apr 2022 12:01), Max: 97.5 (19 Apr 2022 20:29)  HR: 76 (20 Apr 2022 12:01) (74 - 95)  BP: 120/58 (20 Apr 2022 12:01) (111/72 - 138/60)  BP(mean): 79 (19 Apr 2022 17:05) (79 - 79)  RR: 17 (20 Apr 2022 12:01) (17 - 18)  SpO2: 100% (20 Apr 2022 12:01) (96% - 100%)    MEDICATIONS  (STANDING):  aluminum hydroxide/magnesium hydroxide/simethicone Suspension 30 milliLiter(s) Oral every 6 hours  aspirin  chewable 81 milliGRAM(s) Oral daily  atorvastatin 40 milliGRAM(s) Oral at bedtime  budesonide 160 MICROgram(s)/formoterol 4.5 MICROgram(s) Inhaler 2 Puff(s) Inhalation two times a day  folic acid 1 milliGRAM(s) Oral daily  gabapentin 100 milliGRAM(s) Oral three times a day  insulin glargine Injectable (LANTUS) 7 Unit(s) SubCutaneous at bedtime  insulin lispro (ADMELOG) corrective regimen sliding scale   SubCutaneous Before meals and at bedtime  metoprolol tartrate 25 milliGRAM(s) Oral two times a day  nicotine - 21 mG/24Hr(s) Patch 1 patch Transdermal daily  pantoprazole    Tablet 40 milliGRAM(s) Oral before breakfast  polyethylene glycol 3350 17 Gram(s) Oral daily  QUEtiapine 25 milliGRAM(s) Oral at bedtime  thiamine 100 milliGRAM(s) Oral daily  tiotropium 18 MICROgram(s) Capsule 1 Capsule(s) Inhalation daily  tiotropium 18 MICROgram(s) Capsule 1 Capsule(s) Inhalation daily  warfarin 7.5 milliGRAM(s) Oral once    MEDICATIONS  (PRN):  melatonin 3 milliGRAM(s) Oral at bedtime PRN Insomnia  senna 1 Tablet(s) Oral daily PRN Constipation    PHYSICAL EXAM:  GENERAL: NAD  EYES: clear conjunctiva; EOMI  ENMT: Moist mucous membranes  NECK: Supple, No JVD, Normal thyroid  CHEST/LUNG: Clear to auscultation bilaterally; No rales, rhonchi, wheezing, or rubs  HEART: S1, S2, Regular rate and rhythm  ABDOMEN: Soft, Nontender, Nondistended; Bowel sounds present  NEURO: Alert & awake  EXTREMITIES: No LE edema, no calf tenderness  SKIN: No rashes or lesions    Consultant(s) Notes Reviewed:  [x ] YES  [ ] NO  Care Discussed with Consultants/Other Providers [ x] YES  [ ] NO    LABS:                        12.3   9.41  )-----------( 491      ( 19 Apr 2022 08:04 )             38.8     04-19    138  |  101  |  20<H>  ----------------------------<  148<H>  4.4   |  30  |  0.85    Ca    9.7      19 Apr 2022 08:04      PT/INR - ( 20 Apr 2022 09:22 )   PT: 20.6 sec;   INR: 1.72 ratio         PTT - ( 20 Apr 2022 09:22 )  PTT:35.9 sec  CAPILLARY BLOOD GLUCOSE      POCT Blood Glucose.: 153 mg/dL (20 Apr 2022 11:30)  POCT Blood Glucose.: 154 mg/dL (20 Apr 2022 07:31)  POCT Blood Glucose.: 212 mg/dL (19 Apr 2022 20:59)  POCT Blood Glucose.: 276 mg/dL (19 Apr 2022 16:55)      RADIOLOGY & ADDITIONAL TESTS:    Imaging Personally Reviewed:  [ x] YES  [ ] NO  < from: US Abdomen Upper Quadrant Right (04.19.22 @ 11:36) >  ACC: [id number] EXAM:  US ABDOMEN RT UPR QUADRANT                          PROCEDURE DATE:  04/19/2022          INTERPRETATION:  CLINICAL INFORMATION: History of alcohol abuse rule out   cirrhosis    COMPARISON: None available.    TECHNIQUE: Sonography of the right upper quadrant.    FINDINGS:  Liver: Within normal limits.  Bile ducts: Normal caliber. Common bile duct measures 5 mm.  Gallbladder: Cholelithiasis.  Pancreas: Visualized portions are within normal limits.  Right kidney: 11.3 cm. No hydronephrosis. A 2.7 cm simple cortical cyst  Ascites: None.  IVC: Visualized portions are within normal limits.    IMPRESSION:  No acute findings. No scan evidence of cirrhosis..        --- End of Report ---            PARUL LOGAN MD; Attending Radiologist  This document has been electronically signed. Apr 19 2022  1:33PM    < end of copied text >  < from: Xray Chest 1 View-PORTABLE IMMEDIATE (Xray Chest 1 View-PORTABLE IMMEDIATE .) (04.12.22 @ 14:37) >    ACC: [id number] EXAM:  XR CHEST PORTABLE IMMED 1V                          PROCEDURE DATE:  04/12/2022          INTERPRETATION:  Portable chest radiograph    CLINICAL INFORMATION: Dyspnea, shortness of breath.    TECHNIQUE:  Portable  AP chest radiograph.    COMPARISON: 4/6/2022 chest .    FINDINGS: Battery device obscures RIGHT upper zone. CATHETERS AND TUBES:   None    PULMONARY: The visualized lungs are clear of airspace consolidations or   effusions.   No pneumothorax.    HEART/VASCULAR: The heart and mediastinum size and configuration are   within normal limits.    BONES: Visualized osseous structures are intact.    IMPRESSION:   No radiographic evidence of active chest disease.    --- End of Report ---            MAYA DING MD; Attending Radiologist  This document has been electronically signed. Apr 15 2022  2:03PM    < end of copied text >

## 2022-04-20 NOTE — PROGRESS NOTE ADULT - PROBLEM SELECTOR PLAN 7
- Patient able to tolerate BiPAP  - it was noted that patient became more agitated and started pulling off his mask in which he was given 0.5mg of haloperidol  family hx significant for alcohol abuse  patient was placed on CIWA protocol with precedex phenobarb in ICU  now calm and CIWA score 0  c/w to monitor  mild agitation, on 1:1  likely a baseline, dementia  will dc and monitor
On full dose lovenox for new onset AFib  pantoprazole
On full dose lovenox for new onset AFib  pantoprazole
Plan as above  - Currently not a candidate for ischemic eval, revascularization or TAVR- Card recs
Patient is at risk of gastritis  has diffuse abdominal pain and chest pain  Has been on steroids in the ICU  was NPO on BIPAP  -c/w Protonix 40mg PO qd
Plan as above  - Currently not a candidate for ischemic eval, revascularization or TAVR- Card recs
- Patient able to tolerate BiPAP  - it was noted that patient became more agitated and started pulling off his mask in which he was given 0.5mg of haloperidol  family hx significant for alcohol abuse  patient was placed on CIWA protocol with precedex phenobarb in ICU  now calm and CIWA score 0  c/w to monitor  mild agitation, on 1:1  likely a baseline, dementia  will dc and monitor
Plan as above  - Currently not a candidate for ischemic eval, revascularization or TAVR- Card recs

## 2022-04-20 NOTE — PROGRESS NOTE ADULT - PROBLEM SELECTOR PROBLEM 1
Acute respiratory failure with hypercapnia
Paroxysmal atrial fibrillation
Acute respiratory failure with hypercapnia

## 2022-04-20 NOTE — PROGRESS NOTE ADULT - PROBLEM SELECTOR PLAN 8
Patient is at risk of gastritis  has diffuse abdominal pain and chest pain  Has been on steroids in the ICU  was NPO on BIPAP  -c/w Protonix 40mg PO qd
c/w aspirin and statin   trops trended down  TTE showing severe AS/MS  Will need R+L cath and CTS eval once optimized - per Dr. Eleazar Cleaning coumadin
On full dose lovenox for new onset AFib  pantoprazole
Patient is at risk of gastritis  has diffuse abdominal pain and chest pain  Has been on steroids in the ICU  was NPO on BIPAP  -c/w Protonix 40mg PO qd
c/w aspirin and statin   trops trended down  TTE showing severe AS/MS  Will need R+L cath and CTS eval once optimized - per Dr. Eleazar Cleaning coumadin
c/w aspirin and statin   trops trended down  TTE showing severe AS/MS  Will need R+L cath and CTS eval once optimized - per Dr. Eleazar Cleaning coumadin

## 2022-04-20 NOTE — PROGRESS NOTE ADULT - PROBLEM SELECTOR PLAN 6
rated controlled , on BB  Echo with sever MS/AS  Continue with coumadin. 7.5mg today, INR 1.7  INR daily

## 2022-04-20 NOTE — PROGRESS NOTE ADULT - PROBLEM SELECTOR PLAN 9
On full dose lovenox for new onset AFib  pantoprazole
On full dose lovenox for new onset AFib  pantoprazole
Controlled on BB, H0ld home BP meds in the setting of low BP in ICU  had episode low BB with ambulation, corrected itself after rest  Cont with BB with parameters

## 2022-04-20 NOTE — PROGRESS NOTE ADULT - PROBLEM SELECTOR PROBLEM 8
Gastritis
Prophylactic measure
Non-ST elevation MI (NSTEMI)
Gastritis
Non-ST elevation MI (NSTEMI)
Non-ST elevation MI (NSTEMI)

## 2022-04-20 NOTE — PROGRESS NOTE ADULT - PROBLEM SELECTOR PLAN 5
S/p episode of SVT after duoneb in ICU  Converted to AFib after treatment with Adenosine  S/p Cardizem gtt - HR controlled   now on Metoprolol 25 BID and was on full dose Lovenox, transitioned to coumadin  Card Dr Bush

## 2022-04-20 NOTE — PROGRESS NOTE ADULT - PROBLEM SELECTOR PROBLEM 6
Gastritis
Gastritis
Afib
Non-ST elevation MI (NSTEMI)
Afib
Non-ST elevation MI (NSTEMI)
Afib
Alcohol withdrawal

## 2022-04-20 NOTE — PROGRESS NOTE ADULT - PROBLEM SELECTOR PROBLEM 7
Gastritis
Alcohol withdrawal
Prophylactic measure
Alcohol withdrawal
Prophylactic measure
Severe aortic stenosis

## 2022-04-21 NOTE — PROGRESS NOTE ADULT - REASON FOR ADMISSION
Acute hypoxic hypercapnic respiratory failure

## 2022-04-21 NOTE — PROGRESS NOTE ADULT - PROVIDER SPECIALTY LIST ADULT
Cardiology
Critical Care
Critical Care
Internal Medicine
Internal Medicine
Pulmonology
Cardiology
Internal Medicine
Internal Medicine
Pulmonology
Cardiology
Critical Care
Critical Care
Pulmonology
Pulmonology
Cardiology
Internal Medicine
Critical Care
Internal Medicine

## 2022-04-21 NOTE — PROGRESS NOTE ADULT - NS ATTEND AMEND GEN_ALL_CORE FT
Impression: This is a 76 Y/O Male who was sent by family member due to Altered Mental Status and Generalized Weakness. Was in ICU due to Acute Hypoxic Respiratory Failure secondary to COPD exacerbation by Influenza AH3 . Patient probable has undiagnosed COPD from Active smoking. Echo revealed Moderate-severe mitral stenosis, severe aortic stenosis.  On 4/7 Patient had episode of SVT after duoneb. Pt was given adenosine 6 then 12, rhythm converted to Afib. Cardizem 20 given, then pt was placed on Cardizem gtt. Dr. Bush notified, patient also started on therapeutic  Lovenox. Cardizem drip was discontinued and switched to Metoprolol 25 BID. 04-21-22,04-15-22 Negative PCR for Covid 19.     Suggestion:  O2 saturation 95% room air at rest with exertion with O2 saturation in 80S, still requires Oxygen supplementation 2L NC to maintain respiratory stability to prevent hypoxia and tachypnea.   Reinforced the importance of smoking cessation , especially that patient needed Oxygen supplementation. On Nicotine patch on x 12 Hours.   Continue Budesonide 2 Puffs Twice Daily.  Tiotropium 18 mcg Daily.    Oral hygiene care especially every after oral inhaler used.   Pulmonary follow up outpatient .

## 2022-04-21 NOTE — DISCHARGE NOTE NURSING/CASE MANAGEMENT/SOCIAL WORK - PATIENT PORTAL LINK FT
You can access the FollowMyHealth Patient Portal offered by [facility] by registering at the following website: [email or web address]. By joining [employer]’s FollowMyHealth portal, you will also be able to view your health information using other applications (apps) compatible with our system.

## 2022-04-21 NOTE — PROGRESS NOTE ADULT - SUBJECTIVE AND OBJECTIVE BOX
C A R D I O L O G Y  **********************************     DATE OF SERVICE: 04-21-22    Patient denies chest pain or shortness of breath.   Review of symptoms otherwise negative.    aluminum hydroxide/magnesium hydroxide/simethicone Suspension 30 milliLiter(s) Oral every 6 hours  aspirin  chewable 81 milliGRAM(s) Oral daily  atorvastatin 40 milliGRAM(s) Oral at bedtime  budesonide 160 MICROgram(s)/formoterol 4.5 MICROgram(s) Inhaler 2 Puff(s) Inhalation two times a day  cholecalciferol 1000 Unit(s) Oral daily  folic acid 1 milliGRAM(s) Oral daily  gabapentin 200 milliGRAM(s) Oral three times a day  insulin glargine Injectable (LANTUS) 7 Unit(s) SubCutaneous at bedtime  insulin lispro (ADMELOG) corrective regimen sliding scale   SubCutaneous Before meals and at bedtime  melatonin 3 milliGRAM(s) Oral at bedtime PRN  metoprolol tartrate 25 milliGRAM(s) Oral two times a day  nicotine - 21 mG/24Hr(s) Patch 1 patch Transdermal daily  pantoprazole    Tablet 40 milliGRAM(s) Oral before breakfast  polyethylene glycol 3350 17 Gram(s) Oral daily  QUEtiapine 25 milliGRAM(s) Oral at bedtime  senna 1 Tablet(s) Oral daily PRN  thiamine 100 milliGRAM(s) Oral daily  tiotropium 18 MICROgram(s) Capsule 1 Capsule(s) Inhalation daily  tiotropium 18 MICROgram(s) Capsule 1 Capsule(s) Inhalation daily  warfarin 7.5 milliGRAM(s) Oral once          Hemoglobin: 12.3 g/dL (04-19 @ 08:04)  Hemoglobin: 13.0 g/dL (04-18 @ 08:21)  Hemoglobin: 12.4 g/dL (04-17 @ 07:26)            Creatinine Trend: 0.85<--, 0.80<--, 0.74<--, 0.77<--, 0.72<--, 0.73<--    COAGS: PT/INR - ( 21 Apr 2022 07:27 )   PT: 24.6 sec;   INR: 2.05 ratio                   T(C): 36.3 (04-21-22 @ 05:28), Max: 36.6 (04-20-22 @ 17:40)  HR: 84 (04-21-22 @ 05:28) (76 - 102)  BP: 150/85 (04-21-22 @ 05:28) (119/65 - 150/85)  RR: 18 (04-21-22 @ 05:28) (17 - 18)  SpO2: 100% (04-21-22 @ 05:28) (95% - 100%)  Wt(kg): --    I&O's Summary      HEENT:  (-)icterus (-)pallor  CV: N S1 S2 1/6 FELICITA (+)2 Pulses B/l  Resp:  Clear to ausculatation B/L, normal effort  GI: (+) BS Soft, NT, ND  Lymph:  (-)Edema, (-)obvious lymphadenopathy  Skin: Warm to touch, Normal turgor  Psych: Appropriate mood and affect      TELEMETRY: 	off        ASSESSMENT/PLAN: 	76y  Male PMHx of HTN, CAD, active smoker, who was sent to hospital by family members due to altered mental status and generalized weakness found with Influenza, PNA and severe AS.    - Does not appear to be in pulmonary edema and has not been in pulmomary edema  - Keep net Even, A low BNP suggests low filling pressures   - Pt with moderate to severe MS and severe AS dose coumadin INR 2-3  - Monitor mental status  will need R+L cath and CTS eval once optimized but it appears that he has dementia.  If his mental status improves and he is cooperative will plan for outpt CTS referral   - Currently not a candidate for ischemic eval, revascularization or TAVR  - Completed TAMIFLU       Casper Bush MD, [facility]  BEEPER [phone]

## 2022-04-21 NOTE — DISCHARGE NOTE NURSING/CASE MANAGEMENT/SOCIAL WORK - NSDCPEFALRISK_GEN_ALL_CORE
For information on Fall & Injury Prevention, visit: [email or web address] OR  [email or web address] OR  https://www.cdc.gov/steadi/patient.html

## 2022-04-21 NOTE — PROGRESS NOTE ADULT - NS ATTEND OPT1 GEN_ALL_CORE
I independently performed the documented:
I attest my time as attending is greater than 50% of the total combined time spent on qualifying patient care activities by the PA/NP and attending.
I independently performed the documented:
I attest my time as attending is greater than 50% of the total combined time spent on qualifying patient care activities by the PA/NP and attending.

## 2022-04-21 NOTE — PROGRESS NOTE ADULT - SUBJECTIVE AND OBJECTIVE BOX
Time of Visit:  Patient seen and examined.     MEDICATIONS  (STANDING):  aluminum hydroxide/magnesium hydroxide/simethicone Suspension 30 milliLiter(s) Oral every 6 hours  aspirin  chewable 81 milliGRAM(s) Oral daily  atorvastatin 40 milliGRAM(s) Oral at bedtime  budesonide 160 MICROgram(s)/formoterol 4.5 MICROgram(s) Inhaler 2 Puff(s) Inhalation two times a day  cholecalciferol 1000 Unit(s) Oral daily  folic acid 1 milliGRAM(s) Oral daily  gabapentin 200 milliGRAM(s) Oral three times a day  insulin glargine Injectable (LANTUS) 7 Unit(s) SubCutaneous at bedtime  insulin lispro (ADMELOG) corrective regimen sliding scale   SubCutaneous Before meals and at bedtime  metoprolol tartrate 25 milliGRAM(s) Oral two times a day  nicotine - 21 mG/24Hr(s) Patch 1 patch Transdermal daily  pantoprazole    Tablet 40 milliGRAM(s) Oral before breakfast  polyethylene glycol 3350 17 Gram(s) Oral daily  QUEtiapine 25 milliGRAM(s) Oral at bedtime  thiamine 100 milliGRAM(s) Oral daily  tiotropium 18 MICROgram(s) Capsule 1 Capsule(s) Inhalation daily  tiotropium 18 MICROgram(s) Capsule 1 Capsule(s) Inhalation daily  warfarin 7.5 milliGRAM(s) Oral once      MEDICATIONS  (PRN):  melatonin 3 milliGRAM(s) Oral at bedtime PRN Insomnia  senna 1 Tablet(s) Oral daily PRN Constipation       Medications up to date at time of exam.    ROS; No fever, chills, cough, congestion on exam.   PHYSICAL EXAMINATION:    Vital Signs Last 24 Hrs  T(C): 36.8 (21 Apr 2022 12:19), Max: 36.8 (21 Apr 2022 12:19)  T(F): 98.2 (21 Apr 2022 12:19), Max: 98.2 (21 Apr 2022 12:19)  HR: 91 (21 Apr 2022 12:19) (84 - 102)  BP: 106/59 (21 Apr 2022 12:19) (106/59 - 150/85)  BP(mean): --  RR: 18 (21 Apr 2022 12:19) (17 - 18)  SpO2: 97% (21 Apr 2022 12:19) (95% - 100%)   (if applicable)    General : Alert and oriented. Able to answer question at rest with no SOB. No acute distress.     HEENT: Head is normocephalic and atraumatic. No nasal tenderness. Extraocular muscles are intact. Mucous membranes are moist.     NECK: Supple, no palpable adenopathy.    LUNGS: Clear to auscultation bilaterally with no wheezing, rales, or rhonchi. No use of accessory muscle.      HEART: S1 S2 Regular rate and no click/ rub.     ABDOMEN: Soft, nontender, and nondistended. Active bowel sounds.     EXTREMITIES: Without any cyanosis, clubbing, rash, lesions or edema.    NEUROLOGIC: Awake, alert, oriented.     SKIN: Warm and moist . Non diaphoretic.       LABS:          PT/INR - ( 21 Apr 2022 07:27 )   PT: 24.6 sec;   INR: 2.05 ratio         PTT - ( 20 Apr 2022 09:22 )  PTT:35.9 sec    MICROBIOLOGY: (if applicable)    RADIOLOGY & ADDITIONAL STUDIES:  EKG:   CXR:  ECHO:    IMPRESSION: 77y Male PAST MEDICAL & SURGICAL HISTORY:  HTN (hypertension)    Hyperlipemia    COPD (chronic obstructive pulmonary disease)    DM (diabetes mellitus)    Asthma    Status post spinal surgery  lumbar    Encounter for screening colonoscopy    H/O endoscopy       Impression: This is a 76 Y/O Male who was sent by family member due to Altered Mental Status and Generalized Weakness. Was in ICU due to Acute Hypoxic Respiratory Failure secondary to COPD exacerbation by Influenza AH3 . Patient probable has undiagnosed COPD from Active smoking. Echo revealed Moderate-severe mitral stenosis, severe aortic stenosis.  On 4/7 Patient had episode of SVT after duoneb. Pt was given adenosine 6 then 12, rhythm converted to Afib. Cardizem 20 given, then pt was placed on Cardizem gtt. Dr. Bush notified, patient also started on therapeutic  Lovenox. Cardizem drip was discontinued and switched to Metoprolol 25 BID. 04-21-22,04-15-22 Negative PCR for Covid 19.     Suggestion:  O2 saturation 95% room air at rest with exertion with O2 saturation in 80S, still requires Oxygen supplementation 2L NC to maintain respiratory stability to prevent hypoxia and tachypnea.   Reinforced the importance of smoking cessation , especially that patient needed Oxygen supplementation. On Nicotine patch on x 12 Hours.   Continue Budesonide 2 Puffs Twice Daily.  Tiotropium 18 mcg Daily.    Oral hygiene care especially every after oral inhaler used.   Pulmonary follow up outpatient .

## 2022-04-26 NOTE — ED ADULT NURSE NOTE - TEMPLATE LIST FOR HEAD TO TOE ASSESSMENT
KATRINA WITH [facility] CALLED BACK STATING WILVER LUIS OVER PTS DX STATES PT IS NO LONGER UNDER THEIR CARE, THE MOMENT HE WAS DX WITH AVIUM HE WAS OUT OF THEIR CARE.         CALLED PTS GRANDDAUGHTER AND SHE STATES SHE THINKS IF IT WASN'T THE [facility] IT WAS DR TRUJILLO OFFICE IN [place].        CALLED DR TRUJILLO OFFICE AND SPOKE TO HIS NURSE DIAMOND, AND SHE SAID SHE WOULD PUT A MESSAGE BACK AND GIVE ME A CALL WHEN SHE GOT A RESPONSE FROM THE .    Neuro

## 2022-04-30 NOTE — CONSULT NOTE ADULT - ATTENDING COMMENTS
75 y/o male, ambulatory and AAA0x2-3 at baseline, from [facility], with significant medical history of HTN, CAD, Severe AS, Valvular Afib on Coumadin, COPD, Tobacco Use Disorder, Etoh Abuse, Dementia, and recently noted left lung nodules who was sent to the ED for complaints of inability to urinate x 16 hours.  case discussed with Dr Linwood hayden connolly cath  flomax  trend labs

## 2022-04-30 NOTE — H&P ADULT - PROBLEM SELECTOR PLAN 5
Pt with paroxysmal valvular afib; EKG NSR w/ APC's in ED   C/w Warfarin 7.5mg HS, INR goal 2-3 (within goal on admission)  Also on ASA 81 x2 Qd for CAD/PAD   Echo as above, EF 55-60%

## 2022-04-30 NOTE — ED ADULT NURSE NOTE - NSIMPLEMENTINTERV_GEN_ALL_ED
Implemented All Fall Risk Interventions:  [place] to call system. Call bell, personal items and telephone within reach. Instruct patient to call for assistance. Room bathroom lighting operational. Non-slip footwear when patient is off stretcher. Physically safe environment: no spills, clutter or unnecessary equipment. Stretcher in lowest position, wheels locked, appropriate side rails in place. Provide visual cue, wrist band, yellow gown, etc. Monitor gait and stability. Monitor for mental status changes and reorient to person, place, and time. Review medications for side effects contributing to fall risk. Reinforce activity limits and safety measures with patient and family.
fair balance

## 2022-04-30 NOTE — ED PROVIDER NOTE - OBJECTIVE STATEMENT
76 yo M h/o DM, HTN, recent ICU admission for PNA p/w inability to urinate since 8pm last night with suprapubic fullness and no other associated symptoms. Pt denies any h/o urinary/prostate issues and states that he was feeling well with no symptoms/pain before his last night. Pt denies recent Cough, CP, SOB, Headache, Neck Stiffness, Abdominal Pain, nausea/vomiting, diarrhea, dysuria. No recent sick contacts or travel/ other recent illness/hospitalizations.

## 2022-04-30 NOTE — ED PROVIDER NOTE - PROGRESS NOTE DETAILS
Nemes - SO received from Dr Coleman. Pt evaluated at bedside, feels better. Labs/imaging reviewed and discussed with patient, discussed admission for possible sepsis/hypotension, hypoglycemia. D/w Dr Wayne and MAR, agree w plan

## 2022-04-30 NOTE — H&P ADULT - NSTOBACCOEDUHP_GEN_A_CS
Offered and provided Advancement Flap (Single) Text: We discussed various closure modalities with the patient, including healing by second intention, primary closure, skin graft and various flaps.  The location and configuration of the defect indicated that an advancement flap would result in the least disturbance of the position and function of the surrounding anatomic structures, and provide the best result.  The technique, its benefits, alternatives and risks were discussed with the patient.  The patient underwent the procedure as follows: The patient was positioned supine on the operating room table.  The area of the defect and the surrounding skin were anesthetized with buffered 1% lidocaine with epinephrine.  The area was washed with chlorhexidine.  Sterile drapes were applied. \\n\\nThe wound edges were debeveled and extensively undermined.  An advancement flap was designed, incised, and elevated.  Hemostasis was achieved with spot electrodesiccation.  The flap was advanced into position to cover the primary defect and a key suture was used to secure the flap into place.  Additional buried interrupted sutures were used to close the arms of the flap by the rule of halves to share out the unequal lengths.  The standing cones so created by the design of the flap was removed to fat by triangulation.  Final cutaneous approximation was achieved.  The closure was manually tested and found to be sound for strength and hemostasis.

## 2022-04-30 NOTE — H&P ADULT - NSHPSOCIALHISTORY_GEN_ALL_CORE
From [facility]   Current smoker; on nicotine patch   Unclear about prior Etoh use but was recently treated for presumed withdrawal

## 2022-04-30 NOTE — H&P ADULT - NSHPPHYSICALEXAM_GEN_ALL_CORE
GENERAL APPEARANCE: Well developed, AA0x3, in NAD   HEENT: Normocephalic, PERRL, EOMI External auditory canals clear, hearing grossly intact, no nasal discharge   THROAT: Oral cavity and pharynx normal. No inflammation, swelling, exudate, or lesions.  CARDIAC: Normal S1 and S2. No S3, S4 or murmurs. Rhythm is regular. There is no peripheral edema, cyanosis or pallor.  LUNGS: Clear to auscultation and percussion without rales, rhonchi, wheezing or diminished breath sounds.  ABDOMEN: Positive bowel sounds. Soft, nondistended, nontender. No guarding or rebound. No masses.  EXTREMITIES: No significant deformity or joint abnormality. No edema. Peripheral pulses intact. No varicosities.  NEUROLOGICAL: CN II-XII intact. Strength and sensation symmetric and intact throughout. Reflexes 2+ throughout.   SKIN: No skin breakdown, lesions or rashes noted GENERAL APPEARANCE: thin, guyanaese-american male, AA0x3, in NAD on RA   HEENT: Normocephalic, PERRL, EOMI External auditory canals clear, hearing grossly intact, no nasal discharge   THROAT: Oral cavity and pharynx normal. No inflammation, swelling, exudate, or lesions.  CARDIAC: Grade 3 systolic ejection murmur at the aortic area. Rhythm is regular. There is no peripheral edema, cyanosis or pallor.  LUNGS: Clear to auscultation and percussion without rales, rhonchi, wheezing or diminished breath sounds.  ABDOMEN: Positive bowel sounds. Soft, nondistended, nontender. No guarding or rebound. No masses.  EXTREMITIES: No significant deformity or joint abnormality. No edema. Peripheral pulses intact. No varicosities.  NEUROLOGICAL: CN II-XII intact. Strength and sensation symmetric and intact throughout. Reflexes 2+ throughout.   SKIN: No skin breakdown, lesions or rashes noted

## 2022-04-30 NOTE — H&P ADULT - PROBLEM SELECTOR PLAN 7
C/w home medications Symbicort, tiotropium (Spiriva exchange), albuterol PRN   Hold Duonebs as pt was acutely sensitive to it at prior visit (went into SVTs)  Does not appear to be in exacerbation, CXR non focal

## 2022-04-30 NOTE — H&P ADULT - CONVERSATION DETAILS
Prior MOLST in chart (done last visit) confirmed with HCP. DNR/DNI with no other limits to medical interventions noted. Current plan of care explained and questions answered.

## 2022-04-30 NOTE — H&P ADULT - HISTORY OF PRESENT ILLNESS
Patient is a 77 y/o male, ambulatory and AAA0x2-3 at baseline, from [facility], with significant medical history of HTN, CAD, Severe AS, Valvular Afib on Coumadin, COPD, Tobacco Use Disorder, Etoh Abuse, Dementia, and recently noted left lung nodules who was sent to the ED for complaints of inability to urinate x 16 hours. Patient is pleasant on interview and reports that he does not know why he was sent to the hospital and would like to go back to the NH as soon as possible. He endorses that he had some abdominal discomfort last night and had the urge to urinate but was unable to. He last voided yesterday afternoon. He does not recall any new medication intake, and denied any other complaints including fevers, cough, shortness of breath, falls, or leg swelling. While in the ED, he was found to be hypotensive with an eleavted lactate which raised suspicions for an underlying infection. Of note, patient had a recent admission to [facility] ICU for acute hypoxic respiratory failure 2/2 to COPD exacerbation/ Pneumonia requiring bipap and was also sedated with Precedex for presumed EtOH withdrawal; he was found to be very sensitive to Duonebs at that hospitalization (triggered SVT). He had a negative cardiac w/u in terms of CHF (EF>55%), but was found to have severe AS pending CTSx evaluation and ischemic w/u.  Patient is a 78 y/o male, ambulatory and AAA0x2-3 at baseline, from [facility], with significant medical history of HTN, CAD, Severe AS, Valvular Afib on Coumadin, COPD, Tobacco Use Disorder, Etoh Abuse, Dementia, and recently noted left lung nodules who was sent to the ED for complaints of inability to urinate x 16 hours. Patient is pleasant on interview and reports that he does not know why he was sent to the hospital and would like to go back to the NH as soon as possible. He endorses that he had some abdominal discomfort last night and had the urge to urinate but was unable to. He last voided yesterday afternoon. He does not recall any new medication intake, and denied any other complaints including fevers, cough, shortness of breath, falls, or leg swelling. While in the ED, he was found to be hypotensive with an eleavted lactate which raised suspicions for an underlying infection. Of note, patient had a recent admission to [facility] ICU for acute hypoxic respiratory failure 2/2 to COPD exacerbation/ Pneumonia requiring bipap and was also sedated with Precedex for presumed EtOH withdrawal; he was found to be very sensitive to Duonebs at that hospitalization (triggered SVT). He had a negative cardiac w/u in terms of CHF (EF>55%), but was found to have severe AS pending CTSx evaluation and ischemic w/u.

## 2022-04-30 NOTE — H&P ADULT - ASSESSMENT
Patient is a 77 y/o male, ambulatory and AAA0x2-3 at baseline, from [facility], with significant medical history of HTN, CAD, Severe AS, Valvular Afib on Coumadin, COPD, Tobacco Use Disorder, Etoh Abuse, Dementia, and recently noted left lung nodules who was sent to the ED for complaints of inability to urinate x 16 hours. Admitted for acute urinary retention and r/o sepsis.    In the ED:  VS: HR 97.8F, HR 100bpm, BP 85/47mmHg, RR 19, Spo2 99% on RA  Labs significant for WBC 14.6, Hb 8.4, BUN 64, Glucose 47, lactate 2.5, UA w/ RBCs  Echo 4/4/22: Severe AS, EF 55-60%      Patient is a 75 y/o male, ambulatory and AAA0x2-3 at baseline, from [facility], with significant medical history of HTN, CAD, Severe AS, Valvular Afib on Coumadin, COPD, Tobacco Use Disorder, Etoh Abuse, Dementia, and recently noted left lung nodules who was sent to the ED for complaints of inability to urinate x 16 hours. Admitted for acute urinary retention and r/o sepsis.    In the ED:  VS: HR 97.8F, HR 100bpm, BP 85/47mmHg, RR 19, Spo2 99% on RA  Labs significant for WBC 14.6, Hb 8.4, BUN 64, Glucose 47, lactate 2.5, UA w/ RBCs  RVP positive for Flu A   Echo 4/4/22: Severe AS, EF 55-60%      Patient is a 76 y/o male, ambulatory and AAA0x2-3 at baseline, from [facility], with significant medical history of HTN, CAD, Severe AS, Valvular Afib on Coumadin, COPD, Tobacco Use Disorder, Etoh Abuse, Dementia, and recently noted left lung nodules who was sent to the ED for complaints of inability to urinate x 16 hours. Admitted for acute urinary retention and r/o sepsis.    In the ED:  VS: HR 97.8F, HR 100bpm, BP 85/47mmHg, RR 19, Spo2 99% on RA  Labs significant for WBC 14.6, Hb 8.4, BUN 64, Glucose 47, lactate 2.5, UA w/ RBCs  RVP positive for Flu A   Echo 4/4/22: Severe AS, EF 55-60%

## 2022-04-30 NOTE — PATIENT PROFILE ADULT - FALL HARM RISK - HARM RISK INTERVENTIONS
Assistance with ambulation/Assistance OOB with selected safe patient handling equipment/Communicate Risk of Fall with Harm to all staff/Discuss with provider need for PT consult/Monitor gait and stability/Provide patient with walking aids - walker, cane, crutches/Reinforce activity limits and safety measures with patient and family/Tailored Fall Risk Interventions/Use of alarms - bed, chair and/or voice tab/Visual Cue: Yellow wristband and red socks/Bed in lowest position, wheels locked, appropriate side rails in place/Call bell, personal items and telephone in reach/Instruct patient to call for assistance before getting out of bed or chair/Non-slip footwear when patient is out of bed/[place] to call system/Physically safe environment - no spills, clutter or unnecessary equipment/Purposeful Proactive Rounding/Room/bathroom lighting operational, light cord in reach

## 2022-04-30 NOTE — H&P ADULT - PROBLEM SELECTOR PLAN 4
Pt with negative CHF w/u at prior admission   Echo as above w/ severe AS by criteria   Pt currently w/o syncope, chest pain or shortness of breath  Pending ischemic w/u (RHC, LHC), and Cardiothoracic Surgery Eval for possible TAVR outpatient

## 2022-04-30 NOTE — ED PROVIDER NOTE - CLINICAL SUMMARY MEDICAL DECISION MAKING FREE TEXT BOX
Pt p/w urinary retention, 500cc on bladder scan with connolly placed. However pt noted to be mildly tachy and hypotensive with delayed cap refill. WBC, lactate elevated. UA not convincing of UTI. Giving broad spectrum ABx. Glc low, giving D50.     Repeat BPs improved. Signing out to incoming doc pending IVP D50 and CXR and subsequent admission for concern for bacteremia. Pt stable on the monitor.

## 2022-04-30 NOTE — CONSULT NOTE ADULT - SUBJECTIVE AND OBJECTIVE BOX
HPI:  Patient is a 75 y/o male, ambulatory and AAA0x2-3 at baseline, from [facility], with significant medical history of HTN, CAD, Severe AS, Valvular Afib on Coumadin, COPD, Tobacco Use Disorder, Etoh Abuse, Dementia, and recently noted left lung nodules who was sent to the ED for complaints of inability to urinate x 16 hours. Patient is pleasant on interview and reports that he does not know why he was sent to the hospital and would like to go back to the NH as soon as possible. He endorses that he had some abdominal discomfort last night and had the urge to urinate but was unable to. He last voided yesterday afternoon. He does not recall any new medication intake, and denied any other complaints including fevers, cough, shortness of breath, falls, or leg swelling. While in the ED, he was found to be hypotensive with an eleavted lactate which raised suspicions for an underlying infection. Of note, patient had a [facility] for acute hypoxic respiratory failure 2/2 to COPD exacerbation/ Pneumonia requiring bipap and was also sedated with Precedex for presumed EtOH withdrawal; he was found to be very sensitive to Duonebs at that hospitalization (triggered SVT). He had a negative cardiac w/u in terms of CHF (EF>55%), but was found to have severe AS pending CTSx evaluation and ischemic w/u.  (30 Apr 2022 09:17)      PAST MEDICAL & SURGICAL HISTORY:  HTN (hypertension)    Hyperlipemia    COPD (chronic obstructive pulmonary disease)    DM (diabetes mellitus)    Severe aortic stenosis    Dementia    AF (atrial fibrillation)    Tobacco use disorder    Multiple lung nodules    Status post spinal surgery  lumbar    Encounter for screening colonoscopy    H/O endoscopy        Vital Signs Last 24 Hrs  T(C): 36.6 (30 Apr 2022 11:35), Max: 36.6 (30 Apr 2022 07:00)  T(F): 97.8 (30 Apr 2022 11:35), Max: 97.8 (30 Apr 2022 07:00)  HR: 98 (30 Apr 2022 11:35) (98 - 106)  BP: 105/58 (30 Apr 2022 11:35) (85/47 - 123/78)  BP(mean): --  RR: 18 (30 Apr 2022 11:35) (18 - 19)  SpO2: 94% (30 Apr 2022 11:35) (94% - 100%)                          8.4    14.69 )-----------( 345      ( 30 Apr 2022 06:45 )             25.9     04-30    135  |  101  |  64<H>  ----------------------------<  47<LL>  5.3   |  30  |  1.15    Ca    9.5      30 Apr 2022 06:45    TPro  7.9  /  Alb  2.4<L>  /  TBili  0.3  /  DBili  x   /  AST  28  /  ALT  49  /  AlkPhos  78  04-30    PT/INR - ( 30 Apr 2022 11:14 )   PT: 34.9 sec;   INR: 2.90 ratio         PTT - ( 30 Apr 2022 11:14 )  PTT:37.7 sec    PHYSICAL EXAM  General: WN/WD NAD  Neurology: A&Ox3, nonfocal, LAN x 4  Respiratory: CTA B/L  CV: RRR, S1S2, no murmurs, rubs or gallops  Abdominal: Soft, NT, ND +BS, Last BM  Extremities: No edema, + peripheral pulses        ASSESSMENT/ PLAN:   HPI:  Patient is a 77 y/o male, ambulatory and AAA0x2-3 at baseline, from [facility], with significant medical history of HTN, CAD, Severe AS, Valvular Afib on Coumadin, COPD, Tobacco Use Disorder, Etoh Abuse, Dementia, and recently noted left lung nodules who was sent to the ED for complaints of inability to urinate x 16 hours. Patient is pleasant on interview and reports that he does not know why he was sent to the hospital and would like to go back to the NH as soon as possible. He endorses that he had some abdominal discomfort last night and had the urge to urinate but was unable to. He last voided yesterday afternoon. He does not recall any new medication intake, and denied any other complaints including fevers, cough, shortness of breath, falls, or leg swelling. While in the ED, he was found to be hypotensive with an eleavted lactate which raised suspicions for an underlying infection. Of note, patient had a recent admission to Critical access hospital ICU for acute hypoxic respiratory failure 2/2 to COPD exacerbation/ Pneumonia requiring bipap and was also sedated with Precedex for presumed EtOH withdrawal; he was found to be very sensitive to Duonebs at that hospitalization (triggered SVT). He had a negative cardiac w/u in terms of CHF (EF>55%), but was found to have severe AS pending CTSx evaluation and ischemic w/u.  (30 Apr 2022 09:17)    Pt had connolly placed on admission with clear UO  300c in bag now with 500cc emptied per primary team    PAST MEDICAL & SURGICAL HISTORY:  HTN (hypertension)    Hyperlipemia    COPD (chronic obstructive pulmonary disease)    DM (diabetes mellitus)    Severe aortic stenosis    Dementia    AF (atrial fibrillation)    Tobacco use disorder    Multiple lung nodules    Status post spinal surgery  lumbar    Encounter for screening colonoscopy    H/O endoscopy        Vital Signs Last 24 Hrs  T(C): 36.6 (30 Apr 2022 11:35), Max: 36.6 (30 Apr 2022 07:00)  T(F): 97.8 (30 Apr 2022 11:35), Max: 97.8 (30 Apr 2022 07:00)  HR: 98 (30 Apr 2022 11:35) (98 - 106)  BP: 105/58 (30 Apr 2022 11:35) (85/47 - 123/78)  BP(mean): --  RR: 18 (30 Apr 2022 11:35) (18 - 19)  SpO2: 94% (30 Apr 2022 11:35) (94% - 100%)                          8.4    14.69 )-----------( 345      ( 30 Apr 2022 06:45 )             25.9     04-30    135  |  101  |  64<H>  ----------------------------<  47<LL>  5.3   |  30  |  1.15    Ca    9.5      30 Apr 2022 06:45    TPro  7.9  /  Alb  2.4<L>  /  TBili  0.3  /  DBili  x   /  AST  28  /  ALT  49  /  AlkPhos  78  04-30    PT/INR - ( 30 Apr 2022 11:14 )   PT: 34.9 sec;   INR: 2.90 ratio         PTT - ( 30 Apr 2022 11:14 )  PTT:37.7 sec    PHYSICAL EXAM  General: WN/WD NAD  Neurology: A&Ox3, nonfocal, LAN x 4  Respiratory: good b/l air entry  CV: S1S2  Abdominal: Soft, NT, ND   connolly clear UO        ASSESSMENT/ PLAN:  77 y/o male, ambulatory and AAA0x2-3 at baseline, from [facility], with significant medical history of HTN, CAD, Severe AS, Valvular Afib on Coumadin, COPD, Tobacco Use Disorder, Etoh Abuse, Dementia, and recently noted left lung nodules who was sent to the ED for complaints of inability to urinate x 16 hours.  case discussed with Dr Palumbo  no acute urology intervention  cont connolly cath  flomax  trend labs

## 2022-04-30 NOTE — H&P ADULT - PROBLEM SELECTOR PLAN 1
Pt with new onset AUR, suprapubic discomfort   No prior hx of BPH, recently started Remeron, and has a history of waxing/waning dementia   Remeron is less likely to have Anticholinergic side effects but may still cause AUR   May be 2/2 to sepsis/ infection (?Flu A)  Uncontrolled Diabetic with A1c of 10.3   Sorensen placed in the ED 4/30, voided 350ml   Started Flomax, will TOV in 24 hours or as per urology  Urology consulted

## 2022-04-30 NOTE — ED PROVIDER NOTE - PHYSICAL EXAMINATION
Vital Signs Reviewed - Hypotensive, mild tachycardia  GEN: Comfortable and conversant in full sentences, NAD, AAOx3  HEENT: NCAT, MMM, Neck Supple  RESP: CTAB, No rales/rhonchi/wheezing  CV: Mild tachycardia, S1S2, No murmurs  ABD: No TTP, Soft, ND, No masses, No CVA Tenderness  Extrem/Skin: Cap refill >2 seconds, Equal pulses bilat, No cyanosis/edema/rashes  Neuro: No focal deficits

## 2022-04-30 NOTE — H&P ADULT - PROBLEM SELECTOR PLAN 3
Uncontrolled Diabetic with A1c of 10.3 earlier this month   On Amaryl 1mg BIDm Metformin 1000mg BID, and SSI w/ humalog at [facility]   Admitted with glucose 47, pt was confused at that time   Resolved w/ dextrose and food   C/w SSI, Accu-Cheks ACHS   Good renal function, low suspicion for persistent hypoglycemia d/t amaryl; will monitor  DASH/ Carb Consistent diet

## 2022-04-30 NOTE — H&P ADULT - ATTENDING COMMENTS
75 y/o male, ambulatory and AA0x2-3 at baseline, from [facility], with PMH of HTN, CAD, Severe AS, Valvular Afib on Coumadin, COPD, Tobacco Use Disorder, Etoh Abuse, Dementia, and recently noted left lung nodules who presents for complaints of inability to urinate x 16 hours. Admitted for sepsis 2/2 influenza A and acute urinary retention.    #Sepsis 2/2 Influenza A  #Acute urinary retention  #Hypotension  #Chronic valvular Afib on coumadin  #Hypoglycemia  #Uncontrolled DM  #COPD  #Tobacco/Etoh abuse  #Dementia  #Hx HTN, CAD, severe AS  A/O3 at this time. Patient reports eating well. Influenza positive, leukocytosis, tachycardia. Elevated lactate improved with IVF. Patient also hypotensive in ED but improved with IVF. Connolly placed with UOP. Hypoglycemia resolved with dextrose  - Tamiflu, IVF  - connolly placed, TOV in 24 hours  - Flomax  - CXR neg, UA neg  - SSI  - Continue ASA and warfarin  - monitor off antiHTN meds  - Droplet precaution  Pending ischemic w/u (RHC, C), and Cardiothoracic Surgery Eval for possible TAVR outpatient. Needs f/u CT/PET Scan in 3 months for lung nodules  - f/u Urology 75 y/o male, ambulatory and AA0x2-3 at baseline, from [facility], with PMH of HTN, CAD, Severe AS, Valvular Afib on Coumadin, COPD, Tobacco Use Disorder, Etoh Abuse, Dementia, and recently noted left lung nodules who presents for complaints of inability to urinate x 16 hours. Admitted for sepsis 2/2 influenza A and acute urinary retention.    #Sepsis 2/2 Influenza A  #Acute urinary retention  #Hypotension  #Chronic valvular Afib on coumadin  #Anemia  #Hypoglycemia  #Uncontrolled DM  #COPD  #Tobacco/Etoh abuse  #Dementia  #Hx HTN, CAD, severe AS  A/O3 at this time. Patient reports eating well. Influenza positive, leukocytosis, tachycardia. Elevated lactate improved with IVF. Patient also hypotensive in ED but improved with IVF. Connolly placed with UOP. Hypoglycemia resolved with dextrose  - Tamiflu, IVF  - connolly placed, TOV in 24 hours  - Flomax  - CXR neg, UA neg  - SSI  - Continue ASA and warfarin  - monitor off antiHTN meds  - Anemic - no signs of bleeding, UA with RBC but appears clear, monitor  - Droplet precaution  Pending ischemic w/u (RHC, [facility]), and Cardiothoracic Surgery Eval for possible TAVR outpatient. Needs f/u CT/PET Scan in 3 months for lung nodules  - f/u Urology 78 y/o male, ambulatory and AA0x2-3 at baseline, from [facility], with PMH of HTN, CAD, Severe AS, Valvular Afib on Coumadin, COPD, Tobacco Use Disorder, Etoh Abuse, Dementia, and recently noted left lung nodules who presents for complaints of inability to urinate x 16 hours. Admitted for sepsis 2/2 influenza A and acute urinary retention.    #Sepsis 2/2 Influenza A  #Acute urinary retention  #Hypotension  #Chronic valvular Afib on coumadin  #Anemia  #Hypoglycemia  #Uncontrolled DM  #COPD  #Tobacco/Etoh abuse  #Dementia  #Hx HTN, CAD, severe AS  A/O3 at this time. Patient reports eating well. Influenza positive, leukocytosis, tachycardia. Elevated lactate improved with IVF. Patient also hypotensive in ED but improved with IVF. Connolly placed with UOP. Hypoglycemia resolved with dextrose  - Tamiflu, IVF  - connolly placed, TOV in 24 hours  - Flomax  - CXR neg, UA neg  - SSI  - Continue ASA and warfarin  - monitor off antiHTN meds  - Anemic - no signs of bleeding, UA with RBC but appears clear, monitor  - Droplet precaution  Pending ischemic w/u (RHC, LHC), and Cardiothoracic Surgery Eval for possible TAVR outpatient. Needs f/u CT/PET Scan in 3 months for lung nodules  - f/u Urology

## 2022-04-30 NOTE — H&P ADULT - PROBLEM SELECTOR PLAN 2
Pt found to be hypotensive 85/47mmhg, fluid responsive, and tachycardic to 103  Lactate 2.5 which fell to normal s/p IVF boluses   CXR non focal, UA with RBC 25-50 (likely post connolly), but w/o pyuria   RVP +ve for Flu A   Pt denies any cough or shortness of breath; saturating well on RA   Droplet precautions   PRN Tylenol, and Robitussin

## 2022-04-30 NOTE — H&P ADULT - PROBLEM SELECTOR PLAN 10
Two lung nodules noted at prior admission in the left lung   Measuring 1.8x0.6x1cm in the left upper lobe and 1.5x1x1.9cm in left apex   Need f/u CT/PET Scan in 3 months as per prior documentation

## 2022-04-30 NOTE — H&P ADULT - PROBLEM SELECTOR PLAN 11
Pt currently at baseline mental status   C/w home medications including remeron   Required a lot of haldol and other sedatives at prior admission for presumed Etoh withdrawal and delirium

## 2022-04-30 NOTE — H&P ADULT - NSICDXPASTMEDICALHX_GEN_ALL_CORE_FT
PAST MEDICAL HISTORY:  AF (atrial fibrillation)     COPD (chronic obstructive pulmonary disease)     Dementia     DM (diabetes mellitus)     HTN (hypertension)     Hyperlipemia     Multiple lung nodules     Severe aortic stenosis     Tobacco use disorder

## 2022-04-30 NOTE — H&P ADULT - PROBLEM SELECTOR PLAN 6
Has a prior history of HTN on losartan but hypotensive in setting of sepsis   Also taken off losartan as per NH med list   DASH/ Carb Consistent diet

## 2022-05-01 NOTE — PROGRESS NOTE ADULT - ASSESSMENT
75 y/o male, ambulatory and AA0x2-3 at baseline, from [facility], with PMH of HTN, CAD, Severe AS, Valvular Afib on Coumadin, COPD, Tobacco Use Disorder, Etoh Abuse, Dementia, and recently noted left lung nodules who presents for complaints of inability to urinate x 16 hours. Admitted for sepsis 2/2 influenza A and acute urinary retention.    #Sepsis 2/2 Influenza A  #Acute urinary retention  #Hypotension  #Chronic valvular Afib on coumadin  #Hypoglycemia  #Uncontrolled DM  #COPD  #Tobacco/Etoh abuse  #Dementia  #Hx HTN, CAD, severe AS  A/O3 at this time. Patient reports eating well. Influenza positive, leukocytosis, tachycardia. Elevated lactate improved with IVF. Patient also hypotensive in ED but improved with IVF. Connolly placed with UOP. Hypoglycemia resolved with dextrose  - Tamiflu, IVF  - connolly placed, TOV in 24 hours  - Flomax  - CXR neg, UA neg  - SSI  - Continue ASA and warfarin  - monitor off antiHTN meds  - Droplet precaution  Pending ischemic w/u (RHC, C), and Cardiothoracic Surgery Eval for possible TAVR outpatient. Needs f/u CT/PET Scan in 3 months for lung nodules  - f/u Urology  77 y/o male, ambulatory and AA0x2-3 at baseline, from [facility], with PMH of HTN, CAD, Severe AS, Valvular Afib on Coumadin, COPD, Tobacco Use Disorder, Etoh Abuse, Dementia, and recently noted left lung nodules who presents for complaints of inability to urinate x 16 hours. Admitted for sepsis 2/2 influenza A and acute urinary retention.    #Sepsis 2/2 Influenza A  #Acute urinary retention  #Hypotension  #Chronic valvular Afib on coumadin  #Hypoglycemia  #Uncontrolled DM  #COPD  #Tobacco/Etoh abuse  #Dementia  #Hx HTN, CAD, severe AS  A/O3 at this time. Patient reports eating well. Influenza positive, leukocytosis, tachycardia. Elevated lactate improved with IVF. Patient also hypotensive in ED but improved with IVF. Connolly placed with UOP. Hypoglycemia resolved with dextrose  - Tamiflu, IVF  - connolly placed, TOV in 24 hours  - Flomax  - CXR neg, UA neg  - SSI  - Continue ASA and warfarin  - monitor off antiHTN meds  - Anemic - no signs of bleeding, UA with RBC but appears clear, monitor  - Droplet precaution  Pending ischemic w/u (C, [facility]), and Cardiothoracic Surgery Eval for possible TAVR outpatient. Needs f/u CT/PET Scan in 3 months for lung nodules  - f/u Urology

## 2022-05-01 NOTE — DIETITIAN INITIAL EVALUATION ADULT - OTHER INFO
Per RN, patient disliked minced and moist consistency , therefore, diet was changed To easy To chew . A1C: 10.3, DM education deferred

## 2022-05-01 NOTE — DIETITIAN INITIAL EVALUATION ADULT - PERTINENT LABORATORY DATA
05-01    140  |  103  |  36<H>  ----------------------------<  112<H>  4.4   |  32<H>  |  0.84    Ca    10.0      01 May 2022 06:45    TPro  7.9  /  Alb  2.4<L>  /  TBili  0.3  /  DBili  x   /  AST  28  /  ALT  49  /  AlkPhos  78  04-30  POCT Blood Glucose.: 271 mg/dL (05-01-22 @ 11:26)  A1C with Estimated Average Glucose Result: 10.3 % (04-05-22 @ 07:29)

## 2022-05-01 NOTE — DIETITIAN INITIAL EVALUATION ADULT - ORAL INTAKE PTA/DIET HISTORY
unable to interview patient face to face as in droplet precautions. contacted patient On extension in room, declined interview .   Follows [facility], CORTEZ, cardiac regular consistency. diet health shake 4 oz bid, LPS 15/30 30ml by mouth every day at nursing home

## 2022-05-01 NOTE — PROGRESS NOTE ADULT - SUBJECTIVE AND OBJECTIVE BOX
[facility]  Patient is a 77y old  Male who presents with a chief complaint of Hypotension     (01 May 2022 12:56)      SUBJECTIVE / OVERNIGHT EVENTS:  No acute events over night. Tolerating diet well. Denies any fevers/chills, headache, CP, SOB, abd pain, N/V/D, constipation, or leg swelling.       MEDICATIONS  (STANDING):  aluminum hydroxide/magnesium hydroxide/simethicone Suspension 30 milliLiter(s) Oral every 6 hours  aspirin  chewable 81 milliGRAM(s) Oral daily  atorvastatin 40 milliGRAM(s) Oral at bedtime  budesonide  80 MICROgram(s)/formoterol 4.5 MICROgram(s) Inhaler 2 Puff(s) Inhalation two times a day  cholecalciferol 1000 Unit(s) Oral daily  dextrose 50% Injectable 25 Gram(s) IV Push once  folic acid 1 milliGRAM(s) Oral daily  gabapentin 200 milliGRAM(s) Oral two times a day  glucagon  Injectable 1 milliGRAM(s) IntraMuscular once  insulin lispro (ADMELOG) corrective regimen sliding scale   SubCutaneous Before meals and at bedtime  metoprolol tartrate 12.5 milliGRAM(s) Oral two times a day  mirtazapine 15 milliGRAM(s) Oral at bedtime  nicotine -  14 mG/24Hr(s) Patch 1 Patch Transdermal daily  oseltamivir 30 milliGRAM(s) Oral every 12 hours  pantoprazole    Tablet 40 milliGRAM(s) Oral before breakfast  polyethylene glycol 3350 17 Gram(s) Oral daily  senna 2 Tablet(s) Oral at bedtime  sodium chloride 0.9% lock flush 3 milliLiter(s) IV Push every 8 hours  tamsulosin 0.4 milliGRAM(s) Oral at bedtime  thiamine 100 milliGRAM(s) Oral daily  tiotropium 18 MICROgram(s) Capsule 1 Capsule(s) Inhalation daily  warfarin 7.5 milliGRAM(s) Oral at bedtime    MEDICATIONS  (PRN):  acetaminophen     Tablet .. 650 milliGRAM(s) Oral every 6 hours PRN Temp greater or equal to 38C (100.4F), Mild Pain (1 - 3)  ALBUTerol    90 MICROgram(s) HFA Inhaler 2 Puff(s) Inhalation every 6 hours PRN Shortness of Breath and/or Wheezing  melatonin 3 milliGRAM(s) Oral at bedtime PRN Insomnia  ondansetron Injectable 4 milliGRAM(s) IV Push every 8 hours PRN Nausea and/or Vomiting          OBJECTIVE:  Vital Signs Last 24 Hrs  T(C): 36.4 (01 May 2022 13:46), Max: 36.7 (2022 21:28)  T(F): 97.6 (01 May 2022 13:46), Max: 98 (2022 21:28)  HR: 56 (01 May 2022 13:46) (51 - 130)  BP: 98/71 (01 May 2022 13:46) (93/42 - 115/57)  BP(mean): --  RR: 16 (01 May 2022 13:46) (16 - 18)  SpO2: 98% (01 May 2022 13:46) (94% - 98%)    PHYSICAL EXAM:  GENERAL: NAD, well-developed  HEAD:  Atraumatic, Normocephalic  EYES: conjunctiva and sclera clear  NECK: Supple, No JVD  CHEST/LUNG: Clear to auscultation bilaterally; No wheeze  HEART: Regular rate and rhythm; No murmurs, rubs, or gallops  ABDOMEN: Soft, Nontender, Nondistended; Bowel sounds present  EXTREMITIES:  No clubbing, cyanosis, or edema  PSYCH: AAOx3  NEUROLOGY: non-focal  SKIN: No rashes or lesions    CAPILLARY BLOOD GLUCOSE      POCT Blood Glucose.: 271 mg/dL (01 May 2022 11:26)  POCT Blood Glucose.: 163 mg/dL (01 May 2022 08:09)  POCT Blood Glucose.: 210 mg/dL (2022 21:17)  POCT Blood Glucose.: 104 mg/dL (2022 16:43)    I&O's Summary    2022 07:01  -  01 May 2022 07:00  --------------------------------------------------------  IN: 0 mL / OUT: 1500 mL / NET: -1500 mL    01 May 2022 07:01  -  01 May 2022 14:08  --------------------------------------------------------  IN: 0 mL / OUT: 900 mL / NET: -900 mL              LABS:                        7.5    8.20  )-----------( 324      ( 01 May 2022 06:45 )             22.8     05-    140  |  103  |  36<H>  ----------------------------<  112<H>  4.4   |  32<H>  |  0.84    Ca    10.0      01 May 2022 06:45    TPro  7.9  /  Alb  2.4<L>  /  TBili  0.3  /  DBili  x   /  AST  28  /  ALT  49  /  AlkPhos  78  04-30    PT/INR - ( 01 May 2022 06:45 )   PT: 34.1 sec;   INR: 2.84 ratio         PTT - ( 2022 11:14 )  PTT:37.7 sec      Urinalysis Basic - ( 2022 06:45 )    Color: Yellow / Appearance: Clear / S.005 / pH: x  Gluc: x / Ketone: Negative  / Bili: Negative / Urobili: Negative   Blood: x / Protein: 30 mg/dL / Nitrite: Negative   Leuk Esterase: Trace / RBC: 25-50 /HPF / WBC 3-5 /HPF   Sq Epi: x / Non Sq Epi: Few /HPF / Bacteria: Few /HPF          Culture - Urine (collected 2022 10:14)  Source: Clean Catch Clean Catch (Midstream)  Final Report (01 May 2022 10:15):    <10,000 CFU/mL Normal Urogenital Maureen        RADIOLOGY & ADDITIONAL TESTS:

## 2022-05-01 NOTE — DIETITIAN INITIAL EVALUATION ADULT - DIET TYPE
DASH/TLC (sodium and cholesterol restricted diet)/consistent carbohydrate (evening snack)/easy to chew

## 2022-05-01 NOTE — CHART NOTE - NSCHARTNOTEFT_GEN_A_CORE
EVENT:Notified by RN of pt restless, attempting OOB .     SUBJECTIVE:Pt seen and examined. Pt calm, awake/alert, in bed, orientedx3, cooperative endorses he was trying to get of bed "for no reason, but too weak to stand ," endorses he smokes 5-10 cigarettes daily, last cigarette on day of admission.  Pt denies cough, SOB, chest discomfort, N/V/abdominal discomfort.     OBJECTIVE:  Vital Signs Last 24 Hrs  T(C): 36.2 (01 May 2022 04:50), Max: 36.7 (30 Apr 2022 21:28)  T(F): 97.1 (01 May 2022 04:50), Max: 98 (30 Apr 2022 21:28)  HR: 51 (01 May 2022 04:50) (51 - 130)  BP: 93/42 (01 May 2022 04:50) (93/42 - 115/57)  BP(mean): --  RR: 18 (01 May 2022 04:50) (18 - 18)  SpO2: 98% (01 May 2022 04:50) (94% - 98%)    MEDICATIONS  (STANDING):  aluminum hydroxide/magnesium hydroxide/simethicone Suspension 30 milliLiter(s) Oral every 6 hours  aspirin  chewable 81 milliGRAM(s) Oral daily  atorvastatin 40 milliGRAM(s) Oral at bedtime  budesonide  80 MICROgram(s)/formoterol 4.5 MICROgram(s) Inhaler 2 Puff(s) Inhalation two times a day  cholecalciferol 1000 Unit(s) Oral daily  dextrose 50% Injectable 25 Gram(s) IV Push once  folic acid 1 milliGRAM(s) Oral daily  gabapentin 200 milliGRAM(s) Oral two times a day  glucagon  Injectable 1 milliGRAM(s) IntraMuscular once  insulin lispro (ADMELOG) corrective regimen sliding scale   SubCutaneous Before meals and at bedtime  metoprolol tartrate 12.5 milliGRAM(s) Oral two times a day  mirtazapine 15 milliGRAM(s) Oral at bedtime  nicotine -  14 mG/24Hr(s) Patch 1 Patch Transdermal daily  oseltamivir 30 milliGRAM(s) Oral every 12 hours  pantoprazole    Tablet 40 milliGRAM(s) Oral before breakfast  polyethylene glycol 3350 17 Gram(s) Oral daily  senna 2 Tablet(s) Oral at bedtime  sodium chloride 0.9% lock flush 3 milliLiter(s) IV Push every 8 hours  tamsulosin 0.4 milliGRAM(s) Oral at bedtime  thiamine 100 milliGRAM(s) Oral daily  tiotropium 18 MICROgram(s) Capsule 1 Capsule(s) Inhalation daily  warfarin 7.5 milliGRAM(s) Oral at bedtime    MEDICATIONS  (PRN):  acetaminophen     Tablet .. 650 milliGRAM(s) Oral every 6 hours PRN Temp greater or equal to 38C (100.4F), Mild Pain (1 - 3)  ALBUTerol    90 MICROgram(s) HFA Inhaler 2 Puff(s) Inhalation every 6 hours PRN Shortness of Breath and/or Wheezing  melatonin 3 milliGRAM(s) Oral at bedtime PRN Insomnia  ondansetron Injectable 4 milliGRAM(s) IV Push every 8 hours PRN Nausea and/or Vomiting  ---------  Physical Exam  Gen: calm , NAD  HEENT: PERRL, anicteric, no oral mucositis  Resp: Clear breath sounds on auscultation. No rales, no wheezing  CVS: S1S2 present, regular  GI: BS(+), Soft, ND, NT  Extremities : BLE No edema or calf tenderness. PPP  Neuro: Awake/alert.  no new neuro deficits  Skin: warm,dry,no rash      LABS:                        7.5    8.20  )-----------( 324      ( 01 May 2022 06:45 )             22.8     05-01    140  |  103  |  36<H>  ----------------------------<  112<H>  4.4   |  32<H>  |  0.84    Ca    10.0      01 May 2022 06:45    TPro  7.9  /  Alb  2.4<L>  /  TBili  0.3  /  DBili  x   /  AST  28  /  ALT  49  /  AlkPhos  78  04-30      EKG: < from: 12 Lead ECG (04.08.22 @ 18:55) >QTC Calculation(Bazett) 433 ms    < end of copied text >      IMAGING:< from: Xray Chest 1 View-PORTABLE IMMEDIATE (Xray Chest 1 View-PORTABLE IMMEDIATE .) (04.30.22 @ 08:18) >      Impression:  Negative for acute cardiopulmonary process.    < end of copied text >        ASSESSMENT:  HPI:  75 y/o male, ambulatory and AAA0x2-3 at baseline, from [facility], with significant medical history of HTN, CAD, Severe AS, Valvular Afib on Coumadin, COPD, Tobacco Use Disorder, Etoh Abuse, Dementia, and recently noted left lung nodules who was sent to the ED for complaints of inability to urinate x 16 hours. Admitted for acute urinary retention and r/o sepsis. RVP positive for Flu A , on Tamiflu.  Now with reported restlessness likely due to nicotine withdrawal.   Pt also reports generalized weakness due to sepsis/Influenza A    PLAN:   1. Restlessness likely due to Nicotine withdrawal:    C/w nicotine patch   C/w Melatonin   Safety measures  Supportive measures   PT     Nanda Hogan NPMedicine   [phone]

## 2022-05-01 NOTE — PROGRESS NOTE ADULT - SUBJECTIVE AND OBJECTIVE BOX
INTERVAL HPI/OVERNIGHT EVENTS:    No acute events overnight.   Pt resting comfortably. No acute complaints.     MEDICATIONS  (STANDING):  aluminum hydroxide/magnesium hydroxide/simethicone Suspension 30 milliLiter(s) Oral every 6 hours  aspirin  chewable 81 milliGRAM(s) Oral daily  atorvastatin 40 milliGRAM(s) Oral at bedtime  budesonide  80 MICROgram(s)/formoterol 4.5 MICROgram(s) Inhaler 2 Puff(s) Inhalation two times a day  cholecalciferol 1000 Unit(s) Oral daily  dextrose 50% Injectable 25 Gram(s) IV Push once  folic acid 1 milliGRAM(s) Oral daily  gabapentin 200 milliGRAM(s) Oral two times a day  glucagon  Injectable 1 milliGRAM(s) IntraMuscular once  insulin lispro (ADMELOG) corrective regimen sliding scale   SubCutaneous Before meals and at bedtime  metoprolol tartrate 12.5 milliGRAM(s) Oral two times a day  mirtazapine 15 milliGRAM(s) Oral at bedtime  nicotine -  14 mG/24Hr(s) Patch 1 Patch Transdermal daily  oseltamivir 30 milliGRAM(s) Oral every 12 hours  pantoprazole    Tablet 40 milliGRAM(s) Oral before breakfast  polyethylene glycol 3350 17 Gram(s) Oral daily  senna 2 Tablet(s) Oral at bedtime  sodium chloride 0.9% lock flush 3 milliLiter(s) IV Push every 8 hours  tamsulosin 0.4 milliGRAM(s) Oral at bedtime  thiamine 100 milliGRAM(s) Oral daily  tiotropium 18 MICROgram(s) Capsule 1 Capsule(s) Inhalation daily  warfarin 7.5 milliGRAM(s) Oral at bedtime    MEDICATIONS  (PRN):  acetaminophen     Tablet .. 650 milliGRAM(s) Oral every 6 hours PRN Temp greater or equal to 38C (100.4F), Mild Pain (1 - 3)  ALBUTerol    90 MICROgram(s) HFA Inhaler 2 Puff(s) Inhalation every 6 hours PRN Shortness of Breath and/or Wheezing  melatonin 3 milliGRAM(s) Oral at bedtime PRN Insomnia  ondansetron Injectable 4 milliGRAM(s) IV Push every 8 hours PRN Nausea and/or Vomiting      Vital Signs Last 24 Hrs  T(C): 36.2 (01 May 2022 04:50), Max: 36.7 (30 Apr 2022 21:28)  T(F): 97.1 (01 May 2022 04:50), Max: 98 (30 Apr 2022 21:28)  HR: 51 (01 May 2022 04:50) (51 - 130)  BP: 93/42 (01 May 2022 04:50) (93/42 - 115/57)  BP(mean): --  RR: 18 (01 May 2022 04:50) (18 - 18)  SpO2: 98% (01 May 2022 04:50) (94% - 98%)      PHYSICAL EXAM  General: Alert and oriented, not in acute distress  Resp: Breathing unlabored  Abdomen: Soft, nondistended, nontender  : connolly clear      I&O's Detail    30 Apr 2022 07:01  -  01 May 2022 07:00  --------------------------------------------------------  IN:  Total IN: 0 mL    OUT:    Indwelling Catheter - Urethral (mL): 1500 mL  Total OUT: 1500 mL    Total NET: -1500 mL          LABS:                        7.5    8.20  )-----------( 324      ( 01 May 2022 06:45 )             22.8             05-01    140  |  103  |  36<H>  ----------------------------<  112<H>  4.4   |  32<H>  |  0.84    Ca    10.0      01 May 2022 06:45    TPro  7.9  /  Alb  2.4<L>  /  TBili  0.3  /  DBili  x   /  AST  28  /  ALT  49  /  AlkPhos  78  04-30

## 2022-05-01 NOTE — PROGRESS NOTE ADULT - ASSESSMENT
77 yoM with urinary retention, connolly placed    UO appropriate, sCr wnl    - continue connolly with flomax  - monitor UO

## 2022-05-01 NOTE — DIETITIAN INITIAL EVALUATION ADULT - PERTINENT MEDS FT
MEDICATIONS  (STANDING):  aluminum hydroxide/magnesium hydroxide/simethicone Suspension 30 milliLiter(s) Oral every 6 hours  aspirin  chewable 81 milliGRAM(s) Oral daily  atorvastatin 40 milliGRAM(s) Oral at bedtime  budesonide  80 MICROgram(s)/formoterol 4.5 MICROgram(s) Inhaler 2 Puff(s) Inhalation two times a day  cholecalciferol 1000 Unit(s) Oral daily  dextrose 50% Injectable 25 Gram(s) IV Push once  folic acid 1 milliGRAM(s) Oral daily  gabapentin 200 milliGRAM(s) Oral two times a day  glucagon  Injectable 1 milliGRAM(s) IntraMuscular once  insulin lispro (ADMELOG) corrective regimen sliding scale   SubCutaneous Before meals and at bedtime  metoprolol tartrate 12.5 milliGRAM(s) Oral two times a day  mirtazapine 15 milliGRAM(s) Oral at bedtime  nicotine -  14 mG/24Hr(s) Patch 1 Patch Transdermal daily  oseltamivir 30 milliGRAM(s) Oral every 12 hours  pantoprazole    Tablet 40 milliGRAM(s) Oral before breakfast  polyethylene glycol 3350 17 Gram(s) Oral daily  senna 2 Tablet(s) Oral at bedtime  sodium chloride 0.9% lock flush 3 milliLiter(s) IV Push every 8 hours  tamsulosin 0.4 milliGRAM(s) Oral at bedtime  thiamine 100 milliGRAM(s) Oral daily  tiotropium 18 MICROgram(s) Capsule 1 Capsule(s) Inhalation daily  warfarin 7.5 milliGRAM(s) Oral at bedtime    MEDICATIONS  (PRN):  acetaminophen     Tablet .. 650 milliGRAM(s) Oral every 6 hours PRN Temp greater or equal to 38C (100.4F), Mild Pain (1 - 3)  ALBUTerol    90 MICROgram(s) HFA Inhaler 2 Puff(s) Inhalation every 6 hours PRN Shortness of Breath and/or Wheezing  melatonin 3 milliGRAM(s) Oral at bedtime PRN Insomnia  ondansetron Injectable 4 milliGRAM(s) IV Push every 8 hours PRN Nausea and/or Vomiting

## 2022-05-02 NOTE — PROGRESS NOTE ADULT - PROBLEM SELECTOR PLAN 7
Has a prior history of HTN on losartan but hypotensive in setting of sepsis   Also taken off losartan as per NH med list   DASH/ Carb Consistent diet    for HLD- C/w lipitor 40mg   DASH/ Carb Consistent diet

## 2022-05-02 NOTE — PHYSICAL THERAPY INITIAL EVALUATION ADULT - ADDITIONAL COMMENTS
Patient was in rehab. Patient used R.W.  Prior to last hospitalization, patient was independent using cane

## 2022-05-02 NOTE — CHART NOTE - NSCHARTNOTEFT_GEN_A_CORE
77 year old male consult for urinary retention     -Flomax 0.4mg qHS   -Finasteride 5mg qDay  -Bowel regimen, senna, colace, miralax  -Hydration  -Keep Sorensen until patient has gotten at least 2 days of Flomax  -May TOV after 2 days of Flomax if ambulating and having soft BMs.  -If fails TOV, replace Sorensen, home with Sorensen, TOV as outpatient  -Follow up with Dr. Palumbo in clinic [phone]      Please reconsult as necessary

## 2022-05-02 NOTE — PROGRESS NOTE ADULT - PROBLEM SELECTOR PLAN 1
H/H 6.6/20/3 from 7.5  repeated Hgb 7.1  with abdominal distention, worsening   no visible bleeding in urine, no BM today.   f/u CTA A/P urgent to r/o internal bleeding  called surgery for evaluation  hold AC for now.   T/S done, consent in chart  Give 1 unit PRBC for now. f/u post blood tx CBC  f/u FOBT

## 2022-05-02 NOTE — CONSULT NOTE ADULT - SUBJECTIVE AND OBJECTIVE BOX
Patient is a 77 y/o male, ambulatory and AAA0x2-3 at baseline, from [facility], with significant medical history of HTN, CAD, Severe AS, Valvular Afib on Coumadin, COPD, Tobacco Use Disorder, Etoh Abuse, Dementia, and recently noted left lung nodules who was sent to the ED for complaints of inability to urinate x 16 hours. Patient is pleasant on interview and reports that he does not know why he was sent to the hospital and would like to go back to the NH as soon as possible. He endorses that he had some abdominal discomfort last night and had the urge to urinate but was unable to. He last voided yesterday afternoon. He does not recall any new medication intake, and denied any other complaints including fevers, cough, shortness of breath, falls, or leg swelling. While in the ED, he was found to be hypotensive with an eleavted lactate which raised suspicions for an underlying infection. Of note, patient had a recent admission to [facility] ICU for acute hypoxic respiratory failure 2/2 to COPD exacerbation/ Pneumonia requiring bipap and was also sedated with Precedex for presumed EtOH withdrawal; he was found to be very sensitive to Duonebs at that hospitalization (triggered SVT). He had a negative cardiac w/u in terms of CHF (EF>55%), but was found to have severe AS pending CTSx evaluation and ischemic w/u.  Called for r/o intraabdominal bleed for anemia. Medical team reports that pt has low back pain, but no abd pain complaints.  No hx recent trauma.  Pt examined at bedside, no complaints. Alert nad. no report of bpr  No known hx of AAA    Vital Signs Last 24 Hrs  T(C): 36.4 (02 May 2022 04:53), Max: 36.7 (01 May 2022 21:30)  T(F): 97.5 (02 May 2022 04:53), Max: 98.1 (01 May 2022 21:30)  HR: 80 (02 May 2022 09:54) (56 - 97)  BP: 101/63 (02 May 2022 04:53) (98/55 - 101/63)  BP(mean): --  RR: 17 (02 May 2022 04:53) (16 - 17)  SpO2: 98% (02 May 2022 09:54) (96% - 98%)  Abd: soft nt nd, +ecchymosis periumbilical area with perceptible mass effect. mild tender with palpation. umbilical hernia, reducible, nt   Patient is a 77 y/o male, ambulatory and AAA0x2-3 at baseline, from [facility], with significant medical history of HTN, CAD, Severe AS, Valvular Afib on Coumadin, COPD, Tobacco Use Disorder, Etoh Abuse, Dementia, and recently noted left lung nodules who was sent to the ED for complaints of inability to urinate x 16 hours. Patient is pleasant on interview and reports that he does not know why he was sent to the hospital and would like to go back to the NH as soon as possible. He endorses that he had some abdominal discomfort last night and had the urge to urinate but was unable to. He last voided yesterday afternoon. He does not recall any new medication intake, and denied any other complaints including fevers, cough, shortness of breath, falls, or leg swelling. While in the ED, he was found to be hypotensive with an eleavted lactate which raised suspicions for an underlying infection. Of note, patient had a recent admission to [facility] ICU for acute hypoxic respiratory failure 2/2 to COPD exacerbation/ Pneumonia requiring bipap and was also sedated with Precedex for presumed EtOH withdrawal; he was found to be very sensitive to Duonebs at that hospitalization (triggered SVT). He had a negative cardiac w/u in terms of CHF (EF>55%), but was found to have severe AS pending CTSx evaluation and ischemic w/u.  Called for r/o intraabdominal bleed for anemia. Medical team reports that pt has low back pain, but no abd pain complaints.  No hx recent trauma.  Pt examined at bedside, no complaints. Alert nad. no report of bpr  No known hx of AAA    Vital Signs Last 24 Hrs  T(C): 36.4 (02 May 2022 04:53), Max: 36.7 (01 May 2022 21:30)  T(F): 97.5 (02 May 2022 04:53), Max: 98.1 (01 May 2022 21:30)  HR: 80 (02 May 2022 09:54) (56 - 97)  BP: 101/63 (02 May 2022 04:53) (98/55 - 101/63)  BP(mean): --  RR: 17 (02 May 2022 04:53) (16 - 17)  SpO2: 98% (02 May 2022 09:54) (96% - 98%)  Abd: soft nt nd, +ecchymosis periumbilical area with perceptible mass effect. mild tender with palpation. umbilical hernia, reducible, nt                          7.1    10.92 )-----------( 306      ( 02 May 2022 09:24 )             21.9   05-02    138  |  102  |  32<H>  ----------------------------<  220<H>  4.8   |  32<H>  |  0.91    Ca    9.7      02 May 2022 06:35    PT/INR - ( 02 May 2022 06:35 )   PT: 47.0 sec;   INR: 3.90 ratio

## 2022-05-02 NOTE — CONSULT NOTE ADULT - ASSESSMENT
abdominal  sheath hematoma. injection site?  supratheraputic coumadin  hemodynamically stable  anticoagulation held    Plan as per medical service is urgent CT scan CT angio with IV contrast.  trend cbc, transfuse as needed.  may place abdominal binder if evidence of continued bleeding.  GI consult if concern for GIB,

## 2022-05-02 NOTE — PHYSICAL THERAPY INITIAL EVALUATION ADULT - LIVES WITH, PROFILE
alphonso cantu from rehab this hospitalization. Patient lived with wife and 2 sons prior to last hospitalization in house with steps to negotiate.

## 2022-05-02 NOTE — PHYSICAL THERAPY INITIAL EVALUATION ADULT - PERTINENT HX OF CURRENT PROBLEM, REHAB EVAL
medical history of HTN, CAD, Severe AS, Valvular Afib on Coumadin, COPD, Tobacco Use Disorder, Etoh Abuse, Dementia, and recently noted left lung nodules who was sent to the ED for complaints of inability to urinate x 16 hours.

## 2022-05-02 NOTE — PHARMACOTHERAPY INTERVENTION NOTE - COMMENTS
Pt's weight is 53.1kg as per PCA.  CrCl=50ml/min (SrCr=0.91).  For Oseltamivir (Tamiflu) with CrCl 30-60l/min dose should be 30mg po bid for 5 days.  Oseltamivir is properly dosed for this pt.

## 2022-05-02 NOTE — PROGRESS NOTE ADULT - ASSESSMENT
Patient is a 77 y/o male, ambulatory and AAA0x2-3 at baseline, from [facility], with significant medical history of HTN, CAD, Severe AS, Valvular Afib on Coumadin, COPD, Tobacco Use Disorder, Etoh Abuse, Dementia, and recently noted left lung nodules who was sent to the ED for complaints of inability to urinate x 16 hours. Admitted for acute urinary retention and sepsis likely due to influenza  A. started Tamiflu. Sorensen placed.     In the ED:  VS: HR 97.8F, HR 100bpm, BP 85/47mmHg, RR 19, Spo2 99% on RA  Labs significant for WBC 14.6, Hb 8.4, BUN 64, Glucose 47, lactate 2.5, UA w/ RBCs  RVP positive for Flu A   Echo 4/4/22: Severe AS, EF 55-60%    5/2  pt noted worsening Anemia, H/H dropped to 6.6, repeat 7.1. ordered 1 unit PRBC. Pt with abdominal distention. ordered urgent CTA a/p and surgery consulted for possible internal bleeding. No active bleeding visible.     Spoke with wife Chavez [phone], updated and discussed regarding test result, plan of care. consent received for blood tx.

## 2022-05-02 NOTE — PROGRESS NOTE ADULT - NS ATTEND AMEND GEN_ALL_CORE FT
75 y/o male, ambulatory and AA0x2-3 at baseline, from [facility], with PMH of HTN, CAD, Severe AS, Valvular Afib on Coumadin, COPD, Tobacco Use Disorder, Etoh Abuse, Dementia, and recently noted left lung nodules who presents for complaints of inability to urinate x 16 hours. Admitted for sepsis 2/2 influenza A and acute urinary retention.    #Abdominal sheath hematoma  #Supratherpeutic INR  #Sepsis 2/2 Influenza A  #Acute urinary retention  #Hypotension  #Chronic valvular Afib on coumadin  #Hypoglycemia  #Uncontrolled DM  #COPD  #Tobacco/Etoh abuse  #Dementia  #Hx HTN, CAD, severe AS  A/O3 at this time. Patient reports eating well. Influenza positive, leukocytosis, tachycardia. Elevated lactate improved with IVF. Patient also hypotensive in ED but improved with IVF. Connolly placed with UOP. Hypoglycemia resolved with dextrose  - Abdominal exam today showing brusing/hematoma of abdominal wall with TTP to lower quadrants. Also with Downtrending Hgb 6.9 and supratherapeutic INR 3.9. Repeat Hgb 7.1. Concern for abdominal sheath hematoma.     - 1U pRBC, f/u CBC/INR, if inappropriate improvement will consider Vitamin K    - Warfarin/ASA held    - STAT CTA abd w/ IV contrast    - f/u surgery consult  - Tamiflu, IVF  - connolly placed - clear UOP, TOV held in setting of possible bleed  - Flomax  - CXR neg, UA neg  - SSI  - Continue ASA and warfarin  - monitor off antiHTN meds  - Droplet precaution  Pending ischemic w/u (RHC, LHC), and Cardiothoracic Surgery Eval for possible TAVR outpatient. Needs f/u CT/PET Scan in 3 months for lung nodules  - f/u Urology

## 2022-05-02 NOTE — PROGRESS NOTE ADULT - SUBJECTIVE AND OBJECTIVE BOX
NP Note discussed with  Primary Attending    INTERVAL HPI/OVERNIGHT EVENTS: seen at bedside, pt c/o discomfort when palpating abdomen. states last BM 2 days ago.     MEDICATIONS  (STANDING):  aluminum hydroxide/magnesium hydroxide/simethicone Suspension 30 milliLiter(s) Oral every 6 hours  atorvastatin 40 milliGRAM(s) Oral at bedtime  budesonide  80 MICROgram(s)/formoterol 4.5 MICROgram(s) Inhaler 2 Puff(s) Inhalation two times a day  cholecalciferol 1000 Unit(s) Oral daily  dextrose 50% Injectable 25 Gram(s) IV Push once  folic acid 1 milliGRAM(s) Oral daily  gabapentin 200 milliGRAM(s) Oral two times a day  glucagon  Injectable 1 milliGRAM(s) IntraMuscular once  insulin lispro (ADMELOG) corrective regimen sliding scale   SubCutaneous Before meals and at bedtime  metoprolol tartrate 12.5 milliGRAM(s) Oral two times a day  mirtazapine 15 milliGRAM(s) Oral at bedtime  nicotine -  14 mG/24Hr(s) Patch 1 Patch Transdermal daily  oseltamivir 30 milliGRAM(s) Oral every 12 hours  pantoprazole    Tablet 40 milliGRAM(s) Oral before breakfast  polyethylene glycol 3350 17 Gram(s) Oral daily  senna 2 Tablet(s) Oral at bedtime  sodium chloride 0.9% lock flush 3 milliLiter(s) IV Push every 8 hours  tamsulosin 0.4 milliGRAM(s) Oral at bedtime  thiamine 100 milliGRAM(s) Oral daily  tiotropium 18 MICROgram(s) Capsule 1 Capsule(s) Inhalation daily    MEDICATIONS  (PRN):  acetaminophen     Tablet .. 650 milliGRAM(s) Oral every 6 hours PRN Temp greater or equal to 38C (100.4F), Mild Pain (1 - 3)  ALBUTerol    90 MICROgram(s) HFA Inhaler 2 Puff(s) Inhalation every 6 hours PRN Shortness of Breath and/or Wheezing  melatonin 3 milliGRAM(s) Oral at bedtime PRN Insomnia  ondansetron Injectable 4 milliGRAM(s) IV Push every 8 hours PRN Nausea and/or Vomiting    __________________________________________________  REVIEW OF SYSTEMS:    CONSTITUTIONAL: No fever,   EYES: no acute visual disturbances  NECK: No pain or stiffness  RESPIRATORY: No cough; No shortness of breath  CARDIOVASCULAR: No chest pain, no palpitations  GASTROINTESTINAL: No pain. No nausea or vomiting; No diarrhea   NEUROLOGICAL: No headache or numbness, no tremors  MUSCULOSKELETAL: No joint pain, lower back pain mild  GENITOURINARY: no dysuria, no frequency, no hesitancy  PSYCHIATRY: no depression , no anxiety  ALL OTHER  ROS negative        Vital Signs Last 24 Hrs  T(C): 36.4 (02 May 2022 04:53), Max: 36.7 (01 May 2022 21:30)  T(F): 97.5 (02 May 2022 04:53), Max: 98.1 (01 May 2022 21:30)  HR: 80 (02 May 2022 09:54) (56 - 97)  BP: 101/63 (02 May 2022 04:53) (98/55 - 101/63)  BP(mean): --  RR: 17 (02 May 2022 04:53) (16 - 17)  SpO2: 98% (02 May 2022 09:54) (96% - 98%)    ________________________________________________  PHYSICAL EXAM:  GENERAL: NAD  HEENT: Normocephalic;  conjunctivae and sclerae clear; moist mucous membranes;   NECK : supple  CHEST/LUNG: Clear to auscultation bilaterally with good air entry   HEART: S1 S2  regular; no murmurs, gallops or rubs  ABDOMEN: Soft, + distended, + mild tenderness on palpation ; Bowel sounds present  EXTREMITIES: no cyanosis; no edema; no calf tenderness  GUL Sorensen in place, clear urine draining.   SKIN: warm and dry; no rash  NERVOUS SYSTEM:  Awake and alert; Oriented  to place, person and forgetful with time ; no new deficits    _________________________________________________  LABS:                        7.1    10.92 )-----------( 306      ( 02 May 2022 09:24 )             21.9     05-02    138  |  102  |  32<H>  ----------------------------<  220<H>  4.8   |  32<H>  |  0.91    Ca    9.7      02 May 2022 06:35      PT/INR - ( 02 May 2022 06:35 )   PT: 47.0 sec;   INR: 3.90 ratio      CAPILLARY BLOOD GLUCOSE      POCT Blood Glucose.: 384 mg/dL (02 May 2022 11:20)  POCT Blood Glucose.: 218 mg/dL (02 May 2022 07:40)  POCT Blood Glucose.: 281 mg/dL (01 May 2022 22:12)  POCT Blood Glucose.: 202 mg/dL (01 May 2022 17:10)        RADIOLOGY & ADDITIONAL TESTS:    Imaging  Reviewed:  YES    Consultant(s) Notes Reviewed:   YES      Plan of care was discussed with patient and /or primary care giver; all questions and concerns were addressed  NP Note discussed with  Primary Attending    INTERVAL HPI/OVERNIGHT EVENTS: seen at bedside, pt c/o discomfort when palpating abdomen. states last BM 2 days ago.     MEDICATIONS  (STANDING):  aluminum hydroxide/magnesium hydroxide/simethicone Suspension 30 milliLiter(s) Oral every 6 hours  atorvastatin 40 milliGRAM(s) Oral at bedtime  budesonide  80 MICROgram(s)/formoterol 4.5 MICROgram(s) Inhaler 2 Puff(s) Inhalation two times a day  cholecalciferol 1000 Unit(s) Oral daily  dextrose 50% Injectable 25 Gram(s) IV Push once  folic acid 1 milliGRAM(s) Oral daily  gabapentin 200 milliGRAM(s) Oral two times a day  glucagon  Injectable 1 milliGRAM(s) IntraMuscular once  insulin lispro (ADMELOG) corrective regimen sliding scale   SubCutaneous Before meals and at bedtime  metoprolol tartrate 12.5 milliGRAM(s) Oral two times a day  mirtazapine 15 milliGRAM(s) Oral at bedtime  nicotine -  14 mG/24Hr(s) Patch 1 Patch Transdermal daily  oseltamivir 30 milliGRAM(s) Oral every 12 hours  pantoprazole    Tablet 40 milliGRAM(s) Oral before breakfast  polyethylene glycol 3350 17 Gram(s) Oral daily  senna 2 Tablet(s) Oral at bedtime  sodium chloride 0.9% lock flush 3 milliLiter(s) IV Push every 8 hours  tamsulosin 0.4 milliGRAM(s) Oral at bedtime  thiamine 100 milliGRAM(s) Oral daily  tiotropium 18 MICROgram(s) Capsule 1 Capsule(s) Inhalation daily    MEDICATIONS  (PRN):  acetaminophen     Tablet .. 650 milliGRAM(s) Oral every 6 hours PRN Temp greater or equal to 38C (100.4F), Mild Pain (1 - 3)  ALBUTerol    90 MICROgram(s) HFA Inhaler 2 Puff(s) Inhalation every 6 hours PRN Shortness of Breath and/or Wheezing  melatonin 3 milliGRAM(s) Oral at bedtime PRN Insomnia  ondansetron Injectable 4 milliGRAM(s) IV Push every 8 hours PRN Nausea and/or Vomiting    __________________________________________________  REVIEW OF SYSTEMS:    CONSTITUTIONAL: No fever,   EYES: no acute visual disturbances  NECK: No pain or stiffness  RESPIRATORY: No cough; No shortness of breath  CARDIOVASCULAR: No chest pain, no palpitations  GASTROINTESTINAL: No pain. No nausea or vomiting; No diarrhea   NEUROLOGICAL: No headache or numbness, no tremors  MUSCULOSKELETAL: No joint pain, lower back pain mild  GENITOURINARY: no dysuria, no frequency, no hesitancy  PSYCHIATRY: no depression , no anxiety  ALL OTHER  ROS negative        Vital Signs Last 24 Hrs  T(C): 36.4 (02 May 2022 04:53), Max: 36.7 (01 May 2022 21:30)  T(F): 97.5 (02 May 2022 04:53), Max: 98.1 (01 May 2022 21:30)  HR: 80 (02 May 2022 09:54) (56 - 97)  BP: 101/63 (02 May 2022 04:53) (98/55 - 101/63)  BP(mean): --  RR: 17 (02 May 2022 04:53) (16 - 17)  SpO2: 98% (02 May 2022 09:54) (96% - 98%)    ________________________________________________  PHYSICAL EXAM:  GENERAL: NAD  HEENT: Normocephalic;  conjunctivae and sclerae clear; moist mucous membranes;   NECK : supple  CHEST/LUNG: Clear to auscultation bilaterally with good air entry   HEART: S1 S2  regular; no murmurs, gallops or rubs  ABDOMEN: bilateral lower quadrant TTP, new hemtaoma/bruising of abdominal wall, Soft, + distended ; Bowel sounds present  EXTREMITIES: no cyanosis; no edema; no calf tenderness  GUL Sorensen in place, clear urine draining.   SKIN: warm and dry; no rash  NERVOUS SYSTEM:  Awake and alert; Oriented  to place, person and forgetful with time ; no new deficits    _________________________________________________  LABS:                        7.1    10.92 )-----------( 306      ( 02 May 2022 09:24 )             21.9     05-02    138  |  102  |  32<H>  ----------------------------<  220<H>  4.8   |  32<H>  |  0.91    Ca    9.7      02 May 2022 06:35      PT/INR - ( 02 May 2022 06:35 )   PT: 47.0 sec;   INR: 3.90 ratio      CAPILLARY BLOOD GLUCOSE      POCT Blood Glucose.: 384 mg/dL (02 May 2022 11:20)  POCT Blood Glucose.: 218 mg/dL (02 May 2022 07:40)  POCT Blood Glucose.: 281 mg/dL (01 May 2022 22:12)  POCT Blood Glucose.: 202 mg/dL (01 May 2022 17:10)        RADIOLOGY & ADDITIONAL TESTS:    Imaging  Reviewed:  YES    Consultant(s) Notes Reviewed:   YES      Plan of care was discussed with patient and /or primary care giver; all questions and concerns were addressed

## 2022-05-02 NOTE — PROGRESS NOTE ADULT - PROBLEM SELECTOR PLAN 2
Pt with new onset AUR, suprapubic discomfort   No prior hx of BPH, recently started Remeron, and has a history of waxing/waning dementia   Remeron is less likely to have Anticholinergic side effects but may still cause AUR   May be 2/2 to sepsis/ infection (?Flu A)  Uncontrolled Diabetic with A1c of 10.3   Sorensen placed in the ED 4/30, voided 350ml   Started Flomax, will TOV in 24-48 hours as per urology  Urology consulted

## 2022-05-02 NOTE — CONSULT NOTE ADULT - ATTENDING COMMENTS
No sign of intra-abdominal or abdominal wall bleeding / hematoma on CT.   Please recall surgery PRN No sign of intra-abdominal or abdominal wall bleeding / hematoma on CT.     Most likely source of occult bleeding is the GI tract, likely resolved spontaneously since the patient is now stable. Would check stool occult blood and discuss with GI.     Please recall surgery PRN

## 2022-05-03 NOTE — PROGRESS NOTE ADULT - PROBLEM SELECTOR PLAN 3
Lactate 2.5 which fell to normal s/p IVF boluses   CXR non focal, UA with RBC 25-50 (likely post connolly), but w/o pyuria   RVP +ve for Flu A   Pt denies any cough or shortness of breath; saturating well on RA   Droplet precautions   PRN Tylenol, and Robitussin  Patient was FLU positive prior admission, likely lingering.  Tamiflu discontinued

## 2022-05-03 NOTE — PROGRESS NOTE ADULT - PROBLEM SELECTOR PLAN 11
Pt currently at baseline mental status   C/w home medications including remeron   Required a lot of haldol and other sedatives at prior admission for presumed Etoh withdrawal and delirium Pt currently at baseline mental status   C/w home medications including Remeron   Required a lot of haldol and other sedatives at prior admission for presumed ETOH withdrawal and delirium

## 2022-05-03 NOTE — PROGRESS NOTE ADULT - PROBLEM SELECTOR PLAN 2
Pt with new onset AUR, suprapubic discomfort   No prior hx of BPH, recently started Remeron, and has a history of waxing/waning dementia   Remeron is less likely to have Anticholinergic side effects but may still cause AUR   May be 2/2 to sepsis/ infection (?Flu A)  Uncontrolled Diabetic with A1c of 10.3   Sorensen placed in the ED 4/30, voided 350ml   Started Flomax, will TOV in 24-48 hours as per urology  TOV today

## 2022-05-03 NOTE — PROGRESS NOTE ADULT - ASSESSMENT
Patient is a 77 y/o male, ambulatory and AAA0x2-3 at baseline, from [facility], with significant medical history of HTN, CAD, Severe AS, Valvular Afib on Coumadin, COPD, Tobacco Use Disorder, Etoh Abuse, Dementia, and recently noted left lung nodules who was sent to the ED for complaints of inability to urinate x 16 hours. Admitted for acute urinary retention and sepsis likely due to influenza  A. started Tamiflu. Sorensen placed.     In the ED:  VS: HR 97.8F, HR 100bpm, BP 85/47mmHg, RR 19, Spo2 99% on RA  Labs significant for WBC 14.6, Hb 8.4, BUN 64, Glucose 47, lactate 2.5, UA w/ RBCs  RVP positive for Flu A   Echo 4/4/22: Severe AS, EF 55-60%    5/2  pt noted worsening Anemia, H/H dropped to 6.6, repeat 7.1. ordered 1 unit PRBC. Pt with abdominal distention. ordered urgent CTA a/p and surgery consulted for possible internal bleeding. No active bleeding visible.     Spoke with wife Chavez [phone], updated and discussed regarding test result, plan of care. consent received for blood tx.

## 2022-05-03 NOTE — PROGRESS NOTE ADULT - PROBLEM SELECTOR PLAN 1
Hb 7.9 from 5/2/22   with abdominal distention, worsening   no visible bleeding in urine, no BM today.   CT Abdomen showing abdominal sheath hematoma, no internal bleeding.   called surgery for evaluation, no surgical intervention required.   T/S done, consent in chart  f/u CBC from AM

## 2022-05-03 NOTE — PROGRESS NOTE ADULT - ATTENDING COMMENTS
Patient seen and examined with Housestaff this morning Patient seen and examined with Romeltachace this morning    77 y/o male, ambulatory and AA0x 2-3 at baseline, from [facility] (recently discharged for Rehab), with PMH of HTN, CAD, Severe AS, Valvular A. fib on Coumadin, COPD, Tobacco Use Disorder, Hx remote Alcohol Abuse, Dementia, and recently noted left lung nodules who presents for complaints of inability to urinate x 16 hours. Admitted for suspected sepsis 2/2 influenza A and acute urinary retention.    Of note, patient was diagnosed with Influenza AH3 last admission and received 10 days Tamiflu. Patient had indwelling Catheter placed     Vital Signs Last 24 Hrs  T(C): 36.6 (03 May 2022 12:59), Max: 36.6 (02 May 2022 21:54)  T(F): 97.9 (03 May 2022 12:59), Max: 97.9 (02 May 2022 21:54)  HR: 89 (03 May 2022 12:59) (68 - 102)  BP: 102/61 (03 May 2022 12:59) (92/51 - 146/73)  RR: 18 (03 May 2022 12:59) (17 - 18)  SpO2: 95% (03 May 2022 12:59) (94% - 100%)    P/E: as above  AAO x2.5-3    Labs:                        7.9    11.43 )-----------( 309      ( 03 May 2022 07:15 )             23.7   05-03    137  |  102  |  22<H>  ----------------------------<  199<H>  4.1   |  31  |  0.85    Ca    9.1      03 May 2022 07:15    TPro  7.2  /  Alb  2.2<L>  /  TBili  0.4  /  DBili  x   /  AST  17  /  ALT  36  /  AlkPhos  90  05-03    CT Angio Abdomen and Pelvis w/ IV Cont (05.02.22 @ 19:09)   IMPRESSION:  Cystitis.  Ill-defined hypodense area in the lower pole of the right kidney, possibly focal pyelonephritis.  Patchy opacities in the right lower lobe, possibly atelectasis or pneumonia.    D/D:  Acute urinary retention etiology unclear ?? sec. to BPH  Hypotension  Left shoulder pain likely Adhesive Capsulitis/ ligament tear  Abdominal wall  hematoma chronic (since last admission)  Supra therapeutic INR  Influenza A doubt Sepsis  Chronic valvular Afib on coumadin  Hypoglycemia resolved  Uncontrolled DM  COPD stable  Tobacco use disorder  Dementia less likely more likely  Cognitive impairment   Hx HTN, CAD, severe AS    Plan:  Droplet precautions; Spoke with infection control; likely same strain; can D/C Isolation as patient already here for 3 days  D/C Tamiflu; patient received it x 10 days last admission.   Patient is oriented x 3 at this time. Elevated lactate improved with IVF.  Trialo of void; Continue Flomax and Finasteride; Advised RN to have patient stand up and urinate; Urology follow up outpatient   hematoma of abdominal wall is old  Monitor INR; Resume low dose Coumadin at 3 mg tonight as patient did not receive any last night and prior got 7.5 mg x 2 nights  As per wife, Patient had seen Orthopedics in the past for shoulder pain,. advised surgery; I had informed patient and wife that at this point, Patient would not be able to proceed with an Orthopedic surgery until heart issues are fixed as outpatient once Patient recuperates and completes Rehab and is more stable medically.   Pending ischemic w/u (RHC, LHC), and Cardiothoracic Surgery Eval for possible TAVR outpatient. Needs f/u CT/PET Scan in 3 months for lung nodules    Discussed with patient, wife and Sister at bedside at length findings and plan of care  Discussed with PGy1 Dr. Simons and PGY3 Dr. Corrigan and CLAUDY Beal

## 2022-05-03 NOTE — PROGRESS NOTE ADULT - SUBJECTIVE AND OBJECTIVE BOX
PGY-1 Progress Note discussed with attending    PAGER #: [[phone]] TILL 5:00 PM  PLEASE CONTACT ON CALL TEAM:  - On Call Team (Please refer to Yahaira) FROM 5:00 PM - 8:30PM  - Nightfloat Team FROM 8:30 -7:30 AM    INTERVAL HPI/ OVERNIGHT EVENTS: No acute events overnight.       REVIEW OF SYSTEMS:  CONSTITUTIONAL: No fever, weight loss, or fatigue  RESPIRATORY: No cough, wheezing, chills or hemoptysis; No shortness of breath  CARDIOVASCULAR: No chest pain, palpitations, dizziness, or leg swelling  GASTROINTESTINAL: No abdominal pain. No nausea, vomiting, or hematemesis; No diarrhea or constipation. No melena or hematochezia.  GENITOURINARY: No dysuria or hematuria, urinary frequency  NEUROLOGICAL: No headaches, memory loss, loss of strength, numbness, or tremors  SKIN: No itching, burning, rashes, or lesions     MEDICATIONS  (STANDING):  aluminum hydroxide/magnesium hydroxide/simethicone Suspension 30 milliLiter(s) Oral every 6 hours  atorvastatin 40 milliGRAM(s) Oral at bedtime  budesonide  80 MICROgram(s)/formoterol 4.5 MICROgram(s) Inhaler 2 Puff(s) Inhalation two times a day  cholecalciferol 1000 Unit(s) Oral daily  dextrose 50% Injectable 25 Gram(s) IV Push once  folic acid 1 milliGRAM(s) Oral daily  gabapentin 200 milliGRAM(s) Oral two times a day  glucagon  Injectable 1 milliGRAM(s) IntraMuscular once  insulin lispro (ADMELOG) corrective regimen sliding scale   SubCutaneous Before meals and at bedtime  metoprolol tartrate 12.5 milliGRAM(s) Oral two times a day  mirtazapine 15 milliGRAM(s) Oral at bedtime  nicotine -  14 mG/24Hr(s) Patch 1 Patch Transdermal daily  pantoprazole    Tablet 40 milliGRAM(s) Oral before breakfast  polyethylene glycol 3350 17 Gram(s) Oral daily  senna 2 Tablet(s) Oral at bedtime  sodium chloride 0.9% lock flush 3 milliLiter(s) IV Push every 8 hours  tamsulosin 0.4 milliGRAM(s) Oral at bedtime  thiamine 100 milliGRAM(s) Oral daily  tiotropium 18 MICROgram(s) Capsule 1 Capsule(s) Inhalation daily  warfarin 3 milliGRAM(s) Oral once    MEDICATIONS  (PRN):  acetaminophen     Tablet .. 650 milliGRAM(s) Oral every 6 hours PRN Temp greater or equal to 38C (100.4F), Mild Pain (1 - 3)  ALBUTerol    90 MICROgram(s) HFA Inhaler 2 Puff(s) Inhalation every 6 hours PRN Shortness of Breath and/or Wheezing  melatonin 3 milliGRAM(s) Oral at bedtime PRN Insomnia  ondansetron Injectable 4 milliGRAM(s) IV Push every 8 hours PRN Nausea and/or Vomiting      Vital Signs Last 24 Hrs  T(C): 36.3 (03 May 2022 05:34), Max: 36.6 (02 May 2022 21:54)  T(F): 97.3 (03 May 2022 05:34), Max: 97.9 (02 May 2022 21:54)  HR: 92 (03 May 2022 05:34) (68 - 110)  BP: 98/63 (03 May 2022 05:34) (92/51 - 146/73)  BP(mean): --  RR: 17 (03 May 2022 05:34) (17 - 18)  SpO2: 94% (03 May 2022 05:34) (94% - 100%)    PHYSICAL EXAMINATION:  GENERAL: NAD, AAOx3  HEAD: AT/NC  EYES: conjunctiva and sclera clear  NECK: supple, No JVD noted, Normal thyroid  CHEST/LUNG: CTABL; no rales, rhonchi, wheezing, or rubs  HEART: regular rate and rhythm; no murmurs, rubs, or gallops  ABDOMEN: soft, nontender, nondistended; Bowel sounds present  EXTREMITIES:  2+ Peripheral Pulses, No clubbing, cyanosis, or edema  SKIN: warm dry                          7.9    11.43 )-----------( 309      ( 03 May 2022 07:15 )             23.7     05-03    137  |  102  |  22<H>  ----------------------------<  199<H>  4.1   |  31  |  0.85    Ca    9.1      03 May 2022 07:15    TPro  7.2  /  Alb  2.2<L>  /  TBili  0.4  /  DBili  x   /  AST  17  /  ALT  36  /  AlkPhos  90  05-03    LIVER FUNCTIONS - ( 03 May 2022 07:15 )  Alb: 2.2 g/dL / Pro: 7.2 g/dL / ALK PHOS: 90 U/L / ALT: 36 U/L DA / AST: 17 U/L / GGT: x               PT/INR - ( 03 May 2022 07:16 )   PT: 40.3 sec;   INR: 3.35 ratio         PTT - ( 03 May 2022 07:16 )  PTT:40.9 sec  COVID-19 PCR: NotDetec (30 Apr 2022 09:41)  COVID-19 PCR: NotDetec (21 Apr 2022 10:49)  COVID-19 PCR: NotDetec (15 Apr 2022 12:32)  COVID-19 PCR: NotDetec (10 Apr 2022 05:45)  SARS-CoV-2: NotDetec (04 Apr 2022 10:27)  COVID-19 PCR: NotDetec (03 Apr 2022 22:44)      CAPILLARY BLOOD GLUCOSE      POCT Blood Glucose.: 219 mg/dL (03 May 2022 07:43)  POCT Blood Glucose.: 299 mg/dL (02 May 2022 21:56)  POCT Blood Glucose.: 238 mg/dL (02 May 2022 16:37)  POCT Blood Glucose.: 384 mg/dL (02 May 2022 11:20)      RADIOLOGY & ADDITIONAL TESTS:

## 2022-05-03 NOTE — CHART NOTE - NSCHARTNOTEFT_GEN_A_CORE
Notified by RN that patient is having hematuria post voiding. Patient was seen and evaluated at bedside. Patient stated that while trying to urinate into urinal he began having dysuria and hematuria. Blood loss was difficult to estimate as much of the blood was on the floor and on the bed. Patient endorsed only minimal pain in penile area. Hematuria is possibly from trauma secondary to connolly placement or removal during patient's hospitalization. Will hold warfarin due to acute bleeding. Notified by RN that patient is having hematuria post voiding. Patient was seen and evaluated at bedside. Patient stated that while trying to urinate into urinal he began having dysuria and hematuria. Blood loss was difficult to estimate as much of the blood was on the floor and on the bed. Patient endorsed only minimal pain in penile area. Hematuria is possibly from trauma secondary to connolly placement or removal during patient's hospitalization. Will hold warfarin due to acute bleeding. V/S: /54, . Will repeat vital signs later in the night to make sure tachycardia is resolving Notified by RN that patient is having hematuria post voiding. Patient was seen and evaluated at bedside. Patient stated that while trying to urinate into urinal he began having dysuria and hematuria. Blood loss was difficult to estimate as much of the blood was on the floor and on the bed. Patient endorsed only minimal pain in penile area. Hematuria is possibly from trauma secondary to connolly placement or removal during patient's hospitalization. Will hold warfarin due to acute bleeding. V/S: /54, . Will repeat vital signs later in the night to make sure tachycardia is resolving.    Addendum: Patient was reassessed later in the night, patient's blood pressure decreased to 88/51, HR stable at 109. Patient is AAOx3, extremities were warm. Patient continued to have penile bleeding, although at a very low rate. Patient complained of feeling dizzy. 250cc NS bolus given. CBC revealed Hgb 7.0, will give patient 1 unit of PRBC in setting of patient continuing to bleed and tachycardic

## 2022-05-04 NOTE — PROGRESS NOTE ADULT - ASSESSMENT
Patient is a 75 y/o male, ambulatory and AAA0x2-3 at baseline, from [facility], with significant medical history of HTN, CAD, Severe AS, Valvular Afib on Coumadin, COPD, Tobacco Use Disorder, Etoh Abuse, Dementia, and recently noted left lung nodules who was sent to the ED for complaints of inability to urinate x 16 hours. Admitted for acute urinary retention and sepsis likely due to influenza  A. started Tamiflu. Sorensen placed.     In the ED:  VS: HR 97.8F, HR 100bpm, BP 85/47mmHg, RR 19, Spo2 99% on RA  Labs significant for WBC 14.6, Hb 8.4, BUN 64, Glucose 47, lactate 2.5, UA w/ RBCs  RVP positive for Flu A   Echo 4/4/22: Severe AS, EF 55-60%    5/2  pt noted worsening Anemia, H/H dropped to 6.6, repeat 7.1. ordered 1 unit PRBC. Pt with abdominal distention. ordered urgent CTA a/p and surgery consulted for possible internal bleeding. No active bleeding visible.     Spoke with wife Chavez [phone], updated and discussed regarding test result, plan of care. consent received for blood tx.

## 2022-05-04 NOTE — PROGRESS NOTE ADULT - PROBLEM SELECTOR PLAN 5
Uncontrolled Diabetic with A1c of 10.3 earlier this month   On Amaryl 1mg BIDm Metformin 1000mg BID, and SSI w/ humalog at [facility]   Admitted with glucose 47, pt was confused at that time   Resolved w/ dextrose and food   C/w SSI, Accu-Cheks ACHS   Good renal function, low suspicion for persistent hypoglycemia d/t amaryl; will monitor  DASH/ Carb Consistent diet Uncontrolled Diabetic with A1c of 10.3 earlier this month   On Amaryl 1mg BIDm Metformin 1000mg BID, and SSI w/ humalog at [facility]   Admitted with glucose 47, pt was confused at that time   Resolved w/ dextrose and food   C/w SSI, Accu-Cheks ACHS   Good renal function, low suspicion for persistent hypoglycemia d/t amaryl; will monitor  DASH/ Carb Consistent diet  Lantus 6u at bed time  lispro 4 units TID with meals

## 2022-05-04 NOTE — PROGRESS NOTE ADULT - PROBLEM SELECTOR PLAN 2
Pt with new onset AUR, suprapubic discomfort   No prior hx of BPH, recently started Remeron, and has a history of waxing/waning dementia   Remeron is less likely to have Anticholinergic side effects but may still cause AUR   May be 2/2 to sepsis/ infection (?Flu A)  Uncontrolled Diabetic with A1c of 10.3   Sorensen placed in the ED 4/30, voided 350ml   Started Flomax, will TOV in 24-48 hours as per urology  TOV passed, however patient had hematuria likely from trauma

## 2022-05-04 NOTE — PROGRESS NOTE ADULT - PROBLEM SELECTOR PLAN 1
Hb 7.9 from 5/2/22   with abdominal distention, worsening   no visible bleeding in urine, no BM today.   CT Abdomen showing abdominal sheath hematoma, no internal bleeding.   called surgery for evaluation, no surgical intervention required.   T/S done, consent in chart  CBC 8.1 this AM

## 2022-05-04 NOTE — PROGRESS NOTE ADULT - ATTENDING COMMENTS
Patient seen and examined with Romeltachace this morning    75 y/o male, ambulatory and AA0x 2-3 at baseline, from [facility] (recently discharged for Rehab), with PMH of HTN, CAD, Severe AS, Valvular A. fib on Coumadin, COPD, Tobacco Use Disorder, Hx remote Alcohol Abuse, Dementia, and recently noted left lung nodules who presents for complaints of inability to urinate x 16 hours. Admitted for suspected sepsis 2/2 influenza A and acute urinary retention.    Of note, patient was diagnosed with Influenza AH3 last admission and received 10 days Tamiflu. Patient had indwelling Catheter placed     Vital Signs Last 24 Hrs  T(C): 36.7 (04 May 2022 16:44), Max: 36.8 (04 May 2022 04:16)  T(F): 98.1 (04 May 2022 16:44), Max: 98.2 (04 May 2022 04:16)  HR: 98 (04 May 2022 16:44) (89 - 113)  BP: 119/50 (04 May 2022 16:44) (88/51 - 119/60)  RR: 18 (04 May 2022 16:44) (16 - 20)  SpO2: 100% (04 May 2022 16:44) (94% - 100%)    P/E: as above  Psych: AAO x2,5  Neuro: No gross focal deficits; Power and sensation intact  CVS: S1S2 present, regular, no edema  Resp: BLAE+, No wheeze or Rhonchi  GI: Soft, BS+, Non tender, non distended  Extr: No  calf tenderness B/L Lower extremities  Skin: Warm and moist without any rashes      Labs:                      7.7    13.66 )-----------( 273      ( 04 May 2022 18:56 )             22.7   05-04    135  |  103  |  29<H>  ----------------------------<  277<H>  5.4<H>   |  29  |  1.15    Ca    8.7      04 May 2022 06:38  Phos  2.7     05-04  Mg     2.1     05-04    TPro  6.6  /  Alb  2.2<L>  /  TBili  0.3  /  DBili  x   /  AST  14  /  ALT  30  /  AlkPhos  77  05-04    CT Angio Abdomen and Pelvis w/ IV Cont (05.02.22 @ 19:09)   IMPRESSION:  Cystitis.  Ill-defined hypodense area in the lower pole of the right kidney, possibly focal pyelonephritis.  Patchy opacities in the right lower lobe, possibly atelectasis or pneumonia.    D/D:  Acute urinary retention etiology unclear ?? sec. to BPH   Hematuria post connolly removal resolved  Black stools concerning for GI losses  Hypotension resolved  Left shoulder pain likely Adhesive Capsulitis/ ligament tear  Abdominal wall  hematoma chronic (since last admission)  Supra therapeutic INR normalized  Influenza A doubt Sepsis  Chronic valvular Afib on coumadin  Hypoglycemia resolved  Uncontrolled DM  COPD stable  Tobacco use disorder  Dementia less likely more likely  Cognitive impairment   Hx HTN, CAD, severe AS    Plan:  Off Droplet precautions; Afebrile;   Patient is oriented x 3 at this time. Elevated lactate improved with IVF.  Trial of void successful but had hematuria now resolved; Continue Flomax and Finasteride; Advised RN to have patient stand up and urinate;   Urology follow up outpatient   hematoma of abdominal wall is old  Monitor INR; Resume low dose Coumadin; will give 5 mg tonight  As per wife, Patient had seen Orthopedics in the past for shoulder pain,. advised surgery; I had informed patient and wife that at this point, Patient would not be able to proceed with an Orthopedic surgery until heart issues are fixed as outpatient once Patient recuperates and completes Rehab and is more stable medically.   Pending ischemic w/u (RHC, LHC), and Cardiothoracic Surgery Eval for possible TAVR outpatient. Needs f/u CT/PET Scan in 3 months for lung nodules  Holding discharge today for monitoring Urinary status and H/H; if remain stable tonight, H/H stable AM D/C Home tomorrow  Discussed with  Paty; possible D/C to [facility]/ Friday based on clinical status    Discussed with patient at bedside at length findings and plan of care  Discussed with PGy1 Dr. Simons and PGY3 Dr. Corrigan and RN Emigdio

## 2022-05-04 NOTE — PROGRESS NOTE ADULT - SUBJECTIVE AND OBJECTIVE BOX
PGY-1 Progress Note discussed with attending    PAGER #: [phone]] TILL 5:00 PM  PLEASE CONTACT ON CALL TEAM:  - On Call Team (Please refer to Yahaira) FROM 5:00 PM - 8:30PM  - Nightfloat Team FROM 8:30 -7:30 AM    INTERVAL HPI/ OVERNIGHT EVENTS: Patient had episode of hematuria while urinating in bed pan. Required one unit of PRBC. Hemodynamically stable.       REVIEW OF SYSTEMS:  CONSTITUTIONAL: No fever, weight loss, or fatigue  RESPIRATORY: No cough, wheezing, chills or hemoptysis; No shortness of breath  CARDIOVASCULAR: No chest pain, palpitations, dizziness, or leg swelling  GASTROINTESTINAL: No abdominal pain. No nausea, vomiting, or hematemesis; No diarrhea or constipation. No melena or hematochezia.  GENITOURINARY: No dysuria or hematuria, urinary frequency  NEUROLOGICAL: No headaches, memory loss, loss of strength, numbness, or tremors  SKIN: No itching, burning, rashes, or lesions     MEDICATIONS  (STANDING):  aluminum hydroxide/magnesium hydroxide/simethicone Suspension 30 milliLiter(s) Oral every 6 hours  atorvastatin 40 milliGRAM(s) Oral at bedtime  budesonide  80 MICROgram(s)/formoterol 4.5 MICROgram(s) Inhaler 2 Puff(s) Inhalation two times a day  cholecalciferol 1000 Unit(s) Oral daily  dextrose 50% Injectable 25 Gram(s) IV Push once  folic acid 1 milliGRAM(s) Oral daily  gabapentin 200 milliGRAM(s) Oral two times a day  glucagon  Injectable 1 milliGRAM(s) IntraMuscular once  insulin glargine Injectable (LANTUS) 6 Unit(s) SubCutaneous at bedtime  insulin lispro (ADMELOG) corrective regimen sliding scale   SubCutaneous Before meals and at bedtime  insulin lispro Injectable (ADMELOG) 4 Unit(s) SubCutaneous three times a day before meals  insulin regular  human recombinant. 4 Unit(s) IV Push once  lidocaine   4% Patch 1 Patch Transdermal once  metoprolol tartrate 12.5 milliGRAM(s) Oral two times a day  mirtazapine 15 milliGRAM(s) Oral at bedtime  nicotine -  14 mG/24Hr(s) Patch 1 Patch Transdermal daily  pantoprazole    Tablet 40 milliGRAM(s) Oral before breakfast  polyethylene glycol 3350 17 Gram(s) Oral daily  predniSONE   Tablet 30 milliGRAM(s) Oral daily  senna 2 Tablet(s) Oral at bedtime  sodium chloride 0.9% lock flush 3 milliLiter(s) IV Push every 8 hours  tamsulosin 0.4 milliGRAM(s) Oral at bedtime  thiamine 100 milliGRAM(s) Oral daily  tiotropium 18 MICROgram(s) Capsule 1 Capsule(s) Inhalation daily    MEDICATIONS  (PRN):  acetaminophen     Tablet .. 650 milliGRAM(s) Oral every 6 hours PRN Temp greater or equal to 38C (100.4F), Mild Pain (1 - 3)  ALBUTerol    90 MICROgram(s) HFA Inhaler 2 Puff(s) Inhalation every 6 hours PRN Shortness of Breath and/or Wheezing  melatonin 3 milliGRAM(s) Oral at bedtime PRN Insomnia  ondansetron Injectable 4 milliGRAM(s) IV Push every 8 hours PRN Nausea and/or Vomiting      Vital Signs Last 24 Hrs  T(C): 36.4 (04 May 2022 05:00), Max: 36.8 (04 May 2022 04:16)  T(F): 97.6 (04 May 2022 05:00), Max: 98.2 (04 May 2022 04:16)  HR: 89 (04 May 2022 05:00) (89 - 113)  BP: 110/62 (04 May 2022 05:00) (88/51 - 119/60)  BP(mean): --  RR: 18 (04 May 2022 05:00) (16 - 20)  SpO2: 95% (04 May 2022 05:00) (94% - 98%)    PHYSICAL EXAMINATION:  GENERAL: NAD, AAOx3  HEAD: AT/NC  EYES: conjunctiva and sclera clear  NECK: supple, No JVD noted, Normal thyroid  CHEST/LUNG: CTABL; no rales, rhonchi, wheezing, or rubs  HEART: regular rate and rhythm; no murmurs, rubs, or gallops  ABDOMEN: soft, nontender, nondistended; Bowel sounds present  EXTREMITIES:  2+ Peripheral Pulses, No clubbing, cyanosis, or edema  SKIN: warm dry                          8.1    14.07 )-----------( 255      ( 04 May 2022 06:38 )             24.5     05-04    135  |  103  |  29<H>  ----------------------------<  277<H>  5.4<H>   |  29  |  1.15    Ca    8.7      04 May 2022 06:38  Phos  2.7     05-04  Mg     2.1     05-04    TPro  6.6  /  Alb  2.2<L>  /  TBili  0.3  /  DBili  x   /  AST  14  /  ALT  30  /  AlkPhos  77  05-04    LIVER FUNCTIONS - ( 04 May 2022 06:38 )  Alb: 2.2 g/dL / Pro: 6.6 g/dL / ALK PHOS: 77 U/L / ALT: 30 U/L DA / AST: 14 U/L / GGT: x               PT/INR - ( 04 May 2022 06:38 )   PT: 23.3 sec;   INR: 1.94 ratio         PTT - ( 03 May 2022 07:16 )  PTT:40.9 sec  COVID-19 PCR: NotDetec (04 May 2022 05:12)  COVID-19 PCR: NotDetec (30 Apr 2022 09:41)  COVID-19 PCR: NotDetec (21 Apr 2022 10:49)  COVID-19 PCR: NotDetec (15 Apr 2022 12:32)  COVID-19 PCR: NotDetec (10 Apr 2022 05:45)  SARS-CoV-2: NotDetec (04 Apr 2022 10:27)  COVID-19 PCR: NotDetec (03 Apr 2022 22:44)      CAPILLARY BLOOD GLUCOSE      POCT Blood Glucose.: 332 mg/dL (04 May 2022 07:38)  POCT Blood Glucose.: 346 mg/dL (03 May 2022 22:39)  POCT Blood Glucose.: 282 mg/dL (03 May 2022 21:23)  POCT Blood Glucose.: 342 mg/dL (03 May 2022 16:43)  POCT Blood Glucose.: 364 mg/dL (03 May 2022 11:07)      RADIOLOGY & ADDITIONAL TESTS:

## 2022-05-04 NOTE — PROGRESS NOTE ADULT - PROBLEM SELECTOR PLAN 3
Patient had hematuria likely due to trauma from connolly placement/removal  No connolly in place, will obtain TOV  no longer bleeding   CBC 8.1 this AM  continue to monitor CBC

## 2022-05-04 NOTE — PROGRESS NOTE ADULT - PROBLEM SELECTOR PLAN 7
Pt with paroxysmal valvular afib; EKG NSR w/ APC's in ED   pt takes Warfarin 7.5mg HS, INR goal 2-3 (within goal on admission)  Also on ASA 81 x2 Qd for CAD/PAD   Echo as above, EF 55-60%  INR 3.35 today  No coumadin given on 5/3/22 due to hematuria  INR 1.94 on 5/4/22

## 2022-05-05 NOTE — PROGRESS NOTE ADULT - PROBLEM SELECTOR PLAN 2
Pt with new onset AUR, suprapubic discomfort   No prior hx of BPH, recently started Remeron, and has a history of waxing/waning dementia   Remeron is less likely to have Anticholinergic side effects but may still cause AUR   May be 2/2 to sepsis/ infection (?Flu A)  Uncontrolled Diabetic with A1c of 10.3   Sorensen placed in the ED 4/30, voided 350ml   Started Flomax, will TOV in 24-48 hours as per urology  TOV passed, however patient had hematuria likely from trauma Pt with new onset AUR, suprapubic discomfort   No prior hx of BPH, recently started Remeron, and has a history of waxing/waning dementia   Remeron is less likely to have Anticholinergic side effects but may still cause AUR   May be 2/2 to sepsis/ infection (?Flu A)  Uncontrolled Diabetic with A1c of 10.3   Sorensen placed in the ED 4/30, voided 350ml   TOV passed, however patient had hematuria likely from trauma overnight on Tuesday   -no longer having urinary retention or hematuria.

## 2022-05-05 NOTE — PROGRESS NOTE ADULT - PROBLEM SELECTOR PLAN 1
Hb 7.9 from 5/2/22   with abdominal distention, worsening   no visible bleeding in urine, no BM today.   CT Abdomen showing abdominal sheath hematoma, no internal bleeding.   called surgery for evaluation, no surgical intervention required.   T/S done, consent in chart  CBC 8.1 this AM Hb 7.9 from 5/2/22   with abdominal distention, worsening   no visible bleeding in urine, no BM today.   CT Abdomen showing abdominal sheath hematoma, no internal bleeding.   called surgery for evaluation, no surgical intervention required.   T/S done, consent in chart  CBC 7.7 this AM

## 2022-05-05 NOTE — DISCHARGE NOTE PROVIDER - CARE PROVIDERS DIRECT ADDRESSES
,DirectAddress_Unknown ,DirectAddress_Unknown,[email or web address]

## 2022-05-05 NOTE — PROGRESS NOTE ADULT - PROBLEM SELECTOR PLAN 5
Uncontrolled Diabetic with A1c of 10.3 earlier this month   On Amaryl 1mg BIDm Metformin 1000mg BID, and SSI w/ humalog at [facility]   Admitted with glucose 47, pt was confused at that time   Resolved w/ dextrose and food   C/w SSI, Accu-Cheks ACHS   Good renal function, low suspicion for persistent hypoglycemia d/t amaryl; will monitor  DASH/ Carb Consistent diet  Lantus 6u at bed time  lispro 4 units TID with meals

## 2022-05-05 NOTE — DISCHARGE NOTE PROVIDER - HOSPITAL COURSE
75 y/o male, ambulatory and AAA0x2-3 at baseline, from [facility], with significant medical history of HTN, CAD, Severe AS, Valvular Afib on Coumadin, COPD, Tobacco Use Disorder, Etoh Abuse, Dementia, and recently noted left lung nodules who was sent to the ED for complaints of inability to urinate x 16 hours. Patient is pleasant on interview and reports that he does not know why he was sent to the hospital and would like to go back to the NH as soon as possible. He endorses that he had some abdominal discomfort last night and had the urge to urinate but was unable to. He last voided yesterday afternoon. He does not recall any new medication intake, and denied any other complaints including fevers, cough, shortness of breath, falls, or leg swelling. While in the ED, he was found to be hypotensive with an elevated lactate which raised suspicions for an underlying infection. Of note, patient had a recent admission to [facility] ICU for acute hypoxic respiratory failure 2/2 to COPD exacerbation/ Pneumonia requiring bipap and was also sedated with Precedex for presumed EtOH withdrawal; he was found to be very sensitive to Duonebs at that hospitalization (triggered SVT). He had a negative cardiac w/u in terms of CHF (EF>55%), but was found to have severe AS pending CTSx evaluation and ischemic w/u. Connolly catheter was placed and urine output achieved. Patient was started on flomax and connolly was removed few days later. Patient passed TOV and was able to make urine with resolution of urinary retention. Patient had one episode of hematuria likely due to connolly catether placement and/or removal trauma. He required one unit of PRBC due to Hb of 7. Hematuria resolved and patient Hb stabilized. He will be discharged back to nursing home and will follow up outpatient with cardiologist and urologist.      Given patient's improved clinical status and current hemodynamic stability, decision was made to discharge. Discussed with attending  Please refer to patient's complete medical chart with documents for a full hospital course, for this is only a brief summary.

## 2022-05-05 NOTE — PROGRESS NOTE ADULT - ATTENDING COMMENTS
Patient seen and examined with Romeltachace this morning    75 y/o male, ambulatory and AA0x 2-3 at baseline, from [facility] (recently discharged for Rehab), with PMH of HTN, CAD, Severe AS, Valvular A. fib on Coumadin, COPD, Tobacco Use Disorder, Hx remote Alcohol Abuse, Dementia, and recently noted left lung nodules who presents for complaints of inability to urinate x 16 hours. Admitted for suspected sepsis 2/2 influenza A and acute urinary retention.    Of note, patient was diagnosed with Influenza AH3 last admission and received 10 days Tamiflu. Patient had indwelling Catheter placed     Vital Signs Last 24 Hrs  T(C): 36.3 (05 May 2022 15:05), Max: 36.6 (04 May 2022 21:11)  T(F): 97.4 (05 May 2022 15:05), Max: 97.9 (04 May 2022 21:11)  HR: 100 (05 May 2022 17:40) (85 - 100)  BP: 125/57 (05 May 2022 17:40) (100/53 - 125/57)  RR: 18 (05 May 2022 15:05) (18 - 18)  SpO2: 100% (05 May 2022 15:05) (95% - 100%)    P/E: as above  Psych: AAO x2,5  Neuro: No gross focal deficits; Power and sensation intact  CVS: S1S2 present, regular, no edema  Resp: BLAE+, No wheeze or Rhonchi  GI: Soft, BS+, Non tender, non distended  Extr: No  calf tenderness B/L Lower extremities  Skin: Warm and moist without any rashes      Labs:                                 7.8    11.51 )-----------( 308      ( 05 May 2022 16:10 )             23.4   05-05    141  |  107  |  23<H>  ----------------------------<  83  4.5   |  31  |  0.79    Ca    8.9      05 May 2022 06:57  Phos  3.0     05-05  Mg     2.1     05-05    TPro  6.6  /  Alb  2.1<L>  /  TBili  0.2  /  DBili  x   /  AST  12  /  ALT  27  /  AlkPhos  74  05-05    D/D:  Acute urinary retention etiology unclear ?? sec. to BPH   Hematuria post connolly removal resolved  Black stools concerning for GI losses  Hypotension resolved  Left shoulder pain likely Adhesive Capsulitis/ ligament tear  Abdominal wall  hematoma chronic (since last admission)  Supra therapeutic INR normalized  Influenza A doubt Sepsis  Chronic valvular Afib on coumadin  Hypoglycemia resolved  Uncontrolled DM  COPD stable  Tobacco use disorder  Dementia less likely more likely  Cognitive impairment   Hx HTN, CAD, severe AS    Plan:  Trial of void successful; No further hematuria  Continue Flomax and Finasteride; Advised RN to have patient stand up and urinate if retaining  Urology follow up outpatient   Given slight drop in H/H; discharge held; also no male bed was available in [facility]  Advised  to arrange Transport early tomorrow provided H/H remain stable  I also spoke with Admissions in [facility]  Off Droplet precautions; Afebrile;   Patient is oriented x 3 at this time.   hematoma of abdominal wall is old and much smaller;   Monitor INR; Resume low dose Coumadin; will give 6 mg tonight; INR AM  Patient had seen Orthopedics in the past for shoulder pain,. advised surgery; I had informed patient and wife that at this point, Patient would not be able to proceed with an Orthopedic surgery until heart issues are fixed as outpatient once Patient recuperates and completes Rehab and is more stable medically.   Pending ischemic w/u (RHC, LHC), and Cardiothoracic Surgery Eval for possible TAVR outpatient. Discussed with Cardio Dr. Bush to arrange outpatient appt with his office in 2 weeks to facilitate CTS and Int Cardio for cath   Needs f/u CT/PET Scan in 3 months for lung nodules    Discussed with  Paty; possible D/C to [facility] if remain stable overnight and H/H stable  Discussed with patient at bedside at length findings and plan of care  Discussed with PGy1 Dr. Simons and PGY3 Dr. Corrigan and RN Emigdio

## 2022-05-05 NOTE — PROGRESS NOTE ADULT - ASSESSMENT
Patient is a 77 y/o male, ambulatory and AAA0x2-3 at baseline, from [facility], with significant medical history of HTN, CAD, Severe AS, Valvular Afib on Coumadin, COPD, Tobacco Use Disorder, Etoh Abuse, Dementia, and recently noted left lung nodules who was sent to the ED for complaints of inability to urinate x 16 hours. Admitted for acute urinary retention and sepsis likely due to influenza  A. started Tamiflu. Sorensen placed.     In the ED:  VS: HR 97.8F, HR 100bpm, BP 85/47mmHg, RR 19, Spo2 99% on RA  Labs significant for WBC 14.6, Hb 8.4, BUN 64, Glucose 47, lactate 2.5, UA w/ RBCs  RVP positive for Flu A   Echo 4/4/22: Severe AS, EF 55-60%    5/2  pt noted worsening Anemia, H/H dropped to 6.6, repeat 7.1. ordered 1 unit PRBC. Pt with abdominal distention. ordered urgent CTA a/p and surgery consulted for possible internal bleeding. No active bleeding visible.     Spoke with wife Chavez [phone], updated and discussed regarding test result, plan of care. consent received for blood tx.

## 2022-05-05 NOTE — PROGRESS NOTE ADULT - SUBJECTIVE AND OBJECTIVE BOX
PGY-1 Progress Note discussed with attending    PAGER #: [[phone]] TILL 5:00 PM  PLEASE CONTACT ON CALL TEAM:  - On Call Team (Please refer to Yahaira) FROM 5:00 PM - 8:30PM  - Nightfloat Team FROM 8:30 -7:30 AM    INTERVAL HPI/ OVERNIGHT EVENTS: Patient had one dark bowel movement overnight, repeat CBC stable Hb 7.7    REVIEW OF SYSTEMS:  CONSTITUTIONAL: No fever, weight loss, or fatigue  RESPIRATORY: No cough, wheezing, chills or hemoptysis; No shortness of breath  CARDIOVASCULAR: No chest pain, palpitations, dizziness, or leg swelling  GASTROINTESTINAL: No abdominal pain. No nausea, vomiting, or hematemesis; No diarrhea or constipation. No melena or hematochezia.  GENITOURINARY: No dysuria or hematuria, urinary frequency  NEUROLOGICAL: No headaches, memory loss, loss of strength, numbness, or tremors  SKIN: No itching, burning, rashes, or lesions     MEDICATIONS  (STANDING):  aluminum hydroxide/magnesium hydroxide/simethicone Suspension 30 milliLiter(s) Oral every 6 hours  atorvastatin 40 milliGRAM(s) Oral at bedtime  budesonide  80 MICROgram(s)/formoterol 4.5 MICROgram(s) Inhaler 2 Puff(s) Inhalation two times a day  cholecalciferol 1000 Unit(s) Oral daily  dextrose 50% Injectable 25 Gram(s) IV Push once  folic acid 1 milliGRAM(s) Oral daily  gabapentin 200 milliGRAM(s) Oral two times a day  glucagon  Injectable 1 milliGRAM(s) IntraMuscular once  insulin glargine Injectable (LANTUS) 6 Unit(s) SubCutaneous at bedtime  insulin lispro (ADMELOG) corrective regimen sliding scale   SubCutaneous Before meals and at bedtime  insulin lispro Injectable (ADMELOG) 4 Unit(s) SubCutaneous three times a day before meals  lidocaine   4% Patch 1 Patch Transdermal once  metoprolol tartrate 12.5 milliGRAM(s) Oral two times a day  mirtazapine 15 milliGRAM(s) Oral at bedtime  nicotine -  14 mG/24Hr(s) Patch 1 Patch Transdermal daily  pantoprazole    Tablet 40 milliGRAM(s) Oral before breakfast  polyethylene glycol 3350 17 Gram(s) Oral daily  predniSONE   Tablet 30 milliGRAM(s) Oral daily  senna 2 Tablet(s) Oral at bedtime  sodium chloride 0.9% lock flush 3 milliLiter(s) IV Push every 8 hours  tamsulosin 0.4 milliGRAM(s) Oral at bedtime  thiamine 100 milliGRAM(s) Oral daily  tiotropium 18 MICROgram(s) Capsule 1 Capsule(s) Inhalation daily    MEDICATIONS  (PRN):  acetaminophen     Tablet .. 650 milliGRAM(s) Oral every 6 hours PRN Temp greater or equal to 38C (100.4F), Mild Pain (1 - 3)  ALBUTerol    90 MICROgram(s) HFA Inhaler 2 Puff(s) Inhalation every 6 hours PRN Shortness of Breath and/or Wheezing  melatonin 3 milliGRAM(s) Oral at bedtime PRN Insomnia  ondansetron Injectable 4 milliGRAM(s) IV Push every 8 hours PRN Nausea and/or Vomiting      Vital Signs Last 24 Hrs  T(C): 36.2 (05 May 2022 05:02), Max: 36.7 (04 May 2022 16:44)  T(F): 97.2 (05 May 2022 05:02), Max: 98.1 (04 May 2022 16:44)  HR: 85 (05 May 2022 05:02) (85 - 100)  BP: 119/50 (05 May 2022 05:02) (100/53 - 119/50)  BP(mean): --  RR: 18 (05 May 2022 05:02) (18 - 18)  SpO2: 95% (05 May 2022 05:02) (95% - 100%)    PHYSICAL EXAMINATION:  GENERAL: NAD, AAOx3  HEAD: AT/NC  EYES: conjunctiva and sclera clear  NECK: supple, No JVD noted, Normal thyroid  CHEST/LUNG: CTABL; no rales, rhonchi, wheezing, or rubs  HEART: regular rate and rhythm; no murmurs, rubs, or gallops  ABDOMEN: soft, nontender, nondistended; Bowel sounds present  EXTREMITIES:  2+ Peripheral Pulses, No clubbing, cyanosis, or edema  SKIN: warm dry                          7.6    11.82 )-----------( 280      ( 05 May 2022 06:57 )             23.3     05-05    141  |  107  |  23<H>  ----------------------------<  83  4.5   |  31  |  0.79    Ca    8.9      05 May 2022 06:57  Phos  3.0     05-05  Mg     2.1     05-05    TPro  6.6  /  Alb  2.1<L>  /  TBili  0.2  /  DBili  x   /  AST  12  /  ALT  27  /  AlkPhos  74  05-05    LIVER FUNCTIONS - ( 05 May 2022 06:57 )  Alb: 2.1 g/dL / Pro: 6.6 g/dL / ALK PHOS: 74 U/L / ALT: 27 U/L DA / AST: 12 U/L / GGT: x               PT/INR - ( 05 May 2022 06:57 )   PT: 14.6 sec;   INR: 1.22 ratio         PTT - ( 05 May 2022 06:57 )  PTT:26.3 sec  COVID-19 PCR: NotDetec (04 May 2022 05:12)  COVID-19 PCR: NotDetec (30 Apr 2022 09:41)  COVID-19 PCR: NotDetec (21 Apr 2022 10:49)  COVID-19 PCR: NotDetec (15 Apr 2022 12:32)  COVID-19 PCR: NotDetec (10 Apr 2022 05:45)  SARS-CoV-2: NotDetec (04 Apr 2022 10:27)  COVID-19 PCR: NotDetec (03 Apr 2022 22:44)      CAPILLARY BLOOD GLUCOSE      POCT Blood Glucose.: 134 mg/dL (05 May 2022 07:24)  POCT Blood Glucose.: 120 mg/dL (05 May 2022 01:00)  POCT Blood Glucose.: 404 mg/dL (04 May 2022 21:27)  POCT Blood Glucose.: 416 mg/dL (04 May 2022 21:23)  POCT Blood Glucose.: 280 mg/dL (04 May 2022 11:20)      RADIOLOGY & ADDITIONAL TESTS:

## 2022-05-05 NOTE — CONSULT NOTE ADULT - SUBJECTIVE AND OBJECTIVE BOX
C A R D I O L O G Y  *********************    DATE OF SERVICE: 05-05-22    HISTORY OF PRESENT ILLNESS: HPI:  Patient is a 75 y/o male, ambulatory and AAA0x2-3 at baseline, from [facility], with significant medical history of HTN, CAD, Severe AS, Valvular Afib on Coumadin, COPD, Tobacco Use Disorder, Etoh Abuse, Dementia, and recently noted left lung nodules who was sent to the ED for complaints of inability to urinate x 16 hours. Patient is pleasant on interview and reports that he does not know why he was sent to the hospital and would like to go back to the NH as soon as possible. He endorses that he had some abdominal discomfort last night and had the urge to urinate but was unable to. He does not recall any new medication intake, and denied any other complaints including fevers, cough, shortness of breath, falls, or leg swelling. While in the ED, he was found to be hypotensive with an eleavted lactate which raised suspicions for an underlying infection. Of note, patient had a recent admission to [facility] ICU for acute hypoxic respiratory failure 2/2 to COPD exacerbation/ Pneumonia requiring bipap and was also sedated with Precedex for presumed EtOH withdrawal; he was found to be very sensitive to Duonebs at that hospitalization (triggered SVT but was found to have severe AS pending CTSx evaluation and ischemic w/u.  as outpt when optimized.  He denies CP, SOB or LOC      PAST MEDICAL & SURGICAL HISTORY:  HTN (hypertension)    Hyperlipemia    COPD (chronic obstructive pulmonary disease)    DM (diabetes mellitus)    Severe aortic stenosis    Dementia    AF (atrial fibrillation)    Tobacco use disorder    Multiple lung nodules    Status post spinal surgery  lumbar    Encounter for screening colonoscopy    H/O endoscopy            MEDICATIONS:  MEDICATIONS  (STANDING):  aluminum hydroxide/magnesium hydroxide/simethicone Suspension 30 milliLiter(s) Oral every 6 hours  atorvastatin 40 milliGRAM(s) Oral at bedtime  budesonide  80 MICROgram(s)/formoterol 4.5 MICROgram(s) Inhaler 2 Puff(s) Inhalation two times a day  cholecalciferol 1000 Unit(s) Oral daily  dextrose 50% Injectable 25 Gram(s) IV Push once  folic acid 1 milliGRAM(s) Oral daily  gabapentin 200 milliGRAM(s) Oral two times a day  glucagon  Injectable 1 milliGRAM(s) IntraMuscular once  insulin glargine Injectable (LANTUS) 6 Unit(s) SubCutaneous at bedtime  insulin lispro (ADMELOG) corrective regimen sliding scale   SubCutaneous Before meals and at bedtime  insulin lispro Injectable (ADMELOG) 6 Unit(s) SubCutaneous three times a day before meals  lidocaine   4% Patch 1 Patch Transdermal once  metoprolol tartrate 12.5 milliGRAM(s) Oral two times a day  mirtazapine 15 milliGRAM(s) Oral at bedtime  nicotine -  14 mG/24Hr(s) Patch 1 Patch Transdermal daily  pantoprazole    Tablet 40 milliGRAM(s) Oral before breakfast  polyethylene glycol 3350 17 Gram(s) Oral daily  predniSONE   Tablet 30 milliGRAM(s) Oral daily  senna 2 Tablet(s) Oral at bedtime  sodium chloride 0.9% lock flush 3 milliLiter(s) IV Push every 8 hours  tamsulosin 0.4 milliGRAM(s) Oral at bedtime  thiamine 100 milliGRAM(s) Oral daily  tiotropium 18 MICROgram(s) Capsule 1 Capsule(s) Inhalation daily  warfarin 7.5 milliGRAM(s) Oral once      Allergies    No Known Allergies    Intolerances        FAMILY HISTORY:  Family history of diabetes mellitus (Sibling)      Non-contributary for premature coronary disease or sudden cardiac death    SOCIAL HISTORY:    [x ] Non-smoker  [ ] Smoker  [ ] Alcohol    FLU VACCINE THIS YEAR STARTS IN AUGUST:  [ ] Yes    [ ] No    IF OVER 65 HAVE YOU EVER HAD A PNA VACCINE:  [ ] Yes    [ ] No       [ ] N/A      REVIEW OF SYSTEMS:  [ ]chest pain  [  ]shortness of breath  [  ]palpitations  [  ]syncope  [ ]near syncope [ ]upper extremity weakness   [ ] lower extremity weakness  [  ]diplopia  [  ]altered mental status   [  ]fevers  [ ]chills [ ]nausea  [ ]vomitting  [  ]dysphagia    [ ]abdominal pain  [ ]melena  [ ]BRBPR    [  ]epistaxis  [  ]rash    [ ]lower extremity edema        [X] All others negative	  [ ] Unable to obtain      LABS:	 	    CARDIAC MARKERS:                                  7.6    11.82 )-----------( 280      ( 05 May 2022 06:57 )             23.3     Hb Trend: 7.6<--, 7.7<--    05-05    141  |  107  |  23<H>  ----------------------------<  83  4.5   |  31  |  0.79    Ca    8.9      05 May 2022 06:57  Phos  3.0     05-05  Mg     2.1     05-05    TPro  6.6  /  Alb  2.1<L>  /  TBili  0.2  /  DBili  x   /  AST  12  /  ALT  27  /  AlkPhos  74  05-05    Creatinine Trend: 0.79<--, 1.15<--, 0.85<--, 0.91<--, 0.84<--, 1.15<--    Coags:  PT/INR - ( 05 May 2022 06:57 )   PT: 14.6 sec;   INR: 1.22 ratio         PTT - ( 05 May 2022 06:57 )  PTT:26.3 sec        PHYSICAL EXAM:  T(C): 36.2 (05-05-22 @ 05:02), Max: 36.7 (05-04-22 @ 16:44)  HR: 85 (05-05-22 @ 05:02) (85 - 100)  BP: 119/50 (05-05-22 @ 05:02) (100/53 - 119/50)  RR: 18 (05-05-22 @ 05:02) (18 - 18)  SpO2: 95% (05-05-22 @ 05:02) (95% - 100%)  Wt(kg): --     I&O's Summary      HEENT:  (-)icterus (-)pallor  CV: N S1 S2 1/6 FELICITA (+)2 Pulses B/l  Resp:  Clear to ausculatation B/L, normal effort  GI: (+) BS Soft, NT, ND  Lymph:  (-)Edema, (-)obvious lymphadenopathy  Skin: Warm to touch, Normal turgor  Psych: Appropriate mood and affect          ECG:  	NONE    RADIOLOGY:         CXR:   < from: Xray Chest 1 View-PORTABLE IMMEDIATE (Xray Chest 1 View-PORTABLE IMMEDIATE .) (04.30.22 @ 08:18) >  Negative for acute cardiopulmonary process.    < end of copied text >      ASSESSMENT/PLAN: 	77y Male  AAA0x2-3 at baseline, from [facility], with significant medical history of HTN, CAD, Severe AS, moderate MS  Valvular Afib on Coumadin, COPD, Tobacco Use Disorder, Etoh Abuse, Dementia, and recently noted left lung nodules who was sent to the ED for complaints of inability to urinate x 16 hours.     - No clinical CHF  - Management of urinary retention per medical team  - Plan for oupt CTS Eval when optimized   - Dose coumadin INR 2-3, heparin gtt or Lovenox for INR <2    I once again thank you for allowing me to participate in the care of your patient.  If you have any questions or concerns please do not hesitate to contact me.    Casper Bush MD, [facility]  BEEPER [phone]

## 2022-05-05 NOTE — DISCHARGE NOTE PROVIDER - NSDCMRMEDTOKEN_GEN_ALL_CORE_FT
albuterol 90 mcg/inh inhalation aerosol: 2 puff(s) inhaled every 4 hours, As Needed prn sob  aluminum hydroxide-magnesium hydroxide 200 mg-200 mg/5 mL oral suspension: 30 milliliter(s) orally every 6 hours  aspirin 81 mg oral tablet, chewable: 2 tab(s) orally once a day  atorvastatin 40 mg oral tablet: 1 tab(s) orally once a day (at bedtime)  budesonide-formoterol 80 mcg-4.5 mcg/inh inhalation aerosol: 1 application inhaled 2 times a day,  cholecalciferol oral tablet: 1000 unit(s) orally once a day  Coumadin 5 mg oral tablet: 1.5 tab(s) orally once a day (at bedtime)  folic acid 1 mg oral tablet: 1 tab(s) orally once a day  gabapentin 200 m tab(s) orally 2 times a day   glimepiride 4 mg oral tablet: 1 tab(s) orally 2 times a day  melatonin 3 mg oral tablet: 1 tab(s) orally once a day (at bedtime), As needed, Insomnia  metFORMIN 1000 mg oral tablet: 1 tab(s) orally 2 times a day  Metoprolol Tartrate 25 mg oral tablet: 0.5 tab(s) orally 2 times a day  nicotine 14 mg/24 hr transdermal film, extended release: 1 application transdermal once a day  pantoprazole 40 mg oral delayed release tablet: 1 tab(s) orally once a day (before a meal)  polyethylene glycol 3350 oral powder for reconstitution: 17 gram(s) orally once a day  Remeron 15 mg oral tablet: 1 tab(s) orally once a day (at bedtime)  senna oral tablet: 2 tab(s) orally once a day (at bedtime)  Spiriva 18 mcg inhalation capsule: 1 cap(s) inhaled once a day  tamsulosin 0.4 mg oral capsule: 1 cap(s) orally once a day (at bedtime)  thiamine 100 mg oral tablet: 1 tab(s) orally once a day   albuterol 90 mcg/inh inhalation aerosol: 2 puff(s) inhaled every 4 hours, As Needed prn sob  aluminum hydroxide-magnesium hydroxide 200 mg-200 mg/5 mL oral suspension: 30 milliliter(s) orally every 6 hours  aspirin 81 mg oral tablet, chewable: 2 tab(s) orally once a day  atorvastatin 40 mg oral tablet: 1 tab(s) orally once a day (at bedtime)  budesonide-formoterol 80 mcg-4.5 mcg/inh inhalation aerosol: 1 application inhaled 2 times a day,  cholecalciferol oral tablet: 1000 unit(s) orally once a day  folic acid 1 mg oral tablet: 1 tab(s) orally once a day  gabapentin 200 m tab(s) orally 2 times a day   glimepiride 4 mg oral tablet: 1 tab(s) orally 2 times a day  melatonin 3 mg oral tablet: 1 tab(s) orally once a day (at bedtime), As needed, Insomnia  metFORMIN 1000 mg oral tablet: 1 tab(s) orally 2 times a day  Metoprolol Tartrate 25 mg oral tablet: 0.5 tab(s) orally 2 times a day  nicotine 14 mg/24 hr transdermal film, extended release: 1 application transdermal once a day  pantoprazole 40 mg oral delayed release tablet: 1 tab(s) orally once a day (before a meal)  polyethylene glycol 3350 oral powder for reconstitution: 17 gram(s) orally once a day  Remeron 15 mg oral tablet: 1 tab(s) orally once a day (at bedtime)  senna oral tablet: 2 tab(s) orally once a day (at bedtime)  Spiriva 18 mcg inhalation capsule: 1 cap(s) inhaled once a day  tamsulosin 0.4 mg oral capsule: 1 cap(s) orally once a day (at bedtime)  thiamine 100 mg oral tablet: 1 tab(s) orally once a day  warfarin 6 mg oral tablet: 1 tab(s) orally once a day    albuterol 90 mcg/inh inhalation aerosol: 2 puff(s) inhaled every 4 hours, As Needed prn sob  aluminum hydroxide-magnesium hydroxide 200 mg-200 mg/5 mL oral suspension: 30 milliliter(s) orally every 6 hours  aspirin 81 mg oral tablet, chewable: 2 tab(s) orally once a day  atorvastatin 40 mg oral tablet: 1 tab(s) orally once a day (at bedtime)  budesonide-formoterol 80 mcg-4.5 mcg/inh inhalation aerosol: 1 application inhaled 2 times a day,  cholecalciferol oral tablet: 1000 unit(s) orally once a day  finasteride 5 mg oral tablet: 1 tab(s) orally once a day   folic acid 1 mg oral tablet: 1 tab(s) orally once a day  gabapentin 200 m tab(s) orally 2 times a day   glimepiride 4 mg oral tablet: 1 tab(s) orally 2 times a day  melatonin 3 mg oral tablet: 1 tab(s) orally once a day (at bedtime), As needed, Insomnia  metFORMIN 1000 mg oral tablet: 1 tab(s) orally 2 times a day  Metoprolol Tartrate 25 mg oral tablet: 0.5 tab(s) orally 2 times a day  nicotine 14 mg/24 hr transdermal film, extended release: 1 application transdermal once a day  pantoprazole 40 mg oral delayed release tablet: 1 tab(s) orally once a day (before a meal)  polyethylene glycol 3350 oral powder for reconstitution: 17 gram(s) orally once a day  Remeron 15 mg oral tablet: 1 tab(s) orally once a day (at bedtime)  senna oral tablet: 2 tab(s) orally once a day (at bedtime)  Spiriva 18 mcg inhalation capsule: 1 cap(s) inhaled once a day  tamsulosin 0.4 mg oral capsule: 1 cap(s) orally once a day (at bedtime)  thiamine 100 mg oral tablet: 1 tab(s) orally once a day  warfarin 6 mg oral tablet: 1 tab(s) orally once a day

## 2022-05-05 NOTE — DISCHARGE NOTE PROVIDER - PROVIDER TOKENS
PROVIDER:[TOKEN:[[id number]]] PROVIDER:[TOKEN:[[id number]]],PROVIDER:[TOKEN:[[id number]]]

## 2022-05-05 NOTE — PROGRESS NOTE ADULT - PROBLEM SELECTOR PLAN 7
Pt with paroxysmal valvular afib; EKG NSR w/ APC's in ED   pt takes Warfarin 7.5mg HS, INR goal 2-3 (within goal on admission)  Also on ASA 81 x2 Qd for CAD/PAD   Echo as above, EF 55-60%  INR 3.35 today  No coumadin given on 5/3/22 due to hematuria  INR 1.94 on 5/4/22 Pt with paroxysmal valvular afib; EKG NSR w/ APC's in ED   pt takes Warfarin 7.5mg HS, INR goal 2-3 (within goal on admission)  Also on ASA 81 x2 Qd for CAD/PAD   Echo as above, EF 55-60%  INR 3.35 today  No coumadin given on 5/3/22 due to hematuria  INR 1.22 on 5/5/22  -will give 7.5mg warfarin tonight.

## 2022-05-05 NOTE — DISCHARGE NOTE PROVIDER - CARE PROVIDER_API CALL
Casper Bush)  Cardiovascular Disease  [address]  [place] [address]  Phone: [phone]  Fax: [phone]  Follow Up Time:    Casper Bush (MD)  Cardiovascular Disease  [address], [address]  [place] [address]  Phone: [phone]  Fax: [phone]  Follow Up Time:     Emigdio Palumbo)  Urology  [address] [facility] [facility] [address]  [place] [address]  Phone: [phone]  Fax: [phone]  Follow Up Time:

## 2022-05-05 NOTE — DISCHARGE NOTE PROVIDER - NSDCCPCAREPLAN_GEN_ALL_CORE_FT
PRINCIPAL DISCHARGE DIAGNOSIS  Diagnosis: Acute urinary retention  Assessment and Plan of Treatment:       SECONDARY DISCHARGE DIAGNOSES  Diagnosis: Severe aortic stenosis  Assessment and Plan of Treatment:     Diagnosis: Hematuria  Assessment and Plan of Treatment:     Diagnosis: AF (atrial fibrillation)  Assessment and Plan of Treatment:     Diagnosis: COPD (chronic obstructive pulmonary disease)  Assessment and Plan of Treatment:     Diagnosis: HTN (hypertension)  Assessment and Plan of Treatment:     Diagnosis: Dementia  Assessment and Plan of Treatment:     Diagnosis: Anemia  Assessment and Plan of Treatment:     Diagnosis: Multiple lung nodules  Assessment and Plan of Treatment:      PRINCIPAL DISCHARGE DIAGNOSIS  Diagnosis: Acute urinary retention  Assessment and Plan of Treatment: You came to the hospital because you were having urinary retention due to your enlarged prostate likely. We placed a connolly and urine flow was achieved. We started you on Flomax and discontinued the connolly and you were able to make urine on your own. Please continue to take your flomax as prescribed. We will provide you with a urology referral. Please follow up with them outpatient.      SECONDARY DISCHARGE DIAGNOSES  Diagnosis: Severe aortic stenosis  Assessment and Plan of Treatment: You have a history of severe aortic stenosis. Cardiologist Dr. Bush was consulted and recommended you follow up outpatient with him for your aortic stenosis. Please follow up with them when discharged from rehab.    Diagnosis: Hematuria  Assessment and Plan of Treatment: You had blood in your urine likely due to trauama and irritation to the lining of your urethra. Your Hb dropped so you receieved one unit of PRBC. Your Hb levels have been stable since. Please continue to monitor for any signs of bleeding and let your doctor know right away.    Diagnosis: AF (atrial fibrillation)  Assessment and Plan of Treatment: You have a hisstory of Atrial fibrillation and take coumadin to prevent blood clots. Please continue to take your medication as prescribed and follow up with your doctor to monitor INR levels.    Diagnosis: COPD (chronic obstructive pulmonary disease)  Assessment and Plan of Treatment: Please continue to take your medication for your COPD as prescribed.    Diagnosis: HTN (hypertension)  Assessment and Plan of Treatment: Please continue to take your blood pressure medication as prescribed and monitor your BP at home.    Diagnosis: Dementia  Assessment and Plan of Treatment: Please continue to take your medication Remeron as presribed.    Diagnosis: Anemia  Assessment and Plan of Treatment: Your Hb was low during your admission due to your hematuria. Your Hb has been stable. Please continue to monitor for signs of bleeding.    Diagnosis: Multiple lung nodules  Assessment and Plan of Treatment: Please follow up outpatient for your CT/PET Scan in 3 months to monitor lung nodules.     PRINCIPAL DISCHARGE DIAGNOSIS  Diagnosis: Acute urinary retention  Assessment and Plan of Treatment: You came to the hospital because you were having urinary retention due to your enlarged prostate likely. We placed a connolly and urine flow was achieved. We started you on Flomax and discontinued the connolly and you were able to make urine on your own. Please continue to take your flomax as prescribed. We will provide you with a urology referral Dr. Palumbo who saw you in hospital. Please follow up with them outpatient.      SECONDARY DISCHARGE DIAGNOSES  Diagnosis: Severe aortic stenosis  Assessment and Plan of Treatment: You have a history of severe aortic stenosis. Cardiologist Dr. Bush was consulted and recommended you follow up outpatient with him for your aortic stenosis. Please follow up with them when discharged from rehab.    Diagnosis: Hematuria  Assessment and Plan of Treatment: You had blood in your urine likely due to trauama and irritation to the lining of your urethra. Your Hb dropped so you receieved one unit of PRBC. Your Hb levels have been stable since. Please continue to monitor for any signs of bleeding and let your doctor know right away.    Diagnosis: AF (atrial fibrillation)  Assessment and Plan of Treatment: You have a hisstory of Atrial fibrillation and take coumadin to prevent blood clots. Please continue to take your medication Eliquis 6mg for now as prescribed and follow up with your doctor to monitor INR levels and change dose if needed.    Diagnosis: COPD (chronic obstructive pulmonary disease)  Assessment and Plan of Treatment: Please continue to take your medication for your COPD as prescribed.    Diagnosis: HTN (hypertension)  Assessment and Plan of Treatment: Please continue to take your blood pressure medication as prescribed and monitor your BP at home.    Diagnosis: Dementia  Assessment and Plan of Treatment: Please continue to take your medication Remeron as presribed.    Diagnosis: Anemia  Assessment and Plan of Treatment: Your Hb was low during your admission due to your hematuria. Your Hb has been stable. Please continue to monitor for signs of bleeding.    Diagnosis: Multiple lung nodules  Assessment and Plan of Treatment: Please follow up outpatient for your CT/PET Scan in 3 months to monitor lung nodules.     PRINCIPAL DISCHARGE DIAGNOSIS  Diagnosis: Acute urinary retention  Assessment and Plan of Treatment: You came to the hospital because you were having urinary retention due to your enlarged prostate likely. We placed a connolly and urine flow was achieved. We started you on Flomax and discontinued the connolly and you were able to make urine on your own. Please continue to take your flomax and finasteride as prescribed. We will provide you with a urology referral Dr. Palumbo who saw you in hospital. Please follow up with them outpatient.      SECONDARY DISCHARGE DIAGNOSES  Diagnosis: Severe aortic stenosis  Assessment and Plan of Treatment: You have a history of severe aortic stenosis. Cardiologist Dr. Bush was consulted and recommended you follow up outpatient with him for your aortic stenosis. Please follow up with them when discharged from rehab.    Diagnosis: Hematuria  Assessment and Plan of Treatment: You had blood in your urine likely due to trauama and irritation to the lining of your urethra. Your Hb dropped so you receieved one unit of PRBC. Your Hb levels have been stable since. Please continue to monitor for any signs of bleeding and let your doctor know right away.    Diagnosis: AF (atrial fibrillation)  Assessment and Plan of Treatment: You have a hisstory of Atrial fibrillation and take coumadin to prevent blood clots. Please continue to take your medication Coumadin 6mg for now as prescribed and follow up with your doctor to monitor INR levels and change dose if needed.    Diagnosis: COPD (chronic obstructive pulmonary disease)  Assessment and Plan of Treatment: Please continue to take your medication for your COPD as prescribed.    Diagnosis: HTN (hypertension)  Assessment and Plan of Treatment: Please continue to take your blood pressure medication as prescribed and monitor your BP at home.    Diagnosis: Dementia  Assessment and Plan of Treatment: Please continue to take your medication Remeron as presribed.    Diagnosis: Anemia  Assessment and Plan of Treatment: Your Hb was low during your admission due to your hematuria. Your Hb has been stable. Please continue to monitor for signs of bleeding.    Diagnosis: Multiple lung nodules  Assessment and Plan of Treatment: Please follow up outpatient for your CT/PET Scan in 3 months to monitor lung nodules.

## 2022-05-05 NOTE — PROGRESS NOTE ADULT - PROBLEM SELECTOR PLAN 12
Pt currently at baseline mental status   C/w home medications including Remeron   Required a lot of haldol and other sedatives at prior admission for presumed ETOH withdrawal and delirium Pt currently at baseline mental status   C/w home medications including Remeron   Required a lot of haldol and other sedatives at prior admission for presumed ETOH withdrawal and delirium  back to baseline now

## 2022-05-05 NOTE — PROGRESS NOTE ADULT - PROBLEM SELECTOR PLAN 3
Patient had hematuria likely due to trauma from connolly placement/removal  No connolly in place, will obtain TOV  no longer bleeding   CBC 8.1 this AM  continue to monitor CBC Patient had hematuria likely due to trauma from connolly placement/removal  No connolly in place, will obtain TOV  no longer bleeding   CBC 7.7 this AM  continue to monitor CBC

## 2022-05-06 NOTE — PROGRESS NOTE ADULT - PROBLEM SELECTOR PLAN 2
Pt with new onset AUR, suprapubic discomfort   No prior hx of BPH, recently started Remeron, and has a history of waxing/waning dementia   Remeron is less likely to have Anticholinergic side effects but may still cause AUR   May be 2/2 to sepsis/ infection (?Flu A)  Uncontrolled Diabetic with A1c of 10.3   Sorensen placed in the ED 4/30, voided 350ml   TOV passed, however patient had hematuria likely from trauma overnight on Tuesday   -no longer having urinary retention or hematuria.

## 2022-05-06 NOTE — PROGRESS NOTE ADULT - PROBLEM SELECTOR PROBLEM 4
Sepsis due to hemophilus influenzae
Diabetic hypoglycemia
Diabetic hypoglycemia

## 2022-05-06 NOTE — DISCHARGE NOTE NURSING/CASE MANAGEMENT/SOCIAL WORK - NSDCPEFALRISK_GEN_ALL_CORE
For information on Fall & Injury Prevention, visit: [email or web address] OR  [email or web address] OR  https://www.cdc.gov/steadi/patient.html

## 2022-05-06 NOTE — PROGRESS NOTE ADULT - SUBJECTIVE AND OBJECTIVE BOX
C A R D I O L O G Y  **********************************     DATE OF SERVICE: 05-06-22    Patient denies chest pain or shortness of breath.   Review of systems otherwise (-)  	  MEDICATIONS:  MEDICATIONS  (STANDING):  aluminum hydroxide/magnesium hydroxide/simethicone Suspension 30 milliLiter(s) Oral every 6 hours  atorvastatin 40 milliGRAM(s) Oral at bedtime  budesonide  80 MICROgram(s)/formoterol 4.5 MICROgram(s) Inhaler 2 Puff(s) Inhalation two times a day  cholecalciferol 1000 Unit(s) Oral daily  dextrose 50% Injectable 25 Gram(s) IV Push once  folic acid 1 milliGRAM(s) Oral daily  gabapentin 200 milliGRAM(s) Oral two times a day  glucagon  Injectable 1 milliGRAM(s) IntraMuscular once  insulin glargine Injectable (LANTUS) 6 Unit(s) SubCutaneous at bedtime  insulin lispro (ADMELOG) corrective regimen sliding scale   SubCutaneous Before meals and at bedtime  insulin lispro Injectable (ADMELOG) 6 Unit(s) SubCutaneous three times a day before meals  lidocaine   4% Patch 1 Patch Transdermal once  metoprolol tartrate 12.5 milliGRAM(s) Oral two times a day  mirtazapine 15 milliGRAM(s) Oral at bedtime  nicotine -  14 mG/24Hr(s) Patch 1 Patch Transdermal daily  pantoprazole    Tablet 40 milliGRAM(s) Oral before breakfast  polyethylene glycol 3350 17 Gram(s) Oral daily  senna 2 Tablet(s) Oral at bedtime  sodium chloride 0.9% lock flush 3 milliLiter(s) IV Push every 8 hours  tamsulosin 0.4 milliGRAM(s) Oral at bedtime  thiamine 100 milliGRAM(s) Oral daily  tiotropium 18 MICROgram(s) Capsule 1 Capsule(s) Inhalation daily      LABS:	 	    CARDIAC MARKERS:                          7.9    13.50 )-----------( 325      ( 06 May 2022 07:05 )             24.3     Hemoglobin: 7.9 g/dL (05-06 @ 07:05)  Hemoglobin: 7.8 g/dL (05-05 @ 16:10)  Hemoglobin: 7.6 g/dL (05-05 @ 06:57)  Hemoglobin: 7.7 g/dL (05-04 @ 18:56)  Hemoglobin: 8.1 g/dL (05-04 @ 06:38)      05-06    138  |  103  |  20<H>  ----------------------------<  243<H>  4.6   |  28  |  0.99    Ca    9.1      06 May 2022 07:05  Phos  4.3     05-06  Mg     2.0     05-06    TPro  6.8  /  Alb  2.3<L>  /  TBili  0.2  /  DBili  x   /  AST  14  /  ALT  34  /  AlkPhos  81  05-06    Creatinine Trend: 0.99<--, 0.79<--, 1.15<--, 0.85<--, 0.91<--, 0.84<--    COAGS:   PT/INR - ( 06 May 2022 07:05 )   PT: 14.7 sec;   INR: 1.23 ratio             PHYSICAL EXAM:  T(C): 36.3 (05-06-22 @ 04:55), Max: 36.6 (05-05-22 @ 21:30)  HR: 79 (05-06-22 @ 04:55) (79 - 100)  BP: 109/49 (05-06-22 @ 04:55) (106/62 - 125/57)  RR: 18 (05-06-22 @ 04:55) (18 - 18)  SpO2: 98% (05-06-22 @ 04:55) (95% - 100%)  Wt(kg): --  I&O's Summary    05 May 2022 07:01  -  06 May 2022 07:00  --------------------------------------------------------  IN: 0 mL / OUT: 600 mL / NET: -600 mL      HEENT:  (-)icterus (-)pallor  CV: N S1 S2 1/6 FELICITA (+)2 Pulses B/l  Resp:  Clear to ausculatation B/L, normal effort  GI: (+) BS Soft, NT, ND  Lymph:  (-)Edema, (-)obvious lymphadenopathy  Skin: Warm to touch, Normal turgor  Psych: Appropriate mood and affect      ASSESSMENT/PLAN: 	77y  Male AAA0x2-3 at baseline, from [facility], with significant medical history of HTN, CAD, Severe AS, moderate MS  Valvular Afib on Coumadin, COPD, Tobacco Use Disorder, Etoh Abuse, Dementia, and recently noted left lung nodules who was sent to the ED for complaints of inability to urinate x 16 hours.     - No clinical CHF  - Management of urinary retention per medical team  - Plan for oupt CTS Eval when optimized   - Dose coumadin INR 2-3, heparin gtt or Lovenox for INR <2    Casper Bush MD, [facility]  BEEPER [phone]

## 2022-05-06 NOTE — DISCHARGE NOTE NURSING/CASE MANAGEMENT/SOCIAL WORK - PATIENT PORTAL LINK FT
You can access the FollowMyHealth Patient Portal offered by [facility] by registering at the following website: [email or web address]. By joining [employer]’s FollowMyHealth portal, you will also be able to view your health information using other applications (apps) compatible with our system.

## 2022-05-06 NOTE — PROGRESS NOTE ADULT - PROBLEM SELECTOR PLAN 8
C/w home medications Symbicort, tiotropium (Spiriva exchange), albuterol PRN   Hold Duonebs as pt was acutely sensitive to it at prior visit (went into SVTs)  Does not appear to be in exacerbation, CXR non focal
Has a prior history of HTN on losartan but hypotensive in setting of sepsis   Also taken off losartan as per [facility] med list   DASH/ Carb Consistent diet    for HLD- C/w lipitor 40mg   DASH/ Carb Consistent diet
C/w home medications Symbicort, tiotropium (Spiriva exchange), albuterol PRN   Hold Duonebs as pt was acutely sensitive to it at prior visit (went into SVTs)  Does not appear to be in exacerbation, CXR non focal
Has a prior history of HTN on losartan but hypotensive in setting of sepsis   Also taken off losartan as per [facility] med list   DASH/ Carb Consistent diet    for HLD- C/w lipitor 40mg   DASH/ Carb Consistent diet
Has a prior history of HTN on losartan but hypotensive in setting of sepsis   Also taken off losartan as per [facility] med list   DASH/ Carb Consistent diet    for HLD- C/w lipitor 40mg   DASH/ Carb Consistent diet

## 2022-05-06 NOTE — DISCHARGE NOTE NURSING/CASE MANAGEMENT/SOCIAL WORK - NSDCPEWEB_GEN_ALL_CORE
[facility] for Tobacco Control website --- [email or web address] --- [email or web address]

## 2022-05-06 NOTE — PROGRESS NOTE ADULT - PROBLEM SELECTOR PLAN 10
C/w nicotine patch   Pt counseled on cessation
Two lung nodules noted at prior admission in the left lung   Measuring 1.8x0.6x1cm in the left upper lobe and 1.5x1x1.9cm in left apex   Need f/u CT/PET Scan in 3 months as per prior documentation
Two lung nodules noted at prior admission in the left lung   Measuring 1.8x0.6x1cm in the left upper lobe and 1.5x1x1.9cm in left apex   Need f/u CT/PET Scan in 3 months as per prior documentation

## 2022-05-06 NOTE — PROGRESS NOTE ADULT - ASSESSMENT
Patient is a 77 y/o male, ambulatory and AAA0x2-3 at baseline, from [facility], with significant medical history of HTN, CAD, Severe AS, Valvular Afib on Coumadin, COPD, Tobacco Use Disorder, Etoh Abuse, Dementia, and recently noted left lung nodules who was sent to the ED for complaints of inability to urinate x 16 hours. Admitted for acute urinary retention and sepsis likely due to influenza  A. started Tamiflu. Sorensen placed.     In the ED:  VS: HR 97.8F, HR 100bpm, BP 85/47mmHg, RR 19, Spo2 99% on RA  Labs significant for WBC 14.6, Hb 8.4, BUN 64, Glucose 47, lactate 2.5, UA w/ RBCs  RVP positive for Flu A   Echo 4/4/22: Severe AS, EF 55-60%    5/2  pt noted worsening Anemia, H/H dropped to 6.6, repeat 7.1. ordered 1 unit PRBC. Pt with abdominal distention. ordered urgent CTA a/p and surgery consulted for possible internal bleeding. No active bleeding visible.     Spoke with wife Chavez [phone], updated and discussed regarding test result, plan of care. consent received for blood tx.

## 2022-05-06 NOTE — DISCHARGE NOTE NURSING/CASE MANAGEMENT/SOCIAL WORK - NSDCPEEMAIL_GEN_ALL_CORE
[facility] for Tobacco Control email [email or web address]

## 2022-05-06 NOTE — PROGRESS NOTE ADULT - PROBLEM SELECTOR PROBLEM 6
AF (atrial fibrillation)
Severe aortic stenosis
Severe aortic stenosis
AF (atrial fibrillation)
Severe aortic stenosis

## 2022-05-06 NOTE — PROGRESS NOTE ADULT - PROBLEM SELECTOR PLAN 7
Pt with paroxysmal valvular afib; EKG NSR w/ APC's in ED   pt takes Warfarin 7.5mg HS, INR goal 2-3 (within goal on admission)  Also on ASA 81 x2 Qd for CAD/PAD   Echo as above, EF 55-60%  INR 3.35 today  No coumadin given on 5/3/22 due to hematuria  INR 1.23 on 5/6/22  -will give 7.5mg warfarin tonight and d/c on home dose

## 2022-05-06 NOTE — PROGRESS NOTE ADULT - PROBLEM SELECTOR PLAN 9
C/w home medications Symbicort, tiotropium (Spiriva exchange), albuterol PRN   Hold Duonebs as pt was acutely sensitive to it at prior visit (went into SVTs)  Does not appear to be in exacerbation, CXR non focal
C/w nicotine patch   Pt counseled on cessation
C/w home medications Symbicort, tiotropium (Spiriva exchange), albuterol PRN   Hold Duonebs as pt was acutely sensitive to it at prior visit (went into SVTs)  Does not appear to be in exacerbation, CXR non focal
C/w home medications Symbicort, tiotropium (Spiriva exchange), albuterol PRN   Hold Duonebs as pt was acutely sensitive to it at prior visit (went into SVTs)  Does not appear to be in exacerbation, CXR non focal
C/w nicotine patch   Pt counseled on cessation

## 2022-05-06 NOTE — PROGRESS NOTE ADULT - PROBLEM SELECTOR PROBLEM 7
AF (atrial fibrillation)
AF (atrial fibrillation)
HTN (hypertension)
HTN (hypertension)
AF (atrial fibrillation)

## 2022-05-06 NOTE — PROGRESS NOTE ADULT - PROBLEM SELECTOR PROBLEM 8
COPD (chronic obstructive pulmonary disease)
COPD (chronic obstructive pulmonary disease)
HTN (hypertension)

## 2022-05-06 NOTE — PROGRESS NOTE ADULT - PROVIDER SPECIALTY LIST ADULT
Cardiology
Internal Medicine
Surgery
Internal Medicine

## 2022-05-06 NOTE — PROGRESS NOTE ADULT - PROBLEM SELECTOR PLAN 5
Uncontrolled Diabetic with A1c of 10.3 earlier this month   On Amaryl 1mg BIDm Metformin 1000mg BID, and SSI w/ humalog at [facility]   Admitted with glucose 47, pt was confused at that time   Resolved w/ dextrose and food   C/w SSI, Accu-Cheks ACHS   Good renal function, low suspicion for persistent hypoglycemia d/t amaryl; will monitor  DASH/ Carb Consistent diet  Lantus 6u at bed time  lispro 6 units TID with meals

## 2022-05-06 NOTE — PROGRESS NOTE ADULT - PROBLEM SELECTOR PLAN 6
Pt with negative CHF w/u at prior admission   Echo as above w/ severe AS by criteria   Pt currently w/o syncope, chest pain or shortness of breath  Pending ischemic w/u (RHC, LHC), and Cardiothoracic Surgery Eval for possible TAVR outpatient
Pt with paroxysmal valvular afib; EKG NSR w/ APC's in ED   pt takes Warfarin 7.5mg HS, INR goal 2-3 (within goal on admission)  Also on ASA 81 x2 Qd for CAD/PAD   Echo as above, EF 55-60%  hold AC for now for acute anemia
Pt with paroxysmal valvular afib; EKG NSR w/ APC's in ED   pt takes Warfarin 7.5mg HS, INR goal 2-3 (within goal on admission)  Also on ASA 81 x2 Qd for CAD/PAD   Echo as above, EF 55-60%  INR 3.35 today  Will give 3mg coumadin today  f/u INR daily
Pt with negative CHF w/u at prior admission   Echo as above w/ severe AS by criteria   Pt currently w/o syncope, chest pain or shortness of breath  Pending ischemic w/u (RHC, LHC), and Cardiothoracic Surgery Eval for possible TAVR outpatient
Pt with negative CHF w/u at prior admission   Echo as above w/ severe AS by criteria   Pt currently w/o syncope, chest pain or shortness of breath  Pending ischemic w/u (RHC, LHC), and Cardiothoracic Surgery Eval for possible TAVR outpatient

## 2022-05-06 NOTE — PROGRESS NOTE ADULT - PROBLEM SELECTOR PLAN 12
Pt currently at baseline mental status   C/w home medications including Remeron   Required a lot of haldol and other sedatives at prior admission for presumed ETOH withdrawal and delirium  back to baseline now

## 2022-05-06 NOTE — PROGRESS NOTE ADULT - PROBLEM SELECTOR PLAN 3
Patient had hematuria likely due to trauma from connolly placement/removal  No connolly in place, will obtain TOV  no longer bleeding   CBC 7.9 this AM  continue to monitor CBC

## 2022-05-06 NOTE — PROGRESS NOTE ADULT - PROBLEM SELECTOR PROBLEM 5
Severe aortic stenosis
Diabetic hypoglycemia
Diabetic hypoglycemia
Severe aortic stenosis
Diabetic hypoglycemia

## 2022-05-06 NOTE — PROGRESS NOTE ADULT - PROBLEM SELECTOR PROBLEM 3
Hematuria
Sepsis due to hemophilus influenzae
Hematuria
Hematuria
Sepsis due to hemophilus influenzae

## 2022-05-06 NOTE — PROGRESS NOTE ADULT - PROBLEM SELECTOR PROBLEM 2
Acute urinary retention

## 2022-05-06 NOTE — PROGRESS NOTE ADULT - REASON FOR ADMISSION
Urinary Retention
Urinary Retention
Urinary Retention, influenza A
Urinary Retention

## 2022-05-06 NOTE — PROGRESS NOTE ADULT - PROBLEM SELECTOR PROBLEM 9
COPD (chronic obstructive pulmonary disease)
COPD (chronic obstructive pulmonary disease)
Tobacco use disorder
Tobacco use disorder
COPD (chronic obstructive pulmonary disease)

## 2022-05-06 NOTE — PROGRESS NOTE ADULT - PROBLEM SELECTOR PLAN 4
Lactate 2.5 which fell to normal s/p IVF boluses   CXR non focal, UA with RBC 25-50 (likely post connolly), but w/o pyuria   RVP +ve for Flu A   Pt denies any cough or shortness of breath; saturating well on RA   Droplet precautions   PRN Tylenol, and Robitussin  Patient was FLU positive prior admission, likely lingering.  Tamiflu discontinued
none
Lactate 2.5 which fell to normal s/p IVF boluses   CXR non focal, UA with RBC 25-50 (likely post connolly), but w/o pyuria   RVP +ve for Flu A   Pt denies any cough or shortness of breath; saturating well on RA   Droplet precautions   PRN Tylenol, and Robitussin  Patient was FLU positive prior admission, likely lingering.  Tamiflu discontinued
Lactate 2.5 which fell to normal s/p IVF boluses   CXR non focal, UA with RBC 25-50 (likely post connolly), but w/o pyuria   RVP +ve for Flu A   Pt denies any cough or shortness of breath; saturating well on RA   Droplet precautions   PRN Tylenol, and Robitussin  Patient was FLU positive prior admission, likely lingering.  Tamiflu discontinued
Uncontrolled Diabetic with A1c of 10.3 earlier this month   On Amaryl 1mg BIDm Metformin 1000mg BID, and SSI w/ humalog at [facility]   Admitted with glucose 47, pt was confused at that time   Resolved w/ dextrose and food   C/w SSI, Accu-Cheks ACHS   Good renal function, low suspicion for persistent hypoglycemia d/t amaryl; will monitor  DASH/ Carb Consistent diet
Uncontrolled Diabetic with A1c of 10.3 earlier this month   On Amaryl 1mg BIDm Metformin 1000mg BID, and SSI w/ humalog at [facility]   Admitted with glucose 47, pt was confused at that time   Resolved w/ dextrose and food   C/w SSI, Accu-Cheks ACHS   Good renal function, low suspicion for persistent hypoglycemia d/t amaryl; will monitor  DASH/ Carb Consistent diet

## 2022-05-06 NOTE — PROGRESS NOTE ADULT - PROBLEM SELECTOR PROBLEM 10
Multiple lung nodules
Tobacco use disorder
Multiple lung nodules
Tobacco use disorder
Tobacco use disorder

## 2022-05-06 NOTE — PROGRESS NOTE ADULT - ATTENDING COMMENTS
Patient seen and examined with Carolina this morning    75 y/o male, ambulatory and AA0x 2-3 at baseline, from [facility] (recently discharged for Rehab), with PMH of HTN, CAD, Severe AS, Valvular A. fib on Coumadin, COPD, Tobacco Use Disorder, Hx remote Alcohol Abuse, Dementia, and recently noted left lung nodules who presents for complaints of inability to urinate x 16 hours. Admitted for suspected sepsis 2/2 influenza A and acute urinary retention.    Of note, patient was diagnosed with Influenza AH3 last admission and received 10 days Tamiflu. Patient had indwelling Catheter placed   Patient doing well; voiding freely; no hematuria; H/H stable  No new complaints    Vital Signs Last 24 Hrs: reviewed; stable as above    P/E: as above  Psych: AAO x2.5-3  Neuro: No gross focal deficits; Power and sensation intact  CVS: S1S2 present, regular, no edema  Resp: BLAE+, No wheeze or Rhonchi  GI: Soft, BS+, Non tender, non distended  Extr: No  calf tenderness B/L Lower extremities  Skin: Warm and moist without any rashes      Labs:                                    7.9    13.50 )-----------( 325      ( 06 May 2022 07:05 )             24.3   05-06    138  |  103  |  20<H>  ----------------------------<  243<H>  4.6   |  28  |  0.99    Ca    9.1      06 May 2022 07:05  Phos  4.3     05-06  Mg     2.0     05-06    TPro  6.8  /  Alb  2.3<L>  /  TBili  0.2  /  DBili  x   /  AST  14  /  ALT  34  /  AlkPhos  81  05-06            D/D:  Acute urinary retention etiology unclear ?? sec. to BPH resolved  Hematuria post connolly removal resolved  Black stools concerning for GI losses resolved; H/H stable  Hypotension resolved  Left shoulder pain likely Adhesive Capsulitis/ ligament tear  Abdominal wall  hematoma chronic (since last admission)  Supra therapeutic INR normalized  Influenza A doubt Sepsis  Chronic valvular Afib on coumadin  Hypoglycemia resolved  Uncontrolled DM partly due to steroids  COPD stable  Tobacco use disorder  Dementia less likely more likely  Cognitive impairment   Hx HTN, CAD, severe AS    Plan:  Trial of void successful; No further hematuria  Continue Flomax and Finasteride; Advised  patient to stand up and urinate if retaining  Urology follow up outpatient   H/H stable; can be discharged to [facility]; patient accepted  Off Droplet precautions; Afebrile;   Patient is oriented x 3 at this time.   Monitor INR; Continue Coumadin; will give 6 mg on d/c; INR f/u closely in Rehab  Patient had seen Orthopedics in the past for shoulder pain,. advised surgery; I had informed patient and wife that at this point, Patient would not be able to proceed with an Orthopedic surgery until heart issues are fixed as outpatient once Patient recuperates and completes Rehab and is more stable medically.   Pending ischemic w/u (RHC, LHC), and Cardiothoracic Surgery Eval for possible TAVR outpatient. Discussed with Cardio Dr. Bush to arrange outpatient appt with his office in 2 weeks to facilitate CTS and Int Cardio for cath   Needs f/u CT/PET Scan in 3 months for lung nodules    Discussed with  Paty;   Discussed with patient at bedside at length findings and plan of care  Discussed with PGy1 Dr. Simons and PGY3 Dr. Corrigan and RN

## 2022-05-06 NOTE — PROGRESS NOTE ADULT - SUBJECTIVE AND OBJECTIVE BOX
PGY-1 Progress Note discussed with attending    PAGER #: [[phone]] TILL 5:00 PM  PLEASE CONTACT ON CALL TEAM:  - On Call Team (Please refer to Yahaira) FROM 5:00 PM - 8:30PM  - Nightfloat Team FROM 8:30 -7:30 AM    INTERVAL HPI/ OVERNIGHT EVENTS: No acute events overnight.       REVIEW OF SYSTEMS:  CONSTITUTIONAL: No fever, weight loss, or fatigue  RESPIRATORY: No cough, wheezing, chills or hemoptysis; No shortness of breath  CARDIOVASCULAR: No chest pain, palpitations, dizziness, or leg swelling  GASTROINTESTINAL: No abdominal pain. No nausea, vomiting, or hematemesis; No diarrhea or constipation. No melena or hematochezia.  GENITOURINARY: No dysuria or hematuria, urinary frequency  NEUROLOGICAL: No headaches, memory loss, loss of strength, numbness, or tremors  SKIN: No itching, burning, rashes, or lesions     MEDICATIONS  (STANDING):  aluminum hydroxide/magnesium hydroxide/simethicone Suspension 30 milliLiter(s) Oral every 6 hours  atorvastatin 40 milliGRAM(s) Oral at bedtime  budesonide  80 MICROgram(s)/formoterol 4.5 MICROgram(s) Inhaler 2 Puff(s) Inhalation two times a day  cholecalciferol 1000 Unit(s) Oral daily  dextrose 50% Injectable 25 Gram(s) IV Push once  folic acid 1 milliGRAM(s) Oral daily  gabapentin 200 milliGRAM(s) Oral two times a day  glucagon  Injectable 1 milliGRAM(s) IntraMuscular once  insulin glargine Injectable (LANTUS) 6 Unit(s) SubCutaneous at bedtime  insulin lispro (ADMELOG) corrective regimen sliding scale   SubCutaneous Before meals and at bedtime  insulin lispro Injectable (ADMELOG) 6 Unit(s) SubCutaneous three times a day before meals  lidocaine   4% Patch 1 Patch Transdermal once  metoprolol tartrate 12.5 milliGRAM(s) Oral two times a day  mirtazapine 15 milliGRAM(s) Oral at bedtime  nicotine -  14 mG/24Hr(s) Patch 1 Patch Transdermal daily  pantoprazole    Tablet 40 milliGRAM(s) Oral before breakfast  polyethylene glycol 3350 17 Gram(s) Oral daily  senna 2 Tablet(s) Oral at bedtime  sodium chloride 0.9% lock flush 3 milliLiter(s) IV Push every 8 hours  tamsulosin 0.4 milliGRAM(s) Oral at bedtime  thiamine 100 milliGRAM(s) Oral daily  tiotropium 18 MICROgram(s) Capsule 1 Capsule(s) Inhalation daily    MEDICATIONS  (PRN):  acetaminophen     Tablet .. 650 milliGRAM(s) Oral every 6 hours PRN Temp greater or equal to 38C (100.4F), Mild Pain (1 - 3)  ALBUTerol    90 MICROgram(s) HFA Inhaler 2 Puff(s) Inhalation every 6 hours PRN Shortness of Breath and/or Wheezing  melatonin 3 milliGRAM(s) Oral at bedtime PRN Insomnia  ondansetron Injectable 4 milliGRAM(s) IV Push every 8 hours PRN Nausea and/or Vomiting      Vital Signs Last 24 Hrs  T(C): 36.3 (06 May 2022 04:55), Max: 36.6 (05 May 2022 21:30)  T(F): 97.3 (06 May 2022 04:55), Max: 97.8 (05 May 2022 21:30)  HR: 79 (06 May 2022 04:55) (79 - 100)  BP: 109/49 (06 May 2022 04:55) (106/62 - 125/57)  BP(mean): --  RR: 18 (06 May 2022 04:55) (18 - 18)  SpO2: 98% (06 May 2022 04:55) (95% - 100%)    PHYSICAL EXAMINATION:  GENERAL: NAD, AAOx3  HEAD: AT/NC  EYES: conjunctiva and sclera clear  NECK: supple, No JVD noted, Normal thyroid  CHEST/LUNG: CTABL; no rales, rhonchi, wheezing, or rubs  HEART: regular rate and rhythm; no murmurs, rubs, or gallops  ABDOMEN: soft, nontender, nondistended; Bowel sounds present  EXTREMITIES:  2+ Peripheral Pulses, No clubbing, cyanosis, or edema  SKIN: warm dry                          7.9    13.50 )-----------( 325      ( 06 May 2022 07:05 )             24.3     05-06    138  |  103  |  20<H>  ----------------------------<  243<H>  4.6   |  28  |  0.99    Ca    9.1      06 May 2022 07:05  Phos  4.3     05-06  Mg     2.0     05-06    TPro  6.8  /  Alb  2.3<L>  /  TBili  0.2  /  DBili  x   /  AST  14  /  ALT  34  /  AlkPhos  81  05-06    LIVER FUNCTIONS - ( 06 May 2022 07:05 )  Alb: 2.3 g/dL / Pro: 6.8 g/dL / ALK PHOS: 81 U/L / ALT: 34 U/L DA / AST: 14 U/L / GGT: x               PT/INR - ( 06 May 2022 07:05 )   PT: 14.7 sec;   INR: 1.23 ratio         PTT - ( 05 May 2022 06:57 )  PTT:26.3 sec  COVID-19 PCR: NotDetec (04 May 2022 05:12)  COVID-19 PCR: NotDetec (30 Apr 2022 09:41)  COVID-19 PCR: NotDetec (21 Apr 2022 10:49)  COVID-19 PCR: NotDetec (15 Apr 2022 12:32)  COVID-19 PCR: NotDetec (10 Apr 2022 05:45)  SARS-CoV-2: NotDetec (04 Apr 2022 10:27)  COVID-19 PCR: NotDetec (03 Apr 2022 22:44)      CAPILLARY BLOOD GLUCOSE      POCT Blood Glucose.: 401 mg/dL (06 May 2022 09:31)  POCT Blood Glucose.: 422 mg/dL (06 May 2022 08:03)  POCT Blood Glucose.: 179 mg/dL (06 May 2022 02:13)  POCT Blood Glucose.: 451 mg/dL (05 May 2022 21:26)  POCT Blood Glucose.: 433 mg/dL (05 May 2022 16:52)  POCT Blood Glucose.: 269 mg/dL (05 May 2022 14:56)  POCT Blood Glucose.: 432 mg/dL (05 May 2022 11:33)  POCT Blood Glucose.: 523 mg/dL (05 May 2022 11:30)      RADIOLOGY & ADDITIONAL TESTS:

## 2022-05-06 NOTE — PROGRESS NOTE ADULT - PROBLEM SELECTOR PLAN 1
with abdominal distention, worsening   no visible bleeding in urine, no BM today.   CT Abdomen showing abdominal sheath hematoma, no internal bleeding.   called surgery for evaluation, no surgical intervention required.   T/S done, consent in chart  CBC 7.9 this AM  no more active bleeding

## 2022-05-08 PROBLEM — F17.200 NICOTINE DEPENDENCE, UNSPECIFIED, UNCOMPLICATED: Chronic | Status: ACTIVE | Noted: 2022-01-01

## 2022-05-08 PROBLEM — R91.8 OTHER NONSPECIFIC ABNORMAL FINDING OF LUNG FIELD: Chronic | Status: ACTIVE | Noted: 2022-01-01

## 2022-05-08 PROBLEM — I35.0 NONRHEUMATIC AORTIC (VALVE) STENOSIS: Chronic | Status: ACTIVE | Noted: 2022-01-01

## 2022-05-08 PROBLEM — I48.91 UNSPECIFIED ATRIAL FIBRILLATION: Chronic | Status: ACTIVE | Noted: 2022-01-01

## 2022-05-08 PROBLEM — F03.90 UNSPECIFIED DEMENTIA WITHOUT BEHAVIORAL DISTURBANCE: Chronic | Status: ACTIVE | Noted: 2022-01-01

## 2022-05-08 NOTE — H&P ADULT - PROBLEM SELECTOR PLAN 6
hx of COPD requiring BiPAP in past  c/w home medications albuterol, symbicort, and spiriva On atorvastatin 40mg at home  c/w home dose

## 2022-05-08 NOTE — PATIENT PROFILE ADULT - FALL HARM RISK - HARM RISK INTERVENTIONS
Assistance with ambulation/Assistance OOB with selected safe patient handling equipment/Communicate Risk of Fall with Harm to all staff/Discuss with provider need for PT consult/Monitor gait and stability/Provide patient with walking aids - walker, cane, crutches/Reinforce activity limits and safety measures with patient and family/Tailored Fall Risk Interventions/Use of alarms - bed, chair and/or voice tab/Visual Cue: Yellow wristband and red socks/Bed in lowest position, wheels locked, appropriate side rails in place/Call bell, personal items and telephone in reach/Instruct patient to call for assistance before getting out of bed or chair/Non-slip footwear when patient is out of bed/[place] to call system/Physically safe environment - no spills, clutter or unnecessary equipment/Purposeful Proactive Rounding/Room/bathroom lighting operational, light cord in reach

## 2022-05-08 NOTE — H&P ADULT - PROBLEM SELECTOR PLAN 3
BP 77/43 in ED, received 1L NS and BP responded to 107/61  c/w IVF D5NS 100cc/hr   no other signs of sepsis  f/u blood cultures Hx of chronic shoulder pain b/l and left hip pain. Likely degenerative disease  patient may require surgical intervention however needs cardiac clearance to address his aortic stenosis prior.   may possibly me acute lumbar radiculopathy.   c/w Tylenol PRN 650mg for mild pain   ketorolac 15mg IV q6h severe pain   f/u CT lumbar spine

## 2022-05-08 NOTE — ED PROVIDER NOTE - OBJECTIVE STATEMENT
77 year old male presents to the ED with complaints of hypoxia. Patient is AOx3 and is DNR/DNI. Patient states feeling body aches for several months and difficulty breathing for several days. Denies fever, nausea, vomiting, diarrhea and dysuria.   NKDA. 77 year old male presents to the ED with complaints of hypoxia. Patient DNR/DNI. Patient states feeling body aches for several months and difficulty breathing for several days. Denies fever, nausea, vomiting, diarrhea and dysuria.   NKDA.

## 2022-05-08 NOTE — H&P ADULT - ASSESSMENT
76 y/o male, ambulatory and AAA0x2-3 at baseline, from [facility], with significant medical history of HTN, CAD, Severe AS, Valvular Afib on Coumadin, COPD, Tobacco Use Disorder, Etoh Abuse, Dementia, and recently noted left lung nodules who was sent to the ED for complaints of hypoxia, was found to be hypotensive and hypoglycemic, admitted for further management.

## 2022-05-08 NOTE — H&P ADULT - PROBLEM SELECTOR PLAN 5
On atorvastatin 40mg at home  c/w home dose on metoprolol tartrtate 12.5mg BID  hold for now d/t low BP

## 2022-05-08 NOTE — PATIENT PROFILE ADULT - NSTOBACCO QUIT READY_GEN_A_CORE_SD
Reports that he stopped smoking about a month and a half ago but takes "one here and there". Smoked 1ppd for approximately 60 years./ready to quit

## 2022-05-08 NOTE — H&P ADULT - PROBLEM SELECTOR PLAN 10
Patient was on metformin 1000mg BID at home and glimpeiride 4mg two times a day   will hold all anti hyperglycemic agents and continue to watch glucose Patient is AOOX3 and has adequate memory reconciliation.   unlikely to have dementia, if he does it is very mild.

## 2022-05-08 NOTE — H&P ADULT - PROBLEM SELECTOR PLAN 4
on metoprolol tartrtate 12.5mg BID  hold for now d/t low BP Patient was found to by hypoxic in ED saturating 80s on RA. Was started on 15L NRB and spo2 increased to 100%  Patient denies having any trouble breathing. May have possibly aspirated. If patient symptoms persist may consider CT CHEST  He has a hx of COPD and has been admitted in past for AHRF secondary to PNA requiring BIPAP  Rapid RVP negative, chest xray negative  resume home meds albuterol and symbicort  wean off O2   blood cultures sent  no cough or pulm congestion seen on xray or fevers.

## 2022-05-08 NOTE — ED ADULT NURSE REASSESSMENT NOTE - NS ED NURSE REASSESS COMMENT FT1
pt alert and oriented. not in any distress. Admitted to 514[facility]. RG to CLAUDY Stovall. Escorted pt safely to 5S.
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
## 2022-05-08 NOTE — H&P ADULT - PROBLEM SELECTOR PLAN 11
tamsulosin and finasteride Patient was on metformin 1000mg BID at home and glimpeiride 4mg two times a day   will hold all anti hyperglycemic agents and continue to watch glucose

## 2022-05-08 NOTE — ED PROVIDER NOTE - CONSTITUTIONAL, MLM
normal... Well appearing, awake, alert, oriented to person, place, time/situation and in no apparent distress. Well appearing, awake, alert

## 2022-05-08 NOTE — H&P ADULT - ATTENDING COMMENTS
Patient seen and examined in ED around 4.30 PM.        Vital Signs Last 24 Hrs  T(C): 36.1 (08 May 2022 16:00), Max: 36.7 (08 May 2022 12:26)  T(F): 97 (08 May 2022 16:00), Max: 98 (08 May 2022 12:26)  HR: 86 (08 May 2022 16:46) (79 - 93)  BP: 96/88 (08 May 2022 16:46) (77/43 - 138/41)  RR: 24 (08 May 2022 16:46) (20 - 26)  SpO2: 96% (08 May 2022 16:46) (95% - 100%)    P/E:  Psych: AAO x3  Neuro: No gross focal deficits; Power and sensation intact  CVS: S1S2 present, regular, no edema  Resp: BLAE+, No wheeze or Rhonchi  GI: Soft, BS+, Non tender, non distended  Extr: No  calf tenderness B/L Lower extremities  Skin: Warm and moist without any rashes      Labs:                        8.7    12.08 )-----------( 362      ( 08 May 2022 12:58 )             27.3   05-08    137  |  103  |  23<H>  ----------------------------<  32<LL>  5.2   |  28  |  0.82    Ca    8.8      08 May 2022 12:58    TPro  6.8  /  Alb  2.3<L>  /  TBili  0.2  /  DBili  x   /  AST  32  /  ALT  43  /  AlkPhos  74  05-08 Patient seen and examined in ED around 4.30 PM.        Vital Signs Last 24 Hrs  T(C): 36.1 (08 May 2022 16:00), Max: 36.7 (08 May 2022 12:26)  T(F): 97 (08 May 2022 16:00), Max: 98 (08 May 2022 12:26)  HR: 86 (08 May 2022 16:46) (79 - 93)  BP: 96/88 (08 May 2022 16:46) (77/43 - 138/41)  RR: 24 (08 May 2022 16:46) (20 - 26)  SpO2: 96% (08 May 2022 16:46) (95% - 100%)    P/E:  elderly male, in acute distress due to left lower back pain radiating down left leg  Psych: AAO x3  Neuro: No gross focal deficits; Power and sensation intact  CVS: S1S2 present, regular, no edema  Resp: BLAE+, No wheeze or Rhonchi appreciated  GI: Soft, BS+, Non tender, non distended  Extr: No  calf tenderness B/L Lower extremities  Skin: Warm and moist without any rashes    Labs:                        8.7    12.08 )-----------( 362      ( 08 May 2022 12:58 )             27.3   05-08    137  |  103  |  23<H>  ----------------------------<  32<LL>  5.2   |  28  |  0.82    Ca    8.8      08 May 2022 12:58    TPro  6.8  /  Alb  2.3<L>  /  TBili  0.2  /  DBili  x   /  AST  32  /  ALT  43  /  AlkPhos  74  05-08    D/D:  Acute Hypoxic Resp. failure transient etiology unclear   Hypotension ?? related to severe AS Patient seen and examined in ED around 4.30 PM.    Patient well known to me from prior admission. Recently discharged 2 days ago to [facility] for rehab.       Vital Signs Last 24 Hrs  T(C): 36.1 (08 May 2022 16:00), Max: 36.7 (08 May 2022 12:26)  T(F): 97 (08 May 2022 16:00), Max: 98 (08 May 2022 12:26)  HR: 86 (08 May 2022 16:46) (79 - 93)  BP: 96/88 (08 May 2022 16:46) (77/43 - 138/41)  RR: 24 (08 May 2022 16:46) (20 - 26)  SpO2: 96% (08 May 2022 16:46) (95% - 100%)    P/E:  elderly male, in acute distress due to left lower back pain radiating down left leg  Psych: AAO x3  Neuro: No gross focal deficits; Power and sensation intact  CVS: S1S2 present, regular, no edema  Resp: BLAE+, No wheeze or Rhonchi appreciated  GI: Soft, BS+, Non tender, non distended  Extr: No  calf tenderness B/L Lower extremities  Skin: Warm and moist without any rashes    Labs:                        8.7    12.08 )-----------( 362      ( 08 May 2022 12:58 )             27.3   05-08    137  |  103  |  23<H>  ----------------------------<  32<LL>  5.2   |  28  |  0.82    Ca    8.8      08 May 2022 12:58    TPro  6.8  /  Alb  2.3<L>  /  TBili  0.2  /  DBili  x   /  AST  32  /  ALT  43  /  AlkPhos  74  05-08    CXR: (Reviewed): No gross effusion or infiltrate noted    D/D:  Acute Hypoxic Resp. failure transient etiology unclear doubt infectious etiology   Hypotension ?? related to severe AS with volume depletion due to dehydration  Doubt Adrenal insufficiency (Hypoglycmeia, recent steroid use but only few days)  Hypoglycemia due to likely poor oral intake with Insulin on board      Plan:  Admit to tele x 24 hrs given his hypotension, hypoxia and severe AS  AM Cortisol; likely Patient seen and examined in ED around 4.30 PM.    Patient well known to me from prior admission. Recently discharged 2 days ago to [facility] for rehab.     78 y/o male, ambulatory and AAA0x2-3 at baseline, from [facility], with significant medical history of HTN, CAD, Severe AS, Valvular Afib on Coumadin, COPD, Tobacco Use Disorder, ETOH Abuse, Dementia, and recently noted left lung nodules who was sent to the ED for complaints of hypoxia, was found to be hypotensive and hypoglycemic, admitted for further management.     Patient noted to be in significant pain left lower back radiating down the left leg in distress unable to position himself and shaking of legs due to pain. Patient given IV Tylenol with partial relief. No nausea, vomit, abdominal pain. No fever, cough, chest pain. appear to be SOB due to pain.       Vital Signs Last 24 Hrs  T(C): 36.1 (08 May 2022 16:00), Max: 36.7 (08 May 2022 12:26)  T(F): 97 (08 May 2022 16:00), Max: 98 (08 May 2022 12:26)  HR: 86 (08 May 2022 16:46) (79 - 93)  BP: 96/88 (08 May 2022 16:46) (77/43 - 138/41)  RR: 24 (08 May 2022 16:46) (20 - 26)  SpO2: 96% (08 May 2022 16:46) (95% - 100%)    P/E:  elderly male, in acute distress due to left lower back pain radiating down left leg  Psych: AAO x3  Neuro: No gross focal deficits; Power and sensation intact  CVS: S1S2 present, regular, no edema  Resp: BLAE+, No wheeze or Rhonchi appreciated  GI: Soft, BS+, Non tender, non distended  Extr: No  calf tenderness B/L Lower extremities  Skin: Warm and moist without any rashes    Labs:                        8.7    12.08 )-----------( 362      ( 08 May 2022 12:58 )             27.3   05-08    137  |  103  |  23<H>  ----------------------------<  32<LL>  5.2   |  28  |  0.82    Ca    8.8      08 May 2022 12:58    TPro  6.8  /  Alb  2.3<L>  /  TBili  0.2  /  DBili  x   /  AST  32  /  ALT  43  /  AlkPhos  74  05-08    CXR: (Reviewed): No gross effusion or infiltrate noted    D/D:  Acute Hypoxic Resp. failure transient etiology unclear doubt infectious etiology   Hypotension ?? related to severe AS with volume depletion due to dehydration  Doubt Adrenal insufficiency (Hypoglycmeia, recent steroid use but only few days)  Hypoglycemia due to Glimepiride likely   Hypoglycemia due to likely poor oral intake with Insulin on board  s/p recent urinary retention due to possibly BPH      Plan:  Admit to tele x 24 hrs given his hypotension, hypoxia and severe AS  AM Cortisol although unlikely to help as patient got steroids  IV Tylenol just given; Will give Decadron 10 mg IVPB once for pain  CT LS Spine urgent requested d/w Tech  Pain mgmt consult in AM if pain persist  Continue Gabapentin  Hold anti HTN; resume once BP more stable  Continue Flomax and Finasteride

## 2022-05-08 NOTE — H&P ADULT - PROBLEM SELECTOR PLAN 1
Patient was found to by hypoxic in ED saturating 80s on RA. Was started on 15L NRB and spo2 increased to 100%  Patient denies having any trouble breathing  He has a hx of COPD and has been admitted in past for AHRF secondary to PNA requiring BIPAP  Rapid RVP negative, chest xray negative  resume home meds albuterol and symbicort  wean off O2   blood cultures sent BP 77/43 in ED, received 1L NS and BP responded to 107/61  c/w IVF D5NS 100cc/hr   no other signs of sepsis, however patient was hypoxic so may have possibly aspirated. If patient symptoms persist may consider CT CHEST.   f/u blood cultures  DDX: adrenal insufficiency, although unlikely but still possible. Patient was on 30mg prednisone daily for a total of 3 days on prior admission for treatment of shoulder pain.   f/u cortisol AM. Of note, patient already received steroids in ED prior to sending cortisol blood work so results may be skewed.  remote tele

## 2022-05-08 NOTE — H&P ADULT - PROBLEM SELECTOR PLAN 7
Hx of severe aortic stenosis  Plan was for him to follow up with Dr. Bush outpatient to address issue however patient went to rehab facility and was admitted back to hospital before being able to do so hx of COPD requiring BiPAP in past hospitalizations.   c/w home medications albuterol, symbicort, and spiriva

## 2022-05-08 NOTE — H&P ADULT - PROBLEM SELECTOR PLAN 12
Hx of chronic shoulder pain b/l and left hip pain. Likely degenerative disease  patient may require surgical intervention however needs cardiac clearance to address his aortic stenosis prior.   c/w Tylenol PRN Hx of chronic shoulder pain b/l and left hip pain. Likely degenerative disease  patient may require surgical intervention however needs cardiac clearance to address his aortic stenosis prior.   c/w Tylenol PRN  f/u CT lumbar spine Hx of chronic shoulder pain b/l and left hip pain. Likely degenerative disease  patient may require surgical intervention however needs cardiac clearance to address his aortic stenosis prior.   c/w Tylenol PRN 650mg for mild pain   ketorolac 30mg IV q6h severe pain   f/u CT lumbar spine tamsulosin and finasteride

## 2022-05-08 NOTE — H&P ADULT - PROBLEM SELECTOR PLAN 8
Hx of Afib on warfarin 6mg  INR 2.22  c/w 6mg warfarin  folllow up INR daily Hx of severe aortic stenosis  Plan was for him to follow up with Dr. Bush outpatient to address issue however patient went to rehab facility and was admitted back to hospital before being able to do so  caution with IV fluids  will c/w 60cc hr for now D5NS and reassess in AM

## 2022-05-08 NOTE — H&P ADULT - HISTORY OF PRESENT ILLNESS
78 y/o male, ambulatory and AAA0x2-3 at baseline, from [facility], with significant medical history of HTN, CAD, Severe AS, Valvular Afib on Coumadin, COPD, Tobacco Use Disorder, Etoh Abuse, Dementia, and recently noted left lung nodules who was sent to the ED for hypoxia from nursing home. In ED patient was found to be hypoxic in the 80s on RA and was placed on 15L NRB. Patient was also found to be hypotensive in the 70s systolic and was given 1 L of NS in ED. He was also found to be hypoglycemic (glucose 32) and was given D50 in ED. Patient also complaining of severe left hip pain that radiates down to his leg and b/l shoulder pain. Of note, patient was recently admitted two times at [facility]. First admission was for hypoxic resp failure that required ICU care and second admission was for urinary retention. Patient was discharged on 5/6/22 and plan was to follow up with cardiologist to address his severe aortic stenosis. Patient was planning on meeting with orthopedic surgeon for potential surgery for his shoulder pains. He denies any fevers, cough, chest pain, SOB, n/v, diarrhea, constipation, and rashes.    In ED VS: BP 77/43, T 98, RR 15, Spo2 100% on 15L NRB  WBC 12.08, Hb 8.7, glucose 39

## 2022-05-08 NOTE — H&P ADULT - PROBLEM SELECTOR PLAN 2
Patient was found to have glucose of 39 in ED  was given 25mL of D50 and glucose responded to 150  Patient was on metformin 1000mg BID at home and glimpeiride 4mg two times a day   will hold all anti hyperglycemic agents and continue to watch glucose Patient was found to have glucose of 39 in ED  was given 25mL of D50 and glucose responded to 150  Patient was on metformin 1000mg BID at home and glimpeiride 4mg two times a day   will hold all anti hyperglycemic agents and continue to watch glucose  DDX: adrenal insufficiency, although unlikely but still possible. Patient was on 30mg prednisone daily for a total of 3 days on prior admission for treatment of shoulder pain.   f/u cortisol AM. Of note, patient already received steroids in ED prior to sending cortisol blood work so results may be skewed.

## 2022-05-08 NOTE — ED PROVIDER NOTE - CLINICAL SUMMARY MEDICAL DECISION MAKING FREE TEXT BOX
Patient presenting for hypoxia, 88% on ra per ems. noting wheezing. will treat for copd, fluid hydrate given hypotension. ed obs and reassess

## 2022-05-09 NOTE — PROGRESS NOTE ADULT - PROBLEM SELECTOR PLAN 12
Patient is AOOX3 and has adequate memory reconciliation.   unlikely to have dementia, if he does it is very mild.

## 2022-05-09 NOTE — PROGRESS NOTE ADULT - ASSESSMENT
76 y/o male, ambulatory and AAA0x2-3 at baseline, from [facility], with significant medical history of HTN, CAD, Severe AS, Valvular Afib on Coumadin, COPD, Tobacco Use Disorder, Etoh Abuse, Dementia, and recently noted left lung nodules who was sent to the ED for complaints of hypoxia, was found to be hypotensive and hypoglycemic, admitted for further management.

## 2022-05-09 NOTE — PROGRESS NOTE ADULT - PROBLEM SELECTOR PLAN 9
hx of COPD requiring BiPAP in past hospitalizations.   c/w home medications albuterol, symbicort, and spiriva

## 2022-05-09 NOTE — PROGRESS NOTE ADULT - PROBLEM SELECTOR PLAN 10
Hx of severe aortic stenosis  Plan was for him to follow up with Dr. Bush outpatient to address issue however patient went to rehab facility and was admitted back to hospital before being able to do so  caution with IV fluids  will c/w 60cc hr for now D5NS and reassess in AM

## 2022-05-09 NOTE — PROGRESS NOTE ADULT - SUBJECTIVE AND OBJECTIVE BOX
PGY-1 Progress Note discussed with attending    PAGER #: [[phone]] TILL 5:00 PM  PLEASE CONTACT ON CALL TEAM:  - On Call Team (Please refer to Yahaira) FROM 5:00 PM - 8:30PM  - Nightfloat Team FROM 8:30 -7:30 AM    INTERVAL HPI/ OVERNIGHT EVENTS: No acute events overnight.       REVIEW OF SYSTEMS:  CONSTITUTIONAL: No fever, weight loss, or fatigue  RESPIRATORY: No cough, wheezing, chills or hemoptysis; No shortness of breath  CARDIOVASCULAR: No chest pain, palpitations, dizziness, or leg swelling  GASTROINTESTINAL: No abdominal pain. No nausea, vomiting, or hematemesis; No diarrhea or constipation. No melena or hematochezia.  GENITOURINARY: No dysuria or hematuria, urinary frequency  NEUROLOGICAL: No headaches, memory loss, loss of strength, numbness, or tremors  SKIN: No itching, burning, rashes, or lesions     MEDICATIONS  (STANDING):  aspirin  chewable 81 milliGRAM(s) Oral daily  atorvastatin 40 milliGRAM(s) Oral at bedtime  budesonide  80 MICROgram(s)/formoterol 4.5 MICROgram(s) Inhaler 2 Puff(s) Inhalation two times a day  calcium gluconate IVPB 2 Gram(s) IV Intermittent once  cholecalciferol 1000 Unit(s) Oral daily  dextrose 50% Injectable 50 milliLiter(s) IV Push once  finasteride 5 milliGRAM(s) Oral daily  folic acid 1 milliGRAM(s) Oral daily  gabapentin 200 milliGRAM(s) Oral two times a day  insulin lispro (ADMELOG) corrective regimen sliding scale   SubCutaneous three times a day before meals  insulin regular  human recombinant 5 Unit(s) IV Push once  lidocaine   4% Patch 2 Patch Transdermal daily  mirtazapine 15 milliGRAM(s) Oral at bedtime  nicotine -  14 mG/24Hr(s) Patch 1 patch Transdermal daily  pantoprazole    Tablet 40 milliGRAM(s) Oral before breakfast  polyethylene glycol 3350 17 Gram(s) Oral daily  senna 2 Tablet(s) Oral at bedtime  tamsulosin 0.4 milliGRAM(s) Oral at bedtime  thiamine 100 milliGRAM(s) Oral daily  tiotropium 18 MICROgram(s) Capsule 1 Capsule(s) Inhalation daily    MEDICATIONS  (PRN):  acetaminophen     Tablet .. 650 milliGRAM(s) Oral every 6 hours PRN Mild Pain (1 - 3)  ALBUTerol    90 MICROgram(s) HFA Inhaler 2 Puff(s) Inhalation every 6 hours PRN Shortness of Breath and/or Wheezing  aluminum hydroxide/magnesium hydroxide/simethicone Suspension 30 milliLiter(s) Oral every 6 hours PRN Dyspepsia  melatonin 3 milliGRAM(s) Oral at bedtime PRN Insomnia      Vital Signs Last 24 Hrs  T(C): 36.4 (09 May 2022 05:17), Max: 36.8 (09 May 2022 01:06)  T(F): 97.5 (09 May 2022 05:17), Max: 98.2 (09 May 2022 01:06)  HR: 109 (09 May 2022 05:17) (79 - 109)  BP: 101/57 (09 May 2022 05:17) (77/43 - 138/41)  BP(mean): --  RR: 22 (09 May 2022 05:17) (20 - 26)  SpO2: 100% (09 May 2022 05:17) (93% - 100%)    PHYSICAL EXAMINATION:  GENERAL: NAD, AAOx3  HEAD: AT/NC  EYES: conjunctiva and sclera clear  NECK: supple, No JVD noted, Normal thyroid  CHEST/LUNG: CTABL; no rales, rhonchi, wheezing, or rubs  HEART: regular rate and rhythm; no murmurs, rubs, or gallops  ABDOMEN: soft, nontender, nondistended; Bowel sounds present  EXTREMITIES:  2+ Peripheral Pulses, No clubbing, cyanosis, or edema  SKIN: warm dry                          7.8    8.25  )-----------( 363      ( 09 May 2022 06:25 )             24.6     05-09    135  |  103  |  29<H>  ----------------------------<  489<HH>  6.0<H>   |  25  |  1.40<H>    Ca    8.9      09 May 2022 06:25  Phos  4.1     05-09  Mg     2.2     05-09    TPro  6.5  /  Alb  2.4<L>  /  TBili  0.2  /  DBili  x   /  AST  19  /  ALT  39  /  AlkPhos  82  05-09    LIVER FUNCTIONS - ( 09 May 2022 06:25 )  Alb: 2.4 g/dL / Pro: 6.5 g/dL / ALK PHOS: 82 U/L / ALT: 39 U/L DA / AST: 19 U/L / GGT: x               PT/INR - ( 09 May 2022 06:25 )   PT: 23.4 sec;   INR: 1.95 ratio         PTT - ( 08 May 2022 12:58 )  PTT:33.5 sec  SARS-CoV-2: NotDetec (08 May 2022 12:58)  COVID-19 PCR: NotDetec (04 May 2022 05:12)  COVID-19 PCR: NotDetec (30 Apr 2022 09:41)  COVID-19 PCR: NotDetec (21 Apr 2022 10:49)  COVID-19 PCR: NotDetec (15 Apr 2022 12:32)  COVID-19 PCR: NotDetec (10 Apr 2022 05:45)  SARS-CoV-2: NotDetec (04 Apr 2022 10:27)  COVID-19 PCR: NotDetec (03 Apr 2022 22:44)      CAPILLARY BLOOD GLUCOSE      POCT Blood Glucose.: 468 mg/dL (09 May 2022 07:57)  POCT Blood Glucose.: 243 mg/dL (08 May 2022 21:31)  POCT Blood Glucose.: 220 mg/dL (08 May 2022 20:09)  POCT Blood Glucose.: 150 mg/dL (08 May 2022 14:27)  POCT Blood Glucose.: 39 mg/dL (08 May 2022 13:45)      RADIOLOGY & ADDITIONAL TESTS:

## 2022-05-09 NOTE — PROGRESS NOTE ADULT - NS ATTEND AMEND GEN_ALL_CORE FT
Patient seen and examined with Carolina this morning    76 y/o male, ambulatory and AAA0x2-3 at baseline, from [facility], with significant medical history of HTN, CAD, Severe AS, Valvular Afib on Coumadin, COPD, Tobacco Use Disorder, ETOH Abuse, Dementia, and recently noted left lung nodules who was sent to the ED for complaints of hypoxia, was found to be hypotensive and hypoglycemic, admitted for further management.     Patient noted to be in significant pain left lower back radiating down the left leg in distress unable to position himself and shaking of legs due to pain. Patient given IV Tylenol with partial relief. No nausea, vomit, abdominal pain. No fever, cough, chest pain. appear to be SOB due to pain.       Vital Signs Last 24 Hrs  T(C): 36.8 (09 May 2022 15:00), Max: 36.8 (09 May 2022 01:06)  T(F): 98.2 (09 May 2022 15:00), Max: 98.2 (09 May 2022 01:06)  HR: 112 (09 May 2022 17:26) (97 - 112)  BP: 116/56 (09 May 2022 17:26) (83/34 - 116/56)  RR: 18 (09 May 2022 17:26) (18 - 24)  SpO2: 98% (09 May 2022 17:26) (93% - 100%)    P/E:  elderly male, in acute distress due to left lower back pain radiating down left leg  Psych: AAO x3  Neuro: No gross focal deficits; Power and sensation intact  CVS: S1S2 present, regular, no edema  Resp: BLAE+, No wheeze or Rhonchi appreciated  GI: Soft, BS+, Non tender, non distended  Extr: No  calf tenderness B/L Lower extremities  Skin: Warm and moist without any rashes    Labs:                      7.8    8.25  )-----------( 363      ( 09 May 2022 06:25 )             24.6   05-09    139  |  106  |  28<H>  ----------------------------<  217<H>  5.0   |  27  |  1.12    Ca    9.3      09 May 2022 11:14  Phos  4.1     05-09  Mg     2.2     05-09    TPro  6.5  /  Alb  2.4<L>  /  TBili  0.2  /  DBili  x   /  AST  19  /  ALT  39  /  AlkPhos  82  05-09    rad    D/D:  Acute Hypoxic Resp. failure transient etiology unclear doubt infectious etiology   Hypotension ?? related to severe AS with volume depletion due to dehydration  Doubt Adrenal insufficiency (Hypoglycmeia, recent steroid use but only few days)  Hypoglycemia due to Glimepiride likely   Hypoglycemia due to likely poor oral intake with Insulin on board  s/p recent urinary retention due to possibly BPH      Plan:  Admit to tele x 24 hrs given his hypotension, hypoxia and severe AS  AM Cortisol although unlikely to help as patient got steroids  IV Tylenol just given; Will give Decadron 10 mg IVPB once for pain  CT LS Spine urgent requested d/w Tech  Pain mgmt consult in AM if pain persist  Continue Gabapentin  Hold anti HTN; resume once BP more stable  Continue Flomax and Finasteride. Patient seen and examined with Carolina this morning    76 y/o male, ambulatory and AAA0x2-3 at baseline, from [facility], with significant medical history of HTN, CAD, Severe AS, Valvular Afib on Coumadin, COPD, Tobacco Use Disorder, ETOH Abuse, Dementia, and recently noted left lung nodules who was sent to the ED for complaints of hypoxia, was found to be hypotensive and hypoglycemic, admitted for further management.     Patient noted to be in significant pain left lower back radiating down the left leg in distress unable to position himself and shaking of legs due to pain. Patient given IV Tylenol with partial relief. also given a doseof Decadron IV.     Patient doing much better today. Pain much better controlled. comfortable at rest. No nausea, vomit, abdominal pain. No fever, cough, chest pain. appear to be SOB due to pain.     Vital Signs Last 24 Hrs  T(C): 36.8 (09 May 2022 15:00), Max: 36.8 (09 May 2022 01:06)  T(F): 98.2 (09 May 2022 15:00), Max: 98.2 (09 May 2022 01:06)  HR: 112 (09 May 2022 17:26) (97 - 112)  BP: 116/56 (09 May 2022 17:26) (83/34 - 116/56)  RR: 18 (09 May 2022 17:26) (18 - 24)  SpO2: 98% (09 May 2022 17:26) (93% - 100%)    P/E:  elderly male, comfortable at rest  Psych: AAO x3  Neuro: No gross focal deficits; Power and sensation intact  CVS: S1S2 present, regular, no edema  Resp: BLAE+, No wheeze or Rhonchi appreciated  GI: Soft, BS+, Non tender, non distended  Extr: No  calf tenderness B/L Lower extremities  Skin: Warm and moist without any rashes    Labs:                      7.8    8.25  )-----------( 363      ( 09 May 2022 06:25 )             24.6   05-09  139  |  106  |  28<H>  ----------------------------<  217<H>  5.0   |  27  |  1.12    Ca    9.3      09 May 2022 11:14  Phos  4.1     05-09  Mg     2.2     05-09  TPro  6.5  /  Alb  2.4<L>  /  TBili  0.2  /  DBili  x   /  AST  19  /  ALT  39  /  AlkPhos  82  05-09    Cortisol AM, Serum . (05.09.22 @ 18:34) Cortisol AM, Serum: 2.7 ug/dL     CT Lumbar Spine No Cont (05.08.22 @ 18:53)     FINDINGS:  The vertebral body heights and alignment are maintained. There is no acute fracture. Nonspecific small cirrhotic focus L1 vertebral body.    The evaluation of the spinal canal is limited on a CT exam.  Multilevel degenerative changes noted resulting in multilevel spinal canal stenosis   and neural foraminal narrowing. Mild to moderate spinal canal stenosis at L2-3. Mild spinal canal stenosis L3-4. Moderate spinal canal stenosis   L4-5 and L5-S1. Multilevel neural foraminal narrowing with severe bilateral neural foraminal narrowing at L3-4, severe right, moderate left   neural foraminal narrowing L4-5. Moderate bilateral neural foraminal narrowing L5-S1    Small cyst right kidney. Hyperdensities noted in the kidneys which may be related to medullary kidney disease in the correct clinical setting    IMPRESSION: No acute fracture. Multilevel degenerative changes. MRI exam recommended for better evaluation    D/D:  Acute Hypoxic Resp. failure transient etiology unclear doubt infectious etiology likely due to severe pain  Hypotension ?? related to severe AS with volume depletion due to dehydration  Doubt Adrenal insufficiency (Hypoglycemia, recent steroid use but only few days  Hypoglycemia due to Glimepiride likely with likely poor oral intake with Insulin on board  Hyperglycemia now due to reduced dose meds and steroid use  s/p recent urinary retention due to possibly BPH now resolved/ stable    Plan:  hypotension, hypoxia resolved  AM Cortisol appears to be low; if has any further hypotension will get AM Cortisol  CT LS Spine urgent with degenerative changes and spinal stenosis  Pain mgmt consult appreciated; increased Gabapentin to 300 mg TID; if pain persist may need steroid injection but needs coumadin off for 2-3 days  Hold anti HTN; resume AM if BP remain stable next 24 hrs  Continue Flomax and Finasteride.  Monitor renal function closely; use NSAIDs with caution  Patient needs Cardiology outpatient follow up with [facility] to arrange eval for TAVR once functionally more stable; Mental status stable and previous encephalopathy has resolved  PT eval; check if can score for Home PT as it may help patient get outpatient follow up; if SHAMAR send to [facility]  Patient needs Ortho follow up repair for left shoulder once cardiac issues settled  Continue Coumadin 6 mg HS; monitor INR    Discussed with Patient and sister at length at bedside earlier this afternoon  Discussed with / RN/ PGY1 and PGY3  D/C Plan

## 2022-05-09 NOTE — CONSULT NOTE ADULT - ASSESSMENT
eport was requested by: Arlin Shukla | Reference #: [id number]    Others' Prescriptions  Patient Name: Twin FarmerBirth Date: 1945  Address: [address] [place] [id number]: Male  Rx Written	Rx Dispensed	Drug	Quantity	Days Supply	Prescriber Name	Prescriber Teresa #	Payment Method	Dispenser  11/02/2021	03/01/2022	pregabalin 75 mg capsule	30	30	Azamy, Taufiq	[id number]	Cash	[facility]  11/02/2021	02/02/2022	pregabalin 75 mg capsule	30	30	Azamy, Taufiq	[id number]	Cash	[facility]  11/02/2021	12/29/2021	pregabalin 75 mg capsule	30	30	Azamy, Taufiq	[id number]	Insurance	[facility]  11/02/2021	11/29/2021	pregabalin 75 mg capsule	30	30	Azamy, Taufiq	[id number]	Insurance	[facility]  11/02/2021	11/03/2021	pregabalin 75 mg capsule	30	30	Azamy, Taufiq	[id number]	Insurance	[facility]  * - Drugs marked with an asterisk are compound drugs. If the compound drug is made up of more than one controlled substance, then each controlled substance will be a separate row in the

## 2022-05-09 NOTE — CONSULT NOTE ADULT - SUBJECTIVE AND OBJECTIVE BOX
Source of information: , Chart review, patient  Patient language: English  : n/a    CC: Patient is a 77y old  Male who presents with a chief complaint of     HPI:  76 y/o male, ambulatory and AAA0x2-3 at baseline, from [facility], with significant medical history of HTN, CAD, Severe AS, Valvular Afib on Coumadin, COPD, Tobacco Use Disorder, Etoh Abuse, Dementia, and recently noted left lung nodules who was sent to the ED for hypoxia from nursing home. In ED patient was found to be hypoxic in the 80s on RA and was placed on 15L NRB. Patient was also found to be hypotensive in the 70s systolic and was given 1 L of NS in ED. He was also found to be hypoglycemic (glucose 32) and was given D50 in ED. Patient also complaining of severe left hip pain that radiates down to his leg and b/l shoulder pain. Of note, patient was recently admitted two times at [facility]. First admission was for hypoxic resp failure that required ICU care and second admission was for urinary retention. Patient was discharged on 5/6/22 and plan was to follow up with cardiologist to address his severe aortic stenosis. Patient was planning on meeting with orthopedic surgeon for potential surgery for his shoulder pains. He denies any fevers, cough, chest pain, SOB, n/v, diarrhea, constipation, and rashes.    In ED VS: BP 77/43, T 98, RR 15, Spo2 100% on 15L NRB  WBC 12.08, Hb 8.7, glucose 39       (08 May 2022 16:21)      Patient stated goal for pain control: to be able to take deep breaths, utilize incentive spirometer, get out of bed to chair and ambulate with tolerable pain control.     PAIN SCORE:       SCALE USED: (1-10 VNRS)    PAST MEDICAL & SURGICAL HISTORY:  HTN (hypertension)    Hyperlipemia    COPD (chronic obstructive pulmonary disease)    DM (diabetes mellitus)    Severe aortic stenosis    Dementia    AF (atrial fibrillation)    Tobacco use disorder    Multiple lung nodules    Status post spinal surgery  lumbar    Encounter for screening colonoscopy    H/O endoscopy        FAMILY HISTORY:  Family history of diabetes mellitus (Sibling)          SOCIAL HISTORY:  [ ] Denies Smoking, Alcohol, or Drug Use    Allergies    No Known Allergies    Intolerances        MEDICATIONS:    MEDICATIONS  (STANDING):  acetaminophen     Tablet .. 1000 milliGRAM(s) Oral every 8 hours  aspirin  chewable 81 milliGRAM(s) Oral daily  atorvastatin 40 milliGRAM(s) Oral at bedtime  budesonide  80 MICROgram(s)/formoterol 4.5 MICROgram(s) Inhaler 2 Puff(s) Inhalation two times a day  cholecalciferol 1000 Unit(s) Oral daily  finasteride 5 milliGRAM(s) Oral daily  folic acid 1 milliGRAM(s) Oral daily  gabapentin 300 milliGRAM(s) Oral three times a day  insulin glargine Injectable (LANTUS) 6 Unit(s) SubCutaneous at bedtime  insulin lispro (ADMELOG) corrective regimen sliding scale   SubCutaneous three times a day before meals  insulin lispro Injectable (ADMELOG) 4 Unit(s) SubCutaneous three times a day before meals  lidocaine   4% Patch 2 Patch Transdermal daily  mirtazapine 15 milliGRAM(s) Oral at bedtime  nicotine -  14 mG/24Hr(s) Patch 1 patch Transdermal daily  pantoprazole    Tablet 40 milliGRAM(s) Oral before breakfast  polyethylene glycol 3350 17 Gram(s) Oral daily  senna 2 Tablet(s) Oral at bedtime  sodium chloride 0.9%. 1000 milliLiter(s) (60 mL/Hr) IV Continuous <Continuous>  tamsulosin 0.4 milliGRAM(s) Oral at bedtime  thiamine 100 milliGRAM(s) Oral daily  tiotropium 18 MICROgram(s) Capsule 1 Capsule(s) Inhalation daily  warfarin 6 milliGRAM(s) Oral once    MEDICATIONS  (PRN):  ALBUTerol    90 MICROgram(s) HFA Inhaler 2 Puff(s) Inhalation every 6 hours PRN Shortness of Breath and/or Wheezing  aluminum hydroxide/magnesium hydroxide/simethicone Suspension 30 milliLiter(s) Oral every 6 hours PRN Dyspepsia  melatonin 3 milliGRAM(s) Oral at bedtime PRN Insomnia      Vital Signs Last 24 Hrs  T(C): 36.6 (09 May 2022 10:30), Max: 36.8 (09 May 2022 01:06)  T(F): 97.8 (09 May 2022 10:30), Max: 98.2 (09 May 2022 01:06)  HR: 97 (09 May 2022 10:30) (79 - 109)  BP: 99/48 (09 May 2022 10:30) (83/34 - 138/41)  BP(mean): --  RR: 18 (09 May 2022 10:30) (18 - 26)  SpO2: 100% (09 May 2022 10:30) (93% - 100%)    LABS:                          7.8    8.25  )-----------( 363      ( 09 May 2022 06:25 )             24.6     05-09    139  |  106  |  28<H>  ----------------------------<  217<H>  5.0   |  27  |  1.12    Ca    9.3      09 May 2022 11:14  Phos  4.1     05-09  Mg     2.2     05-09    TPro  6.5  /  Alb  2.4<L>  /  TBili  0.2  /  DBili  x   /  AST  19  /  ALT  39  /  AlkPhos  82  05-09    PT/INR - ( 09 May 2022 06:25 )   PT: 23.4 sec;   INR: 1.95 ratio         PTT - ( 08 May 2022 12:58 )  PTT:33.5 sec  LIVER FUNCTIONS - ( 09 May 2022 06:25 )  Alb: 2.4 g/dL / Pro: 6.5 g/dL / ALK PHOS: 82 U/L / ALT: 39 U/L DA / AST: 19 U/L / GGT: x             CAPILLARY BLOOD GLUCOSE      POCT Blood Glucose.: 294 mg/dL (09 May 2022 11:14)  POCT Blood Glucose.: 468 mg/dL (09 May 2022 07:57)  POCT Blood Glucose.: 243 mg/dL (08 May 2022 21:31)  POCT Blood Glucose.: 220 mg/dL (08 May 2022 20:09)  POCT Blood Glucose.: 150 mg/dL (08 May 2022 14:27)  POCT Blood Glucose.: 39 mg/dL (08 May 2022 13:45)      REVIEW OF SYSTEMS:  CONSTITUTIONAL: No fever or fatigue  RESPIRATORY: No cough, wheezing, chills or hemoptysis; No shortness of breath  CARDIOVASCULAR: No chest pain, palpitations, dizziness, or leg swelling  GASTROINTESTINAL: No abdominal or epigastric pain. No nausea, vomiting; No diarrhea or constipation.   GENITOURINARY: No dysuria, frequency, hematuria, retention or incontinence  MUSCULOSKELETAL: No joint pain or swelling; No muscle, back, or extremity pain, no upper or lower motor strength weakness, no saddle anesthesia, bowel/bladder incontinence, no falls   NEURO: No weakness, no numbness   PSYCHIATRIC: No depression, anxiety, mood swings, or difficulty sleeping    PHYSICAL EXAM:  GENERAL:  Alert & Oriented X3, NAD, Good concentration  CHEST/LUNG: Clear to auscultation bilaterally; No rales, rhonchi, wheezing, or rubs  HEART: Regular rate and rhythm; No murmurs, rubs, or gallops  ABDOMEN: Soft, Nontender, Nondistended; Bowel sounds present  : no incontinence, no flank pain  EXTREMITIES:  2+ Peripheral Pulses, No cyanosis, or edema  MUSCULOSKELETAL: Motor Strength 5/5 B/L upper and lower extremities; moves all extremities equally against gravity; ROM intact; negative SRL  NEUROLOGICAL: awake and alert and oriented   SKIN: No rashes or lesions    Radiology:    Drug Screen:  aspirin  chewable 81 milliGRAM(s) Oral daily  warfarin 6 milliGRAM(s) Oral once          ORT Score   Family Hx of substance abuse	Female	Male  Alcohol 	                                              1              3  Illegal drugs	                                      2              3  Rx drugs                                               4	      4    Personal Hx of substance abuse		  Alcohol 	                                               3	      3  Illegal drugs                                  	       4	      4  Rx drugs                                                5	      5  Age between 16- 45 years	               1             1  hx preadolescent sexual abuse	       3	      0  Psychological disease		  ADD, OCD, bipolar, schizophrenia	2	      2  Depression                                    	1	      1  Score totals 		  		  a score of 3 or lower indicates low risk for opioid abuse		  a score of 4-7 indicates moderate risk for opioid abuse		  a score of 8 or higher indicates high risk for opioid abuse	    Risk factors associated with adverse outcomes related to opioid treatment  [ ]  Concurrent benzodiazepine use  [ ]  History/ Active substance use or alcohol use disorder  [ ] Psychiatric co-morbidity  [ ] Sleep apnea  [ ] COPD  [ ] BMI> 35  [ ] Liver dysfunction  [ ] Renal dysfunction  [ ] CHF  [ ] Smoker  [ ]  Age > 60 years      [ ]  [place]  Reviewed and Copied to Chart. See below.           Source of information: , Chart review, patient  Patient language: English  : n/a    CC: Patient is a 77y old  Male who presents with a chief complaint of     HPI:  78 y/o male, ambulatory and AAA0x2-3 at baseline, from [facility], with significant medical history of HTN, CAD, Severe AS, Valvular Afib on Coumadin, COPD, Tobacco Use Disorder, Etoh Abuse, Dementia, and recently noted left lung nodules who was sent to the ED for hypoxia from nursing home. In ED patient was found to be hypoxic in the 80s on RA and was placed on 15L NRB. Patient was also found to be hypotensive in the 70s systolic and was given 1 L of NS in ED. He was also found to be hypoglycemic (glucose 32) and was given D50 in ED. Patient also complaining of severe left hip pain that radiates down to his leg and b/l shoulder pain. Of note, patient was recently admitted two times at [facility]. First admission was for hypoxic resp failure that required ICU care and second admission was for urinary retention. Patient was discharged on 5/6/22 and plan was to follow up with cardiologist to address his severe aortic stenosis. Patient was planning on meeting with orthopedic surgeon for potential surgery for his shoulder pains. He denies any fevers, cough, chest pain, SOB, n/v, diarrhea, constipation, and rashes.    In ED VS: BP 77/43, T 98, RR 15, Spo2 100% on 15L NRB  WBC 12.08, Hb 8.7, glucose 39  Pt from , admitted with hypoxia and severe lower back pain and left leg pain.  Pt with chronic lower back pain, AS, copd.  Pt underwent spinal surgery years ago.  CT shows chronic changes multilevel degenerative changes noted resulting in multilevel spinal canal stenosis and neural foraminal narrowing. Mild to moderate spinal canal stenosis at L2-3. Mild spinal canal stenosis L3-4. Moderate spinal canal stenosis   L4-5 and L5-S1. Multilevel neural foraminal narrowing with severe bilateral neural foraminal narrowing at L3-4, severe right, moderate left neural foraminal narrowing L4-5. Moderate bilateral neural foraminal   narrowing L5-S1.  Pt has not been ambulating - oob to chair.        PAIN SCORE: 5/10  SCALE USED: (1-10 VNRS)    PAST MEDICAL & SURGICAL HISTORY:  HTN (hypertension)    Hyperlipemia    COPD (chronic obstructive pulmonary disease)    DM (diabetes mellitus)    Severe aortic stenosis    Dementia    AF (atrial fibrillation)    Tobacco use disorder    Multiple lung nodules    Status post spinal surgery  lumbar    Encounter for screening colonoscopy    H/O endoscopy        FAMILY HISTORY:  Family history of diabetes mellitus (Sibling)      SOCIAL HISTORY:  [x] Denies Smoking, Alcohol, or Drug Use    Allergies    No Known Allergies    Intolerances        MEDICATIONS:    MEDICATIONS  (STANDING):  acetaminophen     Tablet .. 1000 milliGRAM(s) Oral every 8 hours  aspirin  chewable 81 milliGRAM(s) Oral daily  atorvastatin 40 milliGRAM(s) Oral at bedtime  budesonide  80 MICROgram(s)/formoterol 4.5 MICROgram(s) Inhaler 2 Puff(s) Inhalation two times a day  cholecalciferol 1000 Unit(s) Oral daily  finasteride 5 milliGRAM(s) Oral daily  folic acid 1 milliGRAM(s) Oral daily  gabapentin 300 milliGRAM(s) Oral three times a day  insulin glargine Injectable (LANTUS) 6 Unit(s) SubCutaneous at bedtime  insulin lispro (ADMELOG) corrective regimen sliding scale   SubCutaneous three times a day before meals  insulin lispro Injectable (ADMELOG) 4 Unit(s) SubCutaneous three times a day before meals  lidocaine   4% Patch 2 Patch Transdermal daily  mirtazapine 15 milliGRAM(s) Oral at bedtime  nicotine -  14 mG/24Hr(s) Patch 1 patch Transdermal daily  pantoprazole    Tablet 40 milliGRAM(s) Oral before breakfast  polyethylene glycol 3350 17 Gram(s) Oral daily  senna 2 Tablet(s) Oral at bedtime  sodium chloride 0.9%. 1000 milliLiter(s) (60 mL/Hr) IV Continuous <Continuous>  tamsulosin 0.4 milliGRAM(s) Oral at bedtime  thiamine 100 milliGRAM(s) Oral daily  tiotropium 18 MICROgram(s) Capsule 1 Capsule(s) Inhalation daily  warfarin 6 milliGRAM(s) Oral once    MEDICATIONS  (PRN):  ALBUTerol    90 MICROgram(s) HFA Inhaler 2 Puff(s) Inhalation every 6 hours PRN Shortness of Breath and/or Wheezing  aluminum hydroxide/magnesium hydroxide/simethicone Suspension 30 milliLiter(s) Oral every 6 hours PRN Dyspepsia  melatonin 3 milliGRAM(s) Oral at bedtime PRN Insomnia      Vital Signs Last 24 Hrs  T(C): 36.6 (09 May 2022 10:30), Max: 36.8 (09 May 2022 01:06)  T(F): 97.8 (09 May 2022 10:30), Max: 98.2 (09 May 2022 01:06)  HR: 97 (09 May 2022 10:30) (79 - 109)  BP: 99/48 (09 May 2022 10:30) (83/34 - 138/41)  BP(mean): --  RR: 18 (09 May 2022 10:30) (18 - 26)  SpO2: 100% (09 May 2022 10:30) (93% - 100%)    LABS:                          7.8    8.25  )-----------( 363      ( 09 May 2022 06:25 )             24.6     05-09    139  |  106  |  28<H>  ----------------------------<  217<H>  5.0   |  27  |  1.12    Ca    9.3      09 May 2022 11:14  Phos  4.1     05-09  Mg     2.2     05-09    TPro  6.5  /  Alb  2.4<L>  /  TBili  0.2  /  DBili  x   /  AST  19  /  ALT  39  /  AlkPhos  82  05-09    PT/INR - ( 09 May 2022 06:25 )   PT: 23.4 sec;   INR: 1.95 ratio         PTT - ( 08 May 2022 12:58 )  PTT:33.5 sec  LIVER FUNCTIONS - ( 09 May 2022 06:25 )  Alb: 2.4 g/dL / Pro: 6.5 g/dL / ALK PHOS: 82 U/L / ALT: 39 U/L DA / AST: 19 U/L / GGT: x             CAPILLARY BLOOD GLUCOSE      POCT Blood Glucose.: 294 mg/dL (09 May 2022 11:14)  POCT Blood Glucose.: 468 mg/dL (09 May 2022 07:57)  POCT Blood Glucose.: 243 mg/dL (08 May 2022 21:31)  POCT Blood Glucose.: 220 mg/dL (08 May 2022 20:09)  POCT Blood Glucose.: 150 mg/dL (08 May 2022 14:27)  POCT Blood Glucose.: 39 mg/dL (08 May 2022 13:45)      REVIEW OF SYSTEMS:  CONSTITUTIONAL: No fever or fatigue  RESPIRATORY: No cough, wheezing, chills or hemoptysis; No shortness of breath  CARDIOVASCULAR: + aortic stenosis   GASTROINTESTINAL: No abdominal or epigastric pain. No nausea, vomiting; No diarrhea or constipation.   GENITOURINARY: No dysuria, frequency, hematuria, retention or incontinence  MUSCULOSKELETAL: + lower back pain   NEURO: No weakness, no numbness   PSYCHIATRIC: No depression, anxiety, mood swings, or difficulty sleeping    PHYSICAL EXAM:  GENERAL:  Alert & Oriented X3, NAD, Good concentration  CHEST/LUNG: decreased breath sounds,   HEART: Regular rate and rhythm; + murmur  ABDOMEN: Soft, Nontender, Nondistended; Bowel sounds present  : no incontinence, no flank pain  EXTREMITIES:  2+ Peripheral Pulses, No cyanosis, or edema  MUSCULOSKELETAL: + lumbar spine tenderness + decreased rom   NEUROLOGICAL: awake and alert and oriented   SKIN: No rashes or lesions    Radiology:  < from: CT Lumbar Spine No Cont (05.08.22 @ 18:53) >    ACC: [id number] EXAM:  CT LUMBAR SPINE                          PROCEDURE DATE:  05/08/2022          INTERPRETATION:  CLINICAL STATEMENT: Trauma pain    TECHNIQUE: CT of the lumbar spine was performed without contrast. 3-D MIP   images obtained    COMPARISON: None.    FINDINGS:  The vertebral body heights and alignment are maintained. There is no   acute fracture. Nonspecific small cirrhotic focus L1 vertebral body.    The evaluation of the spinal canal is limited on a CT exam.  Multilevel   degenerative changes noted resulting in multilevel spinal canal stenosis   and neural foraminal narrowing. Mild to moderate spinal canal stenosis at   L2-3. Mild spinal canal stenosis L3-4. Moderate spinal canal stenosis   L4-5 and L5-S1. Multilevel neural foraminal narrowing with severe   bilateral neural foraminal narrowing at L3-4, severe right, moderate left   neural foraminal narrowing L4-5. Moderate bilateral neural foraminal   narrowing L5-S1    Small cyst right kidney. Hyperdensities noted in the kidneys which may be   related to medullary kidney disease in the correct clinical setting    IMPRESSION:  No acute fracture. Multilevel degenerative changes. MRI exam recommended   for better evaluation    < end of copied text >      Drug Screen:  aspirin  chewable 81 milliGRAM(s) Oral daily  warfarin 6 milliGRAM(s) Oral once          ORT Score   Family Hx of substance abuse	Female	Male  Alcohol 	                                              1              3  Illegal drugs	                                      2              3  Rx drugs                                               4	      4    Personal Hx of substance abuse		  Alcohol 	                                               3	      3  Illegal drugs                                  	       4	      4  Rx drugs                                                5	      5  Age between 16- 45 years	               1             1  hx preadolescent sexual abuse	       3	      0  Psychological disease		  ADD, OCD, bipolar, schizophrenia	2	      2  Depression                                    	1	      1  Score totals 		  		  a score of 3 or lower indicates low risk for opioid abuse		  a score of 4-7 indicates moderate risk for opioid abuse		  a score of 8 or higher indicates high risk for opioid abuse	    Risk factors associated with adverse outcomes related to opioid treatment  [ ]  Concurrent benzodiazepine use  [ ]  History/ Active substance use or alcohol use disorder  [ ] Psychiatric co-morbidity  [ ] Sleep apnea  [ ] COPD  [ ] BMI> 35  [ ] Liver dysfunction  [ ] Renal dysfunction  [ ] CHF  [ ] Smoker  [x ]  Age > 60 years      [x ]  [place]  Reviewed and Copied to Chart. See below.

## 2022-05-10 NOTE — PHYSICAL THERAPY INITIAL EVALUATION ADULT - GENERAL OBSERVATIONS, REHAB EVAL
patient recent h dc, adm from rehab, denies sob, pt currently requires assistance with rw tranfers and gait to unstable balance

## 2022-05-10 NOTE — CONSULT NOTE ADULT - SUBJECTIVE AND OBJECTIVE BOX
C A R D I O L O G Y  *********************    DATE OF SERVICE: 05-10-22    HISTORY OF PRESENT ILLNESS: HPI:  76 y/o male, ambulatory and AAA0x2-3 at baseline, from [facility], with significant medical history of HTN, CAD, Severe AS, moderate to severe MS Valvular Afib on Coumadin, COPD, Tobacco Use Disorder, Etoh Abuse, Dementia, and recently noted left lung nodules who was sent to the ED for hypoxia from nursing home. In ED patient was found to be hypoxic in the 80s on RA and was placed on 15L NRB. Patient was also found to be hypotensive in the 70s systolic and was given 1 L of NS in ED. He was also found to be hypoglycemic (glucose 32) and was given D50 in ED. Patient also complaining of severe left hip pain that radiates down to his leg and b/l shoulder pain. Of note, patient was recently admitted two times at [facility]. First admission was for hypoxic resp failure that required ICU care and second admission was for urinary retention. Patient was discharged on 5/6/22 and plan was to follow up with cardiologist to address his severe aortic stenosis. Patient was planning on meeting with orthopedic surgeon for potential surgery for his shoulder pains. He denies any fevers, cough, chest pain, SOB, n/v, diarrhea, constipation, and rashes.  No LOC    In ED VS: BP 77/43, T 98, RR 15, Spo2 100% on 15L NRB  WBC 12.08, Hb 8.7, glucose 39      PAST MEDICAL & SURGICAL HISTORY:    HTN (hypertension)  Hyperlipemia  COPD (chronic obstructive pulmonary disease)  DM (diabetes mellitus)  Severe aortic stenosis  Dementia  AF (atrial fibrillation)  Tobacco use disorder  Multiple lung nodules  Status post spinal surgery  lumbar  Encounter for screening colonoscopy  H/O endoscopy      MEDICATIONS:  MEDICATIONS  (STANDING):  acetaminophen     Tablet .. 1000 milliGRAM(s) Oral every 8 hours  aspirin  chewable 81 milliGRAM(s) Oral daily  atorvastatin 40 milliGRAM(s) Oral at bedtime  budesonide  80 MICROgram(s)/formoterol 4.5 MICROgram(s) Inhaler 2 Puff(s) Inhalation two times a day  cholecalciferol 1000 Unit(s) Oral daily  cosyntropin Injectable 0.25 milliGRAM(s) IV Push once  finasteride 5 milliGRAM(s) Oral daily  folic acid 1 milliGRAM(s) Oral daily  gabapentin 300 milliGRAM(s) Oral three times a day  insulin glargine Injectable (LANTUS) 6 Unit(s) SubCutaneous at bedtime  insulin lispro (ADMELOG) corrective regimen sliding scale   SubCutaneous three times a day before meals  insulin lispro Injectable (ADMELOG) 4 Unit(s) SubCutaneous three times a day before meals  lidocaine   4% Patch 2 Patch Transdermal daily  mirtazapine 15 milliGRAM(s) Oral at bedtime  nicotine -  14 mG/24Hr(s) Patch 1 patch Transdermal daily  pantoprazole    Tablet 40 milliGRAM(s) Oral before breakfast  polyethylene glycol 3350 17 Gram(s) Oral daily  senna 2 Tablet(s) Oral at bedtime  sodium chloride 0.9%. 1000 milliLiter(s) (60 mL/Hr) IV Continuous <Continuous>  sodium zirconium cyclosilicate 10 Gram(s) Oral once  tamsulosin 0.4 milliGRAM(s) Oral at bedtime  thiamine 100 milliGRAM(s) Oral daily  tiotropium 18 MICROgram(s) Capsule 1 Capsule(s) Inhalation daily      Allergies    No Known Allergies    Intolerances    FAMILY HISTORY:  Family history of diabetes mellitus (Sibling)      Non-contributary for premature coronary disease or sudden cardiac death    SOCIAL HISTORY:    [ X] Non-smoker  [ ] Smoker  [ ] Alcohol      REVIEW OF SYSTEMS:  [ ]chest pain  [  ]shortness of breath  [  ]palpitations  [  ]syncope  [ ]near syncope [ ]upper extremity weakness   [ ] lower extremity weakness  [  ]diplopia  [  ]altered mental status   [  ]fevers  [ ]chills [ ]nausea  [ ]vomitting  [  ]dysphagia    [ ]abdominal pain  [ ]melena  [ ]BRBPR    [  ]epistaxis  [  ]rash    [ ]lower extremity edema        [X] All others negative	  [ ] Unable to obtain      LABS:	 	    CARDIAC MARKERS:        Troponin I, High Sensitivity Result: 8.1 ng/L (05-08-22 @ 12:58)                            7.2    11.52 )-----------( 361      ( 10 May 2022 06:21 )             22.4     Hb Trend: 7.2<--    05-10    140  |  108  |  29<H>  ----------------------------<  159<H>  5.4<H>   |  30  |  1.02    Ca    8.2<L>      10 May 2022 06:21  Phos  2.4     05-10  Mg     1.9     05-10    TPro  5.8<L>  /  Alb  2.2<L>  /  TBili  0.2  /  DBili  x   /  AST  21  /  ALT  32  /  AlkPhos  79  05-10    Creatinine Trend: 1.02<--, 1.12<--, 1.40<--, 0.82<--, 0.99<--, 0.79<--    Coags:  PT/INR - ( 10 May 2022 06:21 )   PT: 27.4 sec;   INR: 2.28 ratio             PHYSICAL EXAM:  T(C): 36.9 (05-10-22 @ 05:05), Max: 36.9 (05-09-22 @ 21:23)  HR: 107 (05-10-22 @ 05:05) (97 - 114)  BP: 120/65 (05-10-22 @ 05:05) (99/48 - 126/47)  RR: 18 (05-10-22 @ 05:05) (18 - 20)  SpO2: 99% (05-10-22 @ 05:05) (97% - 100%)  Wt(kg): --   BMI (kg/m2): 22.2 (05-10-22 @ 05:05)  I&O's Summary    09 May 2022 07:01  -  10 May 2022 07:00  --------------------------------------------------------  IN: 720 mL / OUT: 400 mL / NET: 320 mL    HEENT:  (-)icterus (-)pallor  CV: N S1 S2 1/6 FELICITA (+)2 Pulses B/l  Resp:  Clear to ausculatation B/L, normal effort  GI: (+) BS Soft, NT, ND  Lymph:  (-)Edema, (-)obvious lymphadenopathy  Skin: Warm to touch, Normal turgor  Psych: Appropriate mood and affect    ECG:  	Sinus 87 BPM    RADIOLOGY:         CXR:   < from: Xray Chest 1 View- PORTABLE-Urgent (05.08.22 @ 14:08) >  There is slight blunting at the left base laterally new since April 6 of   this year.    < end of copied text >      ASSESSMENT/PLAN: 	77y Male  AAA0x2-3 at baseline, from [facility], with significant medical history of HTN, CAD, Severe AS, moderate to severe MS Valvular Afib on Coumadin, COPD, Tobacco Use Disorder, Etoh Abuse, Dementia, and recently noted left lung nodules, recent admission for UTI who was sent to the ED for hypoxia from nursing home.    - No clinical CHF  - w/u of adrenal insufficiency per medical team  -  Will need oupt CTS eval once optimized  - Pt is DNR    I once again thank you for allowing me to participate in the care of your patient.  If you have any questions or concerns please do not hesitate to contact me.    Casper Bush MD, [facility]  BEEPER [phone]

## 2022-05-10 NOTE — PROGRESS NOTE ADULT - SUBJECTIVE AND OBJECTIVE BOX
PGY-1 Progress Note discussed with attending    PAGER #: [[phone]] TILL 5:00 PM  PLEASE CONTACT ON CALL TEAM:  - On Call Team (Please refer to Yahaira) FROM 5:00 PM - 8:30PM  - Nightfloat Team FROM 8:30 -7:30 AM    INTERVAL HPI/ OVERNIGHT EVENTS: No acute events overnight.    REVIEW OF SYSTEMS:  CONSTITUTIONAL: No fever, weight loss, or fatigue  RESPIRATORY: No cough, wheezing, chills or hemoptysis; No shortness of breath  CARDIOVASCULAR: No chest pain, palpitations, dizziness, or leg swelling  GASTROINTESTINAL: No abdominal pain. No nausea, vomiting, or hematemesis; No diarrhea or constipation. No melena or hematochezia.  GENITOURINARY: No dysuria or hematuria, urinary frequency  NEUROLOGICAL: No headaches, memory loss, loss of strength, numbness, or tremors  SKIN: No itching, burning, rashes, or lesions     MEDICATIONS  (STANDING):  acetaminophen     Tablet .. 1000 milliGRAM(s) Oral every 8 hours  aspirin  chewable 81 milliGRAM(s) Oral daily  atorvastatin 40 milliGRAM(s) Oral at bedtime  budesonide  80 MICROgram(s)/formoterol 4.5 MICROgram(s) Inhaler 2 Puff(s) Inhalation two times a day  cholecalciferol 1000 Unit(s) Oral daily  finasteride 5 milliGRAM(s) Oral daily  folic acid 1 milliGRAM(s) Oral daily  gabapentin 300 milliGRAM(s) Oral three times a day  insulin glargine Injectable (LANTUS) 6 Unit(s) SubCutaneous at bedtime  insulin lispro (ADMELOG) corrective regimen sliding scale   SubCutaneous three times a day before meals  insulin lispro Injectable (ADMELOG) 4 Unit(s) SubCutaneous three times a day before meals  lidocaine   4% Patch 2 Patch Transdermal daily  meclizine 12.5 milliGRAM(s) Oral three times a day  metoprolol tartrate 12.5 milliGRAM(s) Oral two times a day  mirtazapine 15 milliGRAM(s) Oral at bedtime  nicotine -  14 mG/24Hr(s) Patch 1 patch Transdermal daily  pantoprazole    Tablet 40 milliGRAM(s) Oral before breakfast  polyethylene glycol 3350 17 Gram(s) Oral daily  senna 2 Tablet(s) Oral at bedtime  sodium chloride 0.9%. 1000 milliLiter(s) (60 mL/Hr) IV Continuous <Continuous>  tamsulosin 0.4 milliGRAM(s) Oral at bedtime  thiamine 100 milliGRAM(s) Oral daily  tiotropium 18 MICROgram(s) Capsule 1 Capsule(s) Inhalation daily    MEDICATIONS  (PRN):  ALBUTerol    90 MICROgram(s) HFA Inhaler 2 Puff(s) Inhalation every 6 hours PRN Shortness of Breath and/or Wheezing  aluminum hydroxide/magnesium hydroxide/simethicone Suspension 30 milliLiter(s) Oral every 6 hours PRN Dyspepsia  melatonin 3 milliGRAM(s) Oral at bedtime PRN Insomnia      Vital Signs Last 24 Hrs  T(C): 36.9 (10 May 2022 05:05), Max: 36.9 (09 May 2022 21:23)  T(F): 98.5 (10 May 2022 05:05), Max: 98.5 (09 May 2022 21:23)  HR: 62 (10 May 2022 10:30) (62 - 114)  BP: 105/48 (10 May 2022 10:30) (105/48 - 126/47)  BP(mean): --  RR: 18 (10 May 2022 05:05) (18 - 20)  SpO2: 93% (10 May 2022 10:30) (93% - 100%)    PHYSICAL EXAMINATION:  GENERAL: NAD, AAOx3  HEAD: AT/NC  EYES: conjunctiva and sclera clear  NECK: supple, No JVD noted, Normal thyroid  CHEST/LUNG: CTABL; no rales, rhonchi, wheezing, or rubs  HEART: regular rate and rhythm; no murmurs, rubs, or gallops  ABDOMEN: soft, nontender, nondistended; Bowel sounds present  EXTREMITIES:  2+ Peripheral Pulses, No clubbing, cyanosis, or edema  SKIN: warm dry                          7.2    11.52 )-----------( 361      ( 10 May 2022 06:21 )             22.4     05-10    140  |  108  |  29<H>  ----------------------------<  159<H>  5.4<H>   |  30  |  1.02    Ca    8.2<L>      10 May 2022 06:21  Phos  2.4     05-10  Mg     1.9     05-10    TPro  5.8<L>  /  Alb  2.2<L>  /  TBili  0.2  /  DBili  x   /  AST  21  /  ALT  32  /  AlkPhos  79  05-10    LIVER FUNCTIONS - ( 10 May 2022 06:21 )  Alb: 2.2 g/dL / Pro: 5.8 g/dL / ALK PHOS: 79 U/L / ALT: 32 U/L DA / AST: 21 U/L / GGT: x               PT/INR - ( 10 May 2022 06:21 )   PT: 27.4 sec;   INR: 2.28 ratio         PTT - ( 08 May 2022 12:58 )  PTT:33.5 sec  SARS-CoV-2: NotDetec (08 May 2022 12:58)  COVID-19 PCR: NotDetec (04 May 2022 05:12)  COVID-19 PCR: NotDetec (30 Apr 2022 09:41)  COVID-19 PCR: NotDetec (21 Apr 2022 10:49)  COVID-19 PCR: NotDetec (15 Apr 2022 12:32)  COVID-19 PCR: NotDetec (10 Apr 2022 05:45)  SARS-CoV-2: NotDetec (04 Apr 2022 10:27)  COVID-19 PCR: NotDetec (03 Apr 2022 22:44)      CAPILLARY BLOOD GLUCOSE      POCT Blood Glucose.: 198 mg/dL (10 May 2022 08:21)  POCT Blood Glucose.: 155 mg/dL (09 May 2022 21:15)  POCT Blood Glucose.: 72 mg/dL (09 May 2022 16:44)      RADIOLOGY & ADDITIONAL TESTS:

## 2022-05-10 NOTE — PROGRESS NOTE ADULT - PROBLEM SELECTOR PLAN 10
Hx of severe aortic stenosis  Plan was for him to follow up with Dr. Bush outpatient to address issue however patient went to rehab facility and was admitted back to hospital before being able to do so  caution with IV fluids

## 2022-05-10 NOTE — CONSULT NOTE ADULT - SUBJECTIVE AND OBJECTIVE BOX
Patient is a 77y old  Male who presents with a chief complaint of Shortness of breath with hypoglycemia and hypotension with severe low back pain radiating down left leg (09 May 2022 09:12)      HPI:  78 y/o male, ambulatory and AAA0x2-3 at baseline, from [facility], with significant medical history of HTN, CAD, Severe AS, Valvular Afib on Coumadin, COPD, Tobacco Use Disorder, Etoh Abuse, Dementia, and recently noted left lung nodules who was sent to the ED for hypoxia from nursing home. In ED patient was found to be hypoxic in the 80s on RA and was placed on 15L NRB. Patient was also found to be hypotensive in the 70s systolic and was given 1 L of NS in ED. He was also found to be hypoglycemic (glucose 32) and was given D50 in ED. Patient also complaining of severe left hip pain that radiates down to his leg and b/l shoulder pain. Of note, patient was recently admitted two times at [facility]. First admission was for hypoxic resp failure that required ICU care and second admission was for urinary retention. Patient was discharged on 5/6/22 and plan was to follow up with cardiologist to address his severe aortic stenosis. Patient was planning on meeting with orthopedic surgeon for potential surgery for his shoulder pains. He denies any fevers, cough, chest pain, SOB, n/v, diarrhea, constipation, and rashes.  Found to have low cortisol. Pt admits to chronic dizziness (resolves with meclizine ). Unknown if he got steroid injections in his back. Unknown if he takes opiate pain meds ? Hx of dm on metformin and amaryl s/p hypoglycemia.     In ED VS: BP 77/43, T 98, RR 15, Spo2 100% on 15L NRB  WBC 12.08, Hb 8.7, glucose 39       (08 May 2022 16:21)      PAST MEDICAL & SURGICAL HISTORY:  HTN (hypertension)    Hyperlipemia    COPD (chronic obstructive pulmonary disease)    DM (diabetes mellitus)    Severe aortic stenosis    Dementia    AF (atrial fibrillation)    Tobacco use disorder    Multiple lung nodules    Status post spinal surgery  lumbar    Encounter for screening colonoscopy    H/O endoscopy           MEDICATIONS  (STANDING):  acetaminophen     Tablet .. 1000 milliGRAM(s) Oral every 8 hours  aspirin  chewable 81 milliGRAM(s) Oral daily  atorvastatin 40 milliGRAM(s) Oral at bedtime  budesonide  80 MICROgram(s)/formoterol 4.5 MICROgram(s) Inhaler 2 Puff(s) Inhalation two times a day  cholecalciferol 1000 Unit(s) Oral daily  cosyntropin Injectable 0.25 milliGRAM(s) IV Push once  finasteride 5 milliGRAM(s) Oral daily  folic acid 1 milliGRAM(s) Oral daily  gabapentin 300 milliGRAM(s) Oral three times a day  insulin glargine Injectable (LANTUS) 6 Unit(s) SubCutaneous at bedtime  insulin lispro (ADMELOG) corrective regimen sliding scale   SubCutaneous three times a day before meals  insulin lispro Injectable (ADMELOG) 4 Unit(s) SubCutaneous three times a day before meals  lidocaine   4% Patch 2 Patch Transdermal daily  mirtazapine 15 milliGRAM(s) Oral at bedtime  nicotine -  14 mG/24Hr(s) Patch 1 patch Transdermal daily  pantoprazole    Tablet 40 milliGRAM(s) Oral before breakfast  polyethylene glycol 3350 17 Gram(s) Oral daily  senna 2 Tablet(s) Oral at bedtime  sodium chloride 0.9%. 1000 milliLiter(s) (60 mL/Hr) IV Continuous <Continuous>  sodium zirconium cyclosilicate 10 Gram(s) Oral once  tamsulosin 0.4 milliGRAM(s) Oral at bedtime  thiamine 100 milliGRAM(s) Oral daily  tiotropium 18 MICROgram(s) Capsule 1 Capsule(s) Inhalation daily    MEDICATIONS  (PRN):  ALBUTerol    90 MICROgram(s) HFA Inhaler 2 Puff(s) Inhalation every 6 hours PRN Shortness of Breath and/or Wheezing  aluminum hydroxide/magnesium hydroxide/simethicone Suspension 30 milliLiter(s) Oral every 6 hours PRN Dyspepsia  melatonin 3 milliGRAM(s) Oral at bedtime PRN Insomnia      FAMILY HISTORY:  Family history of diabetes mellitus (Sibling)        SOCIAL HISTORY:      REVIEW OF SYSTEMS:  CONSTITUTIONAL: No fever, weight loss, or fatigue  EYES: No eye pain, visual disturbances, or discharge  ENT:  No difficulty hearing, tinnitus, vertigo; No sinus or throat pain  NECK: No pain or stiffness  RESPIRATORY: No cough, wheezing, chills or hemoptysis; No Shortness of Breath  CARDIOVASCULAR: No chest pain, palpitations, passing out, dizziness, or leg swelling  GASTROINTESTINAL: No abdominal or epigastric pain. No nausea, vomiting, or hematemesis; No diarrhea or constipation. No melena or hematochezia.  GENITOURINARY: No dysuria, frequency, hematuria, or incontinence  NEUROLOGICAL: No headaches, memory loss, loss of strength, numbness, or tremors  SKIN: No itching, burning, rashes, or lesions   LYMPH Nodes: No enlarged glands  ENDOCRINE: No heat or cold intolerance; No hair loss  MUSCULOSKELETAL: No joint pain or swelling; No muscle, back, or extremity pain  PSYCHIATRIC: No depression, anxiety, mood swings, or difficulty sleeping  HEME/LYMPH: No easy bruising, or bleeding gums  ALLERGY AND IMMUNOLOGIC: No hives or eczema	        Vital Signs Last 24 Hrs  T(C): 36.9 (10 May 2022 05:05), Max: 36.9 (09 May 2022 21:23)  T(F): 98.5 (10 May 2022 05:05), Max: 98.5 (09 May 2022 21:23)  HR: 107 (10 May 2022 05:05) (97 - 114)  BP: 120/65 (10 May 2022 05:05) (99/48 - 126/47)  BP(mean): --  RR: 18 (10 May 2022 05:05) (18 - 20)  SpO2: 99% (10 May 2022 05:05) (97% - 100%)      Constitutional:    HEENT: nad    Neck:  No JVD, bruits or thyromegaly    Respiratory:  Clear without rales or rhonchi    Cardiovascular:  RR without murmur, rub or gallop.    Gastrointestinal: Soft without hepatosplenomegaly.    Extremities: without cyanosis, clubbing or edema.    Neurological:  Oriented   x   3   . No gross sensory or motor defects.        LABS:                        7.2    11.52 )-----------( 361      ( 10 May 2022 06:21 )             22.4     05-10    140  |  108  |  29<H>  ----------------------------<  159<H>  5.4<H>   |  30  |  1.02    Ca    8.2<L>      10 May 2022 06:21  Phos  2.4     05-10  Mg     1.9     05-10    TPro  5.8<L>  /  Alb  2.2<L>  /  TBili  0.2  /  DBili  x   /  AST  21  /  ALT  32  /  AlkPhos  79  05-10        PT/INR - ( 10 May 2022 06:21 )   PT: 27.4 sec;   INR: 2.28 ratio         PTT - ( 08 May 2022 12:58 )  PTT:33.5 sec    CAPILLARY BLOOD GLUCOSE      POCT Blood Glucose.: 198 mg/dL (10 May 2022 08:21)  POCT Blood Glucose.: 155 mg/dL (09 May 2022 21:15)  POCT Blood Glucose.: 72 mg/dL (09 May 2022 16:44)  POCT Blood Glucose.: 294 mg/dL (09 May 2022 11:14)      RADIOLOGY & ADDITIONAL STUDIES:

## 2022-05-10 NOTE — CONSULT NOTE ADULT - ASSESSMENT
76 y/o male, ambulatory and AAA0x2-3 at baseline, from [facility], with significant medical history of HTN, CAD, Severe AS, Valvular Afib on Coumadin, COPD, Tobacco Use Disorder, Etoh Abuse, Dementia, and recently noted left lung nodules who was sent to the ED for hypoxia from nursing home  Found to have low cortisol. Pt admits to chronic dizziness (resolves with meclizine ). Unknown if he got steroid injections in his back. Unknown if he takes opiate pain meds ? Hx of dm on metformin and amaryl s/p hypoglycemia.

## 2022-05-10 NOTE — PROGRESS NOTE ADULT - ASSESSMENT
78 y/o male, ambulatory and AAA0x2-3 at baseline, from [facility], with significant medical history of HTN, CAD, Severe AS, Valvular Afib on Coumadin, COPD, Tobacco Use Disorder, Etoh Abuse, Dementia, and recently noted left lung nodules who was sent to the ED for complaints of hypoxia, was found to be hypotensive and hypoglycemic, admitted for further management.

## 2022-05-10 NOTE — PROGRESS NOTE ADULT - NS ATTEND AMEND GEN_ALL_CORE FT
76 y/o male, ambulatory and AAA0x2-3 at baseline, from [facility], with significant medical history of HTN, CAD, Severe AS, Valvular Afib on Coumadin, COPD, Tobacco Use Disorder, ETOH Abuse, Dementia, and recently noted left lung nodules who was sent to the ED for complaints of hypoxia, was found to be hypotensive and hypoglycemic, admitted for further management.     Patient noted to be in significant pain left lower back radiating down the left leg in distress unable to position himself and shaking of legs due to pain. Patient given IV Tylenol with partial relief. also given a doseof Decadron IV.     Patient doing much better today. Pain much better controlled. comfortable at rest. No nausea, vomit, abdominal pain. No fever, cough, chest pain. appear to be SOB due to pain.   Vital Signs Last 24 Hrs  T(C): 36.8 (05-10-22 @ 17:37), Max: 36.9 (05-09-22 @ 21:23)  T(F): 98.3 (05-10-22 @ 17:37), Max: 98.5 (05-09-22 @ 21:23)  HR: 101 (05-10-22 @ 17:37) (62 - 114)  BP: 122/68 (05-10-22 @ 17:37) (89/45 - 126/47)  BP(mean): --  RR: 18 (05-10-22 @ 17:37) (18 - 20)  SpO2: 99% (05-10-22 @ 17:37) (93% - 99%)    P/E:  elderly male, comfortable at rest  Psych: AAO x3  Neuro: No gross focal deficits; Power and sensation intact  CVS: S1S2 present, regular, no edema  Resp: BLAE+, No wheeze or Rhonchi appreciated  GI: Soft, BS+, Non tender, non distended  Extr: No  calf tenderness B/L Lower extremities  Skin: Warm and moist without any rashes                        7.2    10.30 )-----------( 359      ( 10 May 2022 15:08 )             23.5   05-10    140  |  108  |  26<H>  ----------------------------<  195<H>  4.7   |  30  |  0.87    Ca    8.4      10 May 2022 15:08  Phos  2.4     05-10  Mg     1.9     05-10    TPro  5.8<L>  /  Alb  2.2<L>  /  TBili  0.2  /  DBili  x   /  AST  21  /  ALT  32  /  AlkPhos  79  05-10      Cortisol AM, Serum . (05.09.22 @ 18:34) Cortisol AM, Serum: 2.7 ug/dL     Cortisol AM, Serum: 8.9 ug/dL (05.10.22 @ 15:14) ACTH Stim Cortisol 30 min: 16.0 ug/dL (05.10.22 @ 19:49) ACTH Stim Cortisol 60 min: 19.2:    CT Lumbar Spine No Cont (05.08.22 @ 18:53)     FINDINGS:  The vertebral body heights and alignment are maintained. There is no acute fracture. Nonspecific small cirrhotic focus L1 vertebral body.    The evaluation of the spinal canal is limited on a CT exam.  Multilevel degenerative changes noted resulting in multilevel spinal canal stenosis   and neural foraminal narrowing. Mild to moderate spinal canal stenosis at L2-3. Mild spinal canal stenosis L3-4. Moderate spinal canal stenosis   L4-5 and L5-S1. Multilevel neural foraminal narrowing with severe bilateral neural foraminal narrowing at L3-4, severe right, moderate left   neural foraminal narrowing L4-5. Moderate bilateral neural foraminal narrowing L5-S1    Small cyst right kidney. Hyperdensities noted in the kidneys which may be related to medullary kidney disease in the correct clinical setting    IMPRESSION: No acute fracture. Multilevel degenerative changes. MRI exam recommended for better evaluation    D/D:  Acute Hypoxic Resp. failure transient etiology unclear doubt infectious etiology likely due to severe pain  Hypotension ?? related to severe AS with volume depletion due to dehydration  Doubt Adrenal insufficiency (Hypoglycemia, recent steroid use but only few days  Hypoglycemia due to Glimepiride likely with likely poor oral intake with Insulin on board  Hyperglycemia now due to reduced dose meds and steroid use  s/p recent urinary retention due to possibly BPH now resolved/ stable  possible adrenal insufficiency    Plan:  hypotension, hypoxia resolved  AM Cortisol appears to be low; if has any further hypotension will get AM Cortisol; Endocrinology consultation, appreciated.   CT LS Spine urgent with degenerative changes and spinal stenosis  Pain mgmt consult appreciated; increased Gabapentin to 300 mg TID; if pain persist may need steroid injection but needs coumadin off for 2-3 days  Hold anti HTN; resume AM if BP remain stable next 24 hrs  Continue Flomax and Finasteride.  Monitor renal function closely; avoid NSAIDs  Patient needs Cardiology outpatient follow up with [facility] to arrange eval for TAVR once functionally more stable; Mental status stable and previous encephalopathy has resolved  PT eval; check if can score for Home PT as it may help patient get outpatient follow up; if [facility]  Patient needs Ortho follow up repair for left shoulder once cardiac issues settled  Continue Coumadin 6 mg HS; monitor INR    Discussed with Patient and sister at length at bedside earlier this afternoon  Discussed with / RN/ PGY1 and PGY3  D/C Plan

## 2022-05-10 NOTE — PROGRESS NOTE ADULT - PROBLEM SELECTOR PLAN 11
Hx of Afib on warfarin 6mg  INR 2.22 today  c/w 6mg warfarin  follow up INR daily  c/w metoprolo 12.5 BID for RVR

## 2022-05-11 NOTE — PROGRESS NOTE ADULT - ASSESSMENT
78 y/o male, ambulatory and AAA0x2-3 at baseline, from [facility], with significant medical history of HTN, CAD, Severe AS, Valvular Afib on Coumadin, COPD, Tobacco Use Disorder, Etoh Abuse, Dementia, and recently noted left lung nodules who was sent to the ED for complaints of hypoxia, was found to be hypotensive and hypoglycemic, admitted for further management.

## 2022-05-11 NOTE — PROGRESS NOTE ADULT - ATTENDING COMMENTS
Discharge has been held because of an acute episode of hematuria.   Urology consultation, appreciated.   will send U/A (not culture, unless U/A warrants), ultrasound of kidneys and bladder.   Continue warfarin as benefit outweighs risk.

## 2022-05-11 NOTE — CONSULT NOTE ADULT - SUBJECTIVE AND OBJECTIVE BOX
Patient is a 77 y/o male, ambulatory and AAA0x2-3 at baseline, from [facility], with significant medical history of HTN, CAD, Severe AS, Valvular Afib on Coumadin, COPD, Tobacco Use Disorder, Etoh Abuse, Dementia, and recently noted left lung nodules who was sent to the ED SOB exacerbation copd.  Of note, patient had a recent admission to [facility] ICU for acute hypoxic respiratory failure 2/2 to COPD exacerbation/ Pneumonia requiring bipap and was also sedated with Precedex for presumed EtOH withdrawal;  Pt was recently discharged from [facility]. During that admission he wasnoted urinary retention. A connolly was placed and pt subsequently developed hematuria. Hematuria resolved and pt had successful TOV and was discharged without connolly.  Pt now admitted  with COPD exacerbation and was improved and pending discharge but had 1 episode of painless hematuria. No flank pain.  no voiding difficulties per pt or chart.  No abd or flank pain, no known traume save potential connolly trauma with previous admission. theraputic on coumadin.        PAST MEDICAL & SURGICAL HISTORY:  HTN (hypertension)    Hyperlipemia    COPD (chronic obstructive pulmonary disease)    DM (diabetes mellitus)    Severe aortic stenosis    Dementia    AF (atrial fibrillation)    Tobacco use disorder    Multiple lung nodules    Status post spinal surgery  lumbar    Encounter for screening colonoscopy    H/O endoscopy        ICU Vital Signs Last 24 Hrs  T(C): 36.4 (11 May 2022 10:30), Max: 37.1 (10 May 2022 21:21)  T(F): 97.6 (11 May 2022 10:30), Max: 98.7 (10 May 2022 21:21)  HR: 107 (11 May 2022 17:39) (82 - 107)  BP: 115/50 (11 May 2022 17:39) (104/44 - 128/65)  BP(mean): --  ABP: --  ABP(mean): --  RR: 18 (11 May 2022 10:30) (18 - 18)  SpO2: 90% (11 May 2022 10:30) (90% - 99%)                                           7.3    9.58  )-----------( 351      ( 11 May 2022 14:47 )             23.1   05-11    139  |  105  |  26<H>  ----------------------------<  217<H>  4.4   |  29  |  0.86    Ca    8.5      11 May 2022 06:39  Phos  3.4     05-11  Mg     1.6     05-11    TPro  6.3  /  Alb  2.3<L>  /  TBili  0.2  /  DBili  x   /  AST  17  /  ALT  33  /  AlkPhos  88  05-11  < from: CT Angio Abdomen and Pelvis w/ IV Cont (05.02.22 @ 19:09) >  LOWER CHEST: Dense calcification of the mitral annulus. Patchy opacities   in the right lower lobe.    LIVER: Within normal limits.  BILE DUCTS: Normal caliber.  GALLBLADDER: Cholecystectomy.  SPLEEN: Within normal limits.  PANCREAS: Within normal limits.  ADRENALS: Within normal limits.  KIDNEYS/URETERS: No hydronephrosis. Right upper pole renal cyst.   Ill-defined hypodense area in the lower pole of the right kidney,   possibly focal pyelonephritis.    BLADDER: Connolly catheter. Mucosal thickening and mural enhancement of the   urinary bladder.  REPRODUCTIVE ORGANS: Prostate gland is enlarged.    BOWEL: No bowel obstruction. Appendix is normal.  PERITONEUM: No ascites.  VESSELS: Atherosclerotic changes.  RETROPERITONEUM/LYMPH NODES: No lymphadenopathy.  ABDOMINAL WALL: Small fat-containing umbilical hernia. Infiltration of   the subcutaneous fat in the right lower abdominal wall, possibly hematoma   or sequela of subcutaneous injection. Right inguinal hernia mesh.  BONES: Degenerative changes.    IMPRESSION:  Cystitis.    Ill-defined hypodense area in the lower pole of the right kidney,   possibly focal pyelonephritis.    Patchy opacities in the right lower lobe, possibly atelectasis or   pneumonia.    < end of copied text >      PHYSICAL EXAM  General: WN/WD NAD  Neurology: A&Ox3, nad  Respiratory: good b/l air entry  CV: S1S2  Abdominal: Soft, NT, ND no suprapubic tenderness or distention  no penile/scrotal swelling or erythema           Patient is a 77 y/o male, ambulatory and AA0x2-3 at baseline, from [facility], with significant medical history of HTN, CAD, Severe AS, Valvular Afib on Coumadin, COPD, Tobacco Use Disorder, Etoh Abuse, Dementia, and recently noted left lung nodules who was sent to the ED for SOB exacerbation/copd.  Of note, patient had a recent admission to [facility] ICU for acute hypoxic respiratory failure 2/2 to COPD exacerbation/ Pneumonia requiring bipap and was also sedated with Precedex for presumed EtOH withdrawal;  Pt was recently discharged from [facility]. During that admission he was noted to be in urinary retention. A connolly catheter was placed and pt subsequently developed hematuria. Hematuria resolved and pt had successful TOV and was discharged without connolly catheter.  Pt now admitted  with COPD exacerbation and was improved and pending discharge but had 1 episode of painless hematuria. No flank pain.  no voiding difficulties per pt.  No abd or flank pain, no known trauma. On coumadin. No specific timing to his symptoms. No aggravating or alleviating factors that he knows of.      PAST MEDICAL & SURGICAL HISTORY:  HTN (hypertension)    Hyperlipemia    COPD (chronic obstructive pulmonary disease)    DM (diabetes mellitus)    Severe aortic stenosis    Dementia    AF (atrial fibrillation)    Tobacco use disorder    Multiple lung nodules    Status post spinal surgery  lumbar    Encounter for screening colonoscopy    H/O endoscopy    Family History: No family history of  malignancy  Social History: Does not smoke    ROS: All others negative except as noted above.    Home Medications:  aluminum hydroxide-magnesium hydroxide 200 mg-200 mg/5 mL oral suspension: 30 milliliter(s) orally every 6 hours (10 May 2022 10:49)  atorvastatin 40 mg oral tablet: 1 tab(s) orally once a day (at bedtime) (10 May 2022 10:49)  cholecalciferol oral tablet: 1000 unit(s) orally once a day (10 May 2022 10:49)  folic acid 1 mg oral tablet: 1 tab(s) orally once a day (10 May 2022 10:49)  glimepiride 4 mg oral tablet: 1 tab(s) orally 2 times a day (10 May 2022 10:49)  melatonin 3 mg oral tablet: 1 tab(s) orally once a day (at bedtime), As needed, Insomnia (10 May 2022 10:49)  metFORMIN 1000 mg oral tablet: 1 tab(s) orally 2 times a day (10 May 2022 10:49)  Metoprolol Tartrate 25 mg oral tablet: 0.5 tab(s) orally 2 times a day (10 May 2022 10:49)  pantoprazole 40 mg oral delayed release tablet: 1 tab(s) orally once a day (before a meal) (10 May 2022 10:49)  polyethylene glycol 3350 oral powder for reconstitution: 17 gram(s) orally once a day (10 May 2022 10:49)  Remeron 15 mg oral tablet: 1 tab(s) orally once a day (at bedtime) (10 May 2022 10:49)  senna oral tablet: 2 tab(s) orally once a day (at bedtime) (10 May 2022 10:49)  Spiriva 18 mcg inhalation capsule: 1 cap(s) inhaled once a day (10 May 2022 10:49)  tamsulosin 0.4 mg oral capsule: 1 cap(s) orally once a day (at bedtime) (10 May 2022 10:49)  thiamine 100 mg oral tablet: 1 tab(s) orally once a day (10 May 2022 10:49)    Allergies    No Known Allergies      ICU Vital Signs Last 24 Hrs  T(C): 36.4 (11 May 2022 10:30), Max: 37.1 (10 May 2022 21:21)  T(F): 97.6 (11 May 2022 10:30), Max: 98.7 (10 May 2022 21:21)  HR: 107 (11 May 2022 17:39) (82 - 107)  BP: 115/50 (11 May 2022 17:39) (104/44 - 128/65)  BP(mean): --  ABP: --  ABP(mean): --  RR: 18 (11 May 2022 10:30) (18 - 18)  SpO2: 90% (11 May 2022 10:30) (90% - 99%)                                           7.3    9.58  )-----------( 351      ( 11 May 2022 14:47 )             23.1   05-11    139  |  105  |  26<H>  ----------------------------<  217<H>  4.4   |  29  |  0.86    Ca    8.5      11 May 2022 06:39  Phos  3.4     05-11  Mg     1.6     05-11    TPro  6.3  /  Alb  2.3<L>  /  TBili  0.2  /  DBili  x   /  AST  17  /  ALT  33  /  AlkPhos  88  05-11  < from: CT Angio Abdomen and Pelvis w/ IV Cont (05.02.22 @ 19:09) >  LOWER CHEST: Dense calcification of the mitral annulus. Patchy opacities   in the right lower lobe.    LIVER: Within normal limits.  BILE DUCTS: Normal caliber.  GALLBLADDER: Cholecystectomy.  SPLEEN: Within normal limits.  PANCREAS: Within normal limits.  ADRENALS: Within normal limits.  KIDNEYS/URETERS: No hydronephrosis. Right upper pole renal cyst.   Ill-defined hypodense area in the lower pole of the right kidney,   possibly focal pyelonephritis.    BLADDER: Connolly catheter. Mucosal thickening and mural enhancement of the   urinary bladder.  REPRODUCTIVE ORGANS: Prostate gland is enlarged.    BOWEL: No bowel obstruction. Appendix is normal.  PERITONEUM: No ascites.  VESSELS: Atherosclerotic changes.  RETROPERITONEUM/LYMPH NODES: No lymphadenopathy.  ABDOMINAL WALL: Small fat-containing umbilical hernia. Infiltration of   the subcutaneous fat in the right lower abdominal wall, possibly hematoma   or sequela of subcutaneous injection. Right inguinal hernia mesh.  BONES: Degenerative changes.    IMPRESSION:  Cystitis.    Ill-defined hypodense area in the lower pole of the right kidney,   possibly focal pyelonephritis.    Patchy opacities in the right lower lobe, possibly atelectasis or   pneumonia.    < end of copied text >

## 2022-05-11 NOTE — PROGRESS NOTE ADULT - SUBJECTIVE AND OBJECTIVE BOX
C A R D I O L O G Y  **********************************     DATE OF SERVICE: 05-11-22    Patient denies chest pain or shortness of breath.  Had back pain earlier today.  Review of symptoms  otherwise (-)  	  MEDICATIONS:  MEDICATIONS  (STANDING):  acetaminophen     Tablet .. 1000 milliGRAM(s) Oral every 8 hours  aspirin  chewable 81 milliGRAM(s) Oral daily  atorvastatin 40 milliGRAM(s) Oral at bedtime  budesonide  80 MICROgram(s)/formoterol 4.5 MICROgram(s) Inhaler 2 Puff(s) Inhalation two times a day  cholecalciferol 1000 Unit(s) Oral daily  finasteride 5 milliGRAM(s) Oral daily  folic acid 1 milliGRAM(s) Oral daily  gabapentin 300 milliGRAM(s) Oral three times a day  insulin glargine Injectable (LANTUS) 6 Unit(s) SubCutaneous at bedtime  insulin lispro (ADMELOG) corrective regimen sliding scale   SubCutaneous three times a day before meals  insulin lispro Injectable (ADMELOG) 4 Unit(s) SubCutaneous three times a day before meals  lidocaine   4% Patch 2 Patch Transdermal daily  meclizine 12.5 milliGRAM(s) Oral three times a day  metoprolol tartrate 12.5 milliGRAM(s) Oral two times a day  mirtazapine 15 milliGRAM(s) Oral at bedtime  nicotine -  14 mG/24Hr(s) Patch 1 patch Transdermal daily  pantoprazole    Tablet 40 milliGRAM(s) Oral before breakfast  polyethylene glycol 3350 17 Gram(s) Oral daily  senna 2 Tablet(s) Oral at bedtime  sodium chloride 0.9%. 1000 milliLiter(s) (60 mL/Hr) IV Continuous <Continuous>  tamsulosin 0.4 milliGRAM(s) Oral at bedtime  thiamine 100 milliGRAM(s) Oral daily  tiotropium 18 MICROgram(s) Capsule 1 Capsule(s) Inhalation daily  warfarin 6 milliGRAM(s) Oral once      LABS:	 	    CARDIAC MARKERS:                                    7.5    10.18 )-----------( 369      ( 11 May 2022 06:39 )             24.0     Hemoglobin: 7.5 g/dL (05-11 @ 06:39)  Hemoglobin: 7.2 g/dL (05-10 @ 15:08)  Hemoglobin: 7.2 g/dL (05-10 @ 06:21)  Hemoglobin: 7.8 g/dL (05-09 @ 06:25)  Hemoglobin: 8.7 g/dL (05-08 @ 12:58)      05-11    139  |  105  |  26<H>  ----------------------------<  217<H>  4.4   |  29  |  0.86    Ca    8.5      11 May 2022 06:39  Phos  3.4     05-11  Mg     1.6     05-11    TPro  6.3  /  Alb  2.3<L>  /  TBili  0.2  /  DBili  x   /  AST  17  /  ALT  33  /  AlkPhos  88  05-11    Creatinine Trend: 0.86<--, 0.87<--, 1.02<--, 1.12<--, 1.40<--, 0.82<--    COAGS:   PT/INR - ( 11 May 2022 06:39 )   PT: 27.8 sec;   INR: 2.32 ratio           PHYSICAL EXAM:  T(C): 36.4 (05-11-22 @ 10:30), Max: 37.1 (05-10-22 @ 21:21)  HR: 82 (05-11-22 @ 10:30) (82 - 106)  BP: 104/44 (05-11-22 @ 10:30) (104/44 - 128/65)  RR: 18 (05-11-22 @ 10:30) (18 - 18)  SpO2: 90% (05-11-22 @ 10:30) (90% - 99%)  Wt(kg): --  I&O's Summary    10 May 2022 07:01  -  11 May 2022 07:00  --------------------------------------------------------  IN: 0 mL / OUT: 220 mL / NET: -220 mL          Gen: Appears well in NAD  HEENT:  (-)icterus (-)pallor  CV: N S1 S2 1/6 FELICITA (+)2 Pulses B/l  Resp:  Clear to ausculatation B/L, normal effort  GI: (+) BS Soft, NT, ND  Lymph:  (-)Edema, (-)obvious lymphadenopathy  Skin: Warm to touch, Normal turgor  Psych: Appropriate mood and affect      TELEMETRY: 	  Sinus         ASSESSMENT/PLAN: 	77y  Male AAA0x2-3 at baseline, from [facility], with significant medical history of HTN, CAD, Severe AS, moderate to severe MS Valvular Afib on Coumadin, COPD, Tobacco Use Disorder, Etoh Abuse, Dementia, and recently noted left lung nodules, recent admission for UTI who was sent to the ED for hypoxia from nursing home.    - No clinical CHF  -  Will need oupt CTS eval once optimized  - Pt is DNR  - No need for further inpatient cardiac work up.    Casper Bush MD, [facility]  BEEPER [phone]

## 2022-05-11 NOTE — PROGRESS NOTE ADULT - ASSESSMENT
eport was requested by: Arlin Shukla | Reference #: [id number]    Others' Prescriptions  Patient Name: Twin FarmerBirth Date: 1945  Address: [address] [place] [id number]: Male  Rx Written	Rx Dispensed	Drug	Quantity	Days Supply	Prescriber Name	Prescriber Teresa #	Payment Method	Dispenser  11/02/2021	03/01/2022	pregabalin 75 mg capsule	30	30	Azamy, Taufiq	[id number]	Cash	[facility]  11/02/2021	02/02/2022	pregabalin 75 mg capsule	30	30	Azamy, Taufiq	[id number]	Cash	[facility]  11/02/2021	12/29/2021	pregabalin 75 mg capsule	30	30	Azamy, Taufiq	[id number]	Insurance	[facility]  11/02/2021	11/29/2021	pregabalin 75 mg capsule	30	30	Azamy, Taufiq	[id number]	Insurance	[facility]  11/02/2021	11/03/2021	pregabalin 75 mg capsule	30	30	Azamy, Taufiq	[id number]	Insurance	[facility]  * - Drugs marked with an asterisk are compound drugs. If the compound drug is made up of more than one controlled substance, then each controlled substance will be a separate row in the

## 2022-05-11 NOTE — PROGRESS NOTE ADULT - PROBLEM SELECTOR PLAN 12
Hx of Afib on warfarin 6mg  INR 2.22 today  c/w 6mg warfarin  follow up INR daily  c/w metoprolo 12.5 BID for RVR Hx of severe aortic stenosis  Plan was for him to follow up with Dr. Bush outpatient to address issue however patient went to rehab facility and was admitted back to hospital before being able to do so  caution with IV fluids

## 2022-05-11 NOTE — PROGRESS NOTE ADULT - SUBJECTIVE AND OBJECTIVE BOX
Source of information: , Chart review  Patient language: English  : n/a    CC:  lower back pain     This is a 77yMale patient who presents to the hospital for Patient is a 77y old  Male who presents with a chief complaint of Shortness of breath with hypoglycemia and hypotension with severe low back pain radiating down left leg (09 May 2022 09:12)  .   Pt with chronic lower back pain and left leg pain.  Pain on exertion. Pt is oob to chair, nad.  Pain is present.  + history of copd and spine surgery. + history of dementia but pt is alert and oriented at this time.     PAIN SCORE:  4/10       SCALE USED: (1-10 NRS)      PAST MEDICAL & SURGICAL HISTORY:  HTN (hypertension)      Hyperlipemia      COPD (chronic obstructive pulmonary disease)      DM (diabetes mellitus)      Severe aortic stenosis      Dementia      AF (atrial fibrillation)      Tobacco use disorder      Multiple lung nodules      Status post spinal surgery  lumbar      Encounter for screening colonoscopy      H/O endoscopy          FAMILY HISTORY:  Family history of diabetes mellitus (Sibling)          SOCIAL HISTORY:  [x ] Denies Smoking, Alcohol, or Drug Use    Allergies    No Known Allergies    Intolerances        MEDICATIONS:    MEDICATIONS  (STANDING):  acetaminophen     Tablet .. 1000 milliGRAM(s) Oral every 8 hours  aspirin  chewable 81 milliGRAM(s) Oral daily  atorvastatin 40 milliGRAM(s) Oral at bedtime  budesonide  80 MICROgram(s)/formoterol 4.5 MICROgram(s) Inhaler 2 Puff(s) Inhalation two times a day  cholecalciferol 1000 Unit(s) Oral daily  finasteride 5 milliGRAM(s) Oral daily  folic acid 1 milliGRAM(s) Oral daily  gabapentin 300 milliGRAM(s) Oral three times a day  insulin glargine Injectable (LANTUS) 6 Unit(s) SubCutaneous at bedtime  insulin lispro (ADMELOG) corrective regimen sliding scale   SubCutaneous three times a day before meals  insulin lispro Injectable (ADMELOG) 4 Unit(s) SubCutaneous three times a day before meals  lidocaine   4% Patch 2 Patch Transdermal daily  meclizine 12.5 milliGRAM(s) Oral three times a day  metoprolol tartrate 12.5 milliGRAM(s) Oral two times a day  mirtazapine 15 milliGRAM(s) Oral at bedtime  nicotine -  14 mG/24Hr(s) Patch 1 patch Transdermal daily  pantoprazole    Tablet 40 milliGRAM(s) Oral before breakfast  polyethylene glycol 3350 17 Gram(s) Oral daily  senna 2 Tablet(s) Oral at bedtime  sodium chloride 0.9%. 1000 milliLiter(s) (60 mL/Hr) IV Continuous <Continuous>  tamsulosin 0.4 milliGRAM(s) Oral at bedtime  thiamine 100 milliGRAM(s) Oral daily  tiotropium 18 MICROgram(s) Capsule 1 Capsule(s) Inhalation daily    MEDICATIONS  (PRN):  ALBUTerol    90 MICROgram(s) HFA Inhaler 2 Puff(s) Inhalation every 6 hours PRN Shortness of Breath and/or Wheezing  aluminum hydroxide/magnesium hydroxide/simethicone Suspension 30 milliLiter(s) Oral every 6 hours PRN Dyspepsia  melatonin 3 milliGRAM(s) Oral at bedtime PRN Insomnia  oxyCODONE    IR 2.5 milliGRAM(s) Oral every 4 hours PRN Severe Pain (7 - 10)      Vital Signs Last 24 Hrs  T(C): 37 (11 May 2022 05:34), Max: 37.1 (10 May 2022 21:21)  T(F): 98.6 (11 May 2022 05:34), Max: 98.7 (10 May 2022 21:21)  HR: 92 (11 May 2022 05:34) (62 - 113)  BP: 119/75 (11 May 2022 05:34) (89/45 - 128/65)  BP(mean): --  RR: 18 (11 May 2022 05:34) (18 - 18)  SpO2: 99% (11 May 2022 05:34) (93% - 99%)    LABS:                          7.5    10.18 )-----------( 369      ( 11 May 2022 06:39 )             24.0     05-11    139  |  105  |  26<H>  ----------------------------<  217<H>  4.4   |  29  |  0.86    Ca    8.5      11 May 2022 06:39  Phos  3.4     05-11  Mg     1.6     05-11    TPro  6.3  /  Alb  2.3<L>  /  TBili  0.2  /  DBili  x   /  AST  17  /  ALT  33  /  AlkPhos  88  05-11    PT/INR - ( 11 May 2022 06:39 )   PT: 27.8 sec;   INR: 2.32 ratio           LIVER FUNCTIONS - ( 11 May 2022 06:39 )  Alb: 2.3 g/dL / Pro: 6.3 g/dL / ALK PHOS: 88 U/L / ALT: 33 U/L DA / AST: 17 U/L / GGT: x             CAPILLARY BLOOD GLUCOSE      POCT Blood Glucose.: 223 mg/dL (11 May 2022 08:09)  POCT Blood Glucose.: 183 mg/dL (10 May 2022 21:40)  POCT Blood Glucose.: 272 mg/dL (10 May 2022 17:01)  POCT Blood Glucose.: 186 mg/dL (10 May 2022 12:06)      Radiology:    Drug Screen:        REVIEW OF SYSTEMS:  CONSTITUTIONAL: No fever or fatigue  RESPIRATORY: No cough, wheezing, chills or hemoptysis; No shortness of breath  CARDIOVASCULAR: No chest pain, palpitations, dizziness, or leg swelling  GASTROINTESTINAL: No abdominal or epigastric pain. No nausea, vomiting; No diarrhea or constipation.   GENITOURINARY: No dysuria, frequency, hematuria, retention or incontinence  MUSCULOSKELETAL:  + lower back pain + left leg pain   NEURO: No headaches, No numbness/tingling b/l LE, No weakness  PSYCHIATRIC: No depression, anxiety, mood swings, or difficulty sleeping    PHYSICAL EXAM:  GENERAL:  Alert & Oriented X3, NAD, Good concentration  CHEST/LUNG: decreased breath sounds   HEART: Regular rate and rhythm; No murmurs, rubs, or gallops  ABDOMEN: Soft, Nontender, Nondistended; Bowel sounds present  EXTREMITIES:  2+ Peripheral Pulses, No cyanosis, or edema  MUSCULOSKELETAL: + decreased rom + lumbar spine tenderness.   + left leg pain   SKIN: No rashes or lesions    Risk factors associated with adverse outcomes related to opioid treatment  [ ]  Concurrent benzodiazepine use  [ ]  History/ Active substance use or alcohol use disorder  [ ] Psychiatric co-morbidity  [ ] Sleep apnea  [ ] COPD  [ ] BMI> 35  [ ] Liver dysfunction  [ ] Renal dysfunction  [ ] CHF  [ ] Smoker  [x ]  Age > 60 years    [ x]  [place]  Reviewed and Copied to Chart. See below.    Plan of care and goal oriented pain management treatment options were discussed with patient and /or primary care giver; all questions and concerns were addressed and care was aligned with patient's wishes.    Educated patient on goal oriented pain management treatment options     05-11-22 @ 10:24

## 2022-05-11 NOTE — CONSULT NOTE ADULT - ASSESSMENT
ASSESSMENT/ PLAN:  77 y/o male, ambulatory and AAA0x2-3 at baseline, from [facility], pmhx HTN, CAD, Severe AS, Valvular Afib on Coumadin, COPD, Tobacco Use Disorder, Etoh Abuse, Dementia, and recently noted left lung nodules who was sent to the ED for complaints of sob, [facility] and episode of hematuria, now with 1 episode of painless hematuria, no other voiding complaints  pt was on flomax 0.4mg      repeat u/a, c&s  renal and bladder sono  will follow after above, if evidence of PVR over 200 would consider connolly placement. ASSESSMENT/ PLAN:  77 y/o male from [facility], pmhx HTN, CAD, Severe AS, Valvular Afib on Coumadin, COPD, Dementia, and recently noted left lung nodules who was sent to the ED for complaints of sob, recently discharged from [facility] with retention (resolved) and episode of hematuria, now with 1 episode of painless hematuria, no other voiding complaints  Continue Flomax  labs reviewed  CT images reviewed  repeat u/a, c&s  renal and bladder sono  discussed with PA and house staff  will follow

## 2022-05-11 NOTE — PROGRESS NOTE ADULT - PROBLEM SELECTOR PLAN 9
A1c 10  c/w Lantus 6 units at night and 4 units TID with meals  Dr. Bennett following   d/c amaryl upon d/c On atorvastatin 40mg at home  c/w home dose

## 2022-05-11 NOTE — PROGRESS NOTE ADULT - PROBLEM SELECTOR PLAN 10
hx of COPD requiring BiPAP in past hospitalizations.   c/w home medications albuterol, symbicort, and spiriva A1c 10  c/w Lantus 6 units at night and 4 units TID with meals  Dr. Bennett following   d/c amaryl upon d/c

## 2022-05-11 NOTE — PROGRESS NOTE ADULT - SUBJECTIVE AND OBJECTIVE BOX
Interval Events:  pt c/o back pain    Allergies    No Known Allergies    Intolerances      Endocrine/Metabolic Medications:  atorvastatin 40 milliGRAM(s) Oral at bedtime  finasteride 5 milliGRAM(s) Oral daily  insulin glargine Injectable (LANTUS) 6 Unit(s) SubCutaneous at bedtime  insulin lispro (ADMELOG) corrective regimen sliding scale   SubCutaneous three times a day before meals  insulin lispro Injectable (ADMELOG) 4 Unit(s) SubCutaneous three times a day before meals      Vital Signs Last 24 Hrs  T(C): 37 (11 May 2022 05:34), Max: 37.1 (10 May 2022 21:21)  T(F): 98.6 (11 May 2022 05:34), Max: 98.7 (10 May 2022 21:21)  HR: 92 (11 May 2022 05:34) (62 - 113)  BP: 119/75 (11 May 2022 05:34) (89/45 - 128/65)  BP(mean): --  RR: 18 (11 May 2022 05:34) (18 - 18)  SpO2: 99% (11 May 2022 05:34) (93% - 99%)      PHYSICAL EXAM  All physical exam findings normal, except those marked:  General:	Alert, active, cooperative, NAD, well hydrated  .		[] Abnormal:  Neck		Normal: supple, no cervical adenopathy, no palpable thyroid  .		[] Abnormal:  Cardiovascular	Normal: regular rate, normal S1, S2, no murmurs  .		[] Abnormal:  Respiratory	Normal: no chest wall deformity, normal respiratory pattern, CTA B/L  .		[] Abnormal:  Abdominal	Normal: soft, ND, NT, bowel sounds present, no masses, no organomegaly  .		[] Abnormal:  		Normal normal genitalia, testes descended, circumcised/uncircumcised  .		Rosalie stage:			Breast rosalie:  .		Menstrual history:  .		[] Abnormal:  Extremities	Normal: FROM x4  .		[] Abnormal:  Skin		Normal: intact and not indurated, no rash, no acanthosis nigricans  .		[] Abnormal:  Neurologic	Normal: grossly intact  .		[] Abnormal:    LABS                        7.5    10.18 )-----------( 369      ( 11 May 2022 06:39 )             24.0                               139    |  105    |  26                  Calcium: 8.5   / iCa: x      (05-11 @ 06:39)    ----------------------------<  217       Magnesium: 1.6                              4.4     |  29     |  0.86             Phosphorous: 3.4      TPro  6.3    /  Alb  2.3    /  TBili  0.2    /  DBili  x      /  AST  17     /  ALT  33     /  AlkPhos  88     11 May 2022 06:39    CAPILLARY BLOOD GLUCOSE      POCT Blood Glucose.: 223 mg/dL (11 May 2022 08:09)  POCT Blood Glucose.: 183 mg/dL (10 May 2022 21:40)  POCT Blood Glucose.: 272 mg/dL (10 May 2022 17:01)  POCT Blood Glucose.: 186 mg/dL (10 May 2022 12:06)        Assesment/plan    78 y/o male, ambulatory and AAA0x2-3 at baseline, from [facility], with significant medical history of HTN, CAD, Severe AS, Valvular Afib on Coumadin, COPD, Tobacco Use Disorder, Etoh Abuse, Dementia, and recently noted left lung nodules who was sent to the ED for hypoxia from nursing home  Found to have low cortisol. Pt admits to chronic dizziness (resolves with meclizine ). Unknown if he got steroid injections in his back. Unknown if he takes opiate pain meds ? Hx of dm on metformin and amaryl s/p hypoglycemia.      Problem/Recommendation - 1:  ·  Problem: Acute hypotension.   ·  Recommendation: r/o AI  low am cortisol  ? hx of steroid use or opiate use as out pt  check orthostatic BP now- not done!  ACTH stim testing reviewed- no AI  d/c  solucortef d/w prim team.     Problem/Recommendation - 2:  ·  Problem: Hypoglycemia.   ·  Recommendation: resolved  d/c amaryl upon d/c  cont insulin tx   lantus 6   admelog 4 ac tis  fsg ac and hs  a1c- 10.3%  will likley need out pt insulin tx.     Problem/Recommendation - 3:  ·  Problem: History of chronic pain.   ·  Recommendation: cont tx per pain management.

## 2022-05-11 NOTE — PROGRESS NOTE ADULT - REASON FOR ADMISSION
lower back pain
Shortness of breath with hypoglycemia and hypotension with severe low back pain radiating down left leg

## 2022-05-11 NOTE — PROGRESS NOTE ADULT - PROBLEM SELECTOR PLAN 11
Hx of severe aortic stenosis  Plan was for him to follow up with Dr. Bush outpatient to address issue however patient went to rehab facility and was admitted back to hospital before being able to do so  caution with IV fluids hx of COPD requiring BiPAP in past hospitalizations.   c/w home medications albuterol, symbicort, and spiriva

## 2022-05-11 NOTE — PROGRESS NOTE ADULT - PROBLEM SELECTOR PLAN 7
on metoprolol tartrtate 12.5mg BID  resumed now patient voided a moderate volume of bloody urine today.   Had hematuria last admission after d/c of Sorensen.   INR: 2.32:   Patient is on anticoagulation for valvular afib, but source of hematuria needs to be considered.   Plan:  Urology consultation.

## 2022-05-11 NOTE — PROGRESS NOTE ADULT - SUBJECTIVE AND OBJECTIVE BOX
PGY-1 Progress Note discussed with attending    PAGER #: [[phone]] TILL 5:00 PM  PLEASE CONTACT ON CALL TEAM:  - On Call Team (Please refer to Yahaira) FROM 5:00 PM - 8:30PM  - Nightfloat Team FROM 8:30 -7:30 AM    INTERVAL HPI/ OVERNIGHT EVENTS: No acute events overnight.       REVIEW OF SYSTEMS:  CONSTITUTIONAL: No fever, weight loss, or fatigue  RESPIRATORY: No cough, wheezing, chills or hemoptysis; No shortness of breath  CARDIOVASCULAR: No chest pain, palpitations, dizziness, or leg swelling  GASTROINTESTINAL: No abdominal pain. No nausea, vomiting, or hematemesis; No diarrhea or constipation. No melena or hematochezia.  GENITOURINARY: No dysuria or hematuria, urinary frequency  NEUROLOGICAL: No headaches, memory loss, loss of strength, numbness, or tremors  SKIN: No itching, burning, rashes, or lesions     MEDICATIONS  (STANDING):  acetaminophen     Tablet .. 1000 milliGRAM(s) Oral every 8 hours  aspirin  chewable 81 milliGRAM(s) Oral daily  atorvastatin 40 milliGRAM(s) Oral at bedtime  budesonide  80 MICROgram(s)/formoterol 4.5 MICROgram(s) Inhaler 2 Puff(s) Inhalation two times a day  cholecalciferol 1000 Unit(s) Oral daily  finasteride 5 milliGRAM(s) Oral daily  folic acid 1 milliGRAM(s) Oral daily  gabapentin 300 milliGRAM(s) Oral three times a day  insulin glargine Injectable (LANTUS) 6 Unit(s) SubCutaneous at bedtime  insulin lispro (ADMELOG) corrective regimen sliding scale   SubCutaneous three times a day before meals  insulin lispro Injectable (ADMELOG) 4 Unit(s) SubCutaneous three times a day before meals  lidocaine   4% Patch 2 Patch Transdermal daily  meclizine 12.5 milliGRAM(s) Oral three times a day  metoprolol tartrate 12.5 milliGRAM(s) Oral two times a day  mirtazapine 15 milliGRAM(s) Oral at bedtime  nicotine -  14 mG/24Hr(s) Patch 1 patch Transdermal daily  pantoprazole    Tablet 40 milliGRAM(s) Oral before breakfast  polyethylene glycol 3350 17 Gram(s) Oral daily  senna 2 Tablet(s) Oral at bedtime  sodium chloride 0.9%. 1000 milliLiter(s) (60 mL/Hr) IV Continuous <Continuous>  tamsulosin 0.4 milliGRAM(s) Oral at bedtime  thiamine 100 milliGRAM(s) Oral daily  tiotropium 18 MICROgram(s) Capsule 1 Capsule(s) Inhalation daily    MEDICATIONS  (PRN):  ALBUTerol    90 MICROgram(s) HFA Inhaler 2 Puff(s) Inhalation every 6 hours PRN Shortness of Breath and/or Wheezing  aluminum hydroxide/magnesium hydroxide/simethicone Suspension 30 milliLiter(s) Oral every 6 hours PRN Dyspepsia  melatonin 3 milliGRAM(s) Oral at bedtime PRN Insomnia  oxyCODONE    IR 2.5 milliGRAM(s) Oral every 4 hours PRN Severe Pain (7 - 10)      Vital Signs Last 24 Hrs  T(C): 36.4 (11 May 2022 10:30), Max: 37.1 (10 May 2022 21:21)  T(F): 97.6 (11 May 2022 10:30), Max: 98.7 (10 May 2022 21:21)  HR: 82 (11 May 2022 10:30) (82 - 113)  BP: 104/44 (11 May 2022 10:30) (104/44 - 128/65)  BP(mean): --  RR: 18 (11 May 2022 10:30) (18 - 18)  SpO2: 90% (11 May 2022 10:30) (90% - 99%)    PHYSICAL EXAMINATION:  GENERAL: NAD, AAOx3  HEAD: AT/NC  EYES: conjunctiva and sclera clear  NECK: supple, No JVD noted, Normal thyroid  CHEST/LUNG: CTABL; no rales, rhonchi, wheezing, or rubs  HEART: regular rate and rhythm; no murmurs, rubs, or gallops  ABDOMEN: soft, nontender, nondistended; Bowel sounds present  EXTREMITIES:  2+ Peripheral Pulses, No clubbing, cyanosis, or edema  SKIN: warm dry                          7.5    10.18 )-----------( 369      ( 11 May 2022 06:39 )             24.0     05-11    139  |  105  |  26<H>  ----------------------------<  217<H>  4.4   |  29  |  0.86    Ca    8.5      11 May 2022 06:39  Phos  3.4     05-11  Mg     1.6     05-11    TPro  6.3  /  Alb  2.3<L>  /  TBili  0.2  /  DBili  x   /  AST  17  /  ALT  33  /  AlkPhos  88  05-11    LIVER FUNCTIONS - ( 11 May 2022 06:39 )  Alb: 2.3 g/dL / Pro: 6.3 g/dL / ALK PHOS: 88 U/L / ALT: 33 U/L DA / AST: 17 U/L / GGT: x               PT/INR - ( 11 May 2022 06:39 )   PT: 27.8 sec;   INR: 2.32 ratio           SARS-CoV-2: NotDetec (08 May 2022 12:58)  COVID-19 PCR: NotDetec (04 May 2022 05:12)  COVID-19 PCR: NotDetec (30 Apr 2022 09:41)  COVID-19 PCR: NotDetec (21 Apr 2022 10:49)  COVID-19 PCR: NotDetec (15 Apr 2022 12:32)  COVID-19 PCR: NotDetec (10 Apr 2022 05:45)  SARS-CoV-2: NotDetec (04 Apr 2022 10:27)  COVID-19 PCR: NotDetec (03 Apr 2022 22:44)      CAPILLARY BLOOD GLUCOSE      POCT Blood Glucose.: 355 mg/dL (11 May 2022 11:43)  POCT Blood Glucose.: 223 mg/dL (11 May 2022 08:09)  POCT Blood Glucose.: 183 mg/dL (10 May 2022 21:40)  POCT Blood Glucose.: 272 mg/dL (10 May 2022 17:01)      RADIOLOGY & ADDITIONAL TESTS:

## 2022-05-12 NOTE — PROGRESS NOTE ADULT - ASSESSMENT
78 y/o male, ambulatory and AAA0x2-3 at baseline, from [facility], with significant medical history of HTN, CAD, Severe AS, Valvular Afib on Coumadin, COPD, Tobacco Use Disorder, Etoh Abuse, Dementia, and recently noted left lung nodules who was sent to the ED for complaints of hypoxia, was found to be hypotensive and hypoglycemic, admitted for further management.

## 2022-05-12 NOTE — PROGRESS NOTE ADULT - SUBJECTIVE AND OBJECTIVE BOX
PGY-1 Progress Note discussed with attending    PAGER #: [[phone]] TILL 5:00 PM  PLEASE CONTACT ON CALL TEAM:  - On Call Team (Please refer to Yahaira) FROM 5:00 PM - 8:30PM  - Nightfloat Team FROM 8:30 -7:30 AM    INTERVAL HPI/ OVERNIGHT EVENTS: Patient had episode of hematuria last night 200cc while urinating.     REVIEW OF SYSTEMS:  CONSTITUTIONAL: No fever, weight loss, or fatigue  RESPIRATORY: No cough, wheezing, chills or hemoptysis; No shortness of breath  CARDIOVASCULAR: No chest pain, palpitations, dizziness, or leg swelling  GASTROINTESTINAL: No abdominal pain. No nausea, vomiting, or hematemesis; No diarrhea or constipation. No melena or hematochezia.  GENITOURINARY: No dysuria or hematuria, urinary frequency  NEUROLOGICAL: No headaches, memory loss, loss of strength, numbness, or tremors  SKIN: No itching, burning, rashes, or lesions     MEDICATIONS  (STANDING):  aspirin  chewable 81 milliGRAM(s) Oral daily  atorvastatin 40 milliGRAM(s) Oral at bedtime  budesonide  80 MICROgram(s)/formoterol 4.5 MICROgram(s) Inhaler 2 Puff(s) Inhalation two times a day  cholecalciferol 1000 Unit(s) Oral daily  finasteride 5 milliGRAM(s) Oral daily  folic acid 1 milliGRAM(s) Oral daily  gabapentin 300 milliGRAM(s) Oral three times a day  insulin glargine Injectable (LANTUS) 6 Unit(s) SubCutaneous at bedtime  insulin lispro (ADMELOG) corrective regimen sliding scale   SubCutaneous three times a day before meals  insulin lispro Injectable (ADMELOG) 4 Unit(s) SubCutaneous three times a day before meals  lidocaine   4% Patch 2 Patch Transdermal daily  magnesium sulfate  IVPB 1 Gram(s) IV Intermittent once  meclizine 12.5 milliGRAM(s) Oral three times a day  metoprolol tartrate 12.5 milliGRAM(s) Oral two times a day  mirtazapine 15 milliGRAM(s) Oral at bedtime  nicotine -  14 mG/24Hr(s) Patch 1 patch Transdermal daily  pantoprazole    Tablet 40 milliGRAM(s) Oral before breakfast  polyethylene glycol 3350 17 Gram(s) Oral daily  senna 2 Tablet(s) Oral at bedtime  sodium chloride 0.9%. 1000 milliLiter(s) (60 mL/Hr) IV Continuous <Continuous>  tamsulosin 0.4 milliGRAM(s) Oral at bedtime  thiamine 100 milliGRAM(s) Oral daily  tiotropium 18 MICROgram(s) Capsule 1 Capsule(s) Inhalation daily    MEDICATIONS  (PRN):  ALBUTerol    90 MICROgram(s) HFA Inhaler 2 Puff(s) Inhalation every 6 hours PRN Shortness of Breath and/or Wheezing  aluminum hydroxide/magnesium hydroxide/simethicone Suspension 30 milliLiter(s) Oral every 6 hours PRN Dyspepsia  melatonin 3 milliGRAM(s) Oral at bedtime PRN Insomnia  oxyCODONE    IR 2.5 milliGRAM(s) Oral every 4 hours PRN Severe Pain (7 - 10)      Vital Signs Last 24 Hrs  T(C): 36.3 (12 May 2022 05:06), Max: 36.6 (11 May 2022 20:46)  T(F): 97.3 (12 May 2022 05:06), Max: 97.9 (11 May 2022 20:46)  HR: 67 (12 May 2022 05:06) (67 - 107)  BP: 120/61 (12 May 2022 05:06) (104/44 - 129/65)  BP(mean): --  RR: 18 (12 May 2022 05:06) (17 - 18)  SpO2: 94% (12 May 2022 05:06) (90% - 96%)    PHYSICAL EXAMINATION:  GENERAL: NAD, AAOx3  HEAD: AT/NC  EYES: conjunctiva and sclera clear  NECK: supple, No JVD noted, Normal thyroid  CHEST/LUNG: CTABL; no rales, rhonchi, wheezing, or rubs  HEART: regular rate and rhythm; no murmurs, rubs, or gallops  ABDOMEN: soft, nontender, nondistended; Bowel sounds present  EXTREMITIES:  2+ Peripheral Pulses, No clubbing, cyanosis, or edema  SKIN: warm dry                          7.5    9.17  )-----------( 382      ( 12 May 2022 07:04 )             23.7     05-12    143  |  108  |  28<H>  ----------------------------<  135<H>  3.8   |  31  |  0.80    Ca    8.5      12 May 2022 07:04  Phos  3.5     05-12  Mg     1.4     05-12    TPro  5.7<L>  /  Alb  2.2<L>  /  TBili  0.2  /  DBili  x   /  AST  15  /  ALT  27  /  AlkPhos  66  05-12    LIVER FUNCTIONS - ( 12 May 2022 07:04 )  Alb: 2.2 g/dL / Pro: 5.7 g/dL / ALK PHOS: 66 U/L / ALT: 27 U/L DA / AST: 15 U/L / GGT: x               PT/INR - ( 12 May 2022 07:04 )   PT: 36.5 sec;   INR: 3.03 ratio         PTT - ( 12 May 2022 07:04 )  PTT:33.2 sec  SARS-CoV-2: NotDetec (08 May 2022 12:58)  COVID-19 PCR: NotDetec (04 May 2022 05:12)  COVID-19 PCR: NotDetec (30 Apr 2022 09:41)  COVID-19 PCR: NotDetec (21 Apr 2022 10:49)  COVID-19 PCR: NotDetec (15 Apr 2022 12:32)  COVID-19 PCR: NotDetec (10 Apr 2022 05:45)  SARS-CoV-2: NotDetec (04 Apr 2022 10:27)  COVID-19 PCR: NotDetec (03 Apr 2022 22:44)      CAPILLARY BLOOD GLUCOSE      POCT Blood Glucose.: 129 mg/dL (12 May 2022 08:12)  POCT Blood Glucose.: 156 mg/dL (11 May 2022 21:03)  POCT Blood Glucose.: 235 mg/dL (11 May 2022 16:59)  POCT Blood Glucose.: 355 mg/dL (11 May 2022 11:43)      RADIOLOGY & ADDITIONAL TESTS:

## 2022-05-12 NOTE — CHART NOTE - NSCHARTNOTEFT_GEN_A_CORE
Nurse paged about pRBC that was ordered at 3:19pm    Patient's most recently Hgb is 8.2 at 6:47pm.    Will discontinue pRBC order.

## 2022-05-12 NOTE — PROGRESS NOTE ADULT - PROBLEM SELECTOR PLAN 10
A1c 10  c/w Lantus 6 units at night and 4 units TID with meals  Dr. Bennett following   d/c amaryl upon d/c

## 2022-05-12 NOTE — CHART NOTE - NSCHARTNOTEFT_GEN_A_CORE
Called patients wife (emergency contact) Chavez Darian. Updated her about patients critical condition regarding low blood pressure and low oxygen levels. Explained to her that he is not doing well and there have been 2 rapid responses for him today. Explained to her that we are giving him fluids and lowering his heart rate with medication. She requested to come see him. She states she will be coming now to visit.

## 2022-05-12 NOTE — RAPID RESPONSE TEAM SUMMARY - NSSITUATIONBACKGROUNDRRT_GEN_ALL_CORE
78 y/o male, ambulatory and AAA0x2-3 at baseline, from [facility], with significant medical history of HTN, CAD, Severe AS, Valvular Afib on Coumadin, COPD, Tobacco Use Disorder, Etoh Abuse, Dementia, and recently noted left lung nodules who was sent to the ED for complaints of hypoxia, was found to be hypotensive and hypoglycemic.  Rapid response was called due to rapid afib. HR in 180s. BP 95/54. Pt was gioven 5 iv lopressor with improvement in HR to 110-120s. Pt appears clinically dry and had 4 episodes of diarrhea and 1 episode of vomiting. Giving 500 cc NS bolus in the light of severe AS. Pt reports left sided chest pain. Will order troponin.  76 y/o male, ambulatory and AAA0x2-3 at baseline, from [facility], with significant medical history of HTN, CAD, Severe AS, Valvular Afib on Coumadin, COPD, Tobacco Use Disorder, Etoh Abuse, Dementia, and recently noted left lung nodules who was sent to the ED for complaints of hypoxia, was found to be hypotensive and hypoglycemic.  Rapid response was called due to rapid afib. HR in 180s. BP 95/54. Pt was given 5 iv lopressor with improvement in HR to 110-120s. Pt appears clinically dry and had 4 episodes of diarrhea and 1 episode of vomiting. Giving 500 cc NS bolus in the light of severe AS. Pt reports left sided chest pain. Will order troponin.   Wife was informed over phone. She requested to come and visit the patient.

## 2022-05-12 NOTE — PROGRESS NOTE ADULT - ATTENDING COMMENTS
Patient had an episode of hypotension this AM after going to the bathroom.  He later stated that "everything went black," but that has resolved.   After he was in bed, vital signs were stable.   Because of hypotension and severe aortic stenosis, he was given a fluid bolus.   Later in the day he became extremely agitated, had afib with RVR and hypoxia.   Abdomen was distended.  Sorensen was place, yielding only 15 ml of clear urine.   Vital Signs Last 24 Hrs  T(C): 36.4 (12 May 2022 14:20), Max: 36.4 (12 May 2022 14:20)  T(F): 97.6 (12 May 2022 14:20), Max: 97.6 (12 May 2022 14:20)  HR: 128 (12 May 2022 19:27) (67 - 128)  BP: 103/53 (12 May 2022 19:27) (66/44 - 120/61)  BP(mean): --  RR: 20 (12 May 2022 19:27) (17 - 20)  SpO2: 100% (12 May 2022 19:27) (94% - 100%)  Lungs, no rales  Cor, II/VI systolic murmur, irregular  Abdomen, distended, reducible ventral hernia;  multiple episodes of brown stool.   Neurological, agitated, cooperative, non-focal  lidocaine patches on shoulder and thigh                        8.2    3.13  )-----------( 340      ( 12 May 2022 18:47 )             26.3   05-12    143  |  109<H>  |  26<H>  ----------------------------<  161<H>  4.0   |  30  |  0.83    Ca    8.4      12 May 2022 18:47  Phos  3.8     05-12  Mg     1.4     05-12    TPro  6.4  /  Alb  2.6<L>  /  TBili  0.2  /  DBili  x   /  AST  18  /  ALT  36  /  AlkPhos  100  05-12    urBlood, Urine: Moderate   Red Blood Cell - Urine: 5-10 /HPF   White Blood Cell - Urine: 11-25 /HPF   Epithelial Cells: Few /HPF   Hyaline Casts: 0-2 /LPF   Bacteria: TNTC /HPF     IMP:  Hypoxia          Afib with RVR, metoprolol was given x 2          severe aortic stenosis          lidocaine reaction is likely cause of agitation; MS 1 mg was given x 2  Plan:  MICU has attended and supervised RRT's, recommending fluids, and rate control with Cardizem          Patient's wife was notified of change in condition          Frequent follow up of vital signs.           MICU guidance, appreciated.          blood cultures

## 2022-05-12 NOTE — PROGRESS NOTE ADULT - SUBJECTIVE AND OBJECTIVE BOX
Interval Events:  pt in nad  c/o back pain . Has hematuria now    Allergies    No Known Allergies    Intolerances      Endocrine/Metabolic Medications:  atorvastatin 40 milliGRAM(s) Oral at bedtime  finasteride 5 milliGRAM(s) Oral daily  insulin glargine Injectable (LANTUS) 6 Unit(s) SubCutaneous at bedtime  insulin lispro (ADMELOG) corrective regimen sliding scale   SubCutaneous three times a day before meals  insulin lispro Injectable (ADMELOG) 4 Unit(s) SubCutaneous three times a day before meals      Vital Signs Last 24 Hrs  T(C): 36.3 (12 May 2022 05:06), Max: 36.6 (11 May 2022 20:46)  T(F): 97.3 (12 May 2022 05:06), Max: 97.9 (11 May 2022 20:46)  HR: 67 (12 May 2022 05:06) (67 - 107)  BP: 120/61 (12 May 2022 05:06) (104/44 - 129/65)  BP(mean): --  RR: 18 (12 May 2022 05:06) (17 - 18)  SpO2: 94% (12 May 2022 05:06) (90% - 96%)      PHYSICAL EXAM  All physical exam findings normal, except those marked:  General:	Alert, active, cooperative, NAD, well hydrated  .		[] Abnormal:  Neck		Normal: supple, no cervical adenopathy, no palpable thyroid  .		[] Abnormal:  Cardiovascular	Normal: regular rate, normal S1, S2, no murmurs  .		[] Abnormal:  Respiratory	Normal: no chest wall deformity, normal respiratory pattern, CTA B/L  .		[] Abnormal:  Abdominal	Normal: soft, ND, NT, bowel sounds present, no masses, no organomegaly  .		[] Abnormal:  		Normal normal genitalia, testes descended, circumcised/uncircumcised  .		Rosalie stage:			Breast rosalie:  .		Menstrual history:  .		[] Abnormal:  Extremities	Normal: FROM x4  .		[] Abnormal:  Skin		Normal: intact and not indurated, no rash, no acanthosis nigricans  .		[] Abnormal:  Neurologic	Normal: grossly intact  .		[] Abnormal:    LABS                        7.5    9.17  )-----------( 382      ( 12 May 2022 07:04 )             23.7                               143    |  108    |  28                  Calcium: 8.5   / iCa: x      (05-12 @ 07:04)    ----------------------------<  135       Magnesium: 1.4                              3.8     |  31     |  0.80             Phosphorous: 3.5      TPro  5.7    /  Alb  2.2    /  TBili  0.2    /  DBili  x      /  AST  15     /  ALT  27     /  AlkPhos  66     12 May 2022 07:04    CAPILLARY BLOOD GLUCOSE      POCT Blood Glucose.: 129 mg/dL (12 May 2022 08:12)  POCT Blood Glucose.: 156 mg/dL (11 May 2022 21:03)  POCT Blood Glucose.: 235 mg/dL (11 May 2022 16:59)  POCT Blood Glucose.: 355 mg/dL (11 May 2022 11:43)        Assesment/plan        76 y/o male, ambulatory and AAA0x2-3 at baseline, from [facility], with significant medical history of HTN, CAD, Severe AS, Valvular Afib on Coumadin, COPD, Tobacco Use Disorder, Etoh Abuse, Dementia, and recently noted left lung nodules who was sent to the ED for hypoxia from nursing home  Found to have low cortisol. Pt admits to chronic dizziness (resolves with meclizine ). Unknown if he got steroid injections in his back. Unknown if he takes opiate pain meds ? Hx of dm on metformin and amaryl s/p hypoglycemia.      Problem/Recommendation - 1:  ·  Problem: Acute hypotension.   ·  Recommendation: r/o AI  low am cortisol  ? hx of steroid use or opiate use as out pt  check orthostatic BP now- not done!  ACTH stim testing reviewed- no AI  d/c  solucortef d/w prim team.     Problem/Recommendation - 2:  ·  Problem: Hypoglycemia.   ·  Recommendation: resolved  d/c amaryl upon d/c  cont insulin tx   lantus 6   admelog 4 ac tis  fsg ac and hs  a1c- 10.3%  will likley need out pt insulin tx.     Problem/Recommendation - 3:  ·  Problem: History of chronic pain.   ·  Recommendation: cont tx per pain management.    Hematuria- urology consult noted

## 2022-05-12 NOTE — PROGRESS NOTE ADULT - PROBLEM SELECTOR PLAN 7
patient voided a moderate volume of bloody urine today.   Had hematuria last admission after d/c of Sorensen.   INR: 2.32:   Patient is on anticoagulation for valvular afib, but source of hematuria needs to be considered.   Plan:  Urology consultation, recommending KUB ultrasound  f/u results  no hematuria today   H/H stable

## 2022-05-12 NOTE — RAPID RESPONSE TEAM SUMMARY - NSSITUATIONBACKGROUNDRRT_GEN_ALL_CORE
78 y/o male, ambulatory and AAA0x2-3 at baseline, from [facility], with significant medical history of HTN, CAD, Severe AS, Valvular Afib on Coumadin, COPD, Tobacco Use Disorder, Etoh Abuse, Dementia, and recently noted left lung nodules who was sent to the ED for complaints of hypoxia, was found to be hypotensive and hypoglycemic.   Rapid response was called due to hypotension. Pt felt dizzy after returning from washroom and BP noted to be 60/40. After few minutes BP improved to 95/50 without any intervention. Likely orthostatic hypotension. Pt got 500 cc NS bolus. Pt reports improvement in dizziness.

## 2022-05-12 NOTE — RAPID RESPONSE TEAM SUMMARY - NSSITUATIONBACKGROUNDRRT_GEN_ALL_CORE
78 y/o male, ambulatory and AAA0x2-3 at baseline, from [facility], with significant medical history of HTN, CAD, Severe AS, Valvular Afib on Coumadin, COPD, Tobacco Use Disorder, Etoh Abuse, Dementia, and recently noted left lung nodules who was sent to the ED for complaints of hypoxia, was found to be hypotensive and hypoglycemic.  Rapid response was called due to hypoxia. Pt was desaturating to low 80s but it was not a good waveform. Pt appeared uncomfortable and in distress due to leg pain. Pt also tachycardic to 140s. Pt got morphine 2 mg for leg pain and lopressor 5 iv push. Sorensen catheter was placed due concern for urine retention but only 15 cc came out. Pt had 3 episodes of diarrhea and 1 episode of vomiting. Bp 119/60, , saturating 96% on 3L NC. Pt reports improvement in pain after morphine.   EKG showed sinus tachycardia. Labs ordered. Pt placed on telemetry    78 y/o male, ambulatory and AAA0x2-3 at baseline, from [facility], with significant medical history of HTN, CAD, Severe AS, Valvular Afib on Coumadin, COPD, Tobacco Use Disorder, Etoh Abuse, Dementia, and recently noted left lung nodules who was sent to the ED for complaints of hypoxia, was found to be hypotensive and hypoglycemic.  Rapid response was called due to hypoxia. Pt was desaturating to low 80s but it was not a good waveform. Pt appeared uncomfortable and in distress due to leg pain. Pt also tachycardic to 140s. Pt got morphine 2 mg for leg pain and lopressor 5 iv push. Sorensen catheter was placed due concern for urine retention but only 15 cc came out. Pt had 3 episodes of diarrhea and 1 episode of vomiting. Bp 119/60, , saturating 96% on 3L NC. Pt reports improvement in pain after morphine.   EKG showed sinus tachycardia. CXR is clear. Labs ordered. Pt placed on telemetry.  Off note, pt was rapid response earlier today due to hypotension and got 500 cc bolus with improvement in BP.

## 2022-05-12 NOTE — PROGRESS NOTE ADULT - SUBJECTIVE AND OBJECTIVE BOX
C A R D I O L O G Y  **********************************     DATE OF SERVICE: 05-12-22    Patient denies chest pain or shortness of breath.   Review of symptoms otherwise negative.    ALBUTerol    90 MICROgram(s) HFA Inhaler 2 Puff(s) Inhalation every 6 hours PRN  aluminum hydroxide/magnesium hydroxide/simethicone Suspension 30 milliLiter(s) Oral every 6 hours PRN  aspirin  chewable 81 milliGRAM(s) Oral daily  atorvastatin 40 milliGRAM(s) Oral at bedtime  budesonide  80 MICROgram(s)/formoterol 4.5 MICROgram(s) Inhaler 2 Puff(s) Inhalation two times a day  cholecalciferol 1000 Unit(s) Oral daily  finasteride 5 milliGRAM(s) Oral daily  folic acid 1 milliGRAM(s) Oral daily  gabapentin 300 milliGRAM(s) Oral three times a day  insulin glargine Injectable (LANTUS) 6 Unit(s) SubCutaneous at bedtime  insulin lispro (ADMELOG) corrective regimen sliding scale   SubCutaneous three times a day before meals  insulin lispro Injectable (ADMELOG) 4 Unit(s) SubCutaneous three times a day before meals  lidocaine   4% Patch 2 Patch Transdermal daily  magnesium sulfate  IVPB 1 Gram(s) IV Intermittent once  meclizine 12.5 milliGRAM(s) Oral three times a day  melatonin 3 milliGRAM(s) Oral at bedtime PRN  metoprolol tartrate 12.5 milliGRAM(s) Oral two times a day  mirtazapine 15 milliGRAM(s) Oral at bedtime  nicotine -  14 mG/24Hr(s) Patch 1 patch Transdermal daily  oxyCODONE    IR 2.5 milliGRAM(s) Oral every 4 hours PRN  pantoprazole    Tablet 40 milliGRAM(s) Oral before breakfast  polyethylene glycol 3350 17 Gram(s) Oral daily  senna 2 Tablet(s) Oral at bedtime  sodium chloride 0.9%. 1000 milliLiter(s) IV Continuous <Continuous>  tamsulosin 0.4 milliGRAM(s) Oral at bedtime  thiamine 100 milliGRAM(s) Oral daily  tiotropium 18 MICROgram(s) Capsule 1 Capsule(s) Inhalation daily  warfarin 6 milliGRAM(s) Oral once                            7.5    9.17  )-----------( 382      ( 12 May 2022 07:04 )             23.7       Hemoglobin: 7.5 g/dL (05-12 @ 07:04)  Hemoglobin: 7.3 g/dL (05-11 @ 14:47)  Hemoglobin: 7.5 g/dL (05-11 @ 06:39)  Hemoglobin: 7.2 g/dL (05-10 @ 15:08)  Hemoglobin: 7.2 g/dL (05-10 @ 06:21)      05-12    143  |  108  |  28<H>  ----------------------------<  135<H>  3.8   |  31  |  0.80    Ca    8.5      12 May 2022 07:04  Phos  3.5     05-12  Mg     1.4     05-12    TPro  5.7<L>  /  Alb  2.2<L>  /  TBili  0.2  /  DBili  x   /  AST  15  /  ALT  27  /  AlkPhos  66  05-12    Creatinine Trend: 0.80<--, 0.86<--, 0.87<--, 1.02<--, 1.12<--, 1.40<--    COAGS: PT/INR - ( 12 May 2022 07:04 )   PT: 36.5 sec;   INR: 3.03 ratio         PTT - ( 12 May 2022 07:04 )  PTT:33.2 sec          T(C): 36.3 (05-12-22 @ 05:06), Max: 36.6 (05-11-22 @ 20:46)  HR: 67 (05-12-22 @ 05:06) (67 - 107)  BP: 120/61 (05-12-22 @ 05:06) (115/50 - 129/65)  RR: 18 (05-12-22 @ 05:06) (17 - 18)  SpO2: 94% (05-12-22 @ 05:06) (94% - 96%)  Wt(kg): --    I&O's Summary    Gen: Appears well in NAD  HEENT:  (-)icterus (-)pallor  CV: N S1 S2 1/6 FELICITA (+)2 Pulses B/l  Resp:  Clear to ausculatation B/L, normal effort  GI: (+) BS Soft, NT, ND  Lymph:  (-)Edema, (-)obvious lymphadenopathy  Skin: Warm to touch, Normal turgor  Psych: Appropriate mood and affect      TELEMETRY: 	  Sinus         ASSESSMENT/PLAN: 	77y  Male AAA0x2-3 at baseline, from [facility], with significant medical history of HTN, CAD, Severe AS, moderate to severe MS Valvular Afib on Coumadin, COPD, Tobacco Use Disorder, Etoh Abuse, Dementia, and recently noted left lung nodules, recent admission for UTI who was sent to the ED for hypoxia from nursing home.    - No clinical CHF  -  Will need oupt CTS eval once optimized  - check orthostatic BP  - Urology f/u regarding hematuria  - Pt is DNR  - No need for further inpatient cardiac work up.    Casper Bush MD, [facility]  BEEPER [phone]

## 2022-05-13 NOTE — CONSULT NOTE ADULT - PROBLEM SELECTOR RECOMMENDATION 4
-urology following for episodes hematuria  -connolly placed draining clear urine  -hgb dropped to 6.6 s/p transfusion

## 2022-05-13 NOTE — PROGRESS NOTE ADULT - ASSESSMENT
76 y/o male, ambulatory and AAA0x2-3 at baseline, from [facility], with significant medical history of HTN, CAD, Severe AS, Valvular Afib on Coumadin, COPD, Tobacco Use Disorder, Etoh Abuse, Dementia, and recently noted left lung nodules who was sent to the ED for complaints of hypoxia, was found to be hypotensive and hypoglycemic, admitted for further management.

## 2022-05-13 NOTE — PROGRESS NOTE ADULT - ATTENDING COMMENTS
Patient had an episode of hypotension yesterday after going to the bathroom.  He later stated that "everything went black," but that has resolved.   After he was in bed, vital signs were stable.   Because of hypotension and severe aortic stenosis, he was given a fluid bolus.   Later in the day he became extremely agitated, had afib with RVR and hypoxia.   Abdomen was distended.  Sorensen was placed, yielding only 15 ml of clear urine.   Vital Signs Last 24 Hrs  T(C): 37.1 (13 May 2022 13:15), Max: 38 (13 May 2022 00:40)  T(F): 98.7 (13 May 2022 13:15), Max: 100.4 (13 May 2022 00:40)  HR: 92 (13 May 2022 13:15) (90 - 128)  BP: 85/48 (13 May 2022 13:15) (67/37 - 103/53)  BP(mean): --  RR: 18 (13 May 2022 13:15) (17 - 20)  SpO2: 95% (13 May 2022 13:15) (95% - 100%)  Lungs, no rales  Cor, II/VI systolic murmur, irregular  Abdomen, soft  Neurological, agitated, cooperative, non-focal                          8.2    31.04 )-----------( 298      ( 13 May 2022 14:08 ) after one unit PRBC             25.5            05-13    144  |  113<H>  |  32<H>  ----------------------------<  158<H>  4.1   |  28  |  1.05    Ca    7.4<L>      13 May 2022 07:08  Phos  4.2     05-13  Mg     1.8     05-13    TPro  5.2<L>  /  Alb  1.7<L>  /  TBili  0.3  /  DBili  x   /  AST  245<H>  /  ALT  269<H>  /  AlkPhos  128<H>  05-13    urBlood, Urine: Moderate   Red Blood Cell - Urine: 5-10 /HPF   White Blood Cell - Urine: 11-25 /HPF   Epithelial Cells: Few /HPF   Hyaline Casts: 0-2 /LPF   Bacteria: TNTC /HPF     gPOCT Blood Glucose.: 180 mg/dL (05.13.22 @ 16:59)   POCT Blood Glucose.: 128 mg/dL (05.13.22 @ 11:32)   POCT Blood Glucose.: 172 mg/dL (05.13.22 @ 07:54)   POCT Blood Glucose.: 176 mg/dL (05.13.22 @ 02:03)     < from: Xray Chest 1 View-PORTABLE IMMEDIATE (Xray Chest 1 View-PORTABLE IMMEDIATE .) (05.13.22 @ 01:41) >    Heart/Vascular: Stable and unremarkable cardiacand mediastinal   silhouette.  Pulmonary: Ill-defined haziness lower lung zones bilaterally. No sizable   pleural effusion.  Bones: Degenerative arthritis right shoulder.    Impression:  Ill-defined haziness lower lung zones bilaterally.    < end of copied text >  < from: CT Lumbar Spine No Cont (05.08.22 @ 18:53) >    Small cyst right kidney. Hyperdensities noted in the kidneys which may be   related to medullary kidney disease in the correct clinical setting    IMPRESSION:  No acute fracture. Multilevel degenerative changes. MRI exam recommended   for better evaluation    < end of copied text >      IMP:  Hypoxia, improved with control of RVR          Afib with RVR, metoprolol was given x 2, now controlled          severe aortic stenosis          elevated transaminases, likely secondary to shock liver after yesterday's hypotensive event          CODY, possible ATN after episode of hypotension          Leukocytosis          acute agitation has resolved          DM          Pain in lumbar spine and left thigh continue to be a problem--discussed with pain management          Severe protein-calorie malnutrition secondary to prolonged illness (cardiac cachexia)  Plan:  blood cultures           Empirical Rx vancomycin x1, in view of changing renal function           continue ceftriaxone           continue telemetry           discontinue lidocaine patches           trial of decadron 2 mg q 12 hours for radicular pain.           Continue FSBS and insulin Rx Patient had an episode of hypotension yesterday after going to the bathroom.  He later stated that "everything went black," but that has resolved.   After he was in bed, vital signs were stable.   Because of hypotension and severe aortic stenosis, he was given a fluid bolus.   Later in the day he became extremely agitated, had afib with RVR and hypoxia.   Abdomen was distended.  Sorensen was placed, yielding only 15 ml of clear urine.   Vital Signs Last 24 Hrs  T(C): 37.1 (13 May 2022 13:15), Max: 38 (13 May 2022 00:40)  T(F): 98.7 (13 May 2022 13:15), Max: 100.4 (13 May 2022 00:40)  HR: 92 (13 May 2022 13:15) (90 - 128)  BP: 85/48 (13 May 2022 13:15) (67/37 - 103/53)  BP(mean): --  RR: 18 (13 May 2022 13:15) (17 - 20)  SpO2: 95% (13 May 2022 13:15) (95% - 100%)  Lungs, no rales  Cor, II/VI systolic murmur, irregular  Abdomen, soft  Neurological, agitated, cooperative, non-focal               8.2    31.04 )-----------( 298      ( 13 May 2022 14:08 ) after one unit PRBC             25.5            05-13    144  |  113<H>  |  32<H>  ----------------------------<  158<H>  4.1   |  28  |  1.05    Ca    7.4<L>      13 May 2022 07:08  Phos  4.2     05-13  Mg     1.8     05-13    TPro  5.2<L>  /  Alb  1.7<L>  /  TBili  0.3  /  DBili  x   /  AST  245<H>  /  ALT  269<H>  /  AlkPhos  128<H>  05-13    INR: 3.98:     urBlood, Urine: Moderate   Red Blood Cell - Urine: 5-10 /HPF   White Blood Cell - Urine: 11-25 /HPF   Epithelial Cells: Few /HPF   Hyaline Casts: 0-2 /LPF   Bacteria: TNTC /HPF     gPOCT Blood Glucose.: 180 mg/dL (05.13.22 @ 16:59)   POCT Blood Glucose.: 128 mg/dL (05.13.22 @ 11:32)   POCT Blood Glucose.: 172 mg/dL (05.13.22 @ 07:54)   POCT Blood Glucose.: 176 mg/dL (05.13.22 @ 02:03)     < from: Xray Chest 1 View-PORTABLE IMMEDIATE (Xray Chest 1 View-PORTABLE IMMEDIATE .) (05.13.22 @ 01:41) >    Heart/Vascular: Stable and unremarkable cardiacand mediastinal   silhouette.  Pulmonary: Ill-defined haziness lower lung zones bilaterally. No sizable   pleural effusion.  Bones: Degenerative arthritis right shoulder.    Impression:  Ill-defined haziness lower lung zones bilaterally.    < end of copied text >  < from: CT Lumbar Spine No Cont (05.08.22 @ 18:53) >    Small cyst right kidney. Hyperdensities noted in the kidneys which may be   related to medullary kidney disease in the correct clinical setting    IMPRESSION:  No acute fracture. Multilevel degenerative changes. MRI exam recommended   for better evaluation    < end of copied text >      IMP:  Hypoxia, improved with control of RVR          Afib with RVR, metoprolol was given x 2, now controlled          severe aortic stenosis          elevated transaminases, likely secondary to shock liver after yesterday's hypotensive event          CODY, possible ATN after episode of hypotension          Leukocytosis          acute agitation has resolved          DM          Pain in lumbar spine and left thigh continue to be a problem--discussed with pain management          Severe protein-calorie malnutrition secondary to prolonged illness (cardiac cachexia)  Plan:  blood cultures           Empirical Rx vancomycin x1, in view of changing renal function           continue ceftriaxone           continue telemetry           discontinue lidocaine patches           trial of decadron 2 mg q 12 hours for radicular pain.           Continue FSBS and insulin Rx

## 2022-05-13 NOTE — CONSULT NOTE ADULT - SUBJECTIVE AND OBJECTIVE BOX
Consult to: Discuss complex medical decision making related to goals of care    [facility] Geriatric and Palliative Consult Service:  Dr. Sima Benito: cell ([phone])  Dr. Garima Chance: cell ([phone])   Sigrid Barry NP: cell ([phone])  Melissa Torres NP: cell ([phone])   Edward Askew LSW: cell ([phone])     HPI:  76 y/o male, ambulatory and AAA0x2-3 at baseline, from [facility], with significant medical history of HTN, CAD, Severe AS, Valvular Afib on Coumadin, COPD, Tobacco Use Disorder, Etoh Abuse, Dementia, and recently noted left lung nodules who was sent to the ED for hypoxia from nursing home. In ED patient was found to be hypoxic in the 80s on RA and was placed on 15L NRB. Patient was also found to be hypotensive in the 70s systolic and was given 1 L of NS in ED. He was also found to be hypoglycemic (glucose 32) and was given D50 in ED. Patient also complaining of severe left hip pain that radiates down to his leg and b/l shoulder pain. Of note, patient was recently admitted two times at [facility]. First admission was for hypoxic resp failure that required ICU care and second admission was for urinary retention. Patient was discharged on 22 and plan was to follow up with cardiologist to address his severe aortic stenosis. Patient was planning on meeting with orthopedic surgeon for potential surgery for his shoulder pains. He denies any fevers, cough, chest pain, SOB, n/v, diarrhea, constipation, and rashes.  In ED VS: BP 77/43, T 98, RR 15, Spo2 100% on 15L NRB  WBC 12.08, Hb 8.7, glucose 39  (08 May 2022 16:21)    Interval History: Remains weak and had rapid responses called for hypoxia, hypotension and hypoglycemia. Seen at the bedside, blood transfusion in progress. Appearing restless and a/ox1, verbally responsive with confusion.       PAST MEDICAL & SURGICAL HISTORY:  HTN (hypertension)      Hyperlipemia      COPD (chronic obstructive pulmonary disease)      DM (diabetes mellitus)      Severe aortic stenosis      Dementia      AF (atrial fibrillation)      Tobacco use disorder      Multiple lung nodules      Status post spinal surgery  lumbar      Encounter for screening colonoscopy      H/O endoscopy          SOCIAL HISTORY:    Admitted from:         [facility]         [ none ] Substance abuse, [ none ] Tobacco hx, [ none ] Alcohol hx, [ none ] Home Opioid Hx    FAMILY HISTORY:  Family history of diabetes mellitus (Sibling)     unable to obtain from patient due to poor mentation, family unable to give information, see H&P for history  Baseline ADLs (prior to admission): Was independent in ADLs until 4/3/22 when he became weak/difficulty ambulating and admitted here. Was then DC to [facility] but continues to decline, dependent in ADLs and difficulty ambulating/using WC.     Allergies    No Known Allergies    Intolerances      Present Symptoms:   Pain:  Fatigue:  Nausea:  Lack of Appetite:   SOB:  Depression:  Anxiety:  Review of Systems:  Unable to obtain due to poor mentation    MEDICATIONS  (STANDING):  aspirin  chewable 81 milliGRAM(s) Oral daily  atorvastatin 40 milliGRAM(s) Oral at bedtime  budesonide  80 MICROgram(s)/formoterol 4.5 MICROgram(s) Inhaler 2 Puff(s) Inhalation two times a day  cholecalciferol 1000 Unit(s) Oral daily  finasteride 5 milliGRAM(s) Oral daily  folic acid 1 milliGRAM(s) Oral daily  gabapentin 300 milliGRAM(s) Oral three times a day  insulin glargine Injectable (LANTUS) 6 Unit(s) SubCutaneous at bedtime  insulin lispro (ADMELOG) corrective regimen sliding scale   SubCutaneous three times a day before meals  insulin lispro Injectable (ADMELOG) 4 Unit(s) SubCutaneous three times a day before meals  lidocaine   4% Patch 2 Patch Transdermal daily  meclizine 12.5 milliGRAM(s) Oral three times a day  mirtazapine 15 milliGRAM(s) Oral at bedtime  nicotine -  14 mG/24Hr(s) Patch 1 patch Transdermal daily  pantoprazole    Tablet 40 milliGRAM(s) Oral before breakfast  sodium chloride 0.9%. 1000 milliLiter(s) (60 mL/Hr) IV Continuous <Continuous>  thiamine 100 milliGRAM(s) Oral daily  tiotropium 18 MICROgram(s) Capsule 1 Capsule(s) Inhalation daily    MEDICATIONS  (PRN):  ALBUTerol    90 MICROgram(s) HFA Inhaler 2 Puff(s) Inhalation every 6 hours PRN Shortness of Breath and/or Wheezing  aluminum hydroxide/magnesium hydroxide/simethicone Suspension 30 milliLiter(s) Oral every 6 hours PRN Dyspepsia  melatonin 3 milliGRAM(s) Oral at bedtime PRN Insomnia  midodrine. 5 milliGRAM(s) Oral once PRN if SBP <70  oxyCODONE    IR 2.5 milliGRAM(s) Oral every 4 hours PRN Severe Pain (7 - 10)      PHYSICAL EXAM:  Vital Signs Last 24 Hrs  T(C): 36.3 (13 May 2022 08:05), Max: 38 (13 May 2022 00:40)  T(F): 97.3 (13 May 2022 08:05), Max: 100.4 (13 May 2022 00:40)  HR: 90 (13 May 2022 08:05) (87 - 150)  BP: 97/58 (13 May 2022 08:05) (67/37 - 144/83)  BP(mean): --  RR: 18 (13 May 2022 08:05) (17 - 24)  SpO2: 100% (13 May 2022 08:05) (80% - 100%)    General: alert  oriented x 1    lethargic frail minimal verbal response    Palliative Performance Scale/Karnofsky Score: 30%    HEENT: no abnormal lesion, dry mouth    Lungs: tachypnea/labored breathing, audible excessive secretions  CV: RRR, S1S2, tachycardia  GI: soft mildly distended non tender  incontinent  : connolly  Musculoskeletal: weakness x4 no edema   ambulatory with assistance   mostly bedbound  Skin: no abnormal skin lesions, poor skin turgor, pressure ulcer stage:   Neuro: no deficits, cognitive impairment  Oral intake ability:  full capability    LABS:                        6.6    31.38 )-----------( 278      ( 13 May 2022 07:08 )             21.3     05-13    144  |  113<H>  |  32<H>  ----------------------------<  158<H>  4.1   |  28  |  1.05    Ca    7.4<L>      13 May 2022 07:08  Phos  4.2     05-13  Mg     1.8     -    TPro  5.2<L>  /  Alb  1.7<L>  /  TBili  0.3  /  DBili  x   /  AST  245<H>  /  ALT  269<H>  /  AlkPhos  128<H>  05-13    Urinalysis Basic - ( 13 May 2022 00:15 )    Color: Yellow / Appearance: Clear / S.010 / pH: x  Gluc: x / Ketone: Negative  / Bili: Negative / Urobili: Negative   Blood: x / Protein: 30 mg/dL / Nitrite: Positive   Leuk Esterase: Moderate / RBC: 25-50 /HPF / WBC 11-25 /HPF   Sq Epi: x / Non Sq Epi: Few /HPF / Bacteria: Moderate /HPF        RADIOLOGY & ADDITIONAL STUDIES:

## 2022-05-13 NOTE — PROGRESS NOTE ADULT - SUBJECTIVE AND OBJECTIVE BOX
Interval Events:  pt in nad  s/p RRT with hypotension    Allergies    No Known Allergies    Intolerances      Endocrine/Metabolic Medications:  atorvastatin 40 milliGRAM(s) Oral at bedtime  finasteride 5 milliGRAM(s) Oral daily  finasteride 5 milliGRAM(s) Oral daily  insulin lispro (ADMELOG) corrective regimen sliding scale   SubCutaneous three times a day before meals  insulin lispro Injectable (ADMELOG) 4 Unit(s) SubCutaneous three times a day before meals      Vital Signs Last 24 Hrs  T(C): 37.1 (13 May 2022 13:15), Max: 38 (13 May 2022 00:40)  T(F): 98.7 (13 May 2022 13:15), Max: 100.4 (13 May 2022 00:40)  HR: 92 (13 May 2022 13:15) (87 - 150)  BP: 85/48 (13 May 2022 13:15) (67/37 - 144/83)  BP(mean): --  RR: 18 (13 May 2022 13:15) (17 - 24)  SpO2: 95% (13 May 2022 13:15) (80% - 100%)      PHYSICAL EXAM  All physical exam findings normal, except those marked:  General:	Alert, active, cooperative, NAD, well hydrated  .		[] Abnormal:  Neck		Normal: supple, no cervical adenopathy, no palpable thyroid  .		[] Abnormal:  Cardiovascular	Normal: regular rate, normal S1, S2, no murmurs  .		[] Abnormal:  Respiratory	Normal: no chest wall deformity, normal respiratory pattern, CTA B/L  .		[] Abnormal:  Abdominal	Normal: soft, ND, NT, bowel sounds present, no masses, no organomegaly  .		[] Abnormal:  		Normal normal genitalia, testes descended, circumcised/uncircumcised  .		Rosalie stage:			Breast rosalie:  .		Menstrual history:  .		[] Abnormal:  Extremities	Normal: FROM x4  .		[] Abnormal:  Skin		Normal: intact and not indurated, no rash, no acanthosis nigricans  .		[] Abnormal:  Neurologic	Normal: grossly intact  .		[] Abnormal:    LABS                        6.6    31.38 )-----------( 278      ( 13 May 2022 07:08 )             21.3                               144    |  113    |  32                  Calcium: 7.4   / iCa: x      (05-13 @ 07:08)    ----------------------------<  158       Magnesium: 1.8                              4.1     |  28     |  1.05             Phosphorous: 4.2      TPro  5.2    /  Alb  1.7    /  TBili  0.3    /  DBili  x      /  AST  245    /  ALT  269    /  AlkPhos  128    13 May 2022 07:08    CAPILLARY BLOOD GLUCOSE      POCT Blood Glucose.: 128 mg/dL (13 May 2022 11:32)  POCT Blood Glucose.: 172 mg/dL (13 May 2022 07:54)  POCT Blood Glucose.: 176 mg/dL (13 May 2022 02:03)  POCT Blood Glucose.: 130 mg/dL (12 May 2022 22:37)  POCT Blood Glucose.: 135 mg/dL (12 May 2022 20:31)  POCT Blood Glucose.: 290 mg/dL (12 May 2022 16:46)        Assesment/plan    76 y/o male, ambulatory and AAA0x2-3 at baseline, from [facility], with significant medical history of HTN, CAD, Severe AS, Valvular Afib on Coumadin, COPD, Tobacco Use Disorder, Etoh Abuse, Dementia, and recently noted left lung nodules who was sent to the ED for hypoxia from nursing home  Found to have low cortisol. Pt admits to chronic dizziness (resolves with meclizine ). Unknown if he got steroid injections in his back. Unknown if he takes opiate pain meds ? Hx of dm on metformin and amaryl s/p hypoglycemia.      Problem/Recommendation - 1:  ·  Problem: Acute hypotension.   ·  Recommendation: r/o AI  low am cortisol  ACTH stim testing reviewed- no AI       Problem/Recommendation - 2:  ·  Problem: Hypoglycemia.   ·  Recommendation: resolved  d/c amaryl upon d/c  cont insulin tx   off of lantus 6   admelog 4 ac tid- not recieving  hold all standing dose insulin for now  fsg ac and hs  a1c- 10.3%  will likley need out pt insulin tx.     Problem/Recommendation - 3:  ·  Problem: History of chronic pain.   ·  Recommendation: cont tx per pain management.    Hematuria- urology consult noted

## 2022-05-13 NOTE — CONSULT NOTE ADULT - NS ATTEND AMEND GEN_ALL_CORE FT
Patient seen/examined. Agree with above and edited as appropriate.
77 y M w multiple comorbidities incl mod dementia, severe AS, COPD, debility, adm from NH for hypoxia, poss asp PNA. Continued clinical decline during this hospitalization, s/p RRT x3 yesterday 2/2 hypotension, anemia, s/p prbc. Pt w chronic LBP radiating to shoulders, c/o leg pains. Continue pain control, alleviated w pain meds. Wife agreeable for patient to return to  w palliative care, no aggressive measures, ok to continue conservative medical measures for now. Palliative will follow. DNR/DNI/DNH, no feeding tubes.

## 2022-05-13 NOTE — PROGRESS NOTE ADULT - SUBJECTIVE AND OBJECTIVE BOX
Interval events:   febrile 100.4, tachycardic and hypotensive overnight   connolly placed    OBJECTIVE:  Vital Signs Last 24 Hrs  T(C): 36.9 (13 May 2022 04:48), Max: 38 (13 May 2022 00:40)  T(F): 98.4 (13 May 2022 04:48), Max: 100.4 (13 May 2022 00:40)  HR: 106 (13 May 2022 04:48) (67 - 150)  BP: 94/53 (13 May 2022 04:48) (60/36 - 144/83)  BP(mean): --  RR: 20 (13 May 2022 04:48) (17 - 24)  SpO2: 100% (13 May 2022 04:48) (80% - 100%)    Physical Examination:  GEN: NAD, resting quietly  PULM: symmetric chest rise bilaterally, no increased WOB  ABD: soft  : connolly secured       LABS:                        8.2    3.13  )-----------( 340      ( 12 May 2022 18:47 )             26.3       05-12    143  |  109<H>  |  26<H>  ----------------------------<  161<H>  4.0   |  30  |  0.83    Ca    8.4      12 May 2022 18:47  Phos  3.8     05-12  Mg     1.4     05-12    TPro  6.4  /  Alb  2.6<L>  /  TBili  0.2  /  DBili  x   /  AST  18  /  ALT  36  /  AlkPhos  100  05-12           Interval events:   febrile 100.4, tachycardic and hypotensive overnight   connolly placed urine clear yellow     OBJECTIVE:  Vital Signs Last 24 Hrs  T(C): 36.9 (13 May 2022 04:48), Max: 38 (13 May 2022 00:40)  T(F): 98.4 (13 May 2022 04:48), Max: 100.4 (13 May 2022 00:40)  HR: 106 (13 May 2022 04:48) (67 - 150)  BP: 94/53 (13 May 2022 04:48) (60/36 - 144/83)  BP(mean): --  RR: 20 (13 May 2022 04:48) (17 - 24)  SpO2: 100% (13 May 2022 04:48) (80% - 100%)    Physical Examination:  GEN: NAD, resting quietly  PULM: symmetric chest rise bilaterally, no increased WOB  ABD: soft  : connolly in place, urine yellow       LABS:                        8.2    3.13  )-----------( 340      ( 12 May 2022 18:47 )             26.3       05-12    143  |  109<H>  |  26<H>  ----------------------------<  161<H>  4.0   |  30  |  0.83    Ca    8.4      12 May 2022 18:47  Phos  3.8     05-12  Mg     1.4     05-12    TPro  6.4  /  Alb  2.6<L>  /  TBili  0.2  /  DBili  x   /  AST  18  /  ALT  36  /  AlkPhos  100  05-12             Interval events:   febrile 100.4, tachycardic and hypotensive overnight   connolly placed urine clear yellow. WBC 31    OBJECTIVE:  Vital Signs Last 24 Hrs  T(C): 36.9 (13 May 2022 04:48), Max: 38 (13 May 2022 00:40)  T(F): 98.4 (13 May 2022 04:48), Max: 100.4 (13 May 2022 00:40)  HR: 106 (13 May 2022 04:48) (67 - 150)  BP: 94/53 (13 May 2022 04:48) (60/36 - 144/83)  BP(mean): --  RR: 20 (13 May 2022 04:48) (17 - 24)  SpO2: 100% (13 May 2022 04:48) (80% - 100%)    Physical Examination:  GEN: lethargic  PULM: symmetric chest rise bilaterally, no increased WOB  ABD: soft  : connolly in place, urine clear yellow       LABS:                                    6.6    31.38 )-----------( 278      ( 13 May 2022 07:08 )             21.3           05-13    144  |  113<H>  |  32<H>  ----------------------------<  158<H>  4.1   |  28  |  1.05    Ca    7.4<L>      13 May 2022 07:08  Phos  4.2     05-13  Mg     1.8     05-13    TPro  5.2<L>  /  Alb  1.7<L>  /  TBili  0.3  /  DBili  x   /  AST  245<H>  /  ALT  269<H>  /  AlkPhos  128<H>  05-13

## 2022-05-13 NOTE — PROGRESS NOTE ADULT - PROBLEM SELECTOR PLAN 7
patient voided a moderate volume of bloody urine today.   Had hematuria last admission after d/c of Sorensen.   INR: 2.32:   Patient is on anticoagulation for valvular afib, but source of hematuria needs to be considered.   Plan:  Urology consultation, recommending KUB ultrasound  f/u results  no hematuria today   H/H stable  f/u KUB ultrasound  TOV today

## 2022-05-13 NOTE — CONSULT NOTE ADULT - PROBLEM SELECTOR RECOMMENDATION 9
Pt with acute on chronic back pain.  Ct shows multilevel disc degeneration.  s/p decadron and gabapentin.  Pain is better today but present.  Pt is currently not ambulating due to deconditioning.  Pain radiates down left leg.    High risk medications reviewed. Avoid polypharmacy. Avoid IV opioids. Avoid NSAIDs and benzodiazepines. Non-pharmacological sleep aides initiated. Non-opioid medications and non-pharmacological pain management measures initiated.  Mild pain - pain level 1-3  nonpharmacological measures  ice packs, heat packs, PT/OOB, guided imagery, distraction   Nonopioid recc  - Acetaminophen 1 gram po q 8 hours atc for 3 days   - Gabapentin 300mg po q 8 hours   - Lidocaine 4% patch daily   - no nsaids - h/h low   Opioid Recc  - oxycodone 2.5mg po q 4 hours prn   - no iv opioids  Bowel Regimen  - senna  Pt would benefit from outpt lesi.  However pt will need to be off coumadin and inr<1.4.  Pt with severe AS. Cardiology may be needed
r/o AI  low am cortisol  ? hx of steroid use or opiate use as out pt  check orthostatic BP now  check ACTH stim testing now  start solucortef after the test- 50mg iv q12  d/w prim team
-TTE 4/4: Moderate-severe mitral annular calcification, mitral valve leaflets are not well seen.   Mild mitral regurgitation.  Moderate-severe mitral stenosis;  severe aortic stenosis. Mild aortic regurgitation. Normal LV function, EF 55-60%  -episode acute hypotension, no central line or pressors   -not a surgical candidate, no further intervention per surrogate/wife  -hospice eligible and wife agreeable to referral  -wishes for pt to return to [facility] with hospice/palliative

## 2022-05-13 NOTE — PROGRESS NOTE ADULT - SUBJECTIVE AND OBJECTIVE BOX
PGY-1 Progress Note discussed with attending    PAGER #: [[phone]] TILL 5:00 PM  PLEASE CONTACT ON CALL TEAM:  - On Call Team (Please refer to Yahaira) FROM 5:00 PM - 8:30PM  - Nightfloat Team FROM 8:30 -7:30 AM    CHIEF COMPLAINT & BRIEF HOSPITAL COURSE:    INTERVAL HPI/OVERNIGHT EVENTS:       REVIEW OF SYSTEMS:  CONSTITUTIONAL: No fever, weight loss, or fatigue  RESPIRATORY: No cough, wheezing, chills or hemoptysis; No shortness of breath  CARDIOVASCULAR: No chest pain, palpitations, dizziness, or leg swelling  GASTROINTESTINAL: No abdominal pain. No nausea, vomiting, or hematemesis; No diarrhea or constipation. No melena or hematochezia.  GENITOURINARY: No dysuria or hematuria, urinary frequency  NEUROLOGICAL: No headaches, memory loss, loss of strength, numbness, or tremors  SKIN: No itching, burning, rashes, or lesions     Vital Signs Last 24 Hrs  T(C): 36.3 (13 May 2022 08:05), Max: 38 (13 May 2022 00:40)  T(F): 97.3 (13 May 2022 08:05), Max: 100.4 (13 May 2022 00:40)  HR: 90 (13 May 2022 08:05) (87 - 150)  BP: 97/58 (13 May 2022 08:05) (67/37 - 144/83)  BP(mean): --  RR: 18 (13 May 2022 08:05) (17 - 24)  SpO2: 100% (13 May 2022 08:05) (80% - 100%)    PHYSICAL EXAMINATION:  GENERAL: NAD, well built  HEAD:  Atraumatic, Normocephalic  EYES:  conjunctiva and sclera clear  NECK: Supple, No JVD, Normal thyroid  CHEST/LUNG: Clear to auscultation. Clear to percussion bilaterally; No rales, rhonchi, wheezing, or rubs  HEART: Regular rate and rhythm; No murmurs, rubs, or gallops  ABDOMEN: Soft, Nontender, Nondistended; Bowel sounds present  NERVOUS SYSTEM:  Alert & Oriented X3,    EXTREMITIES:  2+ Peripheral Pulses, No clubbing, cyanosis, or edema  SKIN: warm dry                          6.6    31.38 )-----------( 278      ( 13 May 2022 07:08 )             21.3     05-13    144  |  113<H>  |  32<H>  ----------------------------<  158<H>  4.1   |  28  |  1.05    Ca    7.4<L>      13 May 2022 07:08  Phos  4.2     05-13  Mg     1.8     05-13    TPro  5.2<L>  /  Alb  1.7<L>  /  TBili  0.3  /  DBili  x   /  AST  245<H>  /  ALT  269<H>  /  AlkPhos  128<H>  05-13    LIVER FUNCTIONS - ( 13 May 2022 07:08 )  Alb: 1.7 g/dL / Pro: 5.2 g/dL / ALK PHOS: 128 U/L / ALT: 269 U/L DA / AST: 245 U/L / GGT: x               PT/INR - ( 13 May 2022 07:08 )   PT: 48.0 sec;   INR: 3.98 ratio         PTT - ( 12 May 2022 07:04 )  PTT:33.2 sec    CAPILLARY BLOOD GLUCOSE      RADIOLOGY & ADDITIONAL TESTS:

## 2022-05-13 NOTE — PROGRESS NOTE ADULT - SUBJECTIVE AND OBJECTIVE BOX
C A R D I O L O G Y  **********************************     DATE OF SERVICE: 05-13-22    Events noted, had an episode of near syncope yesterday while going to the bathroom and an RRT later for hypotension got fluids and now blood.     ALBUTerol    90 MICROgram(s) HFA Inhaler 2 Puff(s) Inhalation every 6 hours PRN  aluminum hydroxide/magnesium hydroxide/simethicone Suspension 30 milliLiter(s) Oral every 6 hours PRN  aspirin  chewable 81 milliGRAM(s) Oral daily  atorvastatin 40 milliGRAM(s) Oral at bedtime  budesonide  80 MICROgram(s)/formoterol 4.5 MICROgram(s) Inhaler 2 Puff(s) Inhalation two times a day  cholecalciferol 1000 Unit(s) Oral daily  finasteride 5 milliGRAM(s) Oral daily  folic acid 1 milliGRAM(s) Oral daily  gabapentin 300 milliGRAM(s) Oral three times a day  insulin glargine Injectable (LANTUS) 6 Unit(s) SubCutaneous at bedtime  insulin lispro (ADMELOG) corrective regimen sliding scale   SubCutaneous three times a day before meals  insulin lispro Injectable (ADMELOG) 4 Unit(s) SubCutaneous three times a day before meals  lidocaine   4% Patch 2 Patch Transdermal daily  meclizine 12.5 milliGRAM(s) Oral three times a day  melatonin 3 milliGRAM(s) Oral at bedtime PRN  midodrine. 5 milliGRAM(s) Oral once PRN  mirtazapine 15 milliGRAM(s) Oral at bedtime  nicotine -  14 mG/24Hr(s) Patch 1 patch Transdermal daily  oxyCODONE    IR 2.5 milliGRAM(s) Oral every 4 hours PRN  pantoprazole    Tablet 40 milliGRAM(s) Oral before breakfast  sodium chloride 0.9%. 1000 milliLiter(s) IV Continuous <Continuous>  thiamine 100 milliGRAM(s) Oral daily  tiotropium 18 MICROgram(s) Capsule 1 Capsule(s) Inhalation daily                            6.6    31.38 )-----------( 278      ( 13 May 2022 07:08 )             21.3       Hemoglobin: 6.6 g/dL (05-13 @ 07:08)  Hemoglobin: 8.2 g/dL (05-12 @ 18:47)  Hemoglobin: 7.5 g/dL (05-12 @ 07:04)  Hemoglobin: 7.3 g/dL (05-11 @ 14:47)  Hemoglobin: 7.5 g/dL (05-11 @ 06:39)      05-13    144  |  113<H>  |  32<H>  ----------------------------<  158<H>  4.1   |  28  |  1.05    Ca    7.4<L>      13 May 2022 07:08  Phos  4.2     05-13  Mg     1.8     05-13    TPro  5.2<L>  /  Alb  1.7<L>  /  TBili  0.3  /  DBili  x   /  AST  245<H>  /  ALT  269<H>  /  AlkPhos  128<H>  05-13    Creatinine Trend: 1.05<--, 0.83<--, 0.80<--, 0.86<--, 0.87<--, 1.02<--    COAGS: PT/INR - ( 13 May 2022 07:08 )   PT: 48.0 sec;   INR: 3.98 ratio                   T(C): 36.3 (05-13-22 @ 08:05), Max: 38 (05-13-22 @ 00:40)  HR: 90 (05-13-22 @ 08:05) (67 - 150)  BP: 97/58 (05-13-22 @ 08:05) (67/37 - 144/83)  RR: 18 (05-13-22 @ 08:05) (17 - 24)  SpO2: 100% (05-13-22 @ 08:05) (80% - 100%)  Wt(kg): --    I&O's Summary    12 May 2022 07:01  -  13 May 2022 07:00  --------------------------------------------------------  IN: 0 mL / OUT: 350 mL / NET: -350 mL      HEENT:  (-)icterus (-)pallor  CV: N S1 S2 1/6 FELICITA (+)2 Pulses B/l  Resp:  Clear to ausculatation B/L, normal effort  GI: (+) BS Soft, NT, ND  Lymph:  (-)Edema, (-)obvious lymphadenopathy  Skin: Warm to touch, Normal turgor  Psych: Appropriate mood and affect      TELEMETRY: 	  Sinus         ASSESSMENT/PLAN: 	77y  Male AAA0x2-3 at baseline, from [facility], with significant medical history of HTN, CAD, Severe AS, moderate to severe MS Valvular Afib on Coumadin, COPD, Tobacco Use Disorder, Etoh Abuse, Dementia, and recently noted left lung nodules, recent admission for UTI who was sent to the ED for hypoxia from nursing home.    - No clinical CHF  - not a candidate for valve intervention at present  - ? source of blood loss  - Unable to check Orthostatic BP as he is too weak to stand  - Urology f/u regarding hematuria  - Pt is DNR  - F/U Bcx    Casper Bush MD, FACC  BEEPER [phone]

## 2022-05-13 NOTE — CONSULT NOTE ADULT - CONVERSATION DETAILS
Discussed pmhx, hospital course, prognosis and GOC with pt's spouse Chavez Farmer. She shares the the pt has expressed wishes for no "heroic measures" and stated he would not want his life to be sustained artificially and wishes for a natural death. DNR in place, educated on MOLST form and updated. We reviewed the limited benefits and potential burdens of central line/pressors, ICU, feeding tube, future hospitalization and she stated that he would not want these interventions. Discussed the disease trajectory of aortic stenosis at length, and that he has severe disease and not a candidate for surgical intervention which she agrees he is too weak for surgery. Educated that he his hospice eligible and reviewed hospice philosophy and locations of care. She is agreeable to hospice referral and wishes for him to return to [facility] with hospice/palliative. Per wife, continue with medical management with limited medical interventions while inpatient and transition to comfort care with hospice/palliative in LTC. Also discussed above with his sister Chepe and she is also in agreement to honor his wishes. Discussed pmhx, hospital course, prognosis and GOC with pt's spouse Chavez Farmer. She shares the the pt has expressed wishes for no "heroic measures" and stated he would not want his life to be sustained artificially and wishes for a natural death. DNR in place, educated on MOLST form and updated. We reviewed the limited benefits and potential burdens of central line/pressors, ICU, feeding tube, future hospitalization and she stated that he would not want these interventions. Discussed the disease trajectory of aortic stenosis at length, and that he has severe disease and not a candidate for surgical intervention which she agrees he is too weak for surgery. Educated that he his hospice eligible and reviewed hospice philosophy and locations of care. She is agreeable to hospice referral and wishes for him to return to [facility] with hospice/palliative. Per wife, continue with medical management with limited medical interventions while inpatient and transition to comfort care with hospice/palliative in LTC. Also discussed above with his sister Chepe and she is also in agreement to honor his wishes. Also discussed with his other sister in [place] who is a retired nurse also in agreement with the above. Discussed pmhx, hospital course, prognosis and GOC with pt's spouse Chavez Farmer. She shares the the pt has expressed wishes for no "heroic measures" and stated he would not want his life to be sustained artificially and wishes for a natural death. DNR in place, educated on MOLST form and updated. We reviewed the limited benefits and potential burdens of central line/pressors, ICU, feeding tube, future hospitalization and she stated that he would not want these interventions. Discussed the disease trajectory of aortic stenosis at length, and that he has severe disease and not a candidate for surgical intervention which she agrees he is too weak for surgery. Educated that he his hospice eligible and reviewed hospice philosophy and locations of care. She is agreeable to hospice referral and wishes for him to return to [facility] with hospice/palliative. Per wife, continue with medical management with limited medical interventions while inpatient and transition to comfort care with hospice/palliative in LTC. Also discussed above with his sister Chepe and she is also in agreement to honor his wishes. Also discussed with his other sister Belinda in [place] who is a retired nurse also in agreement with the above.

## 2022-05-13 NOTE — CONSULT NOTE ADULT - PROBLEM SELECTOR RECOMMENDATION 5
Clinical evidence indicates that the patient has Severe protein calorie malnutrition/ 3rd degree    In context of      Chronic Illness (>1 month)    Energy/Food intake <50% of estimated energy requirement >5 days alb 1.7  Weight loss: Moderate   Body Fat loss: Severe   (Cachexia, temporal wasting)  Muscle mass loss: mod-severe sarcopenia  Fluid Accumulation: mild   Strength: weakened severe (bedbound)    Recommend:   Continue with regular/DASH diet, aspiration precautions, careful hand-feeding, teaching to caregivers  nutritional supplements as tolerated, nutrition consult. PEG/artificial nutrition not within the GOC.

## 2022-05-13 NOTE — PROGRESS NOTE ADULT - ASSESSMENT
ASSESSMENT/ PLAN:  77 y/o male from [facility], pmhx HTN, CAD, Severe AS, Valvular Afib on Coumadin, COPD, Dementia, and recently noted left lung nodules who was sent to the ED for complaints of sob, recently discharged from [facility] with retention (resolved) and episode of hematuria, now with 1 episode of painless hematuria, no other voiding complaints  - Continue Flomax  - labs reviewed  - CT images reviewed  - urine clx pending   - renal and bladder sono when stable   - discussed with PA and house staff  - will follow ASSESSMENT/ PLAN:  75 y/o male from [facility], pmhx HTN, CAD, Severe AS, Valvular Afib on Coumadin, COPD, Dementia, and recently noted left lung nodules who was sent to the ED for complaints of sob, recently discharged from [facility] with retention (resolved) and episode of hematuria, now with 1 episode of painless hematuria, no other voiding complaints  - Sorensen placed with clear yellow urine   - labs reviewed  - CT images reviewed  - urine clx pending   - renal and bladder sono when stable   - discussed with PA and house staff  - will follow ASSESSMENT/ PLAN:  75 y/o male from [facility], pmhx HTN, CAD, Severe AS, Valvular Afib on Coumadin, COPD, Dementia, and recently noted left lung nodules who was sent to the ED for complaints of sob, recently discharged from [facility] with retention (resolved) and episode of hematuria, now with 1 episode of painless hematuria, no other voiding complaints  - Sorensen placed with clear yellow urine   - labs reviewed; significantly increased WBC  - CT images reviewed  - urine clx pending   - renal and bladder sono when stable   - discussed with PA and house staff  - will follow

## 2022-05-13 NOTE — CONSULT NOTE ADULT - PROBLEM SELECTOR RECOMMENDATION 6
-MOLST updated continues DNR/I, no PEG, limited medical interventions, no central line/pressors/ICU  -wife wishes to transition to comfort care when stabilized, continue with medical management while in hospital  -hospice referral placed

## 2022-05-13 NOTE — PROGRESS NOTE ADULT - SUBJECTIVE AND OBJECTIVE BOX
PGY-1 Progress Note discussed with attending    PAGER #: [[phone]] TILL 5:00 PM  PLEASE CONTACT ON CALL TEAM:  - On Call Team (Please refer to Yahaira) FROM 5:00 PM - 8:30PM  - Nightfloat Team FROM 8:30 -7:30 AM    INTERVAL HPI/ OVERNIGHT EVENTS: patient had 3 rapid responses last night due to hypotension and tachycardia. Received IVF. Now hemodynamically stable.       REVIEW OF SYSTEMS:  CONSTITUTIONAL: No fever, weight loss, or fatigue  RESPIRATORY: No cough, wheezing, chills or hemoptysis; No shortness of breath  CARDIOVASCULAR: No chest pain, palpitations, dizziness, or leg swelling  GASTROINTESTINAL: No abdominal pain. No nausea, vomiting, or hematemesis; No diarrhea or constipation. No melena or hematochezia.  GENITOURINARY: No dysuria or hematuria, urinary frequency  NEUROLOGICAL: No headaches, memory loss, loss of strength, numbness, or tremors  SKIN: No itching, burning, rashes, or lesions     MEDICATIONS  (STANDING):  aspirin  chewable 81 milliGRAM(s) Oral daily  atorvastatin 40 milliGRAM(s) Oral at bedtime  budesonide  80 MICROgram(s)/formoterol 4.5 MICROgram(s) Inhaler 2 Puff(s) Inhalation two times a day  cholecalciferol 1000 Unit(s) Oral daily  finasteride 5 milliGRAM(s) Oral daily  finasteride 5 milliGRAM(s) Oral daily  folic acid 1 milliGRAM(s) Oral daily  gabapentin 300 milliGRAM(s) Oral three times a day  insulin glargine Injectable (LANTUS) 6 Unit(s) SubCutaneous at bedtime  insulin lispro (ADMELOG) corrective regimen sliding scale   SubCutaneous three times a day before meals  insulin lispro Injectable (ADMELOG) 4 Unit(s) SubCutaneous three times a day before meals  lidocaine   4% Patch 2 Patch Transdermal daily  meclizine 12.5 milliGRAM(s) Oral three times a day  mirtazapine 15 milliGRAM(s) Oral at bedtime  nicotine -  14 mG/24Hr(s) Patch 1 patch Transdermal daily  pantoprazole    Tablet 40 milliGRAM(s) Oral before breakfast  sodium chloride 0.9%. 1000 milliLiter(s) (60 mL/Hr) IV Continuous <Continuous>  thiamine 100 milliGRAM(s) Oral daily  tiotropium 18 MICROgram(s) Capsule 1 Capsule(s) Inhalation daily    MEDICATIONS  (PRN):  ALBUTerol    90 MICROgram(s) HFA Inhaler 2 Puff(s) Inhalation every 6 hours PRN Shortness of Breath and/or Wheezing  aluminum hydroxide/magnesium hydroxide/simethicone Suspension 30 milliLiter(s) Oral every 6 hours PRN Dyspepsia  cyclobenzaprine 5 milliGRAM(s) Oral three times a day PRN Muscle Spasm  melatonin 3 milliGRAM(s) Oral at bedtime PRN Insomnia  midodrine. 5 milliGRAM(s) Oral once PRN if SBP <70  oxyCODONE    IR 2.5 milliGRAM(s) Oral every 4 hours PRN Severe Pain (7 - 10)      Vital Signs Last 24 Hrs  T(C): 36.3 (13 May 2022 08:05), Max: 38 (13 May 2022 00:40)  T(F): 97.3 (13 May 2022 08:05), Max: 100.4 (13 May 2022 00:40)  HR: 90 (13 May 2022 08:05) (87 - 150)  BP: 97/58 (13 May 2022 08:05) (67/37 - 144/83)  BP(mean): --  RR: 18 (13 May 2022 08:05) (17 - 24)  SpO2: 100% (13 May 2022 08:05) (80% - 100%)    PHYSICAL EXAMINATION:  GENERAL: NAD, AAOx3  HEAD: AT/NC  EYES: conjunctiva and sclera clear  NECK: supple, No JVD noted, Normal thyroid  CHEST/LUNG: CTABL; no rales, rhonchi, wheezing, or rubs  HEART: regular rate and rhythm; no murmurs, rubs, or gallops  ABDOMEN: soft, nontender, nondistended; Bowel sounds present  EXTREMITIES:  2+ Peripheral Pulses, No clubbing, cyanosis, or edema  SKIN: warm dry                          6.6    31.38 )-----------( 278      ( 13 May 2022 07:08 )             21.3     05-13    144  |  113<H>  |  32<H>  ----------------------------<  158<H>  4.1   |  28  |  1.05    Ca    7.4<L>      13 May 2022 07:08  Phos  4.2     05-13  Mg     1.8     05-13    TPro  5.2<L>  /  Alb  1.7<L>  /  TBili  0.3  /  DBili  x   /  AST  245<H>  /  ALT  269<H>  /  AlkPhos  128<H>  05-13    LIVER FUNCTIONS - ( 13 May 2022 07:08 )  Alb: 1.7 g/dL / Pro: 5.2 g/dL / ALK PHOS: 128 U/L / ALT: 269 U/L DA / AST: 245 U/L / GGT: x               PT/INR - ( 13 May 2022 07:08 )   PT: 48.0 sec;   INR: 3.98 ratio         PTT - ( 12 May 2022 07:04 )  PTT:33.2 sec  COVID-19 PCR: NotDetec (13 May 2022 00:16)  SARS-CoV-2: NotDetec (08 May 2022 12:58)  COVID-19 PCR: NotDetec (04 May 2022 05:12)  COVID-19 PCR: NotDetec (30 Apr 2022 09:41)  COVID-19 PCR: NotDetec (21 Apr 2022 10:49)  COVID-19 PCR: NotDetec (15 Apr 2022 12:32)  COVID-19 PCR: NotDetec (10 Apr 2022 05:45)  SARS-CoV-2: NotDetec (04 Apr 2022 10:27)  COVID-19 PCR: NotDetec (03 Apr 2022 22:44)      CAPILLARY BLOOD GLUCOSE      POCT Blood Glucose.: 172 mg/dL (13 May 2022 07:54)  POCT Blood Glucose.: 176 mg/dL (13 May 2022 02:03)  POCT Blood Glucose.: 130 mg/dL (12 May 2022 22:37)  POCT Blood Glucose.: 135 mg/dL (12 May 2022 20:31)  POCT Blood Glucose.: 290 mg/dL (12 May 2022 16:46)  POCT Blood Glucose.: 225 mg/dL (12 May 2022 11:54)      RADIOLOGY & ADDITIONAL TESTS:

## 2022-05-13 NOTE — CONSULT NOTE ADULT - PROBLEM SELECTOR RECOMMENDATION 2
resolved  d/c amaryl upon d/c  cont insulin tx   lantus 6   admelog 4 ac tis  fsg ac and hs  a1c- 10.3%  will jailey need out pt insulin tx
-presented hypoxic in ED saturating 80s on RA. Was started on 15L NRB and spo2 increased to 100% 2/2 possible aspiration  -CXR neg  -now saturating well on RA

## 2022-05-13 NOTE — CONSULT NOTE ADULT - PROBLEM SELECTOR RECOMMENDATION 3
cont tx per pain management
-FAST 6E with a rapid decline in the last month  -plan is for hospice/palliative at [facility]

## 2022-05-14 NOTE — PROGRESS NOTE ADULT - SUBJECTIVE AND OBJECTIVE BOX
MEDICAL ATTENDING NOTE  Patient is a 77y old  Male who presents with a chief complaint of Chronic obstructive pulmonary disease with acute exacerbation     (14 May 2022 09:55)      HPI:  78 y/o male, ambulatory and AAA0x2-3 at baseline, from [facility], with significant medical history of HTN, CAD, Severe AS, Valvular Afib on Coumadin, COPD, Tobacco Use Disorder, Etoh Abuse, Dementia, and recently noted left lung nodules who was sent to the ED for hypoxia from nursing home. In ED patient was found to be hypoxic in the 80s on RA and was placed on 15L NRB. Patient was also found to be hypotensive in the 70s systolic and was given 1 L of NS in ED. He was also found to be hypoglycemic (glucose 32) and was given D50 in ED. Patient also complaining of severe left hip pain that radiates down to his leg and b/l shoulder pain. Of note, patient was recently admitted two times at [facility]. First admission was for hypoxic resp failure that required ICU care and second admission was for urinary retention. Patient was discharged on 22 and plan was to follow up with cardiologist to address his severe aortic stenosis. Patient was planning on meeting with orthopedic surgeon for potential surgery for his shoulder pains. He denies any fevers, cough, chest pain, SOB, n/v, diarrhea, constipation, and rashes.    In ED VS: BP 77/43, T 98, RR 15, Spo2 100% on 15L NRB  WBC 12.08, Hb 8.7, glucose 39       (08 May 2022 16:21)      INTERVAL HPI/OVERNIGHT EVENTS: Patient became unresponsive over night.  Family has decided to implement limited medical interventions.     MEDICATIONS  (STANDING):  cefTRIAXone   IVPB 1000 milliGRAM(s) IV Intermittent every 24 hours  dexAMETHasone  Injectable 2 milliGRAM(s) IV Push two times a day  enoxaparin Injectable 60 milliGRAM(s) SubCutaneous every 12 hours  metoprolol tartrate Injectable 5 milliGRAM(s) IV Push every 6 hours  nicotine -  14 mG/24Hr(s) Patch 1 patch Transdermal daily    MEDICATIONS  (PRN):  acetaminophen  Suppository .. 650 milliGRAM(s) Rectal every 8 hours PRN Temp greater or equal to 38C (100.4F)  bisacodyl 5 milliGRAM(s) Oral every 12 hours PRN Constipation  glycopyrrolate Injectable 0.2 milliGRAM(s) IV Push every 6 hours PRN secretions  HYDROmorphone  Injectable 0.5 milliGRAM(s) IV Push every 2 hours PRN Moderate Pain (4 - 6)  LORazepam   Injectable 0.5 milliGRAM(s) IV Push every 2 hours PRN Anxiety      __________________________________________________  REVIEW OF SYSTEMS:    Unable.  Patient unresponsive      Vital Signs Last 24 Hrs  T(C): 37.5 (14 May 2022 14:47), Max: 37.5 (14 May 2022 14:47)  T(F): 99.5 (14 May 2022 14:47), Max: 99.5 (14 May 2022 14:47)  HR: 118 (14 May 2022 14:47) (30 - 124)  BP: 110/41 (14 May 2022 14:47) (90/44 - 121/50)  BP(mean): --  RR: 19 (14 May 2022 14:47) (10 - 20)  SpO2: 98% (14 May 2022 14:47) (92% - 100%)    ________________________________________________  PHYSICAL EXAM:  GENERAL: Unresponsive man with agonal breathing  HEENT: Normocephalic;  conjunctivae and sclerae clear; moist mucous membranes;   NECK : supple  CHEST/LUNG: Clear to auscultation bilaterally with good air entry   HEART: S1 S2II/VI systolic murmur, irregular  ABDOMEN: Soft, Nontender, Nondistended; Bowel sounds present  EXTREMITIES: no cyanosis; no edema; no calf tenderness  SKIN: warm and dry; no rash  NERVOUS SYSTEM:  unresponsive    _________________________________________________  LABS:                        8.5    31.94 )-----------( 296      ( 14 May 2022 06:14 )             27.1         141  |  111<H>  |  37<H>  ----------------------------<  283<H>  5.6<H>   |  27  |  1.35<H>    Ca    7.8<L>      14 May 2022 06:14  Phos  4.4       Mg     2.1         TPro  6.5  /  Alb  2.2<L>  /  TBili  0.2  /  DBili  x   /  AST  91<H>  /  ALT  208<H>  /  AlkPhos  145<H>      PT/INR - ( 14 May 2022 06:14 )   PT: 36.4 sec;   INR: 3.02 ratio           Urinalysis Basic - ( 13 May 2022 00:15 )    Color: Yellow / Appearance: Clear / S.010 / pH: x  Gluc: x / Ketone: Negative  / Bili: Negative / Urobili: Negative   Blood: x / Protein: 30 mg/dL / Nitrite: Positive   Leuk Esterase: Moderate / RBC: 25-50 /HPF / WBC 11-25 /HPF   Sq Epi: x / Non Sq Epi: Few /HPF / Bacteria: Moderate /HPF      CAPILLARY BLOOD GLUCOSE      POCT Blood Glucose.: 226 mg/dL (14 May 2022 17:01)  POCT Blood Glucose.: 267 mg/dL (14 May 2022 11:34)  POCT Blood Glucose.: 274 mg/dL (14 May 2022 08:02)  POCT Blood Glucose.: 299 mg/dL (14 May 2022 04:23)  POCT Blood Glucose.: 285 mg/dL (13 May 2022 21:35)      Cultures are negative to date

## 2022-05-14 NOTE — DIETITIAN NUTRITION RISK NOTIFICATION - ADDITIONAL COMMENTS/DIETITIAN RECOMMENDATIONS
Malnutrition Diagnosis Parameters: Recent Dx Malnutrition; Severe Malnutrition per MD: wt loss, poor intake, moderate to severe body fat/muscle loss, debility

## 2022-05-14 NOTE — PROGRESS NOTE ADULT - ASSESSMENT
ASSESSMENT/ PLAN:  75 y/o male from [facility], pmhx HTN, CAD, Severe AS, Valvular Afib on Coumadin, COPD, Dementia, and recently noted left lung nodules who was sent to the ED for complaints of sob, recently discharged from [facility] with retention (resolved) and episode of hematuria, now with 1 episode of painless hematuria, no other voiding complaints  - Sorensen placed with clear yellow urine   - labs reviewed; significantly increased WBC  - CT images reviewed  - urine clx Klebsiella, sensitivities pending   - continue antibiotics  - renal and bladder sono when stable   - discussed with PA and house staff

## 2022-05-14 NOTE — DIETITIAN INITIAL EVALUATION ADULT - ORAL INTAKE PTA/DIET HISTORY
Diet Rx at skilled nursing facility PTA: No concentrated sweets, No added salt, Regular; Diet Health Shake 4 oz x bid   Recent admissions noted, Moderate Malnutrition identified per RD 4/7/22

## 2022-05-14 NOTE — DIETITIAN INITIAL EVALUATION ADULT - PHYSCIAL ASSESSMENT
"Body Fat loss: Severe   (Cachexia, temporal wasting); Muscle mass loss: mod-severe sarcopenia  Fluid Accumulation: mild  Strength: weakened severe (bedbound)" per MD/debilitated

## 2022-05-14 NOTE — DIETITIAN INITIAL EVALUATION ADULT - PROBLEM/PLAN-9
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 No eye pain today. Eyes feel pretty good today.    Kristina CHIU May 2, 2018 1:34 PM                DISPLAY PLAN FREE TEXT Abdominal Pain, N/V/D

## 2022-05-14 NOTE — PROGRESS NOTE ADULT - PROBLEM SELECTOR PLAN 8
.  pt agonally breathing, unresponsive, cool extremities, now requiring IV opioids to optimize comfort support that pt has prognosis of hours/days. will continue to follow for support and symptom management to maximize comfort through EOL. ongoing coordination of care to transfer pt to private room and hospice IPU.

## 2022-05-14 NOTE — DIETITIAN INITIAL EVALUATION ADULT - PERTINENT LABORATORY DATA
05-14    141  |  111<H>  |  37<H>  ----------------------------<  283<H>  5.6<H>   |  27  |  1.35<H>    Ca    7.8<L>      14 May 2022 06:14  Phos  4.4     05-14  Mg     2.1     05-14    TPro  6.5  /  Alb  2.2<L>  /  TBili  0.2  /  DBili  x   /  AST  91<H>  /  ALT  208<H>  /  AlkPhos  145<H>  05-14  POCT Blood Glucose.: 274 mg/dL (05-14-22 @ 08:02)  A1C with Estimated Average Glucose Result: 10.3 % (04-05-22 @ 07:29)

## 2022-05-14 NOTE — CONSULT NOTE ADULT - SUBJECTIVE AND OBJECTIVE BOX
HPI:  76 y/o male, ambulatory and AAA0x2-3 at baseline, from [facility], with significant medical history of HTN, CAD, Severe AS, Valvular Afib on Coumadin, COPD, Tobacco Use Disorder, Etoh Abuse, Dementia, and recently noted left lung nodules who was sent to the ED for hypoxia from nursing home. In ED patient was found to be hypoxic in the 80s on RA and was placed on 15L NRB. Patient was also found to be hypotensive in the 70s systolic and was given 1 L of NS in ED. He was also found to be hypoglycemic (glucose 32) and was given D50 in ED. Patient also complaining of severe left hip pain that radiates down to his leg and b/l shoulder pain. Of note, patient was recently admitted two times at [facility]. First admission was for hypoxic resp failure that required ICU care and second admission was for urinary retention. Patient was discharged on 22 and plan was to follow up with cardiologist to address his severe aortic stenosis. Patient was planning on meeting with orthopedic surgeon for potential surgery for his shoulder pains. He denies any fevers, cough, chest pain, SOB, n/v, diarrhea, constipation, and rashes.    In ED VS: BP 77/43, T 98, RR 15, Spo2 100% on 15L NRB  WBC 12.08, Hb 8.7, glucose 39       (08 May 2022 16:21)      PAST MEDICAL & SURGICAL HISTORY:  HTN (hypertension)      Hyperlipemia      COPD (chronic obstructive pulmonary disease)      DM (diabetes mellitus)      Severe aortic stenosis      Dementia      AF (atrial fibrillation)      Tobacco use disorder      Multiple lung nodules      Status post spinal surgery  lumbar      Encounter for screening colonoscopy      H/O endoscopy          No Known Allergies      Meds:  acetaminophen  Suppository .. 650 milliGRAM(s) Rectal every 8 hours PRN  bisacodyl 5 milliGRAM(s) Oral every 12 hours PRN  cefTRIAXone   IVPB 1000 milliGRAM(s) IV Intermittent every 24 hours  dexAMETHasone  Injectable 2 milliGRAM(s) IV Push two times a day  glycopyrrolate Injectable 0.2 milliGRAM(s) IV Push every 6 hours PRN  HYDROmorphone  Injectable 0.5 milliGRAM(s) IV Push every 2 hours PRN  LORazepam   Injectable 0.5 milliGRAM(s) IV Push every 2 hours PRN  nicotine -  14 mG/24Hr(s) Patch 1 patch Transdermal daily      SOCIAL HISTORY:  Smoker:  YES / NO        PACK YEARS:                         WHEN QUIT?  ETOH use:  YES / NO               FREQUENCY / QUANTITY:  Ilicit Drug use:  YES / NO  Occupation:  Assisted device use (Cane / Walker):  Live with:    FAMILY HISTORY:  Family history of diabetes mellitus (Sibling)        VITALS:  Vital Signs Last 24 Hrs  T(C): 37.5 (14 May 2022 14:47), Max: 37.5 (14 May 2022 14:47)  T(F): 99.5 (14 May 2022 14:47), Max: 99.5 (14 May 2022 14:47)  HR: 118 (14 May 2022 14:47) (30 - 124)  BP: 110/41 (14 May 2022 14:47) (90/44 - 121/50)  BP(mean): --  RR: 19 (14 May 2022 14:47) (10 - 20)  SpO2: 98% (14 May 2022 14:47) (92% - 100%)    LABS/DIAGNOSTIC TESTS:                          8.5    31.94 )-----------( 296      ( 14 May 2022 06:14 )             27.1     WBC Count: 31.94 K/uL ( @ 06:14)  WBC Count: 31.04 K/uL ( @ 14:08)  WBC Count: 31.38 K/uL ( @ 07:08)  WBC Count: 3.13 K/uL ( @ 18:47)  WBC Count: 9.17 K/uL ( @ 07:04)          141  |  111<H>  |  37<H>  ----------------------------<  283<H>  5.6<H>   |  27  |  1.35<H>    Ca    7.8<L>      14 May 2022 06:14  Phos  4.4       Mg     2.1         TPro  6.5  /  Alb  2.2<L>  /  TBili  0.2  /  DBili  x   /  AST  91<H>  /  ALT  208<H>  /  AlkPhos  145<H>        Urinalysis Basic - ( 13 May 2022 00:15 )    Color: Yellow / Appearance: Clear / S.010 / pH: x  Gluc: x / Ketone: Negative  / Bili: Negative / Urobili: Negative   Blood: x / Protein: 30 mg/dL / Nitrite: Positive   Leuk Esterase: Moderate / RBC: 25-50 /HPF / WBC 11-25 /HPF   Sq Epi: x / Non Sq Epi: Few /HPF / Bacteria: Moderate /HPF        LIVER FUNCTIONS - ( 14 May 2022 06:14 )  Alb: 2.2 g/dL / Pro: 6.5 g/dL / ALK PHOS: 145 U/L / ALT: 208 U/L DA / AST: 91 U/L / GGT: x             PT/INR - ( 14 May 2022 06:14 )   PT: 36.4 sec;   INR: 3.02 ratio             LACTATE:    ABG -     CULTURES:   Clean Catch Clean Catch (Midstream)   @ 03:37   >100,000 CFU/ml Klebsiella pneumoniae  --  --      .Blood Blood-Peripheral   @ 20:43   No Growth Final  --  --      .Blood Blood-Peripheral   @ 16:21   No Growth Final  --  --      Clean Catch Clean Catch (Midstream)   @ 10:14   <10,000 CFU/mL Normal Urogenital Maureen  --  --      .Blood Blood-Peripheral  - @ 06:15   No Growth Final  --  --          RADIOLOGY:< from: Xray Chest 1 View-PORTABLE IMMEDIATE (Xray Chest 1 View-PORTABLE IMMEDIATE .) (22 @ 01:41) >  ACC: [id number] EXAM:  XR CHEST PORTABLE IMMED 1V                          PROCEDURE DATE:  2022          INTERPRETATION:  INDICATION: Shortness of breath.    COMPARISON: 2022.    FINDINGS:  Heart/Vascular: Stable and unremarkable cardiacand mediastinal   silhouette.  Pulmonary: Ill-defined haziness lower lung zones bilaterally. No sizable   pleural effusion.  Bones: Degenerative arthritis right shoulder.    Impression:  Ill-defined haziness lower lung zones bilaterally.        --- End of Report ---            DIONNE PEREZ MD; Attending Radiologist  This document has been electronically signed. May 13 2022  9:12AM    < end of copied text >        ROS  [  ] UNABLE TO ELICIT               HPI:  76 y/o male, ambulatory and AAA0x2-3 at baseline, from [facility], with significant medical history of HTN, CAD, Severe AS, Valvular Afib on Coumadin, COPD, Tobacco Use Disorder, Etoh Abuse, Dementia, and recently noted left lung nodules who was sent to the ED for hypoxia from nursing home. In ED patient was found to be hypoxic in the 80s on RA and was placed on 15L NRB. Patient was also found to be hypotensive in the 70s systolic and was given 1 L of NS in ED. He was also found to be hypoglycemic (glucose 32) and was given D50 in ED. Patient also complaining of severe left hip pain that radiates down to his leg and b/l shoulder pain. Of note, patient was recently admitted two times at [facility]. First admission was for hypoxic resp failure that required ICU care and second admission was for urinary retention. Patient was discharged on 22 and plan was to follow up with cardiologist to address his severe aortic stenosis. Patient was planning on meeting with orthopedic surgeon for potential surgery for his shoulder pains. He denies any fevers, cough, chest pain, SOB, n/v, diarrhea, constipation, and rashes.    In ED VS: BP 77/43, T 98, RR 15, Spo2 100% on 15L NRB  WBC 12.08, Hb 8.7, glucose 39        History as above, asked to see this patient who was sent in from [facility] for SOB and was found to have a UTI here and is being treated with Rocephin, he was fairly stable until yesterday but suddenly decompensated and is currently on Telemetry and very SOB and on a NRB mask at 100% oxygen, his family is at the bedside and he has been put on comfort care but abxs are still being continued. The patient has received ativan and  Dilaudid and so is currently unresponsive and history is being provided by his family. He has no fevers but his WBC count has been increasing and is up to 31k now.        PAST MEDICAL & SURGICAL HISTORY:  HTN (hypertension)      Hyperlipemia      COPD (chronic obstructive pulmonary disease)      DM (diabetes mellitus)      Severe aortic stenosis      Dementia      AF (atrial fibrillation)      Tobacco use disorder      Multiple lung nodules      Status post spinal surgery  lumbar      Encounter for screening colonoscopy      H/O endoscopy          No Known Allergies      Meds:  acetaminophen  Suppository .. 650 milliGRAM(s) Rectal every 8 hours PRN  bisacodyl 5 milliGRAM(s) Oral every 12 hours PRN  cefTRIAXone   IVPB 1000 milliGRAM(s) IV Intermittent every 24 hours  dexAMETHasone  Injectable 2 milliGRAM(s) IV Push two times a day  glycopyrrolate Injectable 0.2 milliGRAM(s) IV Push every 6 hours PRN  HYDROmorphone  Injectable 0.5 milliGRAM(s) IV Push every 2 hours PRN  LORazepam   Injectable 0.5 milliGRAM(s) IV Push every 2 hours PRN  nicotine -  14 mG/24Hr(s) Patch 1 patch Transdermal daily      SOCIAL HISTORY:  Smoker:  yes  ETOH use: no      FAMILY HISTORY:  Family history of diabetes mellitus (Sibling)        VITALS:  Vital Signs Last 24 Hrs  T(C): 37.5 (14 May 2022 14:47), Max: 37.5 (14 May 2022 14:47)  T(F): 99.5 (14 May 2022 14:47), Max: 99.5 (14 May 2022 14:47)  HR: 118 (14 May 2022 14:47) (30 - 124)  BP: 110/41 (14 May 2022 14:47) (90/44 - 121/50)  BP(mean): --  RR: 19 (14 May 2022 14:47) (10 - 20)  SpO2: 98% (14 May 2022 14:47) (92% - 100%)    LABS/DIAGNOSTIC TESTS:                          8.5    31.94 )-----------( 296      ( 14 May 2022 06:14 )             27.1     WBC Count: 31.94 K/uL ( @ 06:14)  WBC Count: 31.04 K/uL ( @ 14:08)  WBC Count: 31.38 K/uL ( @ 07:08)  WBC Count: 3.13 K/uL ( @ 18:47)  WBC Count: 9.17 K/uL ( @ 07:04)          141  |  111<H>  |  37<H>  ----------------------------<  283<H>  5.6<H>   |  27  |  1.35<H>    Ca    7.8<L>      14 May 2022 06:14  Phos  4.4       Mg     2.1         TPro  6.5  /  Alb  2.2<L>  /  TBili  0.2  /  DBili  x   /  AST  91<H>  /  ALT  208<H>  /  AlkPhos  145<H>        Urinalysis Basic - ( 13 May 2022 00:15 )    Color: Yellow / Appearance: Clear / S.010 / pH: x  Gluc: x / Ketone: Negative  / Bili: Negative / Urobili: Negative   Blood: x / Protein: 30 mg/dL / Nitrite: Positive   Leuk Esterase: Moderate / RBC: 25-50 /HPF / WBC 11-25 /HPF   Sq Epi: x / Non Sq Epi: Few /HPF / Bacteria: Moderate /HPF        LIVER FUNCTIONS - ( 14 May 2022 06:14 )  Alb: 2.2 g/dL / Pro: 6.5 g/dL / ALK PHOS: 145 U/L / ALT: 208 U/L DA / AST: 91 U/L / GGT: x             PT/INR - ( 14 May 2022 06:14 )   PT: 36.4 sec;   INR: 3.02 ratio             LACTATE:    ABG -     CULTURES:   Clean Catch Clean Catch (Midstream)   @ 03:37   >100,000 CFU/ml Klebsiella pneumoniae  --  --      .Blood Blood-Peripheral   @ 20:43   No Growth Final  --  --              RADIOLOGY:< from: Xray Chest 1 View-PORTABLE IMMEDIATE (Xray Chest 1 View-PORTABLE IMMEDIATE .) (22 @ 01:41) >  ACC: [id number] EXAM:  XR CHEST PORTABLE IMMED 1V                          PROCEDURE DATE:  2022          INTERPRETATION:  INDICATION: Shortness of breath.    COMPARISON: 2022.    FINDINGS:  Heart/Vascular: Stable and unremarkable cardiacand mediastinal   silhouette.  Pulmonary: Ill-defined haziness lower lung zones bilaterally. No sizable   pleural effusion.  Bones: Degenerative arthritis right shoulder.    Impression:  Ill-defined haziness lower lung zones bilaterally.        --- End of Report ---            DIONNE PEREZ MD; Attending Radiologist  This document has been electronically signed. May 13 2022  9:12AM    < end of copied text >        ROS  [ x ] UNABLE TO ELICIT

## 2022-05-14 NOTE — DIETITIAN INITIAL EVALUATION ADULT - PERTINENT MEDS FT
MEDICATIONS  (STANDING):  aspirin  chewable 81 milliGRAM(s) Oral daily  atorvastatin 40 milliGRAM(s) Oral at bedtime  budesonide  80 MICROgram(s)/formoterol 4.5 MICROgram(s) Inhaler 2 Puff(s) Inhalation two times a day  cefTRIAXone   IVPB 1000 milliGRAM(s) IV Intermittent every 24 hours  cholecalciferol 1000 Unit(s) Oral daily  dexAMETHasone  Injectable 2 milliGRAM(s) IV Push two times a day  finasteride 5 milliGRAM(s) Oral daily  finasteride 5 milliGRAM(s) Oral daily  folic acid 1 milliGRAM(s) Oral daily  gabapentin 300 milliGRAM(s) Oral three times a day  insulin glargine Injectable (LANTUS) 6 Unit(s) SubCutaneous at bedtime  insulin lispro (ADMELOG) corrective regimen sliding scale   SubCutaneous three times a day before meals  insulin lispro Injectable (ADMELOG) 4 Unit(s) SubCutaneous three times a day before meals  meclizine 12.5 milliGRAM(s) Oral three times a day  mirtazapine 15 milliGRAM(s) Oral at bedtime  nicotine -  14 mG/24Hr(s) Patch 1 patch Transdermal daily  pantoprazole    Tablet 40 milliGRAM(s) Oral before breakfast  sodium chloride 0.9%. 1000 milliLiter(s) (60 mL/Hr) IV Continuous <Continuous>  thiamine 100 milliGRAM(s) Oral daily  tiotropium 18 MICROgram(s) Capsule 1 Capsule(s) Inhalation daily    MEDICATIONS  (PRN):  ALBUTerol    90 MICROgram(s) HFA Inhaler 2 Puff(s) Inhalation every 6 hours PRN Shortness of Breath and/or Wheezing  aluminum hydroxide/magnesium hydroxide/simethicone Suspension 30 milliLiter(s) Oral every 6 hours PRN Dyspepsia  cyclobenzaprine 5 milliGRAM(s) Oral three times a day PRN Muscle Spasm  melatonin 3 milliGRAM(s) Oral at bedtime PRN Insomnia  midodrine. 5 milliGRAM(s) Oral once PRN if SBP <70  oxyCODONE    IR 2.5 milliGRAM(s) Oral every 4 hours PRN Severe Pain (7 - 10)

## 2022-05-14 NOTE — CHART NOTE - NSCHARTNOTEFT_GEN_A_CORE
Patient's heart rate dropped to 20s. He is unresponsive and is having agonal breathing. Rapid Response was called. His heart rate went up to the 80s. Blood glucose was 299. We called the wife, Ms. Patricia Farmer ([phone]). She will come to the hospital as soon as she can and will bring her son. Night team will continue to monitor. Patient's heart rate dropped to 20s. He is unresponsive and is having agonal breathing. Rapid Response was called. His heart rate went up to the 80s. Blood glucose was 299. We called the wife, Ms. Patricia Farmer ([phone]). She will come to the hospital as soon as she can and will bring her son. Night team will continue to monitor.    Addendum: Patient later noted to be minimally responsive on sternal rub. Will continue to monitor

## 2022-05-14 NOTE — PROGRESS NOTE ADULT - SUBJECTIVE AND OBJECTIVE BOX
follow up on:  complex medical decision making related to goals of care    [facility] Geriatric and Palliative Consult Service:  Dr. Sima Benito: cell ([phone])  Dr. Garima Chance: cell ([phone])   Khloe Campbell NP: cell ([phone])  Melissa Torres NP: cell ([phone])   Edward Askew LSW: cell ([phone])     OVERNIGHT EVENTS: Pt and family met with Palliative Care yesterday, shared decision made to transition to care with focus on comfort. Rapid was called overnight for change in mental status, bradycardia (?no [employer] exempt order).     Pt seen and examined this morning, wife and sister at bedside. Pt noted to be agonally breathing on exam. Reviewed GOC, family verbalizes goal for aggressive symptom management to maximize comfort throughout the dying process. No further labs, imaging, IVF. Cw abx. Would like pt to be transferred to private room. JONAS RN, dilaudid 0.5 mg IV administered       Review of Systems: [ Unable to obtain due to poor mentation]    MEDICATIONS  (STANDING):  cefTRIAXone   IVPB 1000 milliGRAM(s) IV Intermittent every 24 hours  dexAMETHasone  Injectable 2 milliGRAM(s) IV Push two times a day  nicotine -  14 mG/24Hr(s) Patch 1 patch Transdermal daily    MEDICATIONS  (PRN):  acetaminophen  Suppository .. 650 milliGRAM(s) Rectal every 8 hours PRN Temp greater or equal to 38C (100.4F)  bisacodyl 5 milliGRAM(s) Oral every 12 hours PRN Constipation  glycopyrrolate Injectable 0.2 milliGRAM(s) IV Push every 6 hours PRN secretions  HYDROmorphone  Injectable 0.5 milliGRAM(s) IV Push every 2 hours PRN Moderate Pain (4 - 6)  LORazepam   Injectable 0.5 milliGRAM(s) IV Push every 2 hours PRN Anxiety      PHYSICAL EXAM:  Vital Signs Last 24 Hrs  T(C): 36.6 (14 May 2022 10:17), Max: 37.2 (13 May 2022 21:00)  T(F): 97.8 (14 May 2022 10:17), Max: 98.9 (13 May 2022 21:00)  HR: 124 (14 May 2022 10:17) (30 - 124)  BP: 117/55 (14 May 2022 10:17) (90/44 - 121/50)  BP(mean): --  RR: 19 (14 May 2022 10:17) (10 - 20)  SpO2: 100% (14 May 2022 10:17) (92% - 100%)    General: distressed 2/2 dyspnea on NRB nonverbal  unarousable     Palliative Performance Scale/Karnofsky Score: 10  ECOG Performance: 4    HEENT: no abnormal lesion,No dry mouth   Lungs: +tachypnea/labored breathing, No audible excessive secretions  CV: RRR, S1S2, + tachycardia  GI: soft non distended non tender  incontinent  : incontinent   connolly  Musculoskeletal: funct quad edema x4  fully bedbound  Skin: no abnormal skin lesions, poor skin turgor, cool bilat lower extremities through ankles, trace mottling bilat   Neuro: unresponsive   Oral intake ability: unable/only mouth care    LABS:                          8.5    31.94 )-----------( 296      ( 14 May 2022 06:14 )             27.1     05-14    141  |  111<H>  |  37<H>  ----------------------------<  283<H>  5.6<H>   |  27  |  1.35<H>    Ca    7.8<L>      14 May 2022 06:14  Phos  4.4     05-14  Mg     2.1     -    TPro  6.5  /  Alb  2.2<L>  /  TBili  0.2  /  DBili  x   /  AST  91<H>  /  ALT  208<H>  /  AlkPhos  145<H>  05-14    Urinalysis Basic - ( 13 May 2022 00:15 )    Color: Yellow / Appearance: Clear / S.010 / pH: x  Gluc: x / Ketone: Negative  / Bili: Negative / Urobili: Negative   Blood: x / Protein: 30 mg/dL / Nitrite: Positive   Leuk Esterase: Moderate / RBC: 25-50 /HPF / WBC 11-25 /HPF   Sq Epi: x / Non Sq Epi: Few /HPF / Bacteria: Moderate /HPF        RADIOLOGY & ADDITIONAL STUDIES:

## 2022-05-14 NOTE — PROGRESS NOTE ADULT - SUBJECTIVE AND OBJECTIVE BOX
Patient seen/examined. Critically ill. tachycardic. Made DNR/DNI overnight. Urine remains clear yellow.    Vital Signs Last 24 Hrs  T(C): 36.2 (14 May 2022 05:19), Max: 37.2 (13 May 2022 21:00)  T(F): 97.2 (14 May 2022 05:19), Max: 98.9 (13 May 2022 21:00)  HR: 118 (14 May 2022 05:19) (30 - 124)  BP: 100/55 (14 May 2022 05:19) (85/48 - 121/50)  BP(mean): --  RR: 19 (14 May 2022 05:19) (10 - 20)  SpO2: 99% (14 May 2022 05:19) (92% - 100%)    General: NAD.   Abd: soft. NT/ND                          8.5    31.94 )-----------( 296      ( 14 May 2022 06:14 )             27.1     05-14    141  |  111<H>  |  37<H>  ----------------------------<  283<H>  5.6<H>   |  27  |  1.35<H>    Ca    7.8<L>      14 May 2022 06:14  Phos  4.4     05-14  Mg     2.1     05-14    TPro  6.5  /  Alb  2.2<L>  /  TBili  0.2  /  DBili  x   /  AST  91<H>  /  ALT  208<H>  /  AlkPhos  145<H>  05-14    Culture - Urine (05.12.22 @ 03:37)    Specimen Source: Clean Catch Clean Catch (Midstream)    Culture Results:   >100,000 CFU/ml Klebsiella pneumoniae

## 2022-05-14 NOTE — PROGRESS NOTE ADULT - PROBLEM SELECTOR PLAN 12
Hx of severe aortic stenosis  Plan was for him to follow up with Dr. Bush outpatient to address issue however patient went to rehab facility and was admitted back to hospital before being able to do so  caution with IV fluids 2021

## 2022-05-14 NOTE — PROGRESS NOTE ADULT - ATTENDING COMMENTS
Acute change in mental status last night.  Vital Signs Last 24 Hrs  T(C): 37.5 (05-14-22 @ 14:47), Max: 37.5 (05-14-22 @ 14:47)  T(F): 99.5 (05-14-22 @ 14:47), Max: 99.5 (05-14-22 @ 14:47)  HR: 118 (05-14-22 @ 14:47) (30 - 124)  BP: 110/41 (05-14-22 @ 14:47) (90/44 - 121/50)  BP(mean): --  RR: 19 (05-14-22 @ 14:47) (10 - 20)  SpO2: 98% (05-14-22 @ 14:47) (92% - 100%)  Lungs, no rales  Cor, II/VI systolic murmur, irregular  Abdomen, soft  Neurological, agitated, cooperative, non-focal  Labs, as above    IMP:  Acute encephalopathy with flaccid quadriparesis.           Hypoxia, i          Afib with RVR, uncontrolled          severe aortic stenosis          elevated transaminases, likely secondary to shock liver hypotensive event          CODY, possible ATN after episode of hypotension          Leukocytosis          DM          Severe protein-calorie malnutrition secondary to prolonged illness (cardiac cachexia)  Plan:  Will implement comfort measures           discontinue telemetry           control RVR with beta blocker           f/u blood culture           continue ceftriaxone           continue telemetry          Continue FSBS and insulin Rx    Patient's sister and other family members are at the bedside.

## 2022-05-14 NOTE — PROGRESS NOTE ADULT - SUBJECTIVE AND OBJECTIVE BOX
C A R D I O L O G Y  **********************************     DATE OF SERVICE: 05-14-22     Events noted, pt on Vent mask this am , s/p aggressive NT suction. pt is tachycardic due to chest pt and suction      ALBUTerol    90 MICROgram(s) HFA Inhaler 2 Puff(s) Inhalation every 6 hours PRN  aluminum hydroxide/magnesium hydroxide/simethicone Suspension 30 milliLiter(s) Oral every 6 hours PRN  aspirin  chewable 81 milliGRAM(s) Oral daily  atorvastatin 40 milliGRAM(s) Oral at bedtime  budesonide  80 MICROgram(s)/formoterol 4.5 MICROgram(s) Inhaler 2 Puff(s) Inhalation two times a day  cefTRIAXone   IVPB 1000 milliGRAM(s) IV Intermittent every 24 hours  cholecalciferol 1000 Unit(s) Oral daily  cyclobenzaprine 5 milliGRAM(s) Oral three times a day PRN  dexAMETHasone  Injectable 2 milliGRAM(s) IV Push two times a day  finasteride 5 milliGRAM(s) Oral daily  finasteride 5 milliGRAM(s) Oral daily  folic acid 1 milliGRAM(s) Oral daily  gabapentin 300 milliGRAM(s) Oral three times a day  insulin glargine Injectable (LANTUS) 6 Unit(s) SubCutaneous at bedtime  insulin lispro (ADMELOG) corrective regimen sliding scale   SubCutaneous three times a day before meals  insulin lispro Injectable (ADMELOG) 4 Unit(s) SubCutaneous three times a day before meals  meclizine 12.5 milliGRAM(s) Oral three times a day  melatonin 3 milliGRAM(s) Oral at bedtime PRN  midodrine. 5 milliGRAM(s) Oral once PRN  mirtazapine 15 milliGRAM(s) Oral at bedtime  nicotine -  14 mG/24Hr(s) Patch 1 patch Transdermal daily  oxyCODONE    IR 2.5 milliGRAM(s) Oral every 4 hours PRN  pantoprazole    Tablet 40 milliGRAM(s) Oral before breakfast  sodium chloride 0.9%. 1000 milliLiter(s) IV Continuous <Continuous>  thiamine 100 milliGRAM(s) Oral daily  tiotropium 18 MICROgram(s) Capsule 1 Capsule(s) Inhalation daily                            8.5    31.94 )-----------( 296      ( 14 May 2022 06:14 )             27.1       Hemoglobin: 8.5 g/dL (05-14 @ 06:14)  Hemoglobin: 8.2 g/dL (05-13 @ 14:08)  Hemoglobin: 6.6 g/dL (05-13 @ 07:08)  Hemoglobin: 8.2 g/dL (05-12 @ 18:47)  Hemoglobin: 7.5 g/dL (05-12 @ 07:04)      05-14    141  |  111<H>  |  37<H>  ----------------------------<  283<H>  5.6<H>   |  27  |  1.35<H>    Ca    7.8<L>      14 May 2022 06:14  Phos  4.4     05-14  Mg     2.1     05-14    TPro  6.5  /  Alb  2.2<L>  /  TBili  0.2  /  DBili  x   /  AST  91<H>  /  ALT  208<H>  /  AlkPhos  145<H>  05-14    Creatinine Trend: 1.35<--, 1.05<--, 0.83<--, 0.80<--, 0.86<--, 0.87<--    COAGS: PT/INR - ( 14 May 2022 06:14 )   PT: 36.4 sec;   INR: 3.02 ratio                   T(C): 36.2 (05-14-22 @ 05:19), Max: 37.2 (05-13-22 @ 21:00)  HR: 118 (05-14-22 @ 05:19) (30 - 124)  BP: 100/55 (05-14-22 @ 05:19) (85/48 - 121/50)  RR: 19 (05-14-22 @ 05:19) (10 - 20)  SpO2: 99% (05-14-22 @ 05:19) (92% - 100%)  Wt(kg): --    I&O's Summary       HEENT:  (-)icterus (-)pallor  CV: N S1 S2 1/6 FELICITA (+)2 Pulses B/l  Resp:  Clear to ausculatation B/L, normal effort  GI: (+) BS Soft, NT, ND  Lymph:  (-)Edema, (-)obvious lymphadenopathy  Skin: Warm to touch, Normal turgor  Psych: Appropriate mood and affect      TELEMETRY: 	  Sinus         ASSESSMENT/PLAN: 	77y  Male AAA0x2-3 at baseline, from [facility], with significant medical history of HTN, CAD, Severe AS, moderate to severe MS Valvular Afib on Coumadin, COPD, Tobacco Use Disorder, Etoh Abuse, Dementia, and recently noted left lung nodules, recent admission for UTI who was sent to the ED for hypoxia from nursing home.    - No clinical CHF  - not a candidate for valve intervention at present  - ? source of blood loss  - Unable to check Orthostatic BP as he is too weak to stand  - Urology f/u regarding hematuria  - Pt is DNR  - F/U Bcx

## 2022-05-14 NOTE — DIETITIAN INITIAL EVALUATION ADULT - FACTORS AFF FOOD INTAKE
acute on chronic comorbidities including AHRF, CIPD, dementia/change in mental status/difficulty feeding self/difficulty swallowing/[place]ism/ethnic/cultural/personal food preferences

## 2022-05-14 NOTE — PROGRESS NOTE ADULT - ASSESSMENT
77y  Male PMHx  HTN, CAD, Severe AS, moderate to severe MS Valvular Afib on Coumadin, COPD, Dementia, left lung nodules, recent admission for UTI who was sent to the ED for hypoxia from nursing home. Palliative following for symptom mgmt and GOC.     ·	Met with family at bedside and reaffirmed GOC: DNR DNI, mews exempt, focus exclusively on symptom management comfort through EOL; no further labs, imaging, IVF; cw abx   ·	DC po medications, pt without oral route  ·	start Dilaudid 0.5 mg IV q2 PRN pain or RR>20  and additional comfort care meds as below   ·	pt agonally breathing, unresponsive, cool extremities, now requiring IV opioids to optimize comfort support that pt has prognosis of hours/days  ·	pending consult for SW to eval inpatient hospice ; unlikely that pt will be accepted today   ·	dw Nurse Manager requesting pt be transferred to private room on 4S  77y  Male PMHx  HTN, CAD, Severe AS, moderate to severe MS Valvular Afib on Coumadin, COPD, Dementia, left lung nodules, recent admission for UTI who was sent to the ED for hypoxia from nursing home. Palliative following for symptom mgmt and GOC.     ·	Met with family at bedside and reaffirmed GOC: DNR DNI, mews exempt, focus exclusively on symptom management comfort through EOL; no further labs, imaging, IVF; cw abx   ·	DC po medications, pt without oral route  ·	start Dilaudid 0.5 mg IV q2 PRN pain or RR>20  and additional comfort care meds as below   ·	pt agonally breathing, unresponsive, cool extremities, now requiring IV opioids to optimize comfort support that pt has prognosis of hours/days  ·	pending consult for SW to eval inpatient hospice ; unlikely that pt will be accepted today   ·	dw Nurse Manager requesting pt be transferred to private room on 4S     full note to follow shortly 77y  Male PMHx  HTN, CAD, Severe AS, moderate to severe MS Valvular Afib on Coumadin, COPD, Dementia, left lung nodules, recent admission for UTI who was sent to the ED for hypoxia from nursing home. Palliative following for symptom mgmt and GOC.     ·	Met with family at bedside and reaffirmed GOC: DNR DNI, MEWS EXEMPT, focus exclusively on symptom management comfort through EOL; no further labs, imaging, IVF; cw abx   ·	DC po medications, pt without oral route  ·	start Dilaudid 0.5 mg IV q2 PRN pain or RR>20  and additional comfort care meds as below   ·	pt agonally breathing, unresponsive, cool extremities, now requiring IV opioids to optimize comfort support that pt has prognosis of hours/days  ·	pending consult for SW to eval inpatient hospice ; unlikely that pt will be accepted today   ·	dw Nurse Manager requesting pt be transferred to private room on 4S

## 2022-05-14 NOTE — RAPID RESPONSE TEAM SUMMARY - NSSITUATIONBACKGROUNDRRT_GEN_ALL_CORE
HPI:  76 y/o male, ambulatory and AAA0x2-3 at baseline, from [facility], with significant medical history of HTN, CAD, Severe AS, Valvular Afib on Coumadin, COPD, Tobacco Use Disorder, Etoh Abuse, Dementia, and recently noted left lung nodules who was sent to the ED for hypoxia from nursing home. In ED patient was found to be hypoxic in the 80s on RA and was placed on 15L NRB. Patient was also found to be hypotensive in the 70s systolic and was given 1 L of NS in ED. He was also found to be hypoglycemic (glucose 32) and was given D50 in ED. Patient also complaining of severe left hip pain that radiates down to his leg and b/l shoulder pain. Of note, patient was recently admitted two times at [facility]. First admission was for hypoxic resp failure that required ICU care and second admission was for urinary retention. Patient was discharged on 5/6/22 and plan was to follow up with cardiologist to address his severe aortic stenosis. Patient was planning on meeting with orthopedic surgeon for potential surgery for his shoulder pains. He denies any fevers, cough, chest pain, SOB, n/v, diarrhea, constipation, and rashes.    Patient was made DNR/DNI/ limited medical interventions by wife. RRT was called for change in mental status and bradycardia.  Patient was not even responsive to sternal rub and was noted to be agonally breathing, wife was called and told about patients current condition. she said she is going to come

## 2022-05-14 NOTE — DIETITIAN INITIAL EVALUATION ADULT - ENERGY INTAKE
s/p Palliative consult, Severe Malnutrition per MD: "In context of Chronic Illness (>1 month)    Energy/Food intake <50% of estimated energy requirement >5 days alb 1.7     Weight loss: Moderate    Body Fat loss: Severe   (Cachexia, temporal wasting)   Muscle mass loss: mod-severe sarcopenia   Fluid Accumulation: mild    Strength: weakened severe (bedbound)" per Chart

## 2022-05-14 NOTE — PROGRESS NOTE ADULT - ASSESSMENT
76 y/o male, ambulatory and AAA0x2-3 at baseline, from [facility], with significant medical history of HTN, CAD, Severe AS, Valvular Afib on Coumadin, COPD, Tobacco Use Disorder, Etoh Abuse, Dementia, and recently noted left lung nodules who was sent to the ED for complaints of hypoxia, was found to be hypotensive and hypoglycemic, admitted for further management.

## 2022-05-14 NOTE — PROGRESS NOTE ADULT - PROBLEM SELECTOR PLAN 7
.  met with wife, sister, family at bedside:  - discussed recent developments including RRT overnight. expressed concern that pt progressing through the dying process aeb increased dyspnea, unresponsiveness, cool extremities with mottling.  - reaffirmed GOC: DNR DNI, MEWS EXEMPT, focus exclusively on symptom management comfort through EOL; no further labs, imaging, IVF; however cw abx   - family requesting private room, tammie Nurse Manager to facilitate transfer to private room on 4S

## 2022-05-14 NOTE — DIETITIAN INITIAL EVALUATION ADULT - OTHER INFO
Pt from skilled nursing facility, AAO x 2 to 3 at baseline, s/p RRT early today due to altered mental status, bradycardia, pt visited with family ( sister, sister-in-law) at bedside, pt lethargy, on NRB mask, unable to swallow, poor prognosis per team, further nutrition interview not feasible at present due to medical condition; Discussed with MD/RN, pt to be on NPO for now Pt from skilled nursing facility, AAO x 2 to 3 at baseline, s/p RRT early today due to altered mental status, bradycardia, pt visited with family ( sister, sister-in-law) at bedside, pt lethargy, on NRB mask, unable to swallow, poor prognosis per team, further nutrition interview not feasible at present due to medical condition; Discussed with MD/RN, pt to be on NPO for now; GOC including No PEG per MD

## 2022-05-14 NOTE — CONSULT NOTE ADULT - CONSULT REQUESTED DATE/TIME
10-May-2022 10:04
09-May-2022 12:26
10-May-2022 10:21
11-May-2022 19:04
14-May-2022 16:42
13-May-2022 11:04

## 2022-05-15 NOTE — PROGRESS NOTE ADULT - ATTENDING COMMENTS
Acute change in mental status night before last    Unresponsive man with agonal breathing.  Vital Signs Last 24 Hrs  T(C): 37 (15 May 2022 14:54), Max: 37.7 (14 May 2022 20:35)  T(F): 98.6 (15 May 2022 14:54), Max: 99.8 (14 May 2022 20:35)  HR: 85 (15 May 2022 14:54) (85 - 123)  BP: 106/56 (15 May 2022 14:54) (102/55 - 128/59)  BP(mean): --  RR: 20 (15 May 2022 14:54) (20 - 22)  SpO2: 97% (15 May 2022 14:54) (97% - 100%)    Lungs, no rales  Cor, II/VI systolic murmur, irregular  Abdomen, soft  Neurological, agitated, cooperative, non-focal                        8.5    31.94 )-----------( 296      ( 14 May 2022 06:14 )             27.1   05-14    141  |  111<H>  |  37<H>  ----------------------------<  283<H>  5.6<H>   |  27  |  1.35<H>    Ca    7.8<L>      14 May 2022 06:14  Phos  4.4     05-14  Mg     2.1     05-14    TPro  6.5  /  Alb  2.2<L>  /  TBili  0.2  /  DBili  x   /  AST  91<H>  /  ALT  208<H>  /  AlkPhos  145<H>  05-14    POCT Blood Glucose (05.15.22 @ 17:02)   POCT Blood Glucose.: 269 mg/dL   POCT Blood Glucose (05.15.22 @ 12:05)   POCT Blood Glucose.: 263 mg/dL   - ESBL: Detec (05.13.22 @ 21:24) - CTX-M Resistance Marker: Detec (05.13.22 @ 21:24)  - CTX-M Resistance Marker: Detec (05.13.22 @ 21:24)  - Klebsiella pneumoniae: Detec   IMP:  Acute encephalopathy with flaccid quadriparesis.           Hypoxia,           Afib with RVR, uncontrolled          severe aortic stenosis          elevated transaminases, likely secondary to shock liver hypotensive event          CODY, possible ATN after episode of hypotension          Leukocytosis          DM          Severe protein-calorie malnutrition secondary to prolonged illness (cardiac cachexia)  Plan:  Will implement comfort measures           discontinue telemetry           control RVR with beta blocker           Rx meropenm           Rx lantus 6 units, as recommended by Endocrinology             Family is aware that prognosis is grave. Acute change in mental status night before last.    Unresponsive man with agonal breathing.  Vital Signs Last 24 Hrs  T(C): 37 (15 May 2022 14:54), Max: 37.7 (14 May 2022 20:35)  T(F): 98.6 (15 May 2022 14:54), Max: 99.8 (14 May 2022 20:35)  HR: 85 (15 May 2022 14:54) (85 - 123)  BP: 106/56 (15 May 2022 14:54) (102/55 - 128/59)  BP(mean): --  RR: 20 (15 May 2022 14:54) (20 - 22)  SpO2: 97% (15 May 2022 14:54) (97% - 100%)    Lungs, no rales  Cor, II/VI systolic murmur, irregular  Abdomen, soft  Neurological, agitated, cooperative, non-focal                        8.5    31.94 )-----------( 296      ( 14 May 2022 06:14 )             27.1   05-14    141  |  111<H>  |  37<H>  ----------------------------<  283<H>  5.6<H>   |  27  |  1.35<H>    Ca    7.8<L>      14 May 2022 06:14  Phos  4.4     05-14  Mg     2.1     05-14    TPro  6.5  /  Alb  2.2<L>  /  TBili  0.2  /  DBili  x   /  AST  91<H>  /  ALT  208<H>  /  AlkPhos  145<H>  05-14    POCT Blood Glucose (05.15.22 @ 17:02)   POCT Blood Glucose.: 269 mg/dL   POCT Blood Glucose (05.15.22 @ 12:05)   POCT Blood Glucose.: 263 mg/dL   - ESBL: Detec (05.13.22 @ 21:24) - CTX-M Resistance Marker: Detec (05.13.22 @ 21:24)  - CTX-M Resistance Marker: Detec (05.13.22 @ 21:24)  - Klebsiella pneumoniae: Detec   IMP:  Acute encephalopathy with flaccid quadriparesis.           Klebsiella bacteremia, ESBL          Hypoxia,           Afib with RVR, uncontrolled          severe aortic stenosis          elevated transaminases, likely secondary to shock liver hypotensive event          CODY, possible ATN after episode of hypotension          Leukocytosis          DM          Severe protein-calorie malnutrition secondary to prolonged illness (cardiac cachexia)  Plan:  Will implement comfort measures           discontinue telemetry           control RVR with beta blocker           Rx meropenm           Rx lantus 6 units, as recommended by Endocrinology             Family is aware that prognosis is grave.

## 2022-05-15 NOTE — PROGRESS NOTE ADULT - PROBLEM SELECTOR PLAN 2
Patient was found to have glucose of 39 in ED  was given 25mL of D50 and glucose responded to 150  Patient was on metformin 1000mg BID at home and glimpeiride 4mg two times a day   will hold all anti hyperglycemic agents and continue to watch glucose  DDX: adrenal insufficiency, although unlikely but still possible. Patient was on 30mg prednisone daily for a total of 3 days on prior admission for treatment of shoulder pain.   -now resolved  -ACTH stimulation test  negative for Adrenal insufficiency. Steroids d/c  -now resolved
Patient was found to have glucose of 39 in ED  was given 25mL of D50 and glucose responded to 150  Patient was on metformin 1000mg BID at home and glimpeiride 4mg two times a day   will hold all anti hyperglycemic agents and continue to watch glucose  DDX: adrenal insufficiency, although unlikely but still possible. Patient was on 30mg prednisone daily for a total of 3 days on prior admission for treatment of shoulder pain.   f/u cortisol AM. Of note, patient already received steroids in ED prior to sending cortisol blood work so results may be skewed.  -now resolved  -ACTH stimulation test today, follow up results
Patient was found to have glucose of 39 in ED  was given 25mL of D50 and glucose responded to 150  Patient was on metformin 1000mg BID at home and glimpeiride 4mg two times a day   will hold all anti hyperglycemic agents and continue to watch glucose  DDX: adrenal insufficiency, although unlikely but still possible. Patient was on 30mg prednisone daily for a total of 3 days on prior admission for treatment of shoulder pain.   -now resolved  -ACTH stimulation test  negative for Adrenal insufficiency. Steroids d/c
Patient was found to have glucose of 39 in ED  was given 25mL of D50 and glucose responded to 150  Patient was on metformin 1000mg BID at home and glimpeiride 4mg two times a day   will hold all anti hyperglycemic agents and continue to watch glucose  DDX: adrenal insufficiency, although unlikely but still possible. Patient was on 30mg prednisone daily for a total of 3 days on prior admission for treatment of shoulder pain.   -now resolved  -ACTH stimulation test  negative for Adrenal insufficiency. Steroids d/c  -now resolved
Patient was found to have glucose of 39 in ED  was given 25mL of D50 and glucose responded to 150  Patient was on metformin 1000mg BID at home and glimpeiride 4mg two times a day   will hold all anti hyperglycemic agents and continue to watch glucose  DDX: adrenal insufficiency, although unlikely but still possible. Patient was on 30mg prednisone daily for a total of 3 days on prior admission for treatment of shoulder pain.   -now resolved  -ACTH stimulation test  negative for Adrenal insufficiency. Steroids d/c  -now resolved
Patient was found to have glucose of 39 in ED  was given 25mL of D50 and glucose responded to 150  Patient was on metformin 1000mg BID at home and glimpeiride 4mg two times a day   will hold all anti hyperglycemic agents and continue to watch glucose  DDX: adrenal insufficiency, although unlikely but still possible. Patient was on 30mg prednisone daily for a total of 3 days on prior admission for treatment of shoulder pain.   f/u cortisol AM. Of note, patient already received steroids in ED prior to sending cortisol blood work so results may be skewed.  -now resolved
.  - start glyco 0.2 mg IV q6 PRN
Patient was found to have glucose of 39 in ED  was given 25mL of D50 and glucose responded to 150  Patient was on metformin 1000mg BID at home and glimpeiride 4mg two times a day   will hold all anti hyperglycemic agents and continue to watch glucose  DDX: adrenal insufficiency, although unlikely but still possible. Patient was on 30mg prednisone daily for a total of 3 days on prior admission for treatment of shoulder pain.   -now resolved  -ACTH stimulation test  negative for Adrenal insufficiency. Steroids d/c

## 2022-05-15 NOTE — PROGRESS NOTE ADULT - PROBLEM SELECTOR PLAN 4
Patient was found to by hypoxic in ED saturating 80s on RA. Was started on 15L NRB and spo2 increased to 100%  Patient denies having any trouble breathing. May have possibly aspirated. If patient symptoms persist may consider CT CHEST  He has a hx of COPD and has been admitted in past for AHRF secondary to PNA requiring BIPAP  Rapid RVP negative, chest xray negative  resume home meds albuterol and symbicort  wean off O2   blood cultures sent  no cough or pulm congestion seen on xray or fevers.  now resolved  patient saturating well on RA
Patient was found to by hypoxic in ED saturating 80s on RA. Was started on 15L NRB and spo2 increased to 100%  Patient denies having any trouble breathing. May have possibly aspirated. If patient symptoms persist may consider CT CHEST  He has a hx of COPD and has been admitted in past for AHRF secondary to PNA requiring BIPAP  Rapid RVP negative, chest xray negative  resume home meds albuterol and symbicort  wean off O2   blood cultures sent  no cough or pulm congestion seen on xray or fevers.  now resolved  patient saturating well on RA  chest xray this AM showing haziness possible b/l pulm edema.
.  -bisacodyl qD PRN constipation  - goal BM qD or every other day
Patient was found to by hypoxic in ED saturating 80s on RA. Was started on 15L NRB and spo2 increased to 100%  Patient denies having any trouble breathing. May have possibly aspirated. If patient symptoms persist may consider CT CHEST  He has a hx of COPD and has been admitted in past for AHRF secondary to PNA requiring BIPAP  Rapid RVP negative, chest xray negative  resume home meds albuterol and symbicort  wean off O2   blood cultures sent  no cough or pulm congestion seen on xray or fevers.  now resolved  patient saturating well on RA
Patient was found to by hypoxic in ED saturating 80s on RA. Was started on 15L NRB and spo2 increased to 100%  Patient denies having any trouble breathing. May have possibly aspirated. If patient symptoms persist may consider CT CHEST  He has a hx of COPD and has been admitted in past for AHRF secondary to PNA requiring BIPAP  Rapid RVP negative, chest xray negative  resume home meds albuterol and symbicort  wean off O2   no cough or pulm congestion seen on xray or fevers.  on NRB saturating well.   comfort measures
Patient was found to by hypoxic in ED saturating 80s on RA. Was started on 15L NRB and spo2 increased to 100%  Patient denies having any trouble breathing. May have possibly aspirated. If patient symptoms persist may consider CT CHEST  He has a hx of COPD and has been admitted in past for AHRF secondary to PNA requiring BIPAP  Rapid RVP negative, chest xray negative  resume home meds albuterol and symbicort  wean off O2   blood cultures sent  no cough or pulm congestion seen on xray or fevers.  now resolved  patient saturating well on RA  chest xray this AM showing haziness possible b/l pulm edema.

## 2022-05-15 NOTE — PROGRESS NOTE ADULT - PROVIDER SPECIALTY LIST ADULT
Cardiology
Endocrinology
Internal Medicine
Urology
Cardiology
Cardiology
Endocrinology
Urology
Pain Medicine
Palliative Care
Internal Medicine

## 2022-05-15 NOTE — PROGRESS NOTE ADULT - PROBLEM SELECTOR PLAN 6
K 6.0 this AM  s/p 5 units regular insulin IV and calcium gluconate  f/u EKG  f/u repeat BMP
K 5.4  s/p calcium gluconate and lokelma   f/u repeat BMP
.  hospice eligible, appears to be in early stages of dying process and is not stable for transfer back to NH with hospice/palliative   - pending consult for SW for transfer to inpatient hospice; unlikely will be today
now resolved

## 2022-05-15 NOTE — PROGRESS NOTE ADULT - SUBJECTIVE AND OBJECTIVE BOX
C A R D I O L O G Y  **********************************     DATE OF SERVICE: 05-15-22       pt seen and examined, no complaints, ROS - .        acetaminophen  Suppository .. 650 milliGRAM(s) Rectal every 8 hours PRN  bisacodyl 5 milliGRAM(s) Oral every 12 hours PRN  cefTRIAXone   IVPB 1000 milliGRAM(s) IV Intermittent every 24 hours  dexAMETHasone  Injectable 2 milliGRAM(s) IV Push two times a day  enoxaparin Injectable 60 milliGRAM(s) SubCutaneous every 12 hours  glycopyrrolate Injectable 0.2 milliGRAM(s) IV Push every 6 hours PRN  HYDROmorphone  Injectable 0.5 milliGRAM(s) IV Push every 2 hours PRN  LORazepam   Injectable 0.5 milliGRAM(s) IV Push every 2 hours PRN  metoprolol tartrate Injectable 5 milliGRAM(s) IV Push every 6 hours  nicotine -  14 mG/24Hr(s) Patch 1 patch Transdermal daily                            8.5    31.94 )-----------( 296      ( 14 May 2022 06:14 )             27.1       Hemoglobin: 8.5 g/dL (05-14 @ 06:14)  Hemoglobin: 8.2 g/dL (05-13 @ 14:08)  Hemoglobin: 6.6 g/dL (05-13 @ 07:08)  Hemoglobin: 8.2 g/dL (05-12 @ 18:47)  Hemoglobin: 7.5 g/dL (05-12 @ 07:04)      05-14    141  |  111<H>  |  37<H>  ----------------------------<  283<H>  5.6<H>   |  27  |  1.35<H>    Ca    7.8<L>      14 May 2022 06:14  Phos  4.4     05-14  Mg     2.1     05-14    TPro  6.5  /  Alb  2.2<L>  /  TBili  0.2  /  DBili  x   /  AST  91<H>  /  ALT  208<H>  /  AlkPhos  145<H>  05-14    Creatinine Trend: 1.35<--, 1.05<--, 0.83<--, 0.80<--, 0.86<--, 0.87<--    COAGS:           T(C): 37.3 (05-15-22 @ 05:30), Max: 37.7 (05-14-22 @ 20:35)  HR: 119 (05-15-22 @ 05:30) (117 - 124)  BP: 128/59 (05-15-22 @ 05:30) (102/55 - 128/59)  RR: 22 (05-15-22 @ 05:30) (19 - 22)  SpO2: 99% (05-15-22 @ 05:30) (98% - 100%)  Wt(kg): --    I&O's Summary       HEENT:  (-)icterus (-)pallor  CV: N S1 S2 1/6 FELICITA (+)2 Pulses B/l  Resp:  Clear to ausculatation B/L, normal effort  GI: (+) BS Soft, NT, ND  Lymph:  (-)Edema, (-)obvious lymphadenopathy  Skin: Warm to touch, Normal turgor  Psych: Appropriate mood and affect      TELEMETRY: 	  Sinus         ASSESSMENT/PLAN: 	77y  Male  alert  at baseline, from [facility], with significant medical history of HTN, CAD, Severe AS, moderate to severe MS Valvular Afib on Coumadin, COPD, Tobacco Use Disorder, Etoh Abuse, Dementia, and recently noted left lung nodules, recent admission for UTI who was sent to the ED for hypoxia from nursing home.    - No clinical CHF  - not a candidate for valve intervention at present   - Urology f/u   - Pt is DNR

## 2022-05-15 NOTE — PROGRESS NOTE ADULT - PROBLEM SELECTOR PLAN 11
hx of COPD requiring BiPAP in past hospitalizations.   d/c home medications albuterol, symbicort, and spiriva

## 2022-05-15 NOTE — PROGRESS NOTE ADULT - PROBLEM SELECTOR PLAN 1
BP 77/43 in ED, received 1L NS and BP responded to 107/61  c/w IVF D5NS 100cc/hr   no other signs of sepsis, however patient was hypoxic so may have possibly aspirated. If patient symptoms persist may consider CT CHEST.   f/u blood cultures  DDX: adrenal insufficiency, although unlikely but still possible. Patient was on 30mg prednisone daily for a total of 3 days on prior admission for treatment of shoulder pain.   f/u cortisol AM. Of note, patient already received steroids in ED prior to sending cortisol blood work so results may be skewed.  remote tele  BP now back to baseline
BP 77/43 in ED, received 1L NS and BP responded to 107/61  no other signs of sepsis, however patient was hypoxic so may have possibly aspirated. If patient symptoms persist may consider CT CHEST.   cultures neg   DDX: adrenal insufficiency, although unlikely but still possible. Patient was on 30mg prednisone daily for a total of 3 days on prior admission for treatment of shoulder pain.   -now resolved  -AI study noted, patient does not have AI  c/w NS 60cc/hr   -f/u orthostatics
Pt with acute on chronic back pain.  Ct shows multilevel disc degeneration.  s/p decadron and gabapentin.  Pain is better today but present.  Pt is currently not ambulating due to deconditioning.  Pain radiates down left leg.    High risk medications reviewed. Avoid polypharmacy. Avoid IV opioids. Avoid NSAIDs and benzodiazepines. Non-pharmacological sleep aides initiated. Non-opioid medications and non-pharmacological pain management measures initiated.  Mild pain - pain level 1-3  nonpharmacological measures  ice packs, heat packs, PT/OOB, guided imagery, distraction   Nonopioid recc  - Acetaminophen 1 gram po q 8 hours atc for 3 days   - Gabapentin 300mg po q 8 hours   - Lidocaine 4% patch daily   - no nsaids - h/h low   Opioid Recc  - oxycodone 2.5mg po q 4 hours prn   - no iv opioids  Bowel Regimen  - senna  Pt would benefit from outpt lesi.  However pt will need to be off coumadin and inr<1.4.  Pt with severe AS. Cardiology may be needed.
BP 77/43 in ED, received 1L NS and BP responded to 107/61  c/w IVF D5NS 100cc/hr   no other signs of sepsis, however patient was hypoxic so may have possibly aspirated. If patient symptoms persist may consider CT CHEST.   f/u blood cultures  DDX: adrenal insufficiency, although unlikely but still possible. Patient was on 30mg prednisone daily for a total of 3 days on prior admission for treatment of shoulder pain.   -now resolved  -AI study noted, patient does not have AI
BP 77/43 in ED, received 1L NS and BP responded to 107/61  no other signs of sepsis, however patient was hypoxic so may have possibly aspirated. If patient symptoms persist may consider CT CHEST.   cultures neg   DDX: adrenal insufficiency, although unlikely but still possible. Patient was on 30mg prednisone daily for a total of 3 days on prior admission for treatment of shoulder pain.   -now resolved  -AI study noted, patient does not have AI  c/w NS 60cc/hr  -added midodrine 5mg PRN if systolic less than 70   -d/c all meds that may lower BP  f/u BCX  F/u UCX
DDX: adrenal insufficiency, although unlikely but still possible. Patient was on 30mg prednisone daily for a total of 3 days on prior admission for treatment of shoulder pain.   -AI study noted, patient does not have AI  c/w NS 60cc/hr  -d/c all meds that may lower BP  -BCX gram negative rods, Ucx K. pneumonia   -Family decided on comfort measures only but want to continue with IV abx. No more lab draws or further testing   -d/c all PO meds
BP 77/43 in ED, received 1L NS and BP responded to 107/61  c/w IVF D5NS 100cc/hr   no other signs of sepsis, however patient was hypoxic so may have possibly aspirated. If patient symptoms persist may consider CT CHEST.   f/u blood cultures  DDX: adrenal insufficiency, although unlikely but still possible. Patient was on 30mg prednisone daily for a total of 3 days on prior admission for treatment of shoulder pain.   f/u cortisol AM. Of note, patient already received steroids in ED prior to sending cortisol blood work so results may be skewed.  remote tele  -received 250cc bolus today for hypotension
BP 77/43 in ED, received 1L NS and BP responded to 107/61  no other signs of sepsis, however patient was hypoxic so may have possibly aspirated. If patient symptoms persist may consider CT CHEST.   cultures neg   DDX: adrenal insufficiency, although unlikely but still possible. Patient was on 30mg prednisone daily for a total of 3 days on prior admission for treatment of shoulder pain.   -now resolved  -AI study noted, patient does not have AI  c/w NS 60cc/hr  -added midodrine 5mg PRN if systolic less than 70   -d/c all meds that may lower BP  f/u BCX  F/u UCX
.  agonally breathing on exam. GOC exclusive comfort.   -start Dilaudid 0.5 mg IV q2 PRN pain or RR>20   - Low threshold to repeat dose or increase PRN frequency as needed to maximize comfort at EOL  - cw NRB as abhi by pt, attempt to wean to NC with PRN opioids as above  - do not escalate to Bipap or HFNC, instead liberalize PRN opioids as above

## 2022-05-15 NOTE — PROGRESS NOTE ADULT - SUBJECTIVE AND OBJECTIVE BOX
Interval Events:  pt in nad    Allergies    No Known Allergies    Intolerances      Endocrine/Metabolic Medications:  dexAMETHasone  Injectable 2 milliGRAM(s) IV Push two times a day      Vital Signs Last 24 Hrs  T(C): 37.3 (15 May 2022 05:30), Max: 37.7 (14 May 2022 20:35)  T(F): 99.1 (15 May 2022 05:30), Max: 99.8 (14 May 2022 20:35)  HR: 119 (15 May 2022 05:30) (117 - 123)  BP: 128/59 (15 May 2022 05:30) (102/55 - 128/59)  BP(mean): --  RR: 22 (15 May 2022 05:30) (19 - 22)  SpO2: 99% (15 May 2022 05:30) (98% - 100%)      PHYSICAL EXAM  All physical exam findings normal, except those marked:  General:	Alert, active, cooperative, NAD, well hydrated  .		[] Abnormal:  Neck		Normal: supple, no cervical adenopathy, no palpable thyroid  .		[] Abnormal:  Cardiovascular	Normal: regular rate, normal S1, S2, no murmurs  .		[] Abnormal:  Respiratory	Normal: no chest wall deformity, normal respiratory pattern, CTA B/L  .		[] Abnormal:  Abdominal	Normal: soft, ND, NT, bowel sounds present, no masses, no organomegaly  .		[] Abnormal:  		Normal normal genitalia, testes descended, circumcised/uncircumcised  .		Rosalie stage:			Breast rosalie:  .		Menstrual history:  .		[] Abnormal:  Extremities	Normal: FROM x4  .		[] Abnormal:  Skin		Normal: intact and not indurated, no rash, no acanthosis nigricans  .		[] Abnormal:  Neurologic	Normal: grossly intact  .		[] Abnormal:    LABS        CAPILLARY BLOOD GLUCOSE      POCT Blood Glucose.: 227 mg/dL (15 May 2022 07:49)  POCT Blood Glucose.: 203 mg/dL (14 May 2022 21:15)  POCT Blood Glucose.: 226 mg/dL (14 May 2022 17:01)        Assesment/plan      76 y/o male, ambulatory and AAA0x2-3 at baseline, from [facility], with significant medical history of HTN, CAD, Severe AS, Valvular Afib on Coumadin, COPD, Tobacco Use Disorder, Etoh Abuse, Dementia, and recently noted left lung nodules who was sent to the ED for hypoxia from nursing home  Found to have low cortisol. Pt admits to chronic dizziness (resolves with meclizine ). Unknown if he got steroid injections in his back. Unknown if he takes opiate pain meds ? Hx of dm on metformin and amaryl s/p hypoglycemia.      Problem/Recommendation - 1:  ·  Problem: Acute hypotension.   ·  Recommendation: r/o AI  low am cortisol  ACTH stim testing reviewed- no AI       Problem/Recommendation - 2:  ·  Problem: Hypoglycemia.   ·  Recommendation: resolved  d/c amaryl upon d/c  now hyperglycemic due to decadron- for back pain   Restart lantus 6   admelog 4 ac tid-   fsg ac and hs  a1c- 10.3%  will likley need out pt insulin tx.     Problem/Recommendation - 3:  ·  Problem: History of chronic pain.   ·  Recommendation: cont tx per pain management.    Hematuria- urology consult noted

## 2022-05-15 NOTE — PROGRESS NOTE ADULT - NUTRITIONAL ASSESSMENT
This patient has been assessed with a concern for Malnutrition and has been determined to have a diagnosis/diagnoses of Severe protein-calorie malnutrition.    This patient is being managed with:   Diet NPO-  Except Medications  With Ice Chips/Sips of Water  Entered: May 15 2022 11:26AM    
This patient has been assessed with a concern for Malnutrition and has been determined to have a diagnosis/diagnoses of Severe protein-calorie malnutrition.    This patient is being managed with:   Diet Regular-  Consistent Carbohydrate {Evening Snacks}  DASH/TLC {Sodium & Cholesterol Restricted}  Entered: May  8 2022  4:30PM    

## 2022-05-15 NOTE — PROGRESS NOTE ADULT - PROBLEM SELECTOR PLAN 7
patient voided a moderate volume of bloody urine today.   Had hematuria last admission after d/c of Sorensen.   INR: 2.32:   Patient is on anticoagulation for valvular afib, but source of hematuria needs to be considered.   Plan:  Urology consultation, recommending KUB ultrasound  H/H stable  no more tests

## 2022-05-15 NOTE — PROGRESS NOTE ADULT - NS ATTEND AMEND GEN_ALL_CORE FT
awaiting hospice for difficult-to-manage symptoms of valvular heart disease and copd.  palliative care note re: GOC and plan of care reviewed.  will sign off.

## 2022-05-15 NOTE — PHARMACOTHERAPY INTERVENTION NOTE - COMMENTS
Pt is on a STAR MEHUL LIST for COPD.  Counseling was provided.  Spoke to son by the bedside. Son asked about the insulin therapy; also relayed that his father is asking for Meclizine (the medication however is not ordered for the patient).  Son also asked about what is pending for his father and his anticipated discharge, for which he was told him to speak with his father's doctor. 
1 gm iv every 8 hours changed to 1 gm iv every 12 hours for bacteremia /crcl=39.8

## 2022-05-15 NOTE — PROGRESS NOTE ADULT - PROBLEM SELECTOR PLAN 5
Cr 1.4 this AM  may be secondary to ketorolac or volume depletion  f/u urine studies  f/u BMP
may be secondary to ketorolac or volume depletion  now resolved
Cr 1.4 this AM  may be secondary to ketorolac or volume depletion  f/u urine studies  f/u BMP
may be secondary to ketorolac or volume depletion  now resolved
may be secondary to ketorolac or volume depletion  now resolved
.  Clinical evidence indicates that the patient has Severe protein calorie malnutrition/ 3rd degree    In context of     Acute Illness/Injury (>7days)    vs    Chronic Illness (>1 month)  Energy/Food intake <50% of estimated energy requirement >5 days  Body Fat loss: Severe   (Cachexia, temporal wasting,muscle atrophy)  Muscle mass loss: Severe  (Skin failure/pressure ulcers)  Fluid Accumulation: Severe (Fluid overload, ascites)   Strength: weakened severe (bedbound)    Recommend:   - actively dying, oral care ATC   - pleasure feeds if pt awake, willing

## 2022-05-15 NOTE — PROGRESS NOTE ADULT - SUBJECTIVE AND OBJECTIVE BOX
PGY-1 Progress Note discussed with attending    PAGER #: [[phone]] TILL 5:00 PM  PLEASE CONTACT ON CALL TEAM:  - On Call Team (Please refer to Yahaira) FROM 5:00 PM - 8:30PM  - Nightfloat Team FROM 8:30 -7:30 AM    INTERVAL HPI/ OVERNIGHT EVENTS: No acute events overnight.        REVIEW OF SYSTEMS:  CONSTITUTIONAL: No fever, weight loss, or fatigue  RESPIRATORY: No cough, wheezing, chills or hemoptysis; No shortness of breath  CARDIOVASCULAR: No chest pain, palpitations, dizziness, or leg swelling  GASTROINTESTINAL: No abdominal pain. No nausea, vomiting, or hematemesis; No diarrhea or constipation. No melena or hematochezia.  GENITOURINARY: No dysuria or hematuria, urinary frequency  NEUROLOGICAL: No headaches, memory loss, loss of strength, numbness, or tremors  SKIN: No itching, burning, rashes, or lesions     MEDICATIONS  (STANDING):  cefTRIAXone   IVPB 1000 milliGRAM(s) IV Intermittent every 24 hours  dexAMETHasone  Injectable 2 milliGRAM(s) IV Push two times a day  enoxaparin Injectable 60 milliGRAM(s) SubCutaneous every 12 hours  metoprolol tartrate Injectable 5 milliGRAM(s) IV Push every 6 hours  nicotine -  14 mG/24Hr(s) Patch 1 patch Transdermal daily    MEDICATIONS  (PRN):  acetaminophen  Suppository .. 650 milliGRAM(s) Rectal every 8 hours PRN Temp greater or equal to 38C (100.4F)  bisacodyl 5 milliGRAM(s) Oral every 12 hours PRN Constipation  glycopyrrolate Injectable 0.2 milliGRAM(s) IV Push every 6 hours PRN secretions  HYDROmorphone  Injectable 0.5 milliGRAM(s) IV Push every 2 hours PRN Moderate Pain (4 - 6)  LORazepam   Injectable 0.5 milliGRAM(s) IV Push every 2 hours PRN Anxiety      Vital Signs Last 24 Hrs  T(C): 37.3 (15 May 2022 05:30), Max: 37.7 (14 May 2022 20:35)  T(F): 99.1 (15 May 2022 05:30), Max: 99.8 (14 May 2022 20:35)  HR: 119 (15 May 2022 05:30) (117 - 124)  BP: 128/59 (15 May 2022 05:30) (102/55 - 128/59)  BP(mean): --  RR: 22 (15 May 2022 05:30) (19 - 22)  SpO2: 99% (15 May 2022 05:30) (98% - 100%)    PHYSICAL EXAMINATION:  GENERAL: NAD, AAOx0  HEAD: AT/NC  EYES: conjunctiva and sclera clear  NECK: supple, No JVD noted, Normal thyroid  CHEST/LUNG: CTABL; no rales, rhonchi, wheezing, or rubs  HEART: regular rate and rhythm; no murmurs, rubs, or gallops  ABDOMEN: soft, nontender, nondistended; Bowel sounds present  EXTREMITIES:  2+ Peripheral Pulses, No clubbing, cyanosis, or edema  SKIN: warm dry                          8.5    31.94 )-----------( 296      ( 14 May 2022 06:14 )             27.1     05-14    141  |  111<H>  |  37<H>  ----------------------------<  283<H>  5.6<H>   |  27  |  1.35<H>    Ca    7.8<L>      14 May 2022 06:14  Phos  4.4     05-14  Mg     2.1     05-14    TPro  6.5  /  Alb  2.2<L>  /  TBili  0.2  /  DBili  x   /  AST  91<H>  /  ALT  208<H>  /  AlkPhos  145<H>  05-14    LIVER FUNCTIONS - ( 14 May 2022 06:14 )  Alb: 2.2 g/dL / Pro: 6.5 g/dL / ALK PHOS: 145 U/L / ALT: 208 U/L DA / AST: 91 U/L / GGT: x               PT/INR - ( 14 May 2022 06:14 )   PT: 36.4 sec;   INR: 3.02 ratio           COVID-19 PCR: NotDetec (13 May 2022 00:16)  SARS-CoV-2: NotDetec (08 May 2022 12:58)  COVID-19 PCR: NotDetec (04 May 2022 05:12)  COVID-19 PCR: NotDetec (30 Apr 2022 09:41)  COVID-19 PCR: NotDetec (21 Apr 2022 10:49)  COVID-19 PCR: NotDetec (15 Apr 2022 12:32)  COVID-19 PCR: NotDetec (10 Apr 2022 05:45)  SARS-CoV-2: NotDetec (04 Apr 2022 10:27)  COVID-19 PCR: NotDetec (03 Apr 2022 22:44)      CAPILLARY BLOOD GLUCOSE      POCT Blood Glucose.: 227 mg/dL (15 May 2022 07:49)  POCT Blood Glucose.: 203 mg/dL (14 May 2022 21:15)  POCT Blood Glucose.: 226 mg/dL (14 May 2022 17:01)  POCT Blood Glucose.: 267 mg/dL (14 May 2022 11:34)      RADIOLOGY & ADDITIONAL TESTS:

## 2022-05-15 NOTE — PROGRESS NOTE ADULT - PROBLEM SELECTOR PROBLEM 1
Acute hypotension
Chronic back pain
Dyspnea
Acute hypotension

## 2022-05-15 NOTE — PROGRESS NOTE ADULT - PROBLEM SELECTOR PLAN 3
Hx of chronic shoulder pain b/l and left hip pain. Likely degenerative disease  patient may require surgical intervention however needs cardiac clearance to address his aortic stenosis prior.   may possibly me acute lumbar radiculopathy.   c/w Tylenol PRN 650mg for mild pain   CT lumbar spine noted    pain management consulted, appreciate recs
.  calm on exam. anticipate worsening anxiety or agitation as pt progresses through the dying process  - ativan 0.5 mg IV q2 PRN   - If agitation unrelieved by above, assess for terminal fevers  - Give Tylenol IV/WA q8h PRN terminal fevers, temp >38c
Hx of chronic shoulder pain b/l and left hip pain. Likely degenerative disease  patient may require surgical intervention however needs cardiac clearance to address his aortic stenosis prior.   may possibly me acute lumbar radiculopathy.   c/w Tylenol PRN 650mg for mild pain   CT lumbar spine noted    pain management consulted, appreciate recs  -Patient may have had steroid injections in past for back pain and may now be in adrenal insufficiency due to steroid dependence  f/u ACTH stimulation test
Hx of chronic shoulder pain b/l and left hip pain. Likely degenerative disease  patient may require surgical intervention however needs cardiac clearance to address his aortic stenosis prior.   may possibly me acute lumbar radiculopathy.   c/w Tylenol PRN 650mg for mild pain   f/u CT lumbar spine  may consider pain  management consult
Hx of chronic shoulder pain b/l and left hip pain. Likely degenerative disease  patient may require surgical intervention however needs cardiac clearance to address his aortic stenosis prior.   may possibly me acute lumbar radiculopathy.   c/w Tylenol PRN 650mg for mild pain   CT lumbar spine noted   pain management consulted, appreciate recs  comfort measures
Hx of chronic shoulder pain b/l and left hip pain. Likely degenerative disease  patient may require surgical intervention however needs cardiac clearance to address his aortic stenosis prior.   may possibly me acute lumbar radiculopathy.   c/w Tylenol PRN 650mg for mild pain   CT lumbar spine noted    pain management consulted, appreciate recs

## 2022-05-15 NOTE — PROGRESS NOTE ADULT - ASSESSMENT
76 y/o male, ambulatory and AAA0x2-3 at baseline, from [facility], with significant medical history of HTN, CAD, Severe AS, Valvular Afib on Coumadin, COPD, Tobacco Use Disorder, Etoh Abuse, Dementia, and recently noted left lung nodules who was sent to the ED for complaints of hypoxia, was found to be hypotensive and hypoglycemic, admitted for further management.

## 2022-05-16 NOTE — DISCHARGE NOTE FOR THE EXPIRED PATIENT - HOSPITAL COURSE
76 y/o male, ambulatory and AAA0x2-3 at baseline, from [facility], with significant medical history of HTN, CAD, Severe AS, Valvular Afib on Coumadin, COPD, Tobacco Use Disorder, Etoh Abuse, Dementia, and recently noted left lung nodules who was sent to the ED for hypoxia from nursing home. In ED patient was found to be hypoxic in the 80s on RA and was placed on 15L NRB. Patient was also found to be hypotensive in the 70s systolic and was given 1 L of NS in ED. He was also found to be hypoglycemic (glucose 32) and was given D50 in ED. Patient also complaining of severe left hip pain that radiates down to his leg and b/l shoulder pain. Of note, patient was recently admitted two times at [facility]. First admission was for hypoxic resp failure that required ICU care and second admission was for urinary retention. Patient was discharged on 5/6/22 and plan was to follow up with cardiologist to address his severe aortic stenosis. Patient was planning on meeting with orthopedic surgeon for potential surgery for his shoulder pains. He denies any fevers, cough, chest pain, SOB, n/v, diarrhea, constipation, and rashes. pt was admitted for further care.    Pt's urine cx and blood cx was positive for Klebsiella ESBL, pt was on meropenem. ACTH stimulation test was negative for adrenal insufficiency. Pt has RRT few times for hypotension, palliative was consulted, pt became DNR/DNI, family decided for comfort measures only with trial of abx.   Called by nurse about patient being unresponsive and in asystole. Patient seen at bedside, On examination, patient was unresponsive, bilateral pupils dilated, non-responsive to light, no bilateral conjunctival reflex present, no heart sounds auscultated, no spontaneous breath sounds present, no peripheral pulses present. EKG showed asystole Patient pronounced dead at   12:22  am on 5/16/2022. Attending, Dr. Sahni was informed regarding patient’s status.   No family present at bedside. Dr. Chance informed patient’s wife Nele and condolences were offered.

## 2022-05-17 LAB
CULTURE RESULTS: SIGNIFICANT CHANGE UP
SPECIMEN SOURCE: SIGNIFICANT CHANGE UP

## 2022-10-04 NOTE — ED ADULT TRIAGE NOTE - HISTORY OF COVID-19 VACCINATION
You are scheduled to return [place] the [facility] [place] Friday, [place] 7th at 1:00 PM, for your COVID test.  Enter at the main level of the [facility], where the [employer] is floating on water, go past the ball and when you come to the information board on the wall across from the elevators, you will see a door with a keypad. The sign next to it will read \"Staff Only\" and will have a red [place] on the sign. You will knock on that door and we will come get you for your test.  We will be expecting you. You must have your COVID test on this day or surgery will be canceled. After having your COVID 19 test, you will need to self isolate until the day of surgery. This means no public gatherings such as Buddhism attendance, weddings, showers, funerals, etc.  If you need something from the store, you will need someone to pick it up for you. It is very important that you are not around other people prior to your surgery or you could contract COVID 19 between the time you are tested and your surgery date. The day before your surgery, you will receive a phone call from the surgery nurse, to let you know what time to arrive on the day of surgery. This call will usually be between 2-4 PM.  If you do not receive a phone call by 4 PM the day before your surgery, please call [phone] and let them know you have not received an arrival time. If your surgery is on Monday, your call will be on the Friday before your Monday surgery. Evelyn Bridges for the [facility]    A script for Bactroban ointment has been call to your pharmacy or was given to you in written form by your surgeon.   The guidelines for the ointment use are as follows:    1)  Start using the ointment 7 days before your surgery date    2)  Use the ointment two times a day - morning and night    3)  Place the ointment on a Q-tip and swirl up in your nose making sure you cover completely       the skin just inside of each nostril. Use one end of the Q-tip for each nostril. CHLORHEXIDINE GLUCONATE 4% SHOWERING    Patient should shower with this soap a minimum of 3 consecutive showers (2 nights before surgery, the night before surgery and the morning of surgery) washing from the neck down (avoiding contact with genitalia). DO NOT [address] YOUR HAIR OR FACE WITH THIS SOAP. When washing with this soap, apply enough to suds up the body thoroughly, turn the water away from your body and allow the soap suds to remain on the body for 2 full minutes, then rinse body completely. After using this soap on the body, please do not apply powders or lotions to your body. After the shower the night before surgery, please dry off with a new towel, sleep in new freshly laundered pj's, and change your bed linen before going to sleep. The morning of surgery, you may take all your prescribed medications with a sip of water. Any exceptions to this would be listed below:          [facility] not eat or drink anything after midnight, the night before your surgery. This is extremely important for your safety. Take a bath (or shower) the night before your surgery and you may brush your teeth the morning of your surgery. You will be scheduled to arrive at the hospital 2 hours before your surgery, or follow your surgeon's instructions. Dress comfortably. Wear loose clothing that will be easy to remove and comfortable for your trip home. You may wear eyeglasses or contacts but bring your cases with you as they must be remove before your surgery. Hearing aids and dentures will need to be removed before your surgery. Do not wear any jewelry, including body jewelry. All jewelry will need to be removed prior to your surgery. Do not wear fingernail polish or make-up. It is best not to bring any valuables with you.     If you are to stay in the hospital overnight, bring your robe, slippers and personal toiletries that you may need. POSTOPERATIVE GUIDELINES AFTER RECEIVING ANESTHESIA    If you are to go home after your surgery, you will need a responsible adult to drive you home. You will not be able to take public transportation after your discharge from the Operative Care Unit unless you are accompanied by a        responsible adult. On returning home, be sure to follow your physician's orders regarding diet, activity and medications. Remember, surgery with general anesthesia or sedation may leave you sleepy, very tired and with a decreased appetite for 12 to 24 hours. If you develop any post-surgical complications or problems, call your surgeon or [facility] Emergency Department ([phone]). [address] [place] [address] Surgery Patients-Revised 6-    Visitors for surgery patients are essential for the patient's emotional well-being and care       post operatively. 2.   Visitor Expectations and Limitations        VISITORS MUST WEAR MASKS AT ALL TIMES. NO Cloth masks allowed. 3.  One visitor allowed with patients in the preop/postop rooms. 4.  A second visitor may sit in the waiting area. 5.  No children under 13 allowed in the pre-post op areas unless they are the patient. 6.  Two people may be with an underage surgical/procedural patient in preop/postop        room. 7.  If you are admitted to the hospital post operatively, there are NO RESTRICTIONS on       the floor at this time. 8.  If you are admitted to ICU postoperatively, you may have one visitor in the room from        7A-7P. A second visitor may sit in the ICU waiting room.   There can be no overnight Vaccine status unknown

## 2022-12-15 NOTE — PROGRESS NOTE ADULT - PROBLEM SELECTOR PLAN 2
Quality 265: Biopsy Follow-Up: Biopsy results reviewed, communicated, tracked, and documented Detail Level: Zone hx of alcohol withdrawal,  hx of hypercapnia  still altered, and not fully back to baseline  improving slowly mostly with the family  will continue to follow

## 2023-01-25 NOTE — PATIENT PROFILE ADULT - NSPROPTRIGHTREPPHONE_GEN_A_NUR
Hide Second Anesthesia?: No Notification Instructions: Patient will be notified of biopsy results. However, patient instructed to call the office if not contacted within 2 weeks. Detail Level: Detailed Dressing: bandage Depth Of Biopsy: dermis Additional Anesthesia Volume In Cc (Will Not Render If 0): 0 Information: Selecting Yes will display possible errors in your note based on the variables you have selected. This validation is only offered as a suggestion for you. PLEASE NOTE THAT THE VALIDATION TEXT WILL BE REMOVED WHEN YOU FINALIZE YOUR NOTE. IF YOU WANT TO FAX A PRELIMINARY NOTE YOU WILL NEED TO TOGGLE THIS TO 'NO' IF YOU DO NOT WANT IT IN YOUR FAXED NOTE. Post-Care Instructions: I reviewed with the patient in detail post-care instructions. Patient is to keep the biopsy site dry overnight, and then apply bacitracin twice daily until healed. Patient may apply hydrogen peroxide soaks to remove any crusting. Curettage Text: The wound bed was treated with curettage after the biopsy was performed. Electrodesiccation Text: The wound bed was treated with electrodesiccation after the biopsy was performed. Hemostasis: Drysol Silver Nitrate Text: The wound bed was treated with silver nitrate after the biopsy was performed. Anesthesia Type: 1% lidocaine with epinephrine Size Of Lesion In Cm: 0.7 Wound Care: Petrolatum Biopsy Type: H and E Electrodesiccation And Curettage Text: The wound bed was treated with electrodesiccation and curettage after the biopsy was performed. Was A Bandage Applied: Yes Type Of Destruction Used: Curettage Biopsy Method: Dermablade Billing Type: Third-Party Bill Consent: Written consent was obtained and risks were reviewed including but not limited to scarring, infection, bleeding, scabbing, incomplete removal, nerve damage and allergy to anesthesia. Anesthesia Volume In Cc (Will Not Render If 0): 0.5 Cryotherapy Text: The wound bed was treated with cryotherapy after the biopsy was performed. [phone]

## 2023-04-05 NOTE — PROGRESS NOTE ADULT - PROBLEM SELECTOR PROBLEM 11
Discharge planning issues
Came in from cardiologist office because of fluid on legs

## 2023-12-28 NOTE — PROGRESS NOTE ADULT - PROBLEM SELECTOR PLAN 5
Patient notified and verbalized understanding   (2) well flexed C/W with Stain therapy  Lipid Panel wnl  CK levels slightly elevated from normal ranges, no transamnitis

## 2024-06-13 NOTE — PROGRESS NOTE ADULT - PROBLEM SELECTOR PROBLEM 2
Addended by: LANCE BLANCAS on: 6/13/2024 09:47 AM     Modules accepted: Orders     Influenza due to other identified influenza virus with other respiratory manifestations

## 2024-07-23 NOTE — PROVIDER CONTACT NOTE (CRITICAL VALUE NOTIFICATION) - SITUATION
Group Topic:  RAF Activity Group    Date: 7/23/2024  Start Time:  2:45 PM  End Time:  3:30 PM  Facilitators: Gregory Garcia    Focus: Checkout and Goal  Number in attendance: 4      Method: Group  Attendance: Present  Mood/Affect: Appropriate  Behavior/Socialization: Appropriate to group  Participation: Active  Overall Patient Response to Group: Appropriate to topic    Patients had the opportunity to discuss what they got out of today's session and what they plan to do for their recovery when they leave today.     Patient obtained \"just being here\" today. Patient plans to \"lay on the couch, finish supper\". Patient applies this to their recovery because \"relaxing for one night\". Patient reports yes safety concerns. She denies any plan or intent and verbalizes that she will be present in group tomorrow.     HALEIGH Hobbs, [facility]          
blood culture preliminary growth in anaerobic bottle gram negative rode.
glucose 489

## 2024-10-07 NOTE — CONSULT NOTE ADULT - NS ATTEND OPT1A GEN_ALL_CORE
Patient reducing intake of sugary snacks.  Will recheck next visit.   History/Exam/Medical decision making

## 2024-11-11 NOTE — ED ADULT TRIAGE NOTE - TEMPERATURE IN CELSIUS (DEGREES C)
36.8 [Cervical Pap Smear] : cervical Pap smear [Liquid Base] : liquid base [Tolerated Well] : the patient tolerated the procedure well [No Complications] : there were no complications

## 2024-11-21 NOTE — ED ADULT NURSE REASSESSMENT NOTE - NS ED NURSE REASSESS COMMENT FT1
7.15 am pt resting now . report given  to  day shift RN 

## 2025-01-17 NOTE — H&P ADULT - NEUROLOGICAL DETAILS
<-- Click to add NO significant Past Surgical History
alert and oriented x 3/responds to pain/responds to verbal commands

## 2025-02-27 NOTE — ED ADULT TRIAGE NOTE - HEIGHT IN CM
Patient called and mammogram result and recommendation given. Patient verbalized understanding of result given.    167.64

## 2025-07-17 NOTE — ED PROVIDER NOTE - CROS ED ROS STATEMENT
"Lab Results   Component Value Date    HGBA1C 8.8 (A) 04/22/2025       No results for input(s): \"POCGLU\" in the last 72 hours.    Blood Sugar Average: Last 72 hrs:  Resume home Lantus 40 units nightly  Resume Humalog 20 units daily with meals  Sliding scale coverage while hospitalized  "
Chief complaint of worsening shortness of breath, weakness.  Was not using her nebulizers at home as previously ordered.  Found to be in COPD flare, hypoxic in the ER requiring 5 L/min.  Resume Pulmicort, Perforomist  Resume Singulair  Xopenex/Atrovent as needed  On Solu-Medrol every 12 hours  Pulmonary follow-up  Pulmonary recommended patient can be discharged on prednisone 40 mg daily and taper by 10 mg every 3 days and outpatient follow-up with them.  Will need outpatient pulmonary follow-up at rehab  
Chief complaint of worsening shortness of breath, weakness.  Was not using her nebulizers at home as previously ordered.  Found to be in COPD flare, hypoxic in the ER requiring 5 L/min.  Resume Pulmicort, Perforomist  Resume Singulair  Xopenex/Atrovent as needed  On Solu-Medrol every 12 hours  Pulmonary follow-up  Will need outpatient pulmonary follow-up at rehab  
Chief complaint of worsening shortness of breath, weakness.  Was not using her nebulizers at home as previously ordered.  Found to be in COPD flare, hypoxic in the ER requiring 5 L/min.  Resume Pulmicort, Perforomist  Resume Singulair  Xopenex/Atrovent as needed  Start IV Solu-Medrol 40 mg every 8 hours  Pulmonary consult  
Chronically maintained on 4 L/min of oxygen  See plan above, wean as tolerated  
Continue supplemental oxygen to maintain sats greater than 88%  Continue nocturnal BiPAP  
Continue supplemental oxygen to maintain sats greater than 88%  Continue nocturnal BiPAP  
Euvolemic  
Euvolemic  
Exacerbation is related to her not being able to use any medications for the past month.  She states she was too tired to even use nebulizers.    Plan:  Complete q12h today and transition to prednisone 40mg on 7/15  Use all nebulized regimen of Pulmicort/Perforomist twice daily, Xopenex/Atrovent - TID  Continue singulair daily    At time of discharge would likely benefit from outpatient pulmonary rehab if she does not require inpatient rehab.  
Exacerbation is related to her not being able to use any medications for the past month.  She states she was too tired to even use nebulizers.    Plan:  Continue every 8 hour Solu-Medrol  Use all nebulized regimen of Pulmicort/Perforomist twice daily  I added Xopenex/Atrovent every 6 hours    With procalcitonin negative x 2, and no CT evidence of pneumonia, will discontinue ceftriaxone    At time of discharge would likely benefit from outpatient pulmonary rehab if she does not require inpatient rehab.  
Exacerbation is related to her not being able to use any medications for the past month.  She states she was too tired to even use nebulizers.  - Home regimen: pulmicort neb BID, brovana neb BID, yupelri neb once daily. She can resume this regimen upon discharge    Plan:  - Transitioned to prednisone 40mg today with 10mg taper every 3 days   - Use all nebulized regimen of Pulmicort/Perforomist twice daily, Xopenex/Atrovent - TID  - Continue singulair daily  - She will need OP pulmonary follow up. Will arrange with scheduling staff.    At time of discharge would likely benefit from outpatient pulmonary rehab if she does not require inpatient rehab.  
Lab Results   Component Value Date    HGBA1C 8.8 (A) 04/22/2025       Recent Labs     07/15/25  1557 07/15/25  2127 07/16/25  0254 07/16/25  0721   POCGLU 390* 461* 261* 285*       Blood Sugar Average: Last 72 hrs:  (P) [id number] home Lantus 40 units nightly  Resume Humalog 20 units daily with meals  Sliding scale coverage while hospitalized  
Lab Results   Component Value Date    HGBA1C 8.8 (A) 04/22/2025       Recent Labs     07/16/25  1126 07/16/25  1608 07/16/25 2034 07/17/25  0712   POCGLU 432* 405* 362* 371*       Blood Sugar Average: Last 72 hrs:  (P) 379.25Resume home Lantus 40 units nightly  Resume Humalog 20 units daily with meals  Sliding scale coverage while hospitalized  
Mild IVELISSE with creatinine 1.4 on admission, baseline around 0.7  Hold nephrotoxins, spironolactone, Bumex  Hold IV fluids in the setting of diastolic heart failure  Repeat BMP tomorrow  
Mild IVELISSE with creatinine 1.4 on admission, baseline around 0.7  Hold nephrotoxins, spironolactone, Bumex  Hold IV fluids in the setting of diastolic heart failure  Restarted on diuretics 7/15/2025  Resolved  
Mild IVELISSE with creatinine 1.4 on admission, baseline around 0.7  Hold nephrotoxins, spironolactone, Bumex  Hold IV fluids in the setting of diastolic heart failure  Restarted on diuretics 7/15/2025  Trend BMP  
Noted history, follows with pulmonary medicine  
Patient admitted with acute on chronic respiratory failure with hypoxia as evidenced by requiring 5 L supplemental oxygen on admission and tachypnea and respiratory distress  Chronically maintained on 4 L/min of oxygen  See plan above, wean as tolerated  
Patient admitted with acute on chronic respiratory failure with hypoxia as evidenced by requiring 5 L supplemental oxygen on admission and tachypnea and respiratory distress  Chronically maintained on 4 L/min of oxygen  See plan above, wean as tolerated  
Patient with sepsis as evidenced by tachypnea, tachycardia and leukocytosis secondary to COPD exacerbation  Continue on IV Rocephin  Lactate is 1.7  Procalcitonin x 2 is negative.  DC Rocephin.  Blood cultures are negative to date  
Patient with sepsis as evidenced by tachypnea, tachycardia and leukocytosis secondary to COPD exacerbation  Continue on IV Rocephin  Lactate is 1.7  Procalcitonin x 2 is negative.  DC Rocephin.  Blood cultures are negative to date  Stable at discharge  
Resume home Eliquis  Continue Toprol-XL and Cardizem  
Resume home diuretics 7/15  
She is on her baseline 4L continuous supplemental oxygen  Continue supplemental oxygen to maintain sats greater than 88%  Continue nocturnal BiPAP  
Wt Readings from Last 3 Encounters:   07/14/25 123 kg (271 lb)   06/03/25 122 kg (268 lb)   05/20/25 122 kg (268 lb 12.8 oz)   EF 65%, grade 2 diastolic dysfunction  Hold Bumex and Aldactone in the setting of IVELISSE  Monitor volume status  
Wt Readings from Last 3 Encounters:   07/14/25 123 kg (271 lb)   06/03/25 122 kg (268 lb)   05/20/25 122 kg (268 lb 12.8 oz)   EF 65%, grade 2 diastolic dysfunction  Restarted on Bumex and Aldactone  Monitor volume status  
Wt Readings from Last 3 Encounters:   07/14/25 123 kg (271 lb)   06/03/25 122 kg (268 lb)   05/20/25 122 kg (268 lb 12.8 oz)   EF 65%, grade 2 diastolic dysfunction  Restarted on Bumex and Aldactone  Monitor volume status  
all other ROS negative except as per HPI

## 2025-07-18 NOTE — DIETITIAN INITIAL EVALUATION ADULT - ADD RECOMMEND
Detail Level: Detailed
Severe Malnutrition identified; change to NPO per MD, pt unable to swallow, poor prognosis at present per team